# Patient Record
Sex: FEMALE | Race: WHITE | Employment: OTHER | URBAN - NONMETROPOLITAN AREA
[De-identification: names, ages, dates, MRNs, and addresses within clinical notes are randomized per-mention and may not be internally consistent; named-entity substitution may affect disease eponyms.]

---

## 2017-01-10 ENCOUNTER — TELEPHONE (OUTPATIENT)
Dept: NEUROLOGY | Age: 42
End: 2017-01-10

## 2017-01-24 ENCOUNTER — OFFICE VISIT (OUTPATIENT)
Dept: NEUROLOGY | Age: 42
End: 2017-01-24
Payer: MEDICARE

## 2017-01-24 VITALS
HEIGHT: 60 IN | WEIGHT: 180 LBS | RESPIRATION RATE: 16 BRPM | BODY MASS INDEX: 35.34 KG/M2 | DIASTOLIC BLOOD PRESSURE: 75 MMHG | SYSTOLIC BLOOD PRESSURE: 102 MMHG | HEART RATE: 80 BPM

## 2017-01-24 DIAGNOSIS — G47.33 OBSTRUCTIVE SLEEP APNEA: ICD-10-CM

## 2017-01-24 DIAGNOSIS — Z78.9 DIFFICULTY WITH BIPAP USE: ICD-10-CM

## 2017-01-24 DIAGNOSIS — G40.219 LOCALZ-RLTD SYMPTOMATIC EPILEPSY W CMPLX PART SZ, INTRACT, WO STATUS (HCC): Primary | ICD-10-CM

## 2017-01-24 PROCEDURE — G8419 CALC BMI OUT NRM PARAM NOF/U: HCPCS | Performed by: PHYSICIAN ASSISTANT

## 2017-01-24 PROCEDURE — G8427 DOCREV CUR MEDS BY ELIG CLIN: HCPCS | Performed by: PHYSICIAN ASSISTANT

## 2017-01-24 PROCEDURE — G8484 FLU IMMUNIZE NO ADMIN: HCPCS | Performed by: PHYSICIAN ASSISTANT

## 2017-01-24 PROCEDURE — 4004F PT TOBACCO SCREEN RCVD TLK: CPT | Performed by: PHYSICIAN ASSISTANT

## 2017-01-24 PROCEDURE — 99213 OFFICE O/P EST LOW 20 MIN: CPT | Performed by: PHYSICIAN ASSISTANT

## 2017-01-24 RX ORDER — GABAPENTIN 600 MG/1
300 TABLET ORAL DAILY
COMMUNITY

## 2017-02-04 ENCOUNTER — TELEPHONE (OUTPATIENT)
Dept: URGENT CARE | Facility: CLINIC | Age: 42
End: 2017-02-04

## 2017-02-04 NOTE — TELEPHONE ENCOUNTER
"Pt called to say she is having some spotting and has a h/o hysterectomy and her DR is not in today. I told pt she should call her OB/GYN on Monday to get appt and relax this weekend. Use tylenol for pain avoid NSAID. Pt to go to the ER if abd pain is severe, fevers, or bleeding increases. Pt then asks if it is possible to get a blood clot while on blood thinners, I explained it is possible if blood thinners are not in therapeutic range. Pt then states that her calf is \"killing her\". I told pt to go to the ER  "

## 2017-02-08 ENCOUNTER — HOSPITAL ENCOUNTER (INPATIENT)
Age: 42
LOS: 6 days | Discharge: HOME | DRG: 813 | End: 2017-02-14
Attending: EMERGENCY MEDICINE | Admitting: HOSPITALIST
Payer: MEDICARE

## 2017-02-08 DIAGNOSIS — D68.32 WARFARIN-INDUCED COAGULOPATHY (HCC): Primary | ICD-10-CM

## 2017-02-08 DIAGNOSIS — T45.515A WARFARIN-INDUCED COAGULOPATHY (HCC): Primary | ICD-10-CM

## 2017-02-08 LAB
ALBUMIN SERPL-MCNC: 3.8 G/DL (ref 3.5–5.2)
ALP BLD-CCNC: 119 U/L (ref 35–104)
ALT SERPL-CCNC: 21 U/L (ref 5–33)
ANION GAP SERPL CALCULATED.3IONS-SCNC: 12 MMOL/L (ref 7–19)
ANISOCYTOSIS: ABNORMAL
AST SERPL-CCNC: 16 U/L (ref 5–32)
BASOPHILS ABSOLUTE: 0 K/UL (ref 0–0.2)
BASOPHILS RELATIVE PERCENT: 0 % (ref 0–1)
BILIRUB SERPL-MCNC: <0.2 MG/DL (ref 0.2–1.2)
BUN BLDV-MCNC: 9 MG/DL (ref 6–20)
CALCIUM SERPL-MCNC: 8.9 MG/DL (ref 8.6–10)
CHLORIDE BLD-SCNC: 104 MMOL/L (ref 98–111)
CO2: 23 MMOL/L (ref 22–29)
CREAT SERPL-MCNC: 0.6 MG/DL (ref 0.5–0.9)
EOSINOPHILS ABSOLUTE: 0.55 K/UL (ref 0–0.6)
EOSINOPHILS RELATIVE PERCENT: 4 % (ref 0–5)
GFR NON-AFRICAN AMERICAN: >60
GLOBULIN: 2.7 G/DL
GLUCOSE BLD-MCNC: 99 MG/DL (ref 74–109)
HCT VFR BLD CALC: 42.6 % (ref 37–47)
HEMOGLOBIN: 13.3 G/DL (ref 12–16)
INR BLD: >18.8 (ref 0.88–1.18)
LYMPHOCYTES ABSOLUTE: 2.9 K/UL (ref 1.1–4.5)
LYMPHOCYTES RELATIVE PERCENT: 21 % (ref 20–40)
MCH RBC QN AUTO: 31.5 PG (ref 27–31)
MCHC RBC AUTO-ENTMCNC: 31.2 G/DL (ref 33–37)
MCV RBC AUTO: 100.9 FL (ref 81–99)
MONOCYTES ABSOLUTE: 0.3 K/UL (ref 0–0.9)
MONOCYTES RELATIVE PERCENT: 2 % (ref 0–10)
NEUTROPHILS ABSOLUTE: 10.1 K/UL (ref 1.5–7.5)
NEUTROPHILS RELATIVE PERCENT: 73 % (ref 50–65)
PDW BLD-RTO: 14.8 % (ref 11.5–14.5)
PLATELET # BLD: 249 K/UL (ref 130–400)
PLATELET SLIDE REVIEW: ADEQUATE
PMV BLD AUTO: 9.9 FL (ref 7.4–10.4)
POTASSIUM SERPL-SCNC: 3.7 MMOL/L (ref 3.5–5)
PROTHROMBIN TIME: >120 SEC (ref 12–14.6)
RBC # BLD: 4.22 M/UL (ref 4.2–5.4)
SODIUM BLD-SCNC: 139 MMOL/L (ref 136–145)
TEAR DROP CELLS: ABNORMAL
TOTAL PROTEIN: 6.5 G/DL (ref 6.6–8.7)
WBC # BLD: 13.8 K/UL (ref 4.8–10.8)

## 2017-02-08 PROCEDURE — 99285 EMERGENCY DEPT VISIT HI MDM: CPT

## 2017-02-08 PROCEDURE — 2580000003 HC RX 258: Performed by: EMERGENCY MEDICINE

## 2017-02-08 PROCEDURE — 85610 PROTHROMBIN TIME: CPT

## 2017-02-08 PROCEDURE — 99999 PR OFFICE/OUTPT VISIT,PROCEDURE ONLY: CPT | Performed by: CLINICAL NURSE SPECIALIST

## 2017-02-08 PROCEDURE — 36430 TRANSFUSION BLD/BLD COMPNT: CPT

## 2017-02-08 PROCEDURE — 80053 COMPREHEN METABOLIC PANEL: CPT

## 2017-02-08 PROCEDURE — 85025 COMPLETE CBC W/AUTO DIFF WBC: CPT

## 2017-02-08 PROCEDURE — 36415 COLL VENOUS BLD VENIPUNCTURE: CPT

## 2017-02-08 PROCEDURE — 6360000002 HC RX W HCPCS: Performed by: EMERGENCY MEDICINE

## 2017-02-08 PROCEDURE — 99283 EMERGENCY DEPT VISIT LOW MDM: CPT | Performed by: EMERGENCY MEDICINE

## 2017-02-08 PROCEDURE — 96365 THER/PROPH/DIAG IV INF INIT: CPT

## 2017-02-08 PROCEDURE — 1210000000 HC MED SURG R&B

## 2017-02-08 RX ORDER — FLUOXETINE HYDROCHLORIDE 40 MG/1
40 CAPSULE ORAL DAILY
COMMUNITY

## 2017-02-08 RX ORDER — ONDANSETRON 4 MG/1
4 TABLET, ORALLY DISINTEGRATING ORAL ONCE
Status: COMPLETED | OUTPATIENT
Start: 2017-02-08 | End: 2017-02-08

## 2017-02-08 RX ORDER — 0.9 % SODIUM CHLORIDE 0.9 %
250 INTRAVENOUS SOLUTION INTRAVENOUS ONCE
Status: COMPLETED | OUTPATIENT
Start: 2017-02-08 | End: 2017-02-09

## 2017-02-08 RX ADMIN — SODIUM CHLORIDE 250 ML: 9 INJECTION, SOLUTION INTRAVENOUS at 22:39

## 2017-02-08 RX ADMIN — PHYTONADIONE 10 MG: 10 INJECTION, EMULSION INTRAMUSCULAR; INTRAVENOUS; SUBCUTANEOUS at 23:17

## 2017-02-08 RX ADMIN — ONDANSETRON 4 MG: 4 TABLET, ORALLY DISINTEGRATING ORAL at 22:00

## 2017-02-08 ASSESSMENT — PAIN SCALES - GENERAL: PAINLEVEL_OUTOF10: 9

## 2017-02-09 PROBLEM — G40.909 SEIZURE DISORDER (HCC): Chronic | Status: ACTIVE | Noted: 2017-02-09

## 2017-02-09 PROBLEM — D68.59 HYPERCOAGULOPATHY (HCC): Status: ACTIVE | Noted: 2017-02-09

## 2017-02-09 LAB
ABO/RH: NORMAL
ANION GAP SERPL CALCULATED.3IONS-SCNC: 14 MMOL/L (ref 7–19)
ANTIBODY SCREEN: NORMAL
BLOOD BANK DISPENSE STATUS: NORMAL
BLOOD BANK PRODUCT CODE: NORMAL
BPU ID: NORMAL
BUN BLDV-MCNC: 8 MG/DL (ref 6–20)
CALCIUM SERPL-MCNC: 9.9 MG/DL (ref 8.6–10)
CHLORIDE BLD-SCNC: 103 MMOL/L (ref 98–111)
CO2: 28 MMOL/L (ref 22–29)
CREAT SERPL-MCNC: 0.7 MG/DL (ref 0.5–0.9)
DESCRIPTION BLOOD BANK: NORMAL
GFR NON-AFRICAN AMERICAN: >60
GLUCOSE BLD-MCNC: 83 MG/DL (ref 74–109)
HCT VFR BLD CALC: 43.1 % (ref 37–47)
HEMOGLOBIN: 13.3 G/DL (ref 12–16)
INR BLD: 1.33 (ref 0.88–1.18)
MAGNESIUM: 2.3 MG/DL (ref 1.6–2.6)
MCH RBC QN AUTO: 31.6 PG (ref 27–31)
MCHC RBC AUTO-ENTMCNC: 30.9 G/DL (ref 33–37)
MCV RBC AUTO: 102.4 FL (ref 81–99)
PDW BLD-RTO: 15 % (ref 11.5–14.5)
PHOSPHORUS: 3.4 MG/DL (ref 2.5–4.5)
PLATELET # BLD: 222 K/UL (ref 130–400)
PMV BLD AUTO: 9.9 FL (ref 7.4–10.4)
POTASSIUM SERPL-SCNC: 3.7 MMOL/L (ref 3.5–5)
PROTHROMBIN TIME: 16.4 SEC (ref 12–14.6)
RBC # BLD: 4.21 M/UL (ref 4.2–5.4)
SODIUM BLD-SCNC: 145 MMOL/L (ref 136–145)
TSH SERPL DL<=0.05 MIU/L-ACNC: 1.87 UIU/ML (ref 0.27–4.2)
WBC # BLD: 9.8 K/UL (ref 4.8–10.8)

## 2017-02-09 PROCEDURE — 80048 BASIC METABOLIC PNL TOTAL CA: CPT

## 2017-02-09 PROCEDURE — 83735 ASSAY OF MAGNESIUM: CPT

## 2017-02-09 PROCEDURE — 85027 COMPLETE CBC AUTOMATED: CPT

## 2017-02-09 PROCEDURE — 1210000000 HC MED SURG R&B

## 2017-02-09 PROCEDURE — 36415 COLL VENOUS BLD VENIPUNCTURE: CPT

## 2017-02-09 PROCEDURE — 99999 PR OFFICE/OUTPT VISIT,PROCEDURE ONLY: CPT | Performed by: HOSPITALIST

## 2017-02-09 PROCEDURE — 85610 PROTHROMBIN TIME: CPT

## 2017-02-09 PROCEDURE — 94640 AIRWAY INHALATION TREATMENT: CPT

## 2017-02-09 PROCEDURE — 6370000000 HC RX 637 (ALT 250 FOR IP): Performed by: HOSPITALIST

## 2017-02-09 PROCEDURE — 86850 RBC ANTIBODY SCREEN: CPT

## 2017-02-09 PROCEDURE — 6360000002 HC RX W HCPCS: Performed by: CLINICAL NURSE SPECIALIST

## 2017-02-09 PROCEDURE — 99223 1ST HOSP IP/OBS HIGH 75: CPT | Performed by: HOSPITALIST

## 2017-02-09 PROCEDURE — 86901 BLOOD TYPING SEROLOGIC RH(D): CPT

## 2017-02-09 PROCEDURE — 6370000000 HC RX 637 (ALT 250 FOR IP): Performed by: CLINICAL NURSE SPECIALIST

## 2017-02-09 PROCEDURE — 36430 TRANSFUSION BLD/BLD COMPNT: CPT

## 2017-02-09 PROCEDURE — 6360000002 HC RX W HCPCS: Performed by: HOSPITALIST

## 2017-02-09 PROCEDURE — 84443 ASSAY THYROID STIM HORMONE: CPT

## 2017-02-09 PROCEDURE — P9059 PLASMA, FRZ BETWEEN 8-24HOUR: HCPCS

## 2017-02-09 PROCEDURE — 84100 ASSAY OF PHOSPHORUS: CPT

## 2017-02-09 PROCEDURE — 86900 BLOOD TYPING SEROLOGIC ABO: CPT

## 2017-02-09 RX ORDER — WARFARIN SODIUM 2.5 MG/1
2.5 TABLET ORAL DAILY
Status: DISCONTINUED | OUTPATIENT
Start: 2017-02-09 | End: 2017-02-09

## 2017-02-09 RX ORDER — LITHIUM CARBONATE 300 MG/1
300 CAPSULE ORAL
Status: DISCONTINUED | OUTPATIENT
Start: 2017-02-09 | End: 2017-02-14 | Stop reason: HOSPADM

## 2017-02-09 RX ORDER — GABAPENTIN 600 MG/1
600 TABLET ORAL 2 TIMES DAILY
Status: DISCONTINUED | OUTPATIENT
Start: 2017-02-09 | End: 2017-02-14 | Stop reason: HOSPADM

## 2017-02-09 RX ORDER — PROMETHAZINE HYDROCHLORIDE 25 MG/1
25 TABLET ORAL 2 TIMES DAILY PRN
Status: DISCONTINUED | OUTPATIENT
Start: 2017-02-09 | End: 2017-02-14 | Stop reason: HOSPADM

## 2017-02-09 RX ORDER — HYDROCODONE BITARTRATE AND ACETAMINOPHEN 7.5; 325 MG/1; MG/1
1 TABLET ORAL EVERY 6 HOURS PRN
Status: DISCONTINUED | OUTPATIENT
Start: 2017-02-09 | End: 2017-02-13

## 2017-02-09 RX ORDER — ONDANSETRON 2 MG/ML
4 INJECTION INTRAMUSCULAR; INTRAVENOUS EVERY 4 HOURS PRN
Status: DISCONTINUED | OUTPATIENT
Start: 2017-02-09 | End: 2017-02-14 | Stop reason: HOSPADM

## 2017-02-09 RX ORDER — TOPIRAMATE 100 MG/1
100 TABLET, FILM COATED ORAL DAILY
Status: DISCONTINUED | OUTPATIENT
Start: 2017-02-09 | End: 2017-02-14 | Stop reason: HOSPADM

## 2017-02-09 RX ORDER — LEVOTHYROXINE SODIUM 0.1 MG/1
100 TABLET ORAL DAILY
Status: DISCONTINUED | OUTPATIENT
Start: 2017-02-09 | End: 2017-02-14 | Stop reason: HOSPADM

## 2017-02-09 RX ORDER — ACETAMINOPHEN 325 MG/1
650 TABLET ORAL EVERY 4 HOURS PRN
Status: DISCONTINUED | OUTPATIENT
Start: 2017-02-09 | End: 2017-02-14 | Stop reason: HOSPADM

## 2017-02-09 RX ORDER — ZOLPIDEM TARTRATE 5 MG/1
10 TABLET ORAL NIGHTLY PRN
Status: DISCONTINUED | OUTPATIENT
Start: 2017-02-09 | End: 2017-02-13

## 2017-02-09 RX ORDER — PANTOPRAZOLE SODIUM 40 MG/1
40 TABLET, DELAYED RELEASE ORAL
Status: DISCONTINUED | OUTPATIENT
Start: 2017-02-09 | End: 2017-02-12

## 2017-02-09 RX ORDER — FLUOXETINE HYDROCHLORIDE 20 MG/1
40 CAPSULE ORAL DAILY
Status: DISCONTINUED | OUTPATIENT
Start: 2017-02-09 | End: 2017-02-14 | Stop reason: HOSPADM

## 2017-02-09 RX ORDER — PANTOPRAZOLE SODIUM 40 MG/10ML
40 INJECTION, POWDER, LYOPHILIZED, FOR SOLUTION INTRAVENOUS DAILY
Status: DISCONTINUED | OUTPATIENT
Start: 2017-02-09 | End: 2017-02-09 | Stop reason: SDUPTHER

## 2017-02-09 RX ORDER — FORMOTEROL FUMARATE 20 UG/2ML
20 SOLUTION RESPIRATORY (INHALATION) 2 TIMES DAILY
Status: DISCONTINUED | OUTPATIENT
Start: 2017-02-09 | End: 2017-02-14 | Stop reason: HOSPADM

## 2017-02-09 RX ORDER — ACARBOSE 25 MG/1
25 TABLET ORAL
Status: DISCONTINUED | OUTPATIENT
Start: 2017-02-09 | End: 2017-02-14 | Stop reason: HOSPADM

## 2017-02-09 RX ORDER — WARFARIN SODIUM 5 MG/1
5 TABLET ORAL DAILY
Status: DISCONTINUED | OUTPATIENT
Start: 2017-02-09 | End: 2017-02-11

## 2017-02-09 RX ORDER — DIAZEPAM 10 MG/1
10 TABLET ORAL 3 TIMES DAILY PRN
Status: DISCONTINUED | OUTPATIENT
Start: 2017-02-09 | End: 2017-02-13

## 2017-02-09 RX ADMIN — ACARBOSE 25 MG: 25 TABLET ORAL at 16:02

## 2017-02-09 RX ADMIN — WARFARIN SODIUM 5 MG: 5 TABLET ORAL at 17:51

## 2017-02-09 RX ADMIN — PANTOPRAZOLE SODIUM 40 MG: 40 TABLET, DELAYED RELEASE ORAL at 07:22

## 2017-02-09 RX ADMIN — LITHIUM CARBONATE 300 MG: 300 CAPSULE, GELATIN COATED ORAL at 12:30

## 2017-02-09 RX ADMIN — LITHIUM CARBONATE 300 MG: 300 CAPSULE, GELATIN COATED ORAL at 16:02

## 2017-02-09 RX ADMIN — HYDROCODONE BITARTRATE AND ACETAMINOPHEN 1 TABLET: 7.5; 325 TABLET ORAL at 07:22

## 2017-02-09 RX ADMIN — LITHIUM CARBONATE 300 MG: 300 CAPSULE, GELATIN COATED ORAL at 07:22

## 2017-02-09 RX ADMIN — LEVOTHYROXINE SODIUM 100 MCG: 100 TABLET ORAL at 07:22

## 2017-02-09 RX ADMIN — DIAZEPAM 10 MG: 10 TABLET ORAL at 10:00

## 2017-02-09 RX ADMIN — GABAPENTIN 600 MG: 600 TABLET, FILM COATED ORAL at 21:12

## 2017-02-09 RX ADMIN — PROMETHAZINE HYDROCHLORIDE 25 MG: 25 TABLET ORAL at 09:56

## 2017-02-09 RX ADMIN — TOPIRAMATE 100 MG: 100 TABLET, FILM COATED ORAL at 09:55

## 2017-02-09 RX ADMIN — ZOLPIDEM TARTRATE 10 MG: 5 TABLET, FILM COATED ORAL at 21:13

## 2017-02-09 RX ADMIN — FORMOTEROL FUMARATE DIHYDRATE 20 MCG: 20 SOLUTION RESPIRATORY (INHALATION) at 19:17

## 2017-02-09 RX ADMIN — FLUOXETINE 40 MG: 20 CAPSULE ORAL at 09:55

## 2017-02-09 RX ADMIN — ENOXAPARIN SODIUM 80 MG: 80 INJECTION SUBCUTANEOUS at 21:12

## 2017-02-09 RX ADMIN — ACARBOSE 25 MG: 25 TABLET ORAL at 12:30

## 2017-02-09 RX ADMIN — GABAPENTIN 600 MG: 600 TABLET, FILM COATED ORAL at 09:55

## 2017-02-09 ASSESSMENT — ENCOUNTER SYMPTOMS
RESPIRATORY NEGATIVE: 1
EYES NEGATIVE: 1
BLOOD IN STOOL: 1

## 2017-02-09 ASSESSMENT — PAIN SCALES - GENERAL: PAINLEVEL_OUTOF10: 6

## 2017-02-10 LAB
ANION GAP SERPL CALCULATED.3IONS-SCNC: 13 MMOL/L (ref 7–19)
BUN BLDV-MCNC: 11 MG/DL (ref 6–20)
CALCIUM SERPL-MCNC: 9.4 MG/DL (ref 8.6–10)
CHLORIDE BLD-SCNC: 105 MMOL/L (ref 98–111)
CO2: 26 MMOL/L (ref 22–29)
CREAT SERPL-MCNC: 0.8 MG/DL (ref 0.5–0.9)
GFR NON-AFRICAN AMERICAN: >60
GLUCOSE BLD-MCNC: 68 MG/DL (ref 74–109)
HCT VFR BLD CALC: 41.9 % (ref 37–47)
HEMOGLOBIN: 12.7 G/DL (ref 12–16)
INR BLD: 1.19 (ref 0.88–1.18)
LITHIUM LEVEL: 0.7 MMOL/L (ref 0.6–1.2)
MAGNESIUM: 2.2 MG/DL (ref 1.6–2.6)
MCH RBC QN AUTO: 31.1 PG (ref 27–31)
MCHC RBC AUTO-ENTMCNC: 30.3 G/DL (ref 33–37)
MCV RBC AUTO: 102.7 FL (ref 81–99)
PDW BLD-RTO: 14.7 % (ref 11.5–14.5)
PLATELET # BLD: 226 K/UL (ref 130–400)
PMV BLD AUTO: 9.9 FL (ref 7.4–10.4)
POTASSIUM SERPL-SCNC: 4 MMOL/L (ref 3.5–5)
PROTHROMBIN TIME: 15 SEC (ref 12–14.6)
RBC # BLD: 4.08 M/UL (ref 4.2–5.4)
SODIUM BLD-SCNC: 144 MMOL/L (ref 136–145)
WBC # BLD: 9.2 K/UL (ref 4.8–10.8)

## 2017-02-10 PROCEDURE — 6370000000 HC RX 637 (ALT 250 FOR IP): Performed by: CLINICAL NURSE SPECIALIST

## 2017-02-10 PROCEDURE — 6360000002 HC RX W HCPCS: Performed by: CLINICAL NURSE SPECIALIST

## 2017-02-10 PROCEDURE — 85027 COMPLETE CBC AUTOMATED: CPT

## 2017-02-10 PROCEDURE — G8978 MOBILITY CURRENT STATUS: HCPCS

## 2017-02-10 PROCEDURE — 83735 ASSAY OF MAGNESIUM: CPT

## 2017-02-10 PROCEDURE — G8979 MOBILITY GOAL STATUS: HCPCS

## 2017-02-10 PROCEDURE — 97162 PT EVAL MOD COMPLEX 30 MIN: CPT

## 2017-02-10 PROCEDURE — 94640 AIRWAY INHALATION TREATMENT: CPT

## 2017-02-10 PROCEDURE — 2700000000 HC OXYGEN THERAPY PER DAY

## 2017-02-10 PROCEDURE — 85610 PROTHROMBIN TIME: CPT

## 2017-02-10 PROCEDURE — 99232 SBSQ HOSP IP/OBS MODERATE 35: CPT | Performed by: INTERNAL MEDICINE

## 2017-02-10 PROCEDURE — 6360000002 HC RX W HCPCS: Performed by: PHYSICIAN ASSISTANT

## 2017-02-10 PROCEDURE — 80048 BASIC METABOLIC PNL TOTAL CA: CPT

## 2017-02-10 PROCEDURE — 6360000002 HC RX W HCPCS: Performed by: HOSPITALIST

## 2017-02-10 PROCEDURE — 2500000003 HC RX 250 WO HCPCS: Performed by: PHYSICIAN ASSISTANT

## 2017-02-10 PROCEDURE — 1210000000 HC MED SURG R&B

## 2017-02-10 PROCEDURE — 80178 ASSAY OF LITHIUM: CPT

## 2017-02-10 PROCEDURE — 2580000003 HC RX 258: Performed by: PHYSICIAN ASSISTANT

## 2017-02-10 PROCEDURE — 36415 COLL VENOUS BLD VENIPUNCTURE: CPT

## 2017-02-10 RX ADMIN — FLUOXETINE 40 MG: 20 CAPSULE ORAL at 08:46

## 2017-02-10 RX ADMIN — LITHIUM CARBONATE 300 MG: 300 CAPSULE, GELATIN COATED ORAL at 08:46

## 2017-02-10 RX ADMIN — ENOXAPARIN SODIUM 80 MG: 80 INJECTION SUBCUTANEOUS at 20:52

## 2017-02-10 RX ADMIN — HYDROCODONE BITARTRATE AND ACETAMINOPHEN 1 TABLET: 7.5; 325 TABLET ORAL at 05:25

## 2017-02-10 RX ADMIN — HYDROCODONE BITARTRATE AND ACETAMINOPHEN 1 TABLET: 7.5; 325 TABLET ORAL at 15:38

## 2017-02-10 RX ADMIN — ENOXAPARIN SODIUM 80 MG: 80 INJECTION SUBCUTANEOUS at 08:46

## 2017-02-10 RX ADMIN — GABAPENTIN 600 MG: 600 TABLET, FILM COATED ORAL at 20:52

## 2017-02-10 RX ADMIN — MAGNESIUM SULFATE HEPTAHYDRATE 1 G: 1 INJECTION, SOLUTION INTRAVENOUS at 17:02

## 2017-02-10 RX ADMIN — PANTOPRAZOLE SODIUM 40 MG: 40 TABLET, DELAYED RELEASE ORAL at 05:24

## 2017-02-10 RX ADMIN — ZOLPIDEM TARTRATE 10 MG: 5 TABLET, FILM COATED ORAL at 22:58

## 2017-02-10 RX ADMIN — LEVOTHYROXINE SODIUM 100 MCG: 100 TABLET ORAL at 05:25

## 2017-02-10 RX ADMIN — HYDROCODONE BITARTRATE AND ACETAMINOPHEN 1 TABLET: 7.5; 325 TABLET ORAL at 22:55

## 2017-02-10 RX ADMIN — ACARBOSE 25 MG: 25 TABLET ORAL at 13:13

## 2017-02-10 RX ADMIN — HYDROCODONE BITARTRATE AND ACETAMINOPHEN 1 TABLET: 7.5; 325 TABLET ORAL at 08:45

## 2017-02-10 RX ADMIN — GABAPENTIN 600 MG: 600 TABLET, FILM COATED ORAL at 08:45

## 2017-02-10 RX ADMIN — ACARBOSE 25 MG: 25 TABLET ORAL at 08:45

## 2017-02-10 RX ADMIN — VALPROATE SODIUM 250 MG: 100 INJECTION, SOLUTION INTRAVENOUS at 15:37

## 2017-02-10 RX ADMIN — TOPIRAMATE 100 MG: 100 TABLET, FILM COATED ORAL at 08:45

## 2017-02-10 RX ADMIN — LITHIUM CARBONATE 300 MG: 300 CAPSULE, GELATIN COATED ORAL at 13:13

## 2017-02-10 RX ADMIN — ONDANSETRON 4 MG: 2 INJECTION INTRAMUSCULAR; INTRAVENOUS at 08:46

## 2017-02-10 RX ADMIN — FORMOTEROL FUMARATE DIHYDRATE 20 MCG: 20 SOLUTION RESPIRATORY (INHALATION) at 18:26

## 2017-02-10 RX ADMIN — FORMOTEROL FUMARATE DIHYDRATE 20 MCG: 20 SOLUTION RESPIRATORY (INHALATION) at 06:31

## 2017-02-10 ASSESSMENT — PAIN SCALES - GENERAL
PAINLEVEL_OUTOF10: 8
PAINLEVEL_OUTOF10: 9
PAINLEVEL_OUTOF10: 0
PAINLEVEL_OUTOF10: 0
PAINLEVEL_OUTOF10: 10
PAINLEVEL_OUTOF10: 8

## 2017-02-11 LAB
ANION GAP SERPL CALCULATED.3IONS-SCNC: 15 MMOL/L (ref 7–19)
BUN BLDV-MCNC: 9 MG/DL (ref 6–20)
CALCIUM SERPL-MCNC: 9.1 MG/DL (ref 8.6–10)
CHLORIDE BLD-SCNC: 104 MMOL/L (ref 98–111)
CO2: 24 MMOL/L (ref 22–29)
CREAT SERPL-MCNC: 0.8 MG/DL (ref 0.5–0.9)
GFR NON-AFRICAN AMERICAN: >60
GLUCOSE BLD-MCNC: 85 MG/DL (ref 74–109)
HCT VFR BLD CALC: 39.6 % (ref 37–47)
HEMOGLOBIN: 12.1 G/DL (ref 12–16)
INR BLD: 1.12 (ref 0.88–1.18)
MCH RBC QN AUTO: 31.6 PG (ref 27–31)
MCHC RBC AUTO-ENTMCNC: 30.6 G/DL (ref 33–37)
MCV RBC AUTO: 103.4 FL (ref 81–99)
PDW BLD-RTO: 14.7 % (ref 11.5–14.5)
PLATELET # BLD: 238 K/UL (ref 130–400)
PMV BLD AUTO: 9.8 FL (ref 7.4–10.4)
POTASSIUM SERPL-SCNC: 4.2 MMOL/L (ref 3.5–5)
PROTHROMBIN TIME: 14.4 SEC (ref 12–14.6)
RBC # BLD: 3.83 M/UL (ref 4.2–5.4)
SODIUM BLD-SCNC: 143 MMOL/L (ref 136–145)
WBC # BLD: 9.1 K/UL (ref 4.8–10.8)

## 2017-02-11 PROCEDURE — 85610 PROTHROMBIN TIME: CPT

## 2017-02-11 PROCEDURE — 94640 AIRWAY INHALATION TREATMENT: CPT

## 2017-02-11 PROCEDURE — 97110 THERAPEUTIC EXERCISES: CPT

## 2017-02-11 PROCEDURE — 97116 GAIT TRAINING THERAPY: CPT

## 2017-02-11 PROCEDURE — 36415 COLL VENOUS BLD VENIPUNCTURE: CPT

## 2017-02-11 PROCEDURE — 1210000000 HC MED SURG R&B

## 2017-02-11 PROCEDURE — 6370000000 HC RX 637 (ALT 250 FOR IP): Performed by: CLINICAL NURSE SPECIALIST

## 2017-02-11 PROCEDURE — 99232 SBSQ HOSP IP/OBS MODERATE 35: CPT | Performed by: INTERNAL MEDICINE

## 2017-02-11 PROCEDURE — 85027 COMPLETE CBC AUTOMATED: CPT

## 2017-02-11 PROCEDURE — 6360000002 HC RX W HCPCS: Performed by: HOSPITALIST

## 2017-02-11 PROCEDURE — 80048 BASIC METABOLIC PNL TOTAL CA: CPT

## 2017-02-11 PROCEDURE — 6360000002 HC RX W HCPCS: Performed by: CLINICAL NURSE SPECIALIST

## 2017-02-11 PROCEDURE — 6370000000 HC RX 637 (ALT 250 FOR IP): Performed by: PHYSICIAN ASSISTANT

## 2017-02-11 PROCEDURE — 2700000000 HC OXYGEN THERAPY PER DAY

## 2017-02-11 RX ORDER — ALBUTEROL SULFATE 90 UG/1
2 AEROSOL, METERED RESPIRATORY (INHALATION) EVERY 6 HOURS PRN
Status: DISCONTINUED | OUTPATIENT
Start: 2017-02-11 | End: 2017-02-14 | Stop reason: HOSPADM

## 2017-02-11 RX ORDER — WARFARIN SODIUM 5 MG/1
10 TABLET ORAL DAILY
Status: DISCONTINUED | OUTPATIENT
Start: 2017-02-11 | End: 2017-02-14 | Stop reason: HOSPADM

## 2017-02-11 RX ADMIN — LEVOTHYROXINE SODIUM 100 MCG: 100 TABLET ORAL at 05:28

## 2017-02-11 RX ADMIN — FORMOTEROL FUMARATE DIHYDRATE 20 MCG: 20 SOLUTION RESPIRATORY (INHALATION) at 18:59

## 2017-02-11 RX ADMIN — LITHIUM CARBONATE 300 MG: 300 CAPSULE, GELATIN COATED ORAL at 18:47

## 2017-02-11 RX ADMIN — HYDROCODONE BITARTRATE AND ACETAMINOPHEN 1 TABLET: 7.5; 325 TABLET ORAL at 20:30

## 2017-02-11 RX ADMIN — ENOXAPARIN SODIUM 80 MG: 80 INJECTION SUBCUTANEOUS at 08:32

## 2017-02-11 RX ADMIN — WARFARIN SODIUM 10 MG: 5 TABLET ORAL at 18:47

## 2017-02-11 RX ADMIN — TOPIRAMATE 100 MG: 100 TABLET, FILM COATED ORAL at 08:32

## 2017-02-11 RX ADMIN — ZOLPIDEM TARTRATE 10 MG: 5 TABLET, FILM COATED ORAL at 20:30

## 2017-02-11 RX ADMIN — ENOXAPARIN SODIUM 80 MG: 80 INJECTION SUBCUTANEOUS at 20:19

## 2017-02-11 RX ADMIN — FORMOTEROL FUMARATE DIHYDRATE 20 MCG: 20 SOLUTION RESPIRATORY (INHALATION) at 07:04

## 2017-02-11 RX ADMIN — GABAPENTIN 600 MG: 600 TABLET, FILM COATED ORAL at 20:19

## 2017-02-11 RX ADMIN — GABAPENTIN 600 MG: 600 TABLET, FILM COATED ORAL at 08:32

## 2017-02-11 RX ADMIN — LITHIUM CARBONATE 300 MG: 300 CAPSULE, GELATIN COATED ORAL at 12:49

## 2017-02-11 RX ADMIN — LITHIUM CARBONATE 300 MG: 300 CAPSULE, GELATIN COATED ORAL at 08:32

## 2017-02-11 RX ADMIN — PANTOPRAZOLE SODIUM 40 MG: 40 TABLET, DELAYED RELEASE ORAL at 05:28

## 2017-02-11 RX ADMIN — FLUOXETINE 40 MG: 20 CAPSULE ORAL at 08:32

## 2017-02-11 RX ADMIN — HYDROCODONE BITARTRATE AND ACETAMINOPHEN 1 TABLET: 7.5; 325 TABLET ORAL at 13:53

## 2017-02-11 RX ADMIN — ACARBOSE 25 MG: 25 TABLET ORAL at 08:32

## 2017-02-11 RX ADMIN — DIAZEPAM 10 MG: 10 TABLET ORAL at 20:30

## 2017-02-11 RX ADMIN — ACARBOSE 25 MG: 25 TABLET ORAL at 12:49

## 2017-02-11 ASSESSMENT — PAIN SCALES - GENERAL
PAINLEVEL_OUTOF10: 10
PAINLEVEL_OUTOF10: 8
PAINLEVEL_OUTOF10: 0
PAINLEVEL_OUTOF10: 10
PAINLEVEL_OUTOF10: 0

## 2017-02-12 LAB
ANION GAP SERPL CALCULATED.3IONS-SCNC: 9 MMOL/L (ref 7–19)
BILIRUBIN URINE: NEGATIVE
BLOOD, URINE: ABNORMAL
BUN BLDV-MCNC: 11 MG/DL (ref 6–20)
CALCIUM SERPL-MCNC: 9.8 MG/DL (ref 8.6–10)
CHLORIDE BLD-SCNC: 102 MMOL/L (ref 98–111)
CLARITY: ABNORMAL
CO2: 30 MMOL/L (ref 22–29)
COLOR: YELLOW
CREAT SERPL-MCNC: 1 MG/DL (ref 0.5–0.9)
GFR NON-AFRICAN AMERICAN: >60
GLUCOSE BLD-MCNC: 97 MG/DL (ref 74–109)
GLUCOSE URINE: NEGATIVE MG/DL
HCT VFR BLD CALC: 41.2 % (ref 37–47)
HEMOGLOBIN: 12.6 G/DL (ref 12–16)
INR BLD: 1.1 (ref 0.88–1.18)
KETONES, URINE: NEGATIVE MG/DL
LEUKOCYTE ESTERASE, URINE: NEGATIVE
LITHIUM LEVEL: 0.9 MMOL/L (ref 0.6–1.2)
MCH RBC QN AUTO: 31.7 PG (ref 27–31)
MCHC RBC AUTO-ENTMCNC: 30.6 G/DL (ref 33–37)
MCV RBC AUTO: 103.5 FL (ref 81–99)
NITRITE, URINE: NEGATIVE
PDW BLD-RTO: 14.6 % (ref 11.5–14.5)
PH UA: 7
PLATELET # BLD: 236 K/UL (ref 130–400)
PMV BLD AUTO: 9.6 FL (ref 7.4–10.4)
POTASSIUM SERPL-SCNC: 4.4 MMOL/L (ref 3.5–5)
PROTEIN UA: NEGATIVE MG/DL
PROTHROMBIN TIME: 14.2 SEC (ref 12–14.6)
RBC # BLD: 3.98 M/UL (ref 4.2–5.4)
RBC UA: ABNORMAL /HPF (ref 0–2)
SODIUM BLD-SCNC: 141 MMOL/L (ref 136–145)
SPECIFIC GRAVITY UA: 1.01
TROPONIN: <0.01 NG/ML (ref 0–0.03)
TROPONIN: <0.01 NG/ML (ref 0–0.03)
UROBILINOGEN, URINE: 1 E.U./DL
WBC # BLD: 11.1 K/UL (ref 4.8–10.8)

## 2017-02-12 PROCEDURE — 6370000000 HC RX 637 (ALT 250 FOR IP): Performed by: PHYSICIAN ASSISTANT

## 2017-02-12 PROCEDURE — 80048 BASIC METABOLIC PNL TOTAL CA: CPT

## 2017-02-12 PROCEDURE — 2700000000 HC OXYGEN THERAPY PER DAY

## 2017-02-12 PROCEDURE — 94640 AIRWAY INHALATION TREATMENT: CPT

## 2017-02-12 PROCEDURE — 80178 ASSAY OF LITHIUM: CPT

## 2017-02-12 PROCEDURE — 93005 ELECTROCARDIOGRAM TRACING: CPT

## 2017-02-12 PROCEDURE — 6360000002 HC RX W HCPCS: Performed by: HOSPITALIST

## 2017-02-12 PROCEDURE — 36415 COLL VENOUS BLD VENIPUNCTURE: CPT

## 2017-02-12 PROCEDURE — 6370000000 HC RX 637 (ALT 250 FOR IP): Performed by: CLINICAL NURSE SPECIALIST

## 2017-02-12 PROCEDURE — 81001 URINALYSIS AUTO W/SCOPE: CPT

## 2017-02-12 PROCEDURE — 1210000000 HC MED SURG R&B

## 2017-02-12 PROCEDURE — 6360000002 HC RX W HCPCS: Performed by: CLINICAL NURSE SPECIALIST

## 2017-02-12 PROCEDURE — 85027 COMPLETE CBC AUTOMATED: CPT

## 2017-02-12 PROCEDURE — 6370000000 HC RX 637 (ALT 250 FOR IP): Performed by: HOSPITALIST

## 2017-02-12 PROCEDURE — 99233 SBSQ HOSP IP/OBS HIGH 50: CPT | Performed by: HOSPITALIST

## 2017-02-12 PROCEDURE — 84484 ASSAY OF TROPONIN QUANT: CPT

## 2017-02-12 PROCEDURE — 85610 PROTHROMBIN TIME: CPT

## 2017-02-12 RX ORDER — SUCRALFATE ORAL 1 G/10ML
1 SUSPENSION ORAL
Status: DISCONTINUED | OUTPATIENT
Start: 2017-02-12 | End: 2017-02-14 | Stop reason: HOSPADM

## 2017-02-12 RX ORDER — PANTOPRAZOLE SODIUM 40 MG/1
40 TABLET, DELAYED RELEASE ORAL
Status: DISCONTINUED | OUTPATIENT
Start: 2017-02-12 | End: 2017-02-14 | Stop reason: HOSPADM

## 2017-02-12 RX ORDER — POLYETHYLENE GLYCOL 3350 17 G/17G
17 POWDER, FOR SOLUTION ORAL DAILY
Status: DISCONTINUED | OUTPATIENT
Start: 2017-02-12 | End: 2017-02-14 | Stop reason: HOSPADM

## 2017-02-12 RX ADMIN — SUCRALFATE 1 G: 1 SUSPENSION ORAL at 17:07

## 2017-02-12 RX ADMIN — DIAZEPAM 10 MG: 10 TABLET ORAL at 10:55

## 2017-02-12 RX ADMIN — LEVOTHYROXINE SODIUM 100 MCG: 100 TABLET ORAL at 05:21

## 2017-02-12 RX ADMIN — FLUOXETINE 40 MG: 20 CAPSULE ORAL at 08:51

## 2017-02-12 RX ADMIN — LITHIUM CARBONATE 300 MG: 300 CAPSULE, GELATIN COATED ORAL at 17:06

## 2017-02-12 RX ADMIN — WARFARIN SODIUM 10 MG: 5 TABLET ORAL at 18:36

## 2017-02-12 RX ADMIN — LITHIUM CARBONATE 300 MG: 300 CAPSULE, GELATIN COATED ORAL at 07:36

## 2017-02-12 RX ADMIN — HYDROCODONE BITARTRATE AND ACETAMINOPHEN 1 TABLET: 7.5; 325 TABLET ORAL at 17:06

## 2017-02-12 RX ADMIN — GABAPENTIN 600 MG: 600 TABLET, FILM COATED ORAL at 20:27

## 2017-02-12 RX ADMIN — TOPIRAMATE 100 MG: 100 TABLET, FILM COATED ORAL at 08:51

## 2017-02-12 RX ADMIN — PANTOPRAZOLE SODIUM 40 MG: 40 TABLET, DELAYED RELEASE ORAL at 05:21

## 2017-02-12 RX ADMIN — SUCRALFATE 1 G: 1 SUSPENSION ORAL at 12:25

## 2017-02-12 RX ADMIN — FORMOTEROL FUMARATE DIHYDRATE 20 MCG: 20 SOLUTION RESPIRATORY (INHALATION) at 06:17

## 2017-02-12 RX ADMIN — SUCRALFATE 1 G: 1 SUSPENSION ORAL at 20:27

## 2017-02-12 RX ADMIN — ENOXAPARIN SODIUM 90 MG: 100 INJECTION SUBCUTANEOUS at 08:52

## 2017-02-12 RX ADMIN — POLYETHYLENE GLYCOL 3350 17 G: 17 POWDER, FOR SOLUTION ORAL at 09:07

## 2017-02-12 RX ADMIN — ACARBOSE 25 MG: 25 TABLET ORAL at 07:36

## 2017-02-12 RX ADMIN — HYDROCODONE BITARTRATE AND ACETAMINOPHEN 1 TABLET: 7.5; 325 TABLET ORAL at 10:55

## 2017-02-12 RX ADMIN — FORMOTEROL FUMARATE DIHYDRATE 20 MCG: 20 SOLUTION RESPIRATORY (INHALATION) at 19:09

## 2017-02-12 RX ADMIN — ENOXAPARIN SODIUM 90 MG: 100 INJECTION SUBCUTANEOUS at 20:27

## 2017-02-12 RX ADMIN — GABAPENTIN 600 MG: 600 TABLET, FILM COATED ORAL at 08:52

## 2017-02-12 RX ADMIN — ACARBOSE 25 MG: 25 TABLET ORAL at 17:06

## 2017-02-12 RX ADMIN — PANTOPRAZOLE SODIUM 40 MG: 40 TABLET, DELAYED RELEASE ORAL at 17:07

## 2017-02-12 RX ADMIN — HYDROCODONE BITARTRATE AND ACETAMINOPHEN 1 TABLET: 7.5; 325 TABLET ORAL at 05:22

## 2017-02-12 RX ADMIN — ACARBOSE 25 MG: 25 TABLET ORAL at 12:25

## 2017-02-12 RX ADMIN — LITHIUM CARBONATE 300 MG: 300 CAPSULE, GELATIN COATED ORAL at 12:25

## 2017-02-12 ASSESSMENT — PAIN SCALES - GENERAL
PAINLEVEL_OUTOF10: 10
PAINLEVEL_OUTOF10: 10
PAINLEVEL_OUTOF10: 0
PAINLEVEL_OUTOF10: 9

## 2017-02-13 LAB
AMPHETAMINE SCREEN, URINE: NEGATIVE
BARBITURATE SCREEN URINE: NEGATIVE
BENZODIAZEPINE SCREEN, URINE: POSITIVE
CANNABINOID SCREEN URINE: NEGATIVE
COCAINE METABOLITE SCREEN URINE: NEGATIVE
INR BLD: 1.33 (ref 0.88–1.18)
Lab: ABNORMAL
OCCULT BLOOD SCREENING: NORMAL
OPIATE SCREEN URINE: POSITIVE
PROTHROMBIN TIME: 16.4 SEC (ref 12–14.6)
TROPONIN: <0.01 NG/ML (ref 0–0.03)
TROPONIN: <0.01 NG/ML (ref 0–0.03)

## 2017-02-13 PROCEDURE — 6360000002 HC RX W HCPCS: Performed by: CLINICAL NURSE SPECIALIST

## 2017-02-13 PROCEDURE — 6370000000 HC RX 637 (ALT 250 FOR IP): Performed by: CLINICAL NURSE SPECIALIST

## 2017-02-13 PROCEDURE — 94640 AIRWAY INHALATION TREATMENT: CPT

## 2017-02-13 PROCEDURE — 6360000002 HC RX W HCPCS: Performed by: HOSPITALIST

## 2017-02-13 PROCEDURE — 6370000000 HC RX 637 (ALT 250 FOR IP): Performed by: HOSPITALIST

## 2017-02-13 PROCEDURE — 1210000000 HC MED SURG R&B

## 2017-02-13 PROCEDURE — 2700000000 HC OXYGEN THERAPY PER DAY

## 2017-02-13 PROCEDURE — 80307 DRUG TEST PRSMV CHEM ANLYZR: CPT

## 2017-02-13 PROCEDURE — 99233 SBSQ HOSP IP/OBS HIGH 50: CPT | Performed by: HOSPITALIST

## 2017-02-13 PROCEDURE — 6370000000 HC RX 637 (ALT 250 FOR IP): Performed by: PHYSICIAN ASSISTANT

## 2017-02-13 PROCEDURE — 85610 PROTHROMBIN TIME: CPT

## 2017-02-13 PROCEDURE — 36415 COLL VENOUS BLD VENIPUNCTURE: CPT

## 2017-02-13 PROCEDURE — 84484 ASSAY OF TROPONIN QUANT: CPT

## 2017-02-13 PROCEDURE — G0328 FECAL BLOOD SCRN IMMUNOASSAY: HCPCS

## 2017-02-13 RX ORDER — DIAZEPAM 5 MG/1
5 TABLET ORAL 3 TIMES DAILY PRN
Status: DISCONTINUED | OUTPATIENT
Start: 2017-02-13 | End: 2017-02-14 | Stop reason: HOSPADM

## 2017-02-13 RX ORDER — ZOLPIDEM TARTRATE 5 MG/1
10 TABLET ORAL NIGHTLY PRN
Status: DISCONTINUED | OUTPATIENT
Start: 2017-02-13 | End: 2017-02-14 | Stop reason: HOSPADM

## 2017-02-13 RX ADMIN — ACARBOSE 25 MG: 25 TABLET ORAL at 07:21

## 2017-02-13 RX ADMIN — WARFARIN SODIUM 10 MG: 5 TABLET ORAL at 17:13

## 2017-02-13 RX ADMIN — PROMETHAZINE HYDROCHLORIDE 25 MG: 25 TABLET ORAL at 17:44

## 2017-02-13 RX ADMIN — DIAZEPAM 10 MG: 10 TABLET ORAL at 11:24

## 2017-02-13 RX ADMIN — ACARBOSE 25 MG: 25 TABLET ORAL at 11:24

## 2017-02-13 RX ADMIN — ENOXAPARIN SODIUM 90 MG: 100 INJECTION SUBCUTANEOUS at 08:38

## 2017-02-13 RX ADMIN — SUCRALFATE 1 G: 1 SUSPENSION ORAL at 11:24

## 2017-02-13 RX ADMIN — HYDROCODONE BITARTRATE AND ACETAMINOPHEN 1 TABLET: 7.5; 325 TABLET ORAL at 07:21

## 2017-02-13 RX ADMIN — ENOXAPARIN SODIUM 90 MG: 100 INJECTION SUBCUTANEOUS at 21:52

## 2017-02-13 RX ADMIN — FLUOXETINE 40 MG: 20 CAPSULE ORAL at 08:38

## 2017-02-13 RX ADMIN — TOPIRAMATE 100 MG: 100 TABLET, FILM COATED ORAL at 09:15

## 2017-02-13 RX ADMIN — FORMOTEROL FUMARATE DIHYDRATE 20 MCG: 20 SOLUTION RESPIRATORY (INHALATION) at 21:21

## 2017-02-13 RX ADMIN — SUCRALFATE 1 G: 1 SUSPENSION ORAL at 21:51

## 2017-02-13 RX ADMIN — LEVOTHYROXINE SODIUM 100 MCG: 100 TABLET ORAL at 06:09

## 2017-02-13 RX ADMIN — HYDROCODONE BITARTRATE AND ACETAMINOPHEN 1 TABLET: 7.5; 325 TABLET ORAL at 00:15

## 2017-02-13 RX ADMIN — PANTOPRAZOLE SODIUM 40 MG: 40 TABLET, DELAYED RELEASE ORAL at 06:09

## 2017-02-13 RX ADMIN — PANTOPRAZOLE SODIUM 40 MG: 40 TABLET, DELAYED RELEASE ORAL at 15:47

## 2017-02-13 RX ADMIN — DIAZEPAM 10 MG: 10 TABLET ORAL at 04:03

## 2017-02-13 RX ADMIN — ZOLPIDEM TARTRATE 10 MG: 5 TABLET, FILM COATED ORAL at 21:53

## 2017-02-13 RX ADMIN — DIAZEPAM 5 MG: 5 TABLET ORAL at 21:52

## 2017-02-13 RX ADMIN — LITHIUM CARBONATE 300 MG: 300 CAPSULE, GELATIN COATED ORAL at 07:21

## 2017-02-13 RX ADMIN — SUCRALFATE 1 G: 1 SUSPENSION ORAL at 17:12

## 2017-02-13 RX ADMIN — GABAPENTIN 600 MG: 600 TABLET, FILM COATED ORAL at 21:52

## 2017-02-13 RX ADMIN — LITHIUM CARBONATE 300 MG: 300 CAPSULE, GELATIN COATED ORAL at 17:13

## 2017-02-13 RX ADMIN — FORMOTEROL FUMARATE DIHYDRATE 20 MCG: 20 SOLUTION RESPIRATORY (INHALATION) at 06:41

## 2017-02-13 RX ADMIN — GABAPENTIN 600 MG: 600 TABLET, FILM COATED ORAL at 08:39

## 2017-02-13 RX ADMIN — ACARBOSE 25 MG: 25 TABLET ORAL at 17:13

## 2017-02-13 RX ADMIN — LITHIUM CARBONATE 300 MG: 300 CAPSULE, GELATIN COATED ORAL at 11:24

## 2017-02-13 RX ADMIN — SUCRALFATE 1 G: 1 SUSPENSION ORAL at 06:09

## 2017-02-13 RX ADMIN — HYDROCODONE BITARTRATE AND ACETAMINOPHEN 1 TABLET: 7.5; 325 TABLET ORAL at 15:46

## 2017-02-13 ASSESSMENT — PAIN SCALES - GENERAL
PAINLEVEL_OUTOF10: 4
PAINLEVEL_OUTOF10: 8
PAINLEVEL_OUTOF10: 8
PAINLEVEL_OUTOF10: 10

## 2017-02-14 VITALS
TEMPERATURE: 98 F | SYSTOLIC BLOOD PRESSURE: 96 MMHG | HEART RATE: 85 BPM | OXYGEN SATURATION: 100 % | DIASTOLIC BLOOD PRESSURE: 66 MMHG | BODY MASS INDEX: 37.76 KG/M2 | RESPIRATION RATE: 18 BRPM | WEIGHT: 192.3 LBS | HEIGHT: 60 IN

## 2017-02-14 LAB
EKG P AXIS: 41 DEGREES
EKG P-R INTERVAL: 120 MS
EKG Q-T INTERVAL: 402 MS
EKG QRS DURATION: 80 MS
EKG QTC CALCULATION (BAZETT): 431 MS
EKG T AXIS: 37 DEGREES
GLUCOSE BLD-MCNC: 117 MG/DL (ref 70–99)
INR BLD: 1.8 (ref 0.88–1.18)
PERFORMED ON: ABNORMAL
PROTHROMBIN TIME: 20.5 SEC (ref 12–14.6)

## 2017-02-14 PROCEDURE — 85610 PROTHROMBIN TIME: CPT

## 2017-02-14 PROCEDURE — 2700000000 HC OXYGEN THERAPY PER DAY

## 2017-02-14 PROCEDURE — 6370000000 HC RX 637 (ALT 250 FOR IP): Performed by: HOSPITALIST

## 2017-02-14 PROCEDURE — 6360000002 HC RX W HCPCS: Performed by: CLINICAL NURSE SPECIALIST

## 2017-02-14 PROCEDURE — 82948 REAGENT STRIP/BLOOD GLUCOSE: CPT

## 2017-02-14 PROCEDURE — 36415 COLL VENOUS BLD VENIPUNCTURE: CPT

## 2017-02-14 PROCEDURE — 94640 AIRWAY INHALATION TREATMENT: CPT

## 2017-02-14 PROCEDURE — 6360000002 HC RX W HCPCS: Performed by: HOSPITALIST

## 2017-02-14 PROCEDURE — 6370000000 HC RX 637 (ALT 250 FOR IP): Performed by: CLINICAL NURSE SPECIALIST

## 2017-02-14 PROCEDURE — 99238 HOSP IP/OBS DSCHRG MGMT 30/<: CPT | Performed by: INTERNAL MEDICINE

## 2017-02-14 RX ORDER — DIAZEPAM 10 MG/1
5 TABLET ORAL 3 TIMES DAILY PRN
Qty: 1 TABLET | Refills: 0
Start: 2017-02-14

## 2017-02-14 RX ADMIN — ENOXAPARIN SODIUM 90 MG: 100 INJECTION SUBCUTANEOUS at 08:19

## 2017-02-14 RX ADMIN — FLUOXETINE 40 MG: 20 CAPSULE ORAL at 08:20

## 2017-02-14 RX ADMIN — TOPIRAMATE 100 MG: 100 TABLET, FILM COATED ORAL at 08:20

## 2017-02-14 RX ADMIN — SUCRALFATE 1 G: 1 SUSPENSION ORAL at 12:32

## 2017-02-14 RX ADMIN — LITHIUM CARBONATE 300 MG: 300 CAPSULE, GELATIN COATED ORAL at 12:32

## 2017-02-14 RX ADMIN — DIAZEPAM 5 MG: 5 TABLET ORAL at 06:39

## 2017-02-14 RX ADMIN — PANTOPRAZOLE SODIUM 40 MG: 40 TABLET, DELAYED RELEASE ORAL at 06:36

## 2017-02-14 RX ADMIN — LEVOTHYROXINE SODIUM 100 MCG: 100 TABLET ORAL at 06:36

## 2017-02-14 RX ADMIN — GABAPENTIN 600 MG: 600 TABLET, FILM COATED ORAL at 08:20

## 2017-02-14 RX ADMIN — ACARBOSE 25 MG: 25 TABLET ORAL at 08:20

## 2017-02-14 RX ADMIN — FORMOTEROL FUMARATE DIHYDRATE 20 MCG: 20 SOLUTION RESPIRATORY (INHALATION) at 07:23

## 2017-02-14 RX ADMIN — ACARBOSE 25 MG: 25 TABLET ORAL at 12:32

## 2017-02-14 RX ADMIN — LITHIUM CARBONATE 300 MG: 300 CAPSULE, GELATIN COATED ORAL at 08:20

## 2017-02-14 RX ADMIN — SUCRALFATE 1 G: 1 SUSPENSION ORAL at 06:36

## 2017-02-16 ENCOUNTER — OFFICE VISIT (OUTPATIENT)
Dept: OBSTETRICS AND GYNECOLOGY | Facility: CLINIC | Age: 42
End: 2017-02-16

## 2017-02-16 VITALS
HEIGHT: 60 IN | SYSTOLIC BLOOD PRESSURE: 106 MMHG | BODY MASS INDEX: 35.73 KG/M2 | WEIGHT: 182 LBS | DIASTOLIC BLOOD PRESSURE: 56 MMHG

## 2017-02-16 DIAGNOSIS — N93.9 VAGINAL BLEEDING: Primary | ICD-10-CM

## 2017-02-16 DIAGNOSIS — Z90.710 HX OF HYSTERECTOMY: ICD-10-CM

## 2017-02-16 PROBLEM — F32.A DEPRESSION: Status: ACTIVE | Noted: 2017-02-16

## 2017-02-16 PROBLEM — D64.9 ANEMIA: Status: ACTIVE | Noted: 2017-02-16

## 2017-02-16 PROBLEM — K21.9 GASTROESOPHAGEAL REFLUX DISEASE: Status: ACTIVE | Noted: 2017-02-16

## 2017-02-16 PROBLEM — G40.909 SEIZURE DISORDER (HCC): Status: ACTIVE | Noted: 2017-02-09

## 2017-02-16 PROBLEM — F41.9 ANXIETY: Status: ACTIVE | Noted: 2017-02-16

## 2017-02-16 PROBLEM — G89.29 CHRONIC PAIN: Status: ACTIVE | Noted: 2017-02-16

## 2017-02-16 PROBLEM — D68.59 HYPERCOAGULABLE STATE (HCC): Status: ACTIVE | Noted: 2017-02-09

## 2017-02-16 PROBLEM — D68.9 COAGULATION DISORDER (HCC): Status: ACTIVE | Noted: 2017-02-16

## 2017-02-16 PROBLEM — Z99.89 BIPHASIC POSITIVE AIRWAY PRESSURE DEPENDENCE: Status: ACTIVE | Noted: 2017-02-16

## 2017-02-16 PROBLEM — J44.9 CHRONIC OBSTRUCTIVE PULMONARY DISEASE (HCC): Status: ACTIVE | Noted: 2017-02-16

## 2017-02-16 PROBLEM — G47.33 OBSTRUCTIVE SLEEP APNEA SYNDROME: Status: ACTIVE | Noted: 2017-02-16

## 2017-02-16 PROCEDURE — 99213 OFFICE O/P EST LOW 20 MIN: CPT | Performed by: NURSE PRACTITIONER

## 2017-02-16 RX ORDER — PROMETHAZINE HYDROCHLORIDE 25 MG/1
TABLET ORAL 2 TIMES DAILY
COMMUNITY
Start: 2016-09-02 | End: 2017-08-02

## 2017-02-16 RX ORDER — CLOTRIMAZOLE AND BETAMETHASONE DIPROPIONATE 10; .64 MG/G; MG/G
CREAM TOPICAL 2 TIMES DAILY
Qty: 15 G | Refills: 0 | Status: SHIPPED | OUTPATIENT
Start: 2017-02-16 | End: 2017-08-02

## 2017-02-16 RX ORDER — LEVOTHYROXINE SODIUM 0.1 MG/1
TABLET ORAL
COMMUNITY
Start: 2016-09-24 | End: 2017-08-02

## 2017-02-16 RX ORDER — TOPIRAMATE 100 MG/1
TABLET, FILM COATED ORAL
COMMUNITY
Start: 2016-10-31 | End: 2017-08-02

## 2017-02-16 RX ORDER — GABAPENTIN 600 MG/1
TABLET ORAL
COMMUNITY
End: 2017-08-02

## 2017-02-16 RX ORDER — ACARBOSE 25 MG/1
TABLET ORAL
COMMUNITY
Start: 2016-09-02 | End: 2017-08-02

## 2017-02-16 RX ORDER — ZOLPIDEM TARTRATE 10 MG/1
TABLET ORAL DAILY
COMMUNITY
End: 2017-02-16

## 2017-02-16 RX ORDER — LITHIUM CARBONATE 300 MG/1
CAPSULE ORAL
COMMUNITY
End: 2017-08-02

## 2017-02-16 RX ORDER — FLUOXETINE HYDROCHLORIDE 40 MG/1
CAPSULE ORAL
Refills: 2 | COMMUNITY
Start: 2017-01-26 | End: 2017-08-02

## 2017-02-16 RX ORDER — OMEPRAZOLE 20 MG/1
CAPSULE, DELAYED RELEASE ORAL
COMMUNITY
Start: 2016-09-02 | End: 2017-08-02

## 2017-02-16 RX ORDER — PREDNISONE 10 MG/1
TABLET ORAL
Refills: 0 | COMMUNITY
Start: 2017-01-26 | End: 2017-08-02

## 2017-02-16 RX ORDER — HYDROCODONE BITARTRATE AND ACETAMINOPHEN 7.5; 325 MG/1; MG/1
TABLET ORAL 3 TIMES DAILY
COMMUNITY
End: 2017-08-02

## 2017-02-16 RX ORDER — WARFARIN SODIUM 7.5 MG/1
TABLET ORAL
COMMUNITY
End: 2017-08-02

## 2017-02-16 RX ORDER — DIAZEPAM 10 MG/1
TABLET ORAL 3 TIMES DAILY
COMMUNITY
Start: 2017-02-14 | End: 2017-08-02

## 2017-02-16 RX ORDER — FLUOXETINE HYDROCHLORIDE 40 MG/1
CAPSULE ORAL
COMMUNITY
End: 2017-08-02

## 2017-02-16 NOTE — PROGRESS NOTES
Subjective   Vianca Spencer is a 41 y.o. female.     HPI Comments: Vaginal bleeding.    Pt reports spots of blood on underwear a few times since December.         The following portions of the patient's history were reviewed and updated as appropriate: allergies, current medications, past family history, past medical history, past social history, past surgical history and problem list.    Review of Systems   Constitutional: Negative for activity change, appetite change, fatigue and fever.        Pt reports recent hospitalization r/t bruising and coumadin       Respiratory: Negative for apnea and shortness of breath.    Cardiovascular: Negative for chest pain and palpitations.   Gastrointestinal: Negative for abdominal distention, abdominal pain, constipation and diarrhea.   Endocrine: Negative for cold intolerance and heat intolerance.   Genitourinary: Negative for difficulty urinating, dysuria, flank pain, frequency, hematuria, menstrual problem, pelvic pain, urgency, vaginal discharge and vaginal pain. Vaginal bleeding: blood on underwear.   Neurological: Negative for headaches.   Psychiatric/Behavioral: Negative for agitation and sleep disturbance.       Objective   Physical Exam   Constitutional: She is oriented to person, place, and time. She appears well-developed.   HENT:   Head: Normocephalic.   Neck: No tracheal deviation present.   Cardiovascular: Normal rate.    Pulmonary/Chest: Breath sounds normal. She has no wheezes.   Abdominal: Soft. There is no tenderness.   Genitourinary: Rectum normal. There is no rash or tenderness on the right labia. There is no rash, tenderness or lesion on the left labia. Left adnexum displays no mass, no tenderness and no fullness. No erythema, tenderness or bleeding in the vagina. No foreign body in the vagina. No signs of injury around the vagina. No vaginal discharge found.   Genitourinary Comments: Minor abrasion on inner labia majora. No tenderness noted.     Lymphadenopathy:        Right: No inguinal adenopathy present.        Left: No inguinal adenopathy present.   Neurological: She is alert and oriented to person, place, and time. She has normal strength.   Skin: Skin is warm and dry. No rash noted. No cyanosis.   Psychiatric: She has a normal mood and affect. Her speech is normal and behavior is normal.       Assessment/Plan    No vaginal tenderness, irritation or bleeding noted with exam.   Will send in Lotrisone to assist with healing of minor abrasion on labia majora.   Pt declined urine testing her as she would prefer to follow up with urology group, pt reports she has been a pt there for years.     Vianca was seen today for vaginal bleeding.    Diagnoses and all orders for this visit:    Vaginal bleeding    Hx of hysterectomy    Other orders  -     clotrimazole-betamethasone (LOTRISONE) 1-0.05 % cream; Apply  topically 2 (Two) Times a Day.

## 2017-03-03 RX ORDER — TOPIRAMATE 100 MG/1
TABLET, FILM COATED ORAL
Qty: 30 TABLET | Refills: 3 | Status: SHIPPED | OUTPATIENT
Start: 2017-03-03

## 2017-03-08 ENCOUNTER — HOSPITAL ENCOUNTER (OUTPATIENT)
Dept: ULTRASOUND IMAGING | Age: 42
Discharge: HOME | End: 2017-03-08
Payer: MEDICARE

## 2017-03-08 DIAGNOSIS — N63.0 LUMP OR MASS IN BREAST: ICD-10-CM

## 2017-03-08 PROCEDURE — 76642 ULTRASOUND BREAST LIMITED: CPT

## 2017-03-21 ENCOUNTER — HOSPITAL ENCOUNTER (OUTPATIENT)
Dept: MRI IMAGING | Age: 42
Discharge: HOME | End: 2017-03-21
Payer: MEDICARE

## 2017-03-21 DIAGNOSIS — N63.0 LUMP OR MASS IN BREAST: ICD-10-CM

## 2017-03-21 PROCEDURE — 6360000004 HC RX CONTRAST MEDICATION: Performed by: NURSE PRACTITIONER

## 2017-03-21 PROCEDURE — A9579 GAD-BASE MR CONTRAST NOS,1ML: HCPCS | Performed by: NURSE PRACTITIONER

## 2017-03-21 PROCEDURE — C8908 MRI W/O FOL W/CONT, BREAST,: HCPCS

## 2017-03-21 RX ADMIN — GADOPENTETATE DIMEGLUMINE 20 ML: 469.01 INJECTION INTRAVENOUS at 15:22

## 2017-04-24 ENCOUNTER — TELEPHONE (OUTPATIENT)
Dept: NEUROLOGY | Age: 42
End: 2017-04-24

## 2017-04-27 ENCOUNTER — HOSPITAL ENCOUNTER (OUTPATIENT)
Dept: GENERAL RADIOLOGY | Facility: HOSPITAL | Age: 42
Discharge: HOME | End: 2017-04-27
Attending: PAIN MEDICINE | Admitting: PAIN MEDICINE

## 2017-04-27 ENCOUNTER — TRANSCRIBE ORDERS (OUTPATIENT)
Dept: ADMINISTRATIVE | Facility: HOSPITAL | Age: 42
End: 2017-04-27

## 2017-04-27 DIAGNOSIS — M25.562 PAIN IN BOTH KNEES, UNSPECIFIED CHRONICITY: ICD-10-CM

## 2017-04-27 DIAGNOSIS — M25.559 ARTHRALGIA OF HIP, UNSPECIFIED LATERALITY: Primary | ICD-10-CM

## 2017-04-27 DIAGNOSIS — M25.561 PAIN IN BOTH KNEES, UNSPECIFIED CHRONICITY: ICD-10-CM

## 2017-04-27 DIAGNOSIS — M25.552 PAIN OF BOTH HIP JOINTS: ICD-10-CM

## 2017-04-27 DIAGNOSIS — M25.551 PAIN OF BOTH HIP JOINTS: ICD-10-CM

## 2017-04-27 PROCEDURE — 73564 X-RAY EXAM KNEE 4 OR MORE: CPT

## 2017-04-27 PROCEDURE — 73522 X-RAY EXAM HIPS BI 3-4 VIEWS: CPT

## 2017-07-31 ENCOUNTER — TELEPHONE (OUTPATIENT)
Dept: OBSTETRICS AND GYNECOLOGY | Facility: CLINIC | Age: 42
End: 2017-07-31

## 2017-07-31 NOTE — TELEPHONE ENCOUNTER
Pt is s/p hysterectomy and c/o bleeding and discharge with foul odor again. Pt informed, per last office visit with Traci, she needs to be seen if she had bleeding again. Pt sent to scheduling.

## 2017-08-02 ENCOUNTER — OFFICE VISIT (OUTPATIENT)
Dept: OBSTETRICS AND GYNECOLOGY | Facility: CLINIC | Age: 42
End: 2017-08-02

## 2017-08-02 VITALS
WEIGHT: 191 LBS | HEIGHT: 60 IN | BODY MASS INDEX: 37.5 KG/M2 | DIASTOLIC BLOOD PRESSURE: 64 MMHG | SYSTOLIC BLOOD PRESSURE: 102 MMHG

## 2017-08-02 DIAGNOSIS — N76.0 ACUTE VAGINITIS: Primary | ICD-10-CM

## 2017-08-02 PROBLEM — D68.59 HYPERCOAGULOPATHY (HCC): Status: ACTIVE | Noted: 2017-02-09

## 2017-08-02 PROBLEM — G47.33 OBSTRUCTIVE SLEEP APNEA: Status: ACTIVE | Noted: 2017-08-02

## 2017-08-02 PROBLEM — D68.32 WARFARIN-INDUCED COAGULOPATHY (HCC): Status: ACTIVE | Noted: 2017-08-02

## 2017-08-02 PROBLEM — K21.9 GERD (GASTROESOPHAGEAL REFLUX DISEASE): Status: ACTIVE | Noted: 2017-08-02

## 2017-08-02 PROBLEM — G40.219 LOCALZ-RLTD SYMPTOMATIC EPILEPSY W CMPLX PART SZ, INTRACT, WO STATUS (HCC): Status: ACTIVE | Noted: 2017-08-02

## 2017-08-02 PROBLEM — F31.9 BIPOLAR DISORDER (HCC): Status: ACTIVE | Noted: 2017-08-02

## 2017-08-02 PROBLEM — J44.9 COPD (CHRONIC OBSTRUCTIVE PULMONARY DISEASE) (HCC): Status: ACTIVE | Noted: 2017-08-02

## 2017-08-02 PROBLEM — Z99.89 BIPAP (BIPHASIC POSITIVE AIRWAY PRESSURE) DEPENDENCE: Status: ACTIVE | Noted: 2017-08-02

## 2017-08-02 PROBLEM — T45.515A WARFARIN-INDUCED COAGULOPATHY (HCC): Status: ACTIVE | Noted: 2017-08-02

## 2017-08-02 PROBLEM — E66.01 MORBID OBESITY DUE TO EXCESS CALORIES (HCC): Status: ACTIVE | Noted: 2017-08-02

## 2017-08-02 PROCEDURE — 99213 OFFICE O/P EST LOW 20 MIN: CPT | Performed by: NURSE PRACTITIONER

## 2017-08-02 PROCEDURE — 87798 DETECT AGENT NOS DNA AMP: CPT | Performed by: NURSE PRACTITIONER

## 2017-08-02 PROCEDURE — 87512 GARDNER VAG DNA QUANT: CPT | Performed by: NURSE PRACTITIONER

## 2017-08-02 PROCEDURE — 87481 CANDIDA DNA AMP PROBE: CPT | Performed by: NURSE PRACTITIONER

## 2017-08-02 PROCEDURE — 87491 CHLMYD TRACH DNA AMP PROBE: CPT | Performed by: NURSE PRACTITIONER

## 2017-08-02 PROCEDURE — 87591 N.GONORRHOEAE DNA AMP PROB: CPT | Performed by: NURSE PRACTITIONER

## 2017-08-02 PROCEDURE — 87661 TRICHOMONAS VAGINALIS AMPLIF: CPT | Performed by: NURSE PRACTITIONER

## 2017-08-02 RX ORDER — OMEPRAZOLE 20 MG/1
20 CAPSULE, DELAYED RELEASE ORAL DAILY
COMMUNITY
Start: 2016-09-02 | End: 2018-01-29 | Stop reason: HOSPADM

## 2017-08-02 RX ORDER — TOPIRAMATE 100 MG/1
TABLET, FILM COATED ORAL
COMMUNITY
Start: 2017-03-03 | End: 2017-11-14

## 2017-08-02 RX ORDER — LEVOTHYROXINE SODIUM 112 UG/1
112 TABLET ORAL DAILY
COMMUNITY
Start: 2016-09-24

## 2017-08-02 RX ORDER — DOXEPIN HYDROCHLORIDE 10 MG/1
CAPSULE ORAL
COMMUNITY
Start: 2017-07-12 | End: 2017-11-14

## 2017-08-02 RX ORDER — ZOLPIDEM TARTRATE 10 MG/1
10 TABLET ORAL NIGHTLY
COMMUNITY

## 2017-08-02 RX ORDER — PROMETHAZINE HYDROCHLORIDE 25 MG/1
25 TABLET ORAL 2 TIMES DAILY PRN
COMMUNITY
Start: 2016-09-02

## 2017-08-02 RX ORDER — FLUOXETINE HYDROCHLORIDE 40 MG/1
40 CAPSULE ORAL DAILY
Status: ON HOLD | COMMUNITY
End: 2018-02-02 | Stop reason: DRUGHIGH

## 2017-08-02 RX ORDER — DIAZEPAM 5 MG/1
5 TABLET ORAL 3 TIMES DAILY
COMMUNITY
Start: 2017-02-14

## 2017-08-02 RX ORDER — HYDROCODONE BITARTRATE AND ACETAMINOPHEN 7.5; 325 MG/1; MG/1
1 TABLET ORAL 3 TIMES DAILY
Status: ON HOLD | COMMUNITY
End: 2018-02-02 | Stop reason: DRUGHIGH

## 2017-08-02 RX ORDER — GABAPENTIN 600 MG/1
TABLET ORAL
COMMUNITY
End: 2017-11-14

## 2017-08-02 RX ORDER — LITHIUM CARBONATE 300 MG/1
300 CAPSULE ORAL NIGHTLY
COMMUNITY

## 2017-08-02 RX ORDER — WARFARIN SODIUM 7.5 MG/1
TABLET ORAL
COMMUNITY
End: 2017-11-14

## 2017-08-02 NOTE — PROGRESS NOTES
"Pierre Spencer is a 41 y.o. female.     HPI Comments: Discharge, bleeding and odor.       The following portions of the patient's history were reviewed and updated as appropriate: allergies, current medications, past family history, past medical history, past social history, past surgical history and problem list.    /64 (BP Location: Left arm, Patient Position: Sitting)  Ht 60\" (152.4 cm)  Wt 191 lb (86.6 kg)  BMI 37.3 kg/m2    Review of Systems   Constitutional: Negative for activity change, appetite change, fatigue and fever.   Respiratory: Negative for apnea and shortness of breath.    Cardiovascular: Negative for chest pain and palpitations.   Gastrointestinal: Negative for abdominal distention, abdominal pain, constipation, diarrhea, nausea and vomiting.   Endocrine: Negative for cold intolerance and heat intolerance.   Genitourinary: Positive for vaginal bleeding (spotting after intercourse) and vaginal discharge (vaginal discharge, odor and itching). Negative for difficulty urinating, dysuria, flank pain, frequency, genital sores, hematuria, menstrual problem, pelvic pain and vaginal pain.   Neurological: Negative for headaches.   Psychiatric/Behavioral: Negative for agitation and sleep disturbance.       Objective   Physical Exam   Constitutional: She is oriented to person, place, and time. She appears well-developed.   HENT:   Head: Normocephalic.   Neck: No tracheal deviation present.   Cardiovascular: Normal rate.    Pulmonary/Chest: Breath sounds normal. She has no wheezes.   Abdominal: Soft. There is no tenderness.   Genitourinary: Rectum normal. There is no rash, tenderness or lesion on the right labia. There is no rash, tenderness or lesion on the left labia. Right adnexum displays no mass, no tenderness and no fullness. Left adnexum displays no mass, no tenderness and no fullness. No erythema or tenderness in the vagina. Vaginal discharge (thin yellow, odor) found. "   Lymphadenopathy:        Right: No inguinal adenopathy present.        Left: No inguinal adenopathy present.   Neurological: She is alert and oriented to person, place, and time. She has normal strength.   Skin: Skin is warm and dry. No rash noted. No cyanosis.   Psychiatric: She has a normal mood and affect. Her speech is normal and behavior is normal.       Assessment/Plan    Wet mount inconclusive.   Specimen collected for BV panel, gonorrhea and chlamydia.      Pt is a coumadin pt. Will need to contact your Dr. Valencia about flagyl if needed for treatment.     Will treat based on results.   Pt to use lotrimin for itching at this time.   RV based on results.     Vianca was seen today for vaginal bleeding.    Diagnoses and all orders for this visit:    Acute vaginitis  -     Gynecologic Fluid, Supplemental Testing

## 2017-08-07 LAB
GEN CATEG CVX/VAG CYTO-IMP: NORMAL
LAB AP CASE REPORT: NORMAL
Lab: NORMAL
STAT OF ADQ CVX/VAG CYTO-IMP: NORMAL

## 2017-08-08 ENCOUNTER — TELEPHONE (OUTPATIENT)
Dept: OBSTETRICS AND GYNECOLOGY | Facility: CLINIC | Age: 42
End: 2017-08-08

## 2017-08-08 NOTE — TELEPHONE ENCOUNTER
Voicemail box is not set up.     Purpose of call-inform pt of test results and planned plan of care.     I have contacted Dr. Valencia's office to inform of needed treatment and ask for coumadin monitoring.     Will send prescriptions when Dr. Valencia's office calls back with verification to start treatment with Flagyl and Doxycycline.

## 2017-08-09 ENCOUNTER — TELEPHONE (OUTPATIENT)
Dept: OBSTETRICS AND GYNECOLOGY | Facility: CLINIC | Age: 42
End: 2017-08-09

## 2017-08-09 RX ORDER — METRONIDAZOLE 500 MG/1
500 TABLET ORAL 2 TIMES DAILY
Qty: 14 TABLET | Refills: 0 | Status: SHIPPED | OUTPATIENT
Start: 2017-08-09 | End: 2017-08-09 | Stop reason: SDUPTHER

## 2017-08-09 RX ORDER — DOXYCYCLINE 100 MG/1
100 CAPSULE ORAL 2 TIMES DAILY
Qty: 14 CAPSULE | Refills: 0 | Status: SHIPPED | OUTPATIENT
Start: 2017-08-09 | End: 2017-11-14

## 2017-08-09 RX ORDER — METRONIDAZOLE 500 MG/1
2000 TABLET ORAL ONCE
Qty: 4 TABLET | Refills: 0 | Status: SHIPPED | OUTPATIENT
Start: 2017-08-09 | End: 2017-08-09

## 2017-08-10 ENCOUNTER — TELEPHONE (OUTPATIENT)
Dept: OBSTETRICS AND GYNECOLOGY | Facility: CLINIC | Age: 42
End: 2017-08-10

## 2017-08-10 NOTE — TELEPHONE ENCOUNTER
Sravanthi from Dr. Storey office called to advise BEAR Rogel that it was OK to treat patient with Flagyl and Doxy

## 2017-09-08 ENCOUNTER — OFFICE VISIT (OUTPATIENT)
Dept: OBSTETRICS AND GYNECOLOGY | Facility: CLINIC | Age: 42
End: 2017-09-08

## 2017-09-08 VITALS
BODY MASS INDEX: 37.89 KG/M2 | HEIGHT: 60 IN | DIASTOLIC BLOOD PRESSURE: 82 MMHG | WEIGHT: 193 LBS | SYSTOLIC BLOOD PRESSURE: 124 MMHG

## 2017-09-08 DIAGNOSIS — Z20.2 TRICHOMONAS CONTACT, TREATED: Primary | ICD-10-CM

## 2017-09-08 PROCEDURE — 87798 DETECT AGENT NOS DNA AMP: CPT | Performed by: OBSTETRICS & GYNECOLOGY

## 2017-09-08 PROCEDURE — 87481 CANDIDA DNA AMP PROBE: CPT | Performed by: OBSTETRICS & GYNECOLOGY

## 2017-09-08 PROCEDURE — 99213 OFFICE O/P EST LOW 20 MIN: CPT | Performed by: OBSTETRICS & GYNECOLOGY

## 2017-09-08 PROCEDURE — 87512 GARDNER VAG DNA QUANT: CPT | Performed by: OBSTETRICS & GYNECOLOGY

## 2017-09-08 PROCEDURE — 87661 TRICHOMONAS VAGINALIS AMPLIF: CPT | Performed by: OBSTETRICS & GYNECOLOGY

## 2017-09-08 NOTE — PROGRESS NOTES
Subjective   Vianca Spencer is a 41 y.o. female presents for a test of cure for Trich.    History of Present Illness  Her for SHELLY for Trich.  Patient denies any problems.  The following portions of the patient's history were reviewed and updated as appropriate: allergies, current medications, past family history, past medical history, past social history, past surgical history and problem list.    Review of Systems   Constitutional: Negative for fatigue and unexpected weight change.   HENT: Negative.    Eyes: Negative.    Respiratory: Negative for cough, chest tightness and shortness of breath.    Cardiovascular: Negative for chest pain, palpitations and leg swelling.   Gastrointestinal: Negative for abdominal distention, abdominal pain, anal bleeding, blood in stool, constipation, diarrhea, nausea and vomiting.   Endocrine: Negative for polydipsia and polyuria.   Genitourinary: Positive for vaginal discharge. Negative for difficulty urinating, dyspareunia, dysuria, frequency, genital sores, hematuria, menstrual problem, pelvic pain, urgency, vaginal bleeding and vaginal pain.   Musculoskeletal: Positive for gait problem.   Skin: Negative for color change and rash.   Allergic/Immunologic: Negative.    Neurological: Negative for dizziness, seizures, syncope, light-headedness and headaches.   Hematological: Negative for adenopathy. Does not bruise/bleed easily.   Psychiatric/Behavioral: Negative for agitation, confusion, dysphoric mood, sleep disturbance and suicidal ideas. The patient is not nervous/anxious.        Objective   Physical Exam   Constitutional: She is oriented to person, place, and time. She appears well-developed and well-nourished.   HENT:   Head: Normocephalic.   Eyes: Pupils are equal, round, and reactive to light.   Cardiovascular: Normal rate and regular rhythm.    Pulmonary/Chest: Effort normal and breath sounds normal.   Abdominal: Soft.   Genitourinary: Vagina normal. Rectal exam shows anal  tone normal. Pelvic exam was performed with patient supine. No labial fusion. There is no rash, tenderness, lesion or injury on the right labia. There is no rash, tenderness, lesion or injury on the left labia. Uterus is not enlarged and not tender. Cervix exhibits no motion tenderness, no discharge and no friability. Right adnexum displays no mass, no tenderness and no fullness. Left adnexum displays no mass, no tenderness and no fullness. No erythema, tenderness or bleeding in the vagina. No signs of injury around the vagina. No vaginal discharge found.   Genitourinary Comments: BUS glands appear nl, perineum intact, urethral orifice appears nl, vagina has good support. Uterus is absent.   Musculoskeletal: Normal range of motion.   Neurological: She is alert and oriented to person, place, and time.   Skin: Skin is warm and dry.   Psychiatric: She has a normal mood and affect. Her behavior is normal.   Nursing note and vitals reviewed.      Assessment/Plan     Recent infection with Trichomonas  Here for test of cure.  BV panel done

## 2017-09-15 LAB
GEN CATEG CVX/VAG CYTO-IMP: NORMAL
LAB AP CASE REPORT: NORMAL
Lab: NORMAL
PATH INTERP SPEC-IMP: NORMAL
STAT OF ADQ CVX/VAG CYTO-IMP: NORMAL

## 2017-11-12 ENCOUNTER — APPOINTMENT (OUTPATIENT)
Dept: CT IMAGING | Age: 42
End: 2017-11-12
Payer: MEDICARE

## 2017-11-12 ENCOUNTER — HOSPITAL ENCOUNTER (EMERGENCY)
Age: 42
Discharge: HOME | End: 2017-11-12
Payer: MEDICARE

## 2017-11-12 ENCOUNTER — APPOINTMENT (OUTPATIENT)
Dept: GENERAL RADIOLOGY | Age: 42
End: 2017-11-12
Payer: MEDICARE

## 2017-11-12 VITALS
SYSTOLIC BLOOD PRESSURE: 125 MMHG | TEMPERATURE: 98.7 F | RESPIRATION RATE: 16 BRPM | OXYGEN SATURATION: 98 % | HEIGHT: 59 IN | DIASTOLIC BLOOD PRESSURE: 78 MMHG | WEIGHT: 180 LBS | BODY MASS INDEX: 36.29 KG/M2 | HEART RATE: 70 BPM

## 2017-11-12 DIAGNOSIS — R05.9 COUGH: ICD-10-CM

## 2017-11-12 DIAGNOSIS — T45.511A COUMADIN TOXICITY, ACCIDENTAL OR UNINTENTIONAL, INITIAL ENCOUNTER: ICD-10-CM

## 2017-11-12 DIAGNOSIS — S80.02XA CONTUSION OF LEFT KNEE, INITIAL ENCOUNTER: Primary | ICD-10-CM

## 2017-11-12 DIAGNOSIS — S09.90XA CLOSED HEAD INJURY, INITIAL ENCOUNTER: ICD-10-CM

## 2017-11-12 LAB
ALBUMIN SERPL-MCNC: 3.7 G/DL (ref 3.5–5.2)
ALP BLD-CCNC: 144 U/L (ref 35–104)
ALT SERPL-CCNC: 11 U/L (ref 5–33)
AMPHETAMINE SCREEN, URINE: NEGATIVE
ANION GAP SERPL CALCULATED.3IONS-SCNC: 12 MMOL/L (ref 7–19)
AST SERPL-CCNC: 16 U/L (ref 5–32)
BACTERIA: NEGATIVE /HPF
BARBITURATE SCREEN URINE: NEGATIVE
BENZODIAZEPINE SCREEN, URINE: POSITIVE
BILIRUB SERPL-MCNC: <0.2 MG/DL (ref 0.2–1.2)
BILIRUBIN URINE: NEGATIVE
BLOOD, URINE: ABNORMAL
BUN BLDV-MCNC: 8 MG/DL (ref 6–20)
CALCIUM SERPL-MCNC: 9.2 MG/DL (ref 8.6–10)
CANNABINOID SCREEN URINE: NEGATIVE
CHLORIDE BLD-SCNC: 100 MMOL/L (ref 98–111)
CLARITY: CLEAR
CO2: 27 MMOL/L (ref 22–29)
COCAINE METABOLITE SCREEN URINE: NEGATIVE
COLOR: YELLOW
CREAT SERPL-MCNC: 0.8 MG/DL (ref 0.5–0.9)
EPITHELIAL CELLS, UA: 2 /HPF (ref 0–5)
ETHANOL: <10 MG/DL (ref 0–0.08)
GFR NON-AFRICAN AMERICAN: >60
GLUCOSE BLD-MCNC: 80 MG/DL
GLUCOSE BLD-MCNC: 80 MG/DL (ref 70–99)
GLUCOSE BLD-MCNC: 81 MG/DL (ref 74–109)
GLUCOSE URINE: NEGATIVE MG/DL
HCT VFR BLD CALC: 41.1 % (ref 37–47)
HEMOGLOBIN: 13.3 G/DL (ref 12–16)
HYALINE CASTS: 7 /HPF (ref 0–8)
INR BLD: 6.45 (ref 0.88–1.18)
KETONES, URINE: NEGATIVE MG/DL
LEUKOCYTE ESTERASE, URINE: NEGATIVE
LITHIUM LEVEL: 0.5 MMOL/L (ref 0.6–1.2)
Lab: ABNORMAL
MCH RBC QN AUTO: 32.4 PG (ref 27–31)
MCHC RBC AUTO-ENTMCNC: 32.4 G/DL (ref 33–37)
MCV RBC AUTO: 100 FL (ref 81–99)
NITRITE, URINE: NEGATIVE
OPIATE SCREEN URINE: NEGATIVE
PDW BLD-RTO: 13.2 % (ref 11.5–14.5)
PERFORMED ON: NORMAL
PH UA: 6.5
PLATELET # BLD: 346 K/UL (ref 130–400)
PMV BLD AUTO: 9.5 FL (ref 9.4–12.3)
POTASSIUM SERPL-SCNC: 4.1 MMOL/L (ref 3.5–5)
PROTEIN UA: NEGATIVE MG/DL
PROTHROMBIN TIME: 58.2 SEC (ref 12–14.6)
RAPID INFLUENZA  B AGN: NEGATIVE
RAPID INFLUENZA A AGN: NEGATIVE
RBC # BLD: 4.11 M/UL (ref 4.2–5.4)
RBC UA: 14 /HPF (ref 0–4)
SODIUM BLD-SCNC: 139 MMOL/L (ref 136–145)
SPECIFIC GRAVITY UA: 1.01
TOTAL PROTEIN: 7.1 G/DL (ref 6.6–8.7)
UROBILINOGEN, URINE: 1 E.U./DL
WBC # BLD: 15.9 K/UL (ref 4.8–10.8)
WBC UA: 3 /HPF (ref 0–5)

## 2017-11-12 PROCEDURE — 70486 CT MAXILLOFACIAL W/O DYE: CPT

## 2017-11-12 PROCEDURE — 99284 EMERGENCY DEPT VISIT MOD MDM: CPT

## 2017-11-12 PROCEDURE — 81001 URINALYSIS AUTO W/SCOPE: CPT

## 2017-11-12 PROCEDURE — 99283 EMERGENCY DEPT VISIT LOW MDM: CPT | Performed by: NURSE PRACTITIONER

## 2017-11-12 PROCEDURE — 73560 X-RAY EXAM OF KNEE 1 OR 2: CPT

## 2017-11-12 PROCEDURE — 87804 INFLUENZA ASSAY W/OPTIC: CPT

## 2017-11-12 PROCEDURE — G0480 DRUG TEST DEF 1-7 CLASSES: HCPCS

## 2017-11-12 PROCEDURE — 71020 XR CHEST STANDARD TWO VW: CPT

## 2017-11-12 PROCEDURE — 80178 ASSAY OF LITHIUM: CPT

## 2017-11-12 PROCEDURE — 93005 ELECTROCARDIOGRAM TRACING: CPT

## 2017-11-12 PROCEDURE — 85027 COMPLETE CBC AUTOMATED: CPT

## 2017-11-12 PROCEDURE — 2580000003 HC RX 258: Performed by: NURSE PRACTITIONER

## 2017-11-12 PROCEDURE — 80053 COMPREHEN METABOLIC PANEL: CPT

## 2017-11-12 PROCEDURE — 70450 CT HEAD/BRAIN W/O DYE: CPT

## 2017-11-12 PROCEDURE — 80201 ASSAY OF TOPIRAMATE: CPT

## 2017-11-12 PROCEDURE — 6370000000 HC RX 637 (ALT 250 FOR IP): Performed by: NURSE PRACTITIONER

## 2017-11-12 PROCEDURE — 82948 REAGENT STRIP/BLOOD GLUCOSE: CPT

## 2017-11-12 PROCEDURE — 80307 DRUG TEST PRSMV CHEM ANLYZR: CPT

## 2017-11-12 PROCEDURE — 36415 COLL VENOUS BLD VENIPUNCTURE: CPT

## 2017-11-12 PROCEDURE — 85610 PROTHROMBIN TIME: CPT

## 2017-11-12 RX ORDER — PHYTONADIONE 5 MG/1
5 TABLET ORAL ONCE
Status: COMPLETED | OUTPATIENT
Start: 2017-11-12 | End: 2017-11-12

## 2017-11-12 RX ORDER — 0.9 % SODIUM CHLORIDE 0.9 %
500 INTRAVENOUS SOLUTION INTRAVENOUS ONCE
Status: COMPLETED | OUTPATIENT
Start: 2017-11-12 | End: 2017-11-12

## 2017-11-12 RX ORDER — HYDROCODONE BITARTRATE AND ACETAMINOPHEN 10; 325 MG/1; MG/1
1 TABLET ORAL ONCE
Status: COMPLETED | OUTPATIENT
Start: 2017-11-12 | End: 2017-11-12

## 2017-11-12 RX ADMIN — PHYTONADIONE 5 MG: 5 TABLET ORAL at 02:02

## 2017-11-12 RX ADMIN — HYDROCODONE BITARTRATE AND ACETAMINOPHEN 1 TABLET: 10; 325 TABLET ORAL at 02:50

## 2017-11-12 RX ADMIN — SODIUM CHLORIDE 500 ML: 9 INJECTION, SOLUTION INTRAVENOUS at 02:02

## 2017-11-12 ASSESSMENT — ENCOUNTER SYMPTOMS
COUGH: 1
VOMITING: 0

## 2017-11-12 ASSESSMENT — PAIN SCALES - GENERAL
PAINLEVEL_OUTOF10: 5
PAINLEVEL_OUTOF10: 8

## 2017-11-12 ASSESSMENT — PAIN DESCRIPTION - LOCATION: LOCATION: FACE

## 2017-11-12 NOTE — ED NOTES
Patient placed on Oxygen @ 2 L/M NC    Patient states she uses oxygen at home when she is awake       Natali Santana RN  11/12/17 [id number]

## 2017-11-12 NOTE — ED PROVIDER NOTES
[facility] EMERGENCY DEPT  eMERGENCY dEPARTMENT eNCOUnter      Pt Name: Michael Keyes  MRN: [id number]  [place] 1975  Date of evaluation: 11/12/2017  Provider: Digna Phillips, [address]       Chief Complaint   Patient presents with    Fall     Patient was getting up from the comode, fell while pulling her pants up, and hit the bath tub, denies LOC ; Recent frequent falls, about 1 week ago.  Facial Injury     Left side of face     Cough     Patient has had a productive cough for about 1 week      Knee Pain     Left knee bruising           HISTORY OF PRESENT ILLNESS   (Location/Symptom, Timing/Onset, Context/Setting, Quality, Duration, Modifying Factors, Severity)  Note limiting factors. Michael Keyes is a 35 y.o. female who presents to the emergency department with frequent falls in the last week. SHe takes coumadin and has many bruises. Tonight she fell while getting up from the commode. she hit the side of her face on the bathtub. No loc. ALso has left knee pain and many bruises to her knee. Pt has had a productive cough for a week. History of seizures, obesity, opoid use and bipolar     The history is provided by the patient. Fall   The accident occurred 1 to 2 hours ago. The fall occurred from a stool. She fell from a height of 1 to 2 ft. She landed on concrete. There was no blood loss. The point of impact was the head and left knee. She was not ambulatory at the scene. There was no entrapment after the fall. There was no drug use involved in the accident. There was no alcohol use involved in the accident. Associated symptoms include headaches. Pertinent negatives include no fever, no vomiting and no loss of consciousness. Nursing Notes were reviewed. REVIEW OF SYSTEMS    (2-9 systems for level 4, 10 or more for level 5)     Review of Systems   Constitutional: Negative for fever. HENT: Positive for congestion. Respiratory: Positive for cough.     Gastrointestinal: CURRENT MEDICATIONS       Previous Medications    ARFORMOTEROL TARTRATE (BROVANA) 15 MCG/2ML NEBU    Take 1 ampule by nebulization 2 times daily    DIAZEPAM (VALIUM) 10 MG TABLET    Take 0.5 tablets by mouth 3 times daily as needed for Anxiety    FLUOXETINE (PROZAC) 40 MG CAPSULE    Take 40 mg by mouth daily    GABAPENTIN (NEURONTIN) 600 MG TABLET    Take 300 mg by mouth daily     HYDROCODONE-ACETAMINOPHEN (NORCO) 7.5-325 MG PER TABLET    Take 1 tablet by mouth 3 times daily as needed for Pain     LEVOTHYROXINE (SYNTHROID) 100 MCG TABLET    Take 100 mcg by mouth daily     LITHIUM 300 MG CAPSULE    Take 300 mg by mouth 3 times daily (with meals). OMEPRAZOLE (PRILOSEC) 20 MG DELAYED RELEASE CAPSULE    Take 20 mg by mouth Daily     OXYGEN    Inhale into the lungs nightly     PROMETHAZINE (PHENERGAN) 25 MG TABLET    Take 25 mg by mouth 2 times daily as needed     TOPIRAMATE (TOPAMAX) 100 MG TABLET    TAKE 1 TABLET BY MOUTH DAILY    WARFARIN (COUMADIN) 7.5 MG TABLET    Take 7.5 mg by mouth daily As direced    ZOLPIDEM (AMBIEN) 10 MG TABLET    Take  by mouth nightly as needed for Sleep. ALLERGIES     Morphine; Asa [aspirin];  Seroquel [quetiapine]; and Codeine    FAMILY HISTORY       Family History   Problem Relation Age of Onset    Lung Cancer Mother     Cancer Father      bone    Diabetes Paternal Grandmother     Heart Disease Paternal Grandmother     Colon Cancer Neg Hx     Colon Polyps Neg Hx           SOCIAL HISTORY       Social History     Social History    Marital status:      Spouse name: N/A    Number of children: 3    Years of education: N/A     Social History Main Topics    Smoking status: Current Every Day Smoker     Packs/day: 0.25     Years: 35.00     Types: Cigarettes    Smokeless tobacco: Never Used      Comment: disable    Alcohol use No    Drug use: No    Sexual activity: Not on file     Other Topics Concern    Not on file     Social History Narrative    No BEDSIDE ULTRASOUND:   Performed by ED Physician - none    LABS:  Labs Reviewed   CBC - Abnormal; Notable for the following:        Result Value    WBC 15.9 (*)     RBC 4.11 (*)     .0 (*)     MCH 32.4 (*)     MCHC 32.4 (*)     All other components within normal limits   COMPREHENSIVE METABOLIC PANEL - Abnormal; Notable for the following:     Alkaline Phosphatase 144 (*)     All other components within normal limits   PROTIME-INR - Abnormal; Notable for the following:     Protime 58.2 (*)     INR 6.45 (*)     All other components within normal limits    Narrative:     CALL  John  FATOU tel. ,  Coag results called to and read back by Terra Wise RN in ED, 11/12/2017 01:21, by  Watson Sauer - Abnormal; Notable for the following:     Blood, Urine MODERATE (*)     All other components within normal limits   URINE DRUG SCREEN - Abnormal; Notable for the following:     Benzodiazepine Screen, Urine Positive (*)     All other components within normal limits   LITHIUM LEVEL - Abnormal; Notable for the following:     Lithium Lvl 0.50 (*)     All other components within normal limits   MICROSCOPIC URINALYSIS - Abnormal; Notable for the following:     RBC, UA 14 (*)     All other components within normal limits   POCT GLUCOSE - Normal   RAPID INFLUENZA A/B ANTIGENS   ETHANOL   TOPIRAMATE LEVEL   POCT GLUCOSE       All other labs were within normal range or not returned as of this dictation. EMERGENCY DEPARTMENT COURSE and DIFFERENTIAL DIAGNOSIS/MDM:   Vitals:    Vitals:    11/12/17 0022 11/12/17 0223   BP: 93/76 108/72   Pulse: 88 70   Resp: 18 16   Temp: 98 °F (36.7 °C)    TempSrc: Oral    SpO2: 93% 98%   Weight: 180 lb (81.6 kg)    Height: 4' 11\" (1.499 m)            MDM  Pt was given vit k  Pt has been instructed to hold her coumadin x 2 days and to talk to Her doc on Monday about when to restart it. Pt agreeable with this.         CONSULTS:  None    PROCEDURES:  Unless otherwise noted below, none

## 2017-11-13 LAB
EKG P AXIS: 62 DEGREES
EKG P-R INTERVAL: 118 MS
EKG Q-T INTERVAL: 414 MS
EKG QRS DURATION: 76 MS
EKG QTC CALCULATION (BAZETT): 429 MS
EKG T AXIS: 57 DEGREES

## 2017-11-14 ENCOUNTER — HOSPITAL ENCOUNTER (OUTPATIENT)
Facility: HOSPITAL | Age: 42
Setting detail: OBSERVATION
Discharge: HOME | End: 2017-11-15
Attending: INTERNAL MEDICINE | Admitting: INTERNAL MEDICINE

## 2017-11-14 ENCOUNTER — APPOINTMENT (OUTPATIENT)
Dept: CT IMAGING | Facility: HOSPITAL | Age: 42
End: 2017-11-14

## 2017-11-14 ENCOUNTER — APPOINTMENT (OUTPATIENT)
Dept: GENERAL RADIOLOGY | Facility: HOSPITAL | Age: 42
End: 2017-11-14

## 2017-11-14 DIAGNOSIS — R29.6 MULTIPLE FALLS: Primary | ICD-10-CM

## 2017-11-14 DIAGNOSIS — Z74.09 IMPAIRED FUNCTIONAL MOBILITY, BALANCE, GAIT, AND ENDURANCE: ICD-10-CM

## 2017-11-14 DIAGNOSIS — S00.03XA CONTUSION OF SCALP, INITIAL ENCOUNTER: ICD-10-CM

## 2017-11-14 DIAGNOSIS — N39.0 ACUTE UTI: ICD-10-CM

## 2017-11-14 LAB
ALBUMIN SERPL-MCNC: 4 G/DL (ref 3.5–5)
ALBUMIN/GLOB SERPL: 1.3 G/DL (ref 1.1–2.5)
ALP SERPL-CCNC: 128 U/L (ref 24–120)
ALT SERPL W P-5'-P-CCNC: 26 U/L (ref 0–54)
AMPHET+METHAMPHET UR QL: NEGATIVE
ANION GAP SERPL CALCULATED.3IONS-SCNC: 8 MMOL/L (ref 4–13)
APTT PPP: 38.8 SECONDS (ref 24.1–34.8)
ARTERIAL PATENCY WRIST A: POSITIVE
AST SERPL-CCNC: 32 U/L (ref 7–45)
ATMOSPHERIC PRESS: 759 MMHG
BACTERIA UR QL AUTO: ABNORMAL /HPF
BARBITURATES UR QL SCN: NEGATIVE
BASE EXCESS BLDA CALC-SCNC: 0.5 MMOL/L (ref 0–2)
BASOPHILS # BLD AUTO: 0.02 10*3/MM3 (ref 0–0.2)
BASOPHILS NFR BLD AUTO: 0.2 % (ref 0–2)
BDY SITE: ABNORMAL
BENZODIAZ UR QL SCN: POSITIVE
BILIRUB SERPL-MCNC: 0.3 MG/DL (ref 0.1–1)
BILIRUB UR QL STRIP: NEGATIVE
BODY TEMPERATURE: 37 C
BUN BLD-MCNC: 10 MG/DL (ref 5–21)
BUN/CREAT SERPL: 8.8 (ref 7–25)
CALCIUM SPEC-SCNC: 9.4 MG/DL (ref 8.4–10.4)
CANNABINOIDS SERPL QL: NEGATIVE
CHLORIDE SERPL-SCNC: 106 MMOL/L (ref 98–110)
CLARITY UR: ABNORMAL
CO2 SERPL-SCNC: 31 MMOL/L (ref 24–31)
COCAINE UR QL: NEGATIVE
COLOR UR: YELLOW
CREAT BLD-MCNC: 1.13 MG/DL (ref 0.5–1.4)
DEPRECATED RDW RBC AUTO: 49.8 FL (ref 40–54)
EOSINOPHIL # BLD AUTO: 0.25 10*3/MM3 (ref 0–0.7)
EOSINOPHIL NFR BLD AUTO: 2.2 % (ref 0–4)
ERYTHROCYTE [DISTWIDTH] IN BLOOD BY AUTOMATED COUNT: 13.9 % (ref 12–15)
FOLATE SERPL-MCNC: 4.69 NG/ML
GFR SERPL CREATININE-BSD FRML MDRD: 53 ML/MIN/1.73
GLOBULIN UR ELPH-MCNC: 3.2 GM/DL
GLUCOSE BLD-MCNC: 96 MG/DL (ref 70–100)
GLUCOSE UR STRIP-MCNC: NEGATIVE MG/DL
HCO3 BLDA-SCNC: 25.6 MMOL/L (ref 20–26)
HCT VFR BLD AUTO: 39.8 % (ref 37–47)
HGB BLD-MCNC: 12.5 G/DL (ref 12–16)
HGB UR QL STRIP.AUTO: ABNORMAL
HOLD SPECIMEN: NORMAL
HOLD SPECIMEN: NORMAL
IMM GRANULOCYTES # BLD: 0.1 10*3/MM3 (ref 0–0.03)
IMM GRANULOCYTES NFR BLD: 0.9 % (ref 0–5)
INR PPP: 1.54 (ref 0.91–1.09)
KETONES UR QL STRIP: NEGATIVE
LEUKOCYTE ESTERASE UR QL STRIP.AUTO: ABNORMAL
LITHIUM SERPL-SCNC: 0.2 MMOL/L (ref 0.6–1.2)
LYMPHOCYTES # BLD AUTO: 1.65 10*3/MM3 (ref 0.72–4.86)
LYMPHOCYTES NFR BLD AUTO: 14.7 % (ref 15–45)
Lab: ABNORMAL
MAGNESIUM SERPL-MCNC: 2.1 MG/DL (ref 1.4–2.2)
MCH RBC QN AUTO: 31.4 PG (ref 28–32)
MCHC RBC AUTO-ENTMCNC: 31.4 G/DL (ref 33–36)
MCV RBC AUTO: 100 FL (ref 82–98)
METHADONE UR QL SCN: NEGATIVE
MODALITY: ABNORMAL
MONOCYTES # BLD AUTO: 0.35 10*3/MM3 (ref 0.19–1.3)
MONOCYTES NFR BLD AUTO: 3.1 % (ref 4–12)
NEUTROPHILS # BLD AUTO: 8.82 10*3/MM3 (ref 1.87–8.4)
NEUTROPHILS NFR BLD AUTO: 78.9 % (ref 39–78)
NITRITE UR QL STRIP: NEGATIVE
OPIATES UR QL: NEGATIVE
PCO2 BLDA: 42.1 MM HG (ref 35–45)
PCP UR QL SCN: NEGATIVE
PH BLDA: 7.39 PH UNITS (ref 7.35–7.45)
PH UR STRIP.AUTO: 6 [PH] (ref 5–8)
PLATELET # BLD AUTO: 302 10*3/MM3 (ref 130–400)
PMV BLD AUTO: 9.7 FL (ref 6–12)
PO2 BLDA: 80.6 MM HG (ref 83–108)
POTASSIUM BLD-SCNC: 4.4 MMOL/L (ref 3.5–5.3)
PROT SERPL-MCNC: 7.2 G/DL (ref 6.3–8.7)
PROT UR QL STRIP: ABNORMAL
PROTHROMBIN TIME: 19 SECONDS (ref 11.9–14.6)
RBC # BLD AUTO: 3.98 10*6/MM3 (ref 4.2–5.4)
RBC # UR: ABNORMAL /HPF
REF LAB TEST METHOD: ABNORMAL
SAO2 % BLDCOA: 97.1 % (ref 94–99)
SODIUM BLD-SCNC: 145 MMOL/L (ref 135–145)
SP GR UR STRIP: 1.02 (ref 1–1.03)
SQUAMOUS #/AREA URNS HPF: ABNORMAL /HPF
TOPIRAMATE LEVEL: <1.5 UG/ML (ref 5–20)
TROPONIN I SERPL-MCNC: <0.012 NG/ML (ref 0–0.03)
UROBILINOGEN UR QL STRIP: ABNORMAL
VENTILATOR MODE: ABNORMAL
VIT B12 BLD-MCNC: 255 PG/ML (ref 239–931)
WBC NRBC COR # BLD: 11.19 10*3/MM3 (ref 4.8–10.8)
WBC UR QL AUTO: ABNORMAL /HPF
WHOLE BLOOD HOLD SPECIMEN: NORMAL
WHOLE BLOOD HOLD SPECIMEN: NORMAL

## 2017-11-14 PROCEDURE — 97161 PT EVAL LOW COMPLEX 20 MIN: CPT

## 2017-11-14 PROCEDURE — G0378 HOSPITAL OBSERVATION PER HR: HCPCS

## 2017-11-14 PROCEDURE — 85610 PROTHROMBIN TIME: CPT | Performed by: NURSE PRACTITIONER

## 2017-11-14 PROCEDURE — 93005 ELECTROCARDIOGRAM TRACING: CPT | Performed by: NURSE PRACTITIONER

## 2017-11-14 PROCEDURE — 99284 EMERGENCY DEPT VISIT MOD MDM: CPT

## 2017-11-14 PROCEDURE — 80053 COMPREHEN METABOLIC PANEL: CPT | Performed by: NURSE PRACTITIONER

## 2017-11-14 PROCEDURE — 87186 SC STD MICRODIL/AGAR DIL: CPT | Performed by: NURSE PRACTITIONER

## 2017-11-14 PROCEDURE — 63710000001 FLINTSTONES COMPLETE 60 MG CHEWABLE TABLET: Performed by: INTERNAL MEDICINE

## 2017-11-14 PROCEDURE — 85025 COMPLETE CBC W/AUTO DIFF WBC: CPT | Performed by: NURSE PRACTITIONER

## 2017-11-14 PROCEDURE — 96372 THER/PROPH/DIAG INJ SC/IM: CPT

## 2017-11-14 PROCEDURE — 80307 DRUG TEST PRSMV CHEM ANLYZR: CPT | Performed by: NURSE PRACTITIONER

## 2017-11-14 PROCEDURE — 81001 URINALYSIS AUTO W/SCOPE: CPT | Performed by: NURSE PRACTITIONER

## 2017-11-14 PROCEDURE — 82746 ASSAY OF FOLIC ACID SERUM: CPT | Performed by: INTERNAL MEDICINE

## 2017-11-14 PROCEDURE — 25010000002 CEFTRIAXONE: Performed by: NURSE PRACTITIONER

## 2017-11-14 PROCEDURE — 93010 ELECTROCARDIOGRAM REPORT: CPT | Performed by: INTERNAL MEDICINE

## 2017-11-14 PROCEDURE — 84484 ASSAY OF TROPONIN QUANT: CPT | Performed by: NURSE PRACTITIONER

## 2017-11-14 PROCEDURE — 83735 ASSAY OF MAGNESIUM: CPT | Performed by: NURSE PRACTITIONER

## 2017-11-14 PROCEDURE — 80178 ASSAY OF LITHIUM: CPT | Performed by: NURSE PRACTITIONER

## 2017-11-14 PROCEDURE — 25010000002 HYDROMORPHONE PER 4 MG: Performed by: INTERNAL MEDICINE

## 2017-11-14 PROCEDURE — 96375 TX/PRO/DX INJ NEW DRUG ADDON: CPT

## 2017-11-14 PROCEDURE — 70450 CT HEAD/BRAIN W/O DYE: CPT

## 2017-11-14 PROCEDURE — 82607 VITAMIN B-12: CPT | Performed by: INTERNAL MEDICINE

## 2017-11-14 PROCEDURE — 96365 THER/PROPH/DIAG IV INF INIT: CPT

## 2017-11-14 PROCEDURE — 85730 THROMBOPLASTIN TIME PARTIAL: CPT | Performed by: NURSE PRACTITIONER

## 2017-11-14 PROCEDURE — G8979 MOBILITY GOAL STATUS: HCPCS

## 2017-11-14 PROCEDURE — 36600 WITHDRAWAL OF ARTERIAL BLOOD: CPT

## 2017-11-14 PROCEDURE — G8978 MOBILITY CURRENT STATUS: HCPCS

## 2017-11-14 PROCEDURE — 25010000002 CYANOCOBALAMIN PER 1000 MCG: Performed by: INTERNAL MEDICINE

## 2017-11-14 PROCEDURE — 70486 CT MAXILLOFACIAL W/O DYE: CPT

## 2017-11-14 PROCEDURE — 87086 URINE CULTURE/COLONY COUNT: CPT | Performed by: NURSE PRACTITIONER

## 2017-11-14 PROCEDURE — 82803 BLOOD GASES ANY COMBINATION: CPT

## 2017-11-14 PROCEDURE — 87077 CULTURE AEROBIC IDENTIFY: CPT | Performed by: NURSE PRACTITIONER

## 2017-11-14 PROCEDURE — 96376 TX/PRO/DX INJ SAME DRUG ADON: CPT

## 2017-11-14 PROCEDURE — 71010 HC CHEST PA OR AP: CPT

## 2017-11-14 RX ORDER — DIAZEPAM 10 MG/1
10 TABLET ORAL 3 TIMES DAILY
Status: DISCONTINUED | OUTPATIENT
Start: 2017-11-14 | End: 2017-11-15 | Stop reason: HOSPADM

## 2017-11-14 RX ORDER — WARFARIN SODIUM 5 MG/1
5 TABLET ORAL 3 TIMES WEEKLY
Status: ON HOLD | COMMUNITY
End: 2018-01-29

## 2017-11-14 RX ORDER — GABAPENTIN 300 MG/1
300 CAPSULE ORAL 2 TIMES DAILY
Status: DISCONTINUED | OUTPATIENT
Start: 2017-11-14 | End: 2017-11-15 | Stop reason: HOSPADM

## 2017-11-14 RX ORDER — SODIUM CHLORIDE 9 MG/ML
100 INJECTION, SOLUTION INTRAVENOUS CONTINUOUS
Status: DISCONTINUED | OUTPATIENT
Start: 2017-11-14 | End: 2017-11-15

## 2017-11-14 RX ORDER — WARFARIN SODIUM 7.5 MG/1
7.5 TABLET ORAL
Status: DISCONTINUED | OUTPATIENT
Start: 2017-11-14 | End: 2017-11-15 | Stop reason: HOSPADM

## 2017-11-14 RX ORDER — SODIUM CHLORIDE 0.9 % (FLUSH) 0.9 %
1-10 SYRINGE (ML) INJECTION AS NEEDED
Status: DISCONTINUED | OUTPATIENT
Start: 2017-11-14 | End: 2017-11-15 | Stop reason: HOSPADM

## 2017-11-14 RX ORDER — LITHIUM CARBONATE 300 MG/1
300 CAPSULE ORAL NIGHTLY
Status: DISCONTINUED | OUTPATIENT
Start: 2017-11-14 | End: 2017-11-15 | Stop reason: HOSPADM

## 2017-11-14 RX ORDER — GABAPENTIN 300 MG/1
600 CAPSULE ORAL NIGHTLY
Status: DISCONTINUED | OUTPATIENT
Start: 2017-11-14 | End: 2017-11-15 | Stop reason: HOSPADM

## 2017-11-14 RX ORDER — GABAPENTIN 300 MG/1
600 CAPSULE ORAL NIGHTLY
COMMUNITY

## 2017-11-14 RX ORDER — SODIUM CHLORIDE 0.9 % (FLUSH) 0.9 %
10 SYRINGE (ML) INJECTION AS NEEDED
Status: DISCONTINUED | OUTPATIENT
Start: 2017-11-14 | End: 2017-11-15 | Stop reason: HOSPADM

## 2017-11-14 RX ORDER — GUAIFENESIN 600 MG/1
1200 TABLET, EXTENDED RELEASE ORAL EVERY 12 HOURS SCHEDULED
Status: DISCONTINUED | OUTPATIENT
Start: 2017-11-14 | End: 2017-11-15 | Stop reason: HOSPADM

## 2017-11-14 RX ORDER — LEVOTHYROXINE SODIUM 0.1 MG/1
100 TABLET ORAL
Status: DISCONTINUED | OUTPATIENT
Start: 2017-11-14 | End: 2017-11-15 | Stop reason: HOSPADM

## 2017-11-14 RX ORDER — WARFARIN SODIUM 5 MG/1
5 TABLET ORAL 3 TIMES WEEKLY
Status: DISCONTINUED | OUTPATIENT
Start: 2017-11-15 | End: 2017-11-15 | Stop reason: HOSPADM

## 2017-11-14 RX ORDER — CYANOCOBALAMIN 1000 UG/ML
1000 INJECTION, SOLUTION INTRAMUSCULAR; SUBCUTANEOUS DAILY
Status: DISCONTINUED | OUTPATIENT
Start: 2017-11-14 | End: 2017-11-15 | Stop reason: HOSPADM

## 2017-11-14 RX ORDER — ACETAMINOPHEN 325 MG/1
650 TABLET ORAL EVERY 4 HOURS PRN
Status: DISCONTINUED | OUTPATIENT
Start: 2017-11-14 | End: 2017-11-15 | Stop reason: HOSPADM

## 2017-11-14 RX ORDER — PANTOPRAZOLE SODIUM 40 MG/1
40 TABLET, DELAYED RELEASE ORAL
Status: DISCONTINUED | OUTPATIENT
Start: 2017-11-15 | End: 2017-11-15 | Stop reason: HOSPADM

## 2017-11-14 RX ORDER — FLUOXETINE HYDROCHLORIDE 20 MG/1
40 CAPSULE ORAL DAILY
Status: DISCONTINUED | OUTPATIENT
Start: 2017-11-14 | End: 2017-11-15 | Stop reason: HOSPADM

## 2017-11-14 RX ORDER — NALOXONE HCL 0.4 MG/ML
0.4 VIAL (ML) INJECTION
Status: DISCONTINUED | OUTPATIENT
Start: 2017-11-14 | End: 2017-11-15 | Stop reason: HOSPADM

## 2017-11-14 RX ORDER — NICOTINE 21 MG/24HR
1 PATCH, TRANSDERMAL 24 HOURS TRANSDERMAL EVERY 24 HOURS
Status: DISCONTINUED | OUTPATIENT
Start: 2017-11-14 | End: 2017-11-15 | Stop reason: HOSPADM

## 2017-11-14 RX ORDER — GABAPENTIN 300 MG/1
300 CAPSULE ORAL 2 TIMES DAILY
COMMUNITY

## 2017-11-14 RX ORDER — HYDROCODONE BITARTRATE AND ACETAMINOPHEN 7.5; 325 MG/1; MG/1
1 TABLET ORAL 3 TIMES DAILY
Status: DISCONTINUED | OUTPATIENT
Start: 2017-11-14 | End: 2017-11-15 | Stop reason: HOSPADM

## 2017-11-14 RX ORDER — ALBUTEROL SULFATE 2.5 MG/3ML
2.5 SOLUTION RESPIRATORY (INHALATION) EVERY 6 HOURS PRN
Status: DISCONTINUED | OUTPATIENT
Start: 2017-11-14 | End: 2017-11-15 | Stop reason: HOSPADM

## 2017-11-14 RX ORDER — ZOLPIDEM TARTRATE 5 MG/1
10 TABLET ORAL NIGHTLY
Status: DISCONTINUED | OUTPATIENT
Start: 2017-11-14 | End: 2017-11-15 | Stop reason: HOSPADM

## 2017-11-14 RX ORDER — ONDANSETRON 2 MG/ML
4 INJECTION INTRAMUSCULAR; INTRAVENOUS EVERY 6 HOURS PRN
Status: DISCONTINUED | OUTPATIENT
Start: 2017-11-14 | End: 2017-11-15 | Stop reason: HOSPADM

## 2017-11-14 RX ORDER — WARFARIN SODIUM 5 MG/1
7.5 TABLET ORAL
COMMUNITY
End: 2018-01-29 | Stop reason: HOSPADM

## 2017-11-14 RX ADMIN — CEFTRIAXONE 1 G: 1 INJECTION, POWDER, FOR SOLUTION INTRAMUSCULAR; INTRAVENOUS at 10:56

## 2017-11-14 RX ADMIN — DIAZEPAM 10 MG: 10 TABLET ORAL at 16:04

## 2017-11-14 RX ADMIN — LEVOTHYROXINE SODIUM 100 MCG: 100 TABLET ORAL at 14:49

## 2017-11-14 RX ADMIN — SODIUM CHLORIDE 100 ML/HR: 9 INJECTION, SOLUTION INTRAVENOUS at 18:20

## 2017-11-14 RX ADMIN — GUAIFENESIN 1200 MG: 600 TABLET, EXTENDED RELEASE ORAL at 20:14

## 2017-11-14 RX ADMIN — HYDROCODONE BITARTRATE AND ACETAMINOPHEN 1 TABLET: 7.5; 325 TABLET ORAL at 20:15

## 2017-11-14 RX ADMIN — ZOLPIDEM TARTRATE 10 MG: 5 TABLET, COATED ORAL at 20:15

## 2017-11-14 RX ADMIN — HYDROMORPHONE HYDROCHLORIDE 1 MG: 1 INJECTION, SOLUTION INTRAMUSCULAR; INTRAVENOUS; SUBCUTANEOUS at 18:26

## 2017-11-14 RX ADMIN — LITHIUM CARBONATE 300 MG: 300 CAPSULE, GELATIN COATED ORAL at 20:14

## 2017-11-14 RX ADMIN — CYANOCOBALAMIN 1000 MCG: 1000 INJECTION, SOLUTION INTRAMUSCULAR; SUBCUTANEOUS at 16:39

## 2017-11-14 RX ADMIN — GABAPENTIN 600 MG: 300 CAPSULE ORAL at 20:14

## 2017-11-14 RX ADMIN — GUAIFENESIN 1200 MG: 600 TABLET, EXTENDED RELEASE ORAL at 14:49

## 2017-11-14 RX ADMIN — FLUOXETINE HYDROCHLORIDE 40 MG: 20 CAPSULE ORAL at 14:49

## 2017-11-14 RX ADMIN — GABAPENTIN 300 MG: 300 CAPSULE ORAL at 18:26

## 2017-11-14 RX ADMIN — Medication 1 TABLET: at 18:26

## 2017-11-14 RX ADMIN — DIAZEPAM 10 MG: 10 TABLET ORAL at 20:15

## 2017-11-14 RX ADMIN — WARFARIN SODIUM 7.5 MG: 7.5 TABLET ORAL at 18:26

## 2017-11-14 RX ADMIN — HYDROMORPHONE HYDROCHLORIDE 1 MG: 1 INJECTION, SOLUTION INTRAMUSCULAR; INTRAVENOUS; SUBCUTANEOUS at 13:24

## 2017-11-14 RX ADMIN — HYDROCODONE BITARTRATE AND ACETAMINOPHEN 1 TABLET: 7.5; 325 TABLET ORAL at 16:04

## 2017-11-14 NOTE — ED NOTES
"PT CO BRUISING, PAIN ALL OVER, AND FREQUENT FALLS. PT STATES SHE WENT TO [facility] LAST Friday FOR A FALL WHERE SHE HIT HER HEAD. PT STATES THEY DID XRAYS AND CT SCANS AND TOLD HER THEY WERE NEGATIVE. PT STATES THEY TOLD HER INR WAS A \"6\". SHE SAYS SHE TAKES COUMADIN BUT DOESN'T THINK IT'S HELPING HER BECAUSE ALL IT DOES IS CAUSE BRUISING. I ASKED IF SHE HAS HER INR LEVELS CHECKED AS ORDERED AND PT SAID SHE HASN'T IN OVER A MOUTH. ON ASSESSMENT, SEVERAL BRUISES AT VARIOUS STAGES OF HEALING ARE SEEN FROM HEAD TO TOE. PT SAY \"I FALL ALL THE TIME.\" PT USES A WHEELED WALKER WITH AMBULATION. A&O X3. CONTINENT OF B & B.     Kinga Valencia RN  11/14/17 1031    "

## 2017-11-14 NOTE — ED PROVIDER NOTES
"Subjective   HPI Comments: Patient is a 41-year-old white female with frequent falling for the past several weeks. She states that she fell 5 days ago and hit the left side of her face on the commode. She states that she does not remember if she had loc. She states that she was seen at [facility] after the fall and was checked out. She states that she has continued to have falling since and that she still has headache. She denies chest pain or sob     Patient is a 41 y.o. female presenting with fall.   History provided by:  Patient   used: No    Fall       Review of Systems   Constitutional: Negative.    HENT: Negative.    Eyes: Negative.    Respiratory: Negative.    Cardiovascular: Negative.    Gastrointestinal: Negative.    Endocrine: Negative.    Genitourinary: Negative.    Musculoskeletal: Negative.    Skin: Negative.    Allergic/Immunologic: Negative.    Neurological:        Pt presents with frequent falls for the past several weeks. She states that she fell 5 days ago and injured her head and left side of her face. She states that she was seen at [facility] at that time and was checked out. She states that since then, she has continued to have headache and has continued to have mult falls. She denies chest pain before falling. She states that she is not sure whether she has loc or not when she falls. Pt takes coumadin for dvt prevention. She states that she has been on that for several years  She states that her 7yo son has been \"catching me\"    Hematological: Negative.    Psychiatric/Behavioral: Negative.    All other systems reviewed and are negative.      Past Medical History:   Diagnosis Date   • Acute retention of urine    • Anxiety and depression    • Bipolar 1 disorder    • COPD (chronic obstructive pulmonary disease)    • GERD (gastroesophageal reflux disease)    • Hypoglycemia    • Insomnia    • PTSD (post-traumatic stress disorder)        Allergies   Allergen Reactions   • Morphine " Anaphylaxis   • Aspirin Other (See Comments)     Child allergy, unsure what reaction  Child allergy, unsure what reaction   • Morphine And Related    • Nsaids    • Quetiapine Other (See Comments)     Muscle spasms  Muscle spasms   • Quetiapine Fumarate    • Salicylates    • Codeine Rash       Past Surgical History:   Procedure Laterality Date   • ABDOMINOPLASTY     • APPENDECTOMY     • CHOLECYSTECTOMY     • GASTRIC BYPASS     • HYSTERECTOMY         Family History   Problem Relation Age of Onset   • Cancer Mother    • Cancer Father    • HIV Brother        Social History     Social History   • Marital status:      Spouse name: N/A   • Number of children: N/A   • Years of education: N/A     Social History Main Topics   • Smoking status: Current Every Day Smoker     Packs/day: 0.25     Types: Cigarettes   • Smokeless tobacco: Never Used   • Alcohol use No   • Drug use: No   • Sexual activity: No     Other Topics Concern   • None     Social History Narrative       Prior to Admission medications    Medication Sig Start Date End Date Taking? Authorizing Provider   diazePAM (VALIUM) 10 MG tablet Take  by mouth. 2/14/17   Historical Provider, MD   doxepin (SINEquan) 10 MG capsule  7/12/17   Historical Provider, MD   doxycycline (MONODOX) 100 MG capsule Take 1 capsule by mouth 2 (Two) Times a Day. 8/9/17   PARIS Howard   FLUoxetine (PROzac) 40 MG capsule Take  by mouth.    Historical Provider, MD   gabapentin (NEURONTIN) 600 MG tablet Take  by mouth.    Historical Provider, MD   HYDROcodone-acetaminophen (NORCO) 7.5-325 MG per tablet Take  by mouth.    Historical Provider, MD   levothyroxine (SYNTHROID, LEVOTHROID) 100 MCG tablet Take  by mouth. 9/24/16   Historical Provider, MD   lithium carbonate 300 MG capsule Take  by mouth.    Historical Provider, MD   O2 (OXYGEN) Inhale into the lungs nightly     Historical Provider, MD   O2 (OXYGEN) Inhale.    Historical Provider, MD   omeprazole (priLOSEC) 20 MG  "capsule Take  by mouth. 9/2/16   Historical Provider, MD   PROAIR  (90 BASE) MCG/ACT inhaler  6/3/17   Historical Provider, MD   promethazine (PHENERGAN) 25 MG tablet Take  by mouth. 9/2/16   Historical Provider, MD   topiramate (TOPAMAX) 100 MG tablet TAKE 1 TABLET BY MOUTH DAILY 3/3/17   Historical Provider, MD   warfarin (COUMADIN) 7.5 MG tablet Take  by mouth.    Historical Provider, MD   zolpidem (AMBIEN) 10 MG tablet Take  by mouth.    Historical Provider, MD       /70 (BP Location: Right arm, Patient Position: Lying)  Pulse 76  Temp 97.8 °F (36.6 °C) (Oral)   Resp 20  Ht 59\" (149.9 cm)  Wt 200 lb (90.7 kg)  SpO2 100%  BMI 40.4 kg/m2    Objective   Physical Exam   Constitutional: She is oriented to person, place, and time. She appears well-developed and well-nourished.   HENT:   Head: Normocephalic and atraumatic.   Moderate sized hematoma noted to left side of face involving the left periorpital area. Moderate amt of swelling noted to left side of scalp as well.   Eyes: Conjunctivae and EOM are normal. Pupils are equal, round, and reactive to light.   Neck: Normal range of motion. Neck supple. No tracheal deviation present. No thyromegaly present.   Cardiovascular: Normal rate, regular rhythm, normal heart sounds and intact distal pulses.    Pulmonary/Chest: Effort normal and breath sounds normal. No respiratory distress. She has no wheezes. She has no rales. She exhibits no tenderness.   Abdominal: Soft. Bowel sounds are normal.   Musculoskeletal: Normal range of motion.   Neurological: She is alert and oriented to person, place, and time. She has normal reflexes. No cranial nerve deficit.   Skin: Skin is warm and dry.   Mult bruises noted to bilat les and ues . Pedal pulses and periph palp. bernadette well    Psychiatric: She has a normal mood and affect. Her behavior is normal. Judgment and thought content normal.   Nursing note and vitals reviewed.      Procedures         Lab Results (last " 24 hours)     Procedure Component Value Units Date/Time    Blood Gas, Arterial [[id number]]  (Abnormal) Collected:  11/14/17 0941    Specimen:  Arterial Blood Updated:  11/14/17 0950     Site Right Radial     Luis's Test Positive     pH, Arterial 7.392 pH units      pCO2, Arterial 42.1 mm Hg      pO2, Arterial 80.6 (L) mm Hg      HCO3, Arterial 25.6 mmol/L      Base Excess, Arterial 0.5 mmol/L      O2 Saturation, Arterial 97.1 %      Temperature 37.0 C      Barometric Pressure for Blood Gas 759 mmHg      Modality Room Air     Ventilator Mode NA     Collected by [id number]    Urinalysis With / Culture If Indicated - Urine, Clean Catch [[id number]]  (Abnormal) Collected:  11/14/17 0942    Specimen:  Urine from Urine, Clean Catch Updated:  11/14/17 1023     Color, UA Yellow     Appearance, UA Turbid (A)     pH, UA 6.0     Specific Gravity, UA 1.018     Glucose, UA Negative     Ketones, UA Negative     Bilirubin, UA Negative     Blood, UA Moderate (2+) (A)     Protein, UA Trace (A)     Leuk Esterase, UA Small (1+) (A)     Nitrite, UA Negative     Urobilinogen, UA 1.0 E.U./dL    Urine Drug Screen - Urine, Clean Catch [[id number]]  (Abnormal) Collected:  11/14/17 0942    Specimen:  Urine from Urine, Clean Catch Updated:  11/14/17 1036     Amphetamine Screen, Urine Negative     Barbiturates Screen, Urine Negative     Benzodiazepine Screen, Urine Positive (A)     Cocaine Screen, Urine Negative     Methadone Screen, Urine Negative     Opiate Screen Negative     Phencyclidine (PCP), Urine Negative     THC, Screen, Urine Negative    Narrative:       Negative Thresholds For Drugs Screened in Urine:    Amphetamines          500 ng/ml  Barbiturates          200 ng/ml  Benzodiazepines       200 ng/ml  Cocaine               150 ng/ml  Methadone             150 ng/ml  Opiates               300 ng/ml  Phencyclidine         25 ng/ml  THC                      50 ng/ml    The normal value for all drugs tested is negative. This report includes  final unconfirmed screening results.  A positive result by this assay can be, at your request, sent to the Reference Lab for confirmation by gas chromatography. Unconfirmed results must not be used for non-medical purposes, such as employment or legal testing. Clinical consideration should be applied to any drug of abuse test result, particularly when unconfirmed results are used.    Urine Culture - Urine, Urine, Clean Catch [[id number]] Collected:  11/14/17 0942    Specimen:  Urine from Urine, Clean Catch Updated:  11/14/17 1000    Urinalysis, Microscopic Only - Urine, Clean Catch [[id number]]  (Abnormal) Collected:  11/14/17 0942    Specimen:  Urine from Urine, Clean Catch Updated:  11/14/17 1023     RBC, UA 6-12 (A) /HPF      WBC, UA 21-30 (A) /HPF      Bacteria, UA 4+ (A) /HPF      Squamous Epithelial Cells, UA 31-50 (A) /HPF      Methodology Manual Light Microscopy    CBC & Differential [[id number]] Collected:  11/14/17 0952    Specimen:  Blood Updated:  11/14/17 1002    Narrative:       The following orders were created for panel order CBC & Differential.  Procedure                               Abnormality         Status                     ---------                               -----------         ------                     CBC Auto Differential[[id number]]        Abnormal            Final result                 Please view results for these tests on the individual orders.    Comprehensive Metabolic Panel [[id number]]  (Abnormal) Collected:  11/14/17 0952    Specimen:  Blood Updated:  11/14/17 1013     Glucose 96 mg/dL      BUN 10 mg/dL      Creatinine 1.13 mg/dL      Sodium 145 mmol/L      Potassium 4.4 mmol/L      Chloride 106 mmol/L      CO2 31.0 mmol/L      Calcium 9.4 mg/dL      Total Protein 7.2 g/dL      Albumin 4.00 g/dL      ALT (SGPT) 26 U/L      AST (SGOT) 32 U/L      Alkaline Phosphatase 128 (H) U/L      Total Bilirubin 0.3 mg/dL      eGFR Non African Amer 53 (L) mL/min/1.73      Globulin 3.2 gm/dL       A/G Ratio 1.3 g/dL      BUN/Creatinine Ratio 8.8     Anion Gap 8.0 mmol/L     Protime-INR [[id number]]  (Abnormal) Collected:  11/14/17 0952    Specimen:  Blood Updated:  11/14/17 1009     Protime 19.0 (H) Seconds      INR 1.54 (H)    aPTT [[id number]]  (Abnormal) Collected:  11/14/17 0952    Specimen:  Blood Updated:  11/14/17 1009     PTT 38.8 (H) seconds     Troponin [[id number]]  (Normal) Collected:  11/14/17 0952    Specimen:  Blood Updated:  11/14/17 1025     Troponin I <0.012 ng/mL     Lithium Level [[id number]]  (Abnormal) Collected:  11/14/17 0952    Specimen:  Blood Updated:  11/14/17 1040     Lithium 0.2 (L) mmol/L     CBC Auto Differential [[id number]]  (Abnormal) Collected:  11/14/17 0952    Specimen:  Blood Updated:  11/14/17 1002     WBC 11.19 (H) 10*3/mm3      RBC 3.98 (L) 10*6/mm3      Hemoglobin 12.5 g/dL      Hematocrit 39.8 %      .0 (H) fL      MCH 31.4 pg      MCHC 31.4 (L) g/dL      RDW 13.9 %      RDW-SD 49.8 fl      MPV 9.7 fL      Platelets 302 10*3/mm3      Neutrophil % 78.9 (H) %      Lymphocyte % 14.7 (L) %      Monocyte % 3.1 (L) %      Eosinophil % 2.2 %      Basophil % 0.2 %      Immature Grans % 0.9 %      Neutrophils, Absolute 8.82 (H) 10*3/mm3      Lymphocytes, Absolute 1.65 10*3/mm3      Monocytes, Absolute 0.35 10*3/mm3      Eosinophils, Absolute 0.25 10*3/mm3      Basophils, Absolute 0.02 10*3/mm3      Immature Grans, Absolute 0.10 (H) 10*3/mm3     Magnesium [[id number]]  (Normal) Collected:  11/14/17 0952    Specimen:  Blood Updated:  11/14/17 1059     Magnesium 2.1 mg/dL     Vitamin B12 [[id number]] Collected:  11/14/17 0952    Specimen:  Blood Updated:  11/14/17 1353    Folate [[id number]] Collected:  11/14/17 0952    Specimen:  Blood Updated:  11/14/17 1353          XR Chest 1 View   Final Result   1. Limited exam due to poor chest expansion.   2. The lungs appear grossly clear.           This report was finalized on 11/14/2017 10:34 by Dr Geovanny Hagen, .      CT Head  Without Contrast   ED Interpretation   1. No acute intracranial findings.       This report was finalized on 11/14/2017 10:16 by Dr. Arnel Reaves MD.      Final Result   1. No acute intracranial findings.       This report was finalized on 11/14/2017 10:16 by Dr. Arnel Reaves MD.      CT Facial Bones Without Contrast   ED Interpretation   1. No evidence of acute facial fracture. Extensive soft tissue swelling   of the left face.   This report was finalized on 11/14/2017 10:28 by Dr. Arnel Reaves MD.      Final Result   1. No evidence of acute facial fracture. Extensive soft tissue swelling   of the left face.   This report was finalized on 11/14/2017 10:28 by Dr. Arnel Reaves MD.          ED Course  ED Course   Comment By Time   Reviewed pt and pt care plan with dr yang- also in agreement with pt care plan. Call placed to hospitalist for further Eliza Patel, APRN 11/14 1045   Spoke to dr john- advised to admit to dr esdras Patel, APRN 11/14 1102          MDM  Number of Diagnoses or Management Options  Acute UTI: minor  Contusion of scalp, initial encounter: minor  Multiple falls: new and requires workup     Amount and/or Complexity of Data Reviewed  Clinical lab tests: ordered and reviewed  Tests in the radiology section of CPT®: ordered and reviewed  Independent visualization of images, tracings, or specimens: yes    Patient Progress  Patient progress: stable      Final diagnoses:   Multiple falls   Acute UTI   Contusion of scalp, initial encounter          Eliza Patel, APRN  11/14/17 9771

## 2017-11-14 NOTE — PLAN OF CARE
Problem: Patient Care Overview (Adult)  Goal: Plan of Care Review  Outcome: Ongoing (interventions implemented as appropriate)    11/14/17 1500   Coping/Psychosocial Response Interventions   Plan Of Care Reviewed With patient   Outcome Evaluation   Outcome Summary/Follow up Plan PT evaluation completed. Pt is oriented x4 but repeats herself and appears confused at times. Pt completes all bed mobility independently. Pt completes sit to stand to sit with rollator CGA. Pt experiences LOB with stand to sit and required mod A to correct. Pt amb 100 ft CGA with rollator. Pt would benefit from continued skilled therapy to increase activity tolerance and balance and increase independence in transfers and gait. Anticipated d/c home with assist, HH.         Problem: Inpatient Physical Therapy  Goal: Transfer Training Goal 1 LTG- PT  Outcome: Ongoing (interventions implemented as appropriate)    11/14/17 1500   Transfer Training PT LTG   Transfer Training PT LTG, Date Established 11/14/17   Transfer Training PT LTG, Time to Achieve by discharge   Transfer Training PT LTG, Activity Type bed to chair /chair to bed;sit to stand/stand to sit   Transfer Training PT LTG, [place] Level conditional independence   Transfer Training PT LTG, Assist Device walker, rolling   Transfer Training PT LTG, Additional Goal no LOB       Goal: Gait Training Goal LTG- PT  Outcome: Ongoing (interventions implemented as appropriate)    11/14/17 1500   Gait Training PT LTG   Gait Training Goal PT LTG, Date Established 11/14/17   Gait Training Goal PT LTG, Time to Achieve by discharge   Gait Training Goal PT LTG, [place] Level conditional independence   Gait Training Goal PT LTG, Assist Device walker, rolling   Gait Training Goal PT LTG, Distance to Achieve 200 ft

## 2017-11-14 NOTE — THERAPY EVALUATION
Acute Care - Physical Therapy Initial Evaluation  [facility]     Patient Name: Vianca Spencer  : 1975  MRN: [id number]  Today's Date: 2017   Onset of Illness/Injury or Date of Surgery Date: 17  Date of Referral to PT: 17  Referring Physician: Dr Kimbrough      Admit Date: 2017     Visit Dx:    ICD-10-CM ICD-9-CM   1. Multiple falls R29.6 V15.88   2. Acute UTI N39.0 599.0   3. Contusion of scalp, initial encounter S00.03XA 920   4. Impaired functional mobility, balance, gait, and endurance Z74.09 V49.89     Patient Active Problem List   Diagnosis   • Fe deficiency anemia   • Anemia   • Anxiety   • Biphasic positive airway pressure dependence   • Chronic pain   • Chronic obstructive pulmonary disease   • Depression   • Gastroesophageal reflux disease   • Bariatric surgery status   • Hypercoagulable state   • Opioid dependence   • Obstructive sleep apnea syndrome   • Seizure disorder   • Coagulation disorder   • BiPAP (biphasic positive airway pressure) dependence   • Bipolar disorder   • COPD (chronic obstructive pulmonary disease)   • Essential tremor   • GERD (gastroesophageal reflux disease)   • History of Stefanie-en-Y gastric bypass   • Hypercoagulopathy   • Incisional hernia   • 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, intract, 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   • Morbid obesity due to excess calories   • Narcotic dependence   • Obstructive sleep apnea   • Warfarin-induced coagulopathy   • Multiple falls     Past Medical History:   Diagnosis Date   • Acute retention of urine    • Anxiety and depression    • Bipolar 1 disorder    • COPD (chronic obstructive pulmonary disease)    • GERD (gastroesophageal reflux disease)    • Hypoglycemia    • Insomnia    • PTSD (post-traumatic stress disorder)      Past Surgical History:   Procedure Laterality Date   • ABDOMINOPLASTY     • APPENDECTOMY     • CHOLECYSTECTOMY     • GASTRIC BYPASS     • HYSTERECTOMY            PT ASSESSMENT (last 72 hours)      PT  Evaluation       11/14/17 1418 11/14/17 1230    Rehab Evaluation    Document Type evaluation   see MAR  -PB (r) AA (t) PB (c)     Subjective Information agree to therapy;fatigue;nausea/vomiting;dyspnea;numbness   numbness R foot  -PB (r) AA (t) PB (c)     Patient Effort, Rehab Treatment good  -PB (r) AA (t) PB (c)     Symptoms Noted During/After Treatment fatigue  -PB (r) AA (t) PB (c)     Symptoms Noted Comment under direct supervision of PB, PT throughout evaluation  -PB (r) AA (t) PB (c)     General Information    Patient Profile Review yes  -PB (r) AA (t) PB (c)     Onset of Illness/Injury or Date of Surgery Date 11/14/17  -PB (r) AA (t) PB (c)     Referring Physician Dr Kimbrough  -PB (r) AA (t) PB (c)     General Observations Pt in bed, awake and alert, O2 via NC, L eye bruised  -PB (r) AA (t) PB (c)     Pertinent History Of Current Problem Pt has had multiple falls over several weeks. Fell 5 days ago, went to [email or web address], all testing negative. Pt has continued to have recurrent falls since then  -PB (r) AA (t) PB (c)     Precautions/Limitations fall precautions;oxygen therapy device and L/min  -PB (r) AA (t) PB (c)     Prior Level of Function independent:;ADL's;feeding;grooming;dressing;all household mobility;min assist:;community mobility  -PB (r) AA (t) PB (c)     Equipment Currently Used at Home rollator;oxygen  -PB (r) AA (t) PB (c)     Plans/Goals Discussed With patient;agreed upon  -PB (r) AA (t) PB (c)     Risks Reviewed patient:;LOB;nausea/vomiting;dizziness;increased discomfort;change in vital signs;increased drainage;lines disloged  -PB (r) AA (t) PB (c)     Benefits Reviewed patient:;improve function;increase independence;increase strength;increase balance;decrease pain;decrease risk of DVT;improve skin integrity;increase knowledge  -PB (r) AA (t) PB (c)     Barriers to Rehab physical barrier  -PB (r) AA (t) PB (c)     Living Environment    Lives With child(laura), dependent  -PB (r) AA (t) PB (c)  child(laura), dependent  -LR    Living Arrangements apartment  -PB (r) AA (t) PB (c) apartment  -LR    Home Accessibility tub/shower is not walk in;bed and bath on same level  -PB (r) AA (t) PB (c) no concerns  -LR    Stair Railings at Home  none  -LR    Type of Financial/Environmental Concern  none  -LR    Transportation Available  car  -LR    Living Environment Comment other children can check if needed  -PB (r) AA (t) PB (c)     Clinical Impression    Date of Referral to PT 11/14/17  -PB (r) AA (t) PB (c)     PT Diagnosis impaired mobility, endurance  -PB (r) AA (t) PB (c)     Patient/Family Goals Statement return home  -PB (r) AA (t) PB (c)     Criteria for Skilled Therapeutic Interventions Met yes;treatment indicated  -PB (r) AA (t) PB (c)     Impairments Found (describe specific impairments) aerobic capacity/endurance;gait, locomotion, and balance  -PB (r) AA (t) PB (c)     Rehab Potential fair, will monitor progress closely  -PB (r) AA (t) PB (c)     Predicted Duration of Therapy Intervention (days/wks) until d/c  -PB (r) AA (t) PB (c)     Pain Assessment    Pain Assessment No/denies pain  -PB (r) AA (t) PB (c)     Cognitive Assessment/Intervention    Current Cognitive/Communication Assessment functional  -PB (r) AA (t) PB (c)     Orientation Status oriented x 4  -PB (r) AA (t) PB (c)     Follows Commands/Answers Questions 100% of the time;able to follow multi-step instructions  -PB (r) AA (t) PB (c)     Personal Safety decreased awareness, need for assist;decreased awareness, need for safety  -PB (r) AA (t) PB (c)     Personal Safety Interventions fall prevention program maintained;gait belt;nonskid shoes/slippers when out of bed;supervised activity  -PB (r) AA (t) PB (c)     ROM (Range of Motion)    General ROM no range of motion deficits identified  -PB (r) AA (t) PB (c)     MMT (Manual Muscle Testing)    General MMT Assessment no strength deficits identified  -PB (r) AA (t) PB (c)     Bed Mobility,  Assessment/Treatment    Bed Mob, Supine to Sit, [place] independent  -PB (r) AA (t) PB (c)     Bed Mob, Sit to Supine, [place] independent  -PB (r) AA (t) PB (c)     Bed Mobility, Impairments impaired balance  -PB (r) AA (t) PB (c)     Transfer Assessment/Treatment    Transfers, Sit-Stand [place] contact guard assist;upper extremity support  -PB (r) AA (t) PB (c)     Transfers, Stand-Sit [place] contact guard assist;upper extremity support  -PB (r) AA (t) PB (c)     Transfers, Sit-Stand-Sit, Assist Device rolling walker  -PB (r) AA (t) PB (c)     Transfer, Safety Issues loses balance backward   during stand to sit  -PB (r) AA (t) PB (c)     Transfer, Impairments impaired balance  -PB (r) AA (t) PB (c)     Transfer, Comment Pt requires modA to correct LOB stand to sit  -PB (r) AA (t) PB (c)     Gait Assessment/Treatment    Gait, [place] Level contact guard assist;upper extremity support  -PB (r) AA (t) PB (c)     Gait, Assistive Device rollator  -PB (r) AA (t) PB (c)     Gait, Distance (Feet) 100  -PB (r) AA (t) PB (c)     Gait, Safety Issues balance decreased during turns;step length decreased;supplemental O2;loses balance backward  -PB (r) AA (t) PB (c)     Gait, Impairments impaired balance  -PB (r) AA (t) PB (c)     Motor Skills/Interventions    Additional Documentation Balance Skills Training (Group)  -PB (r) AA (t) PB (c)     Balance Skills Training    Sitting-Level of Assistance Independent  -PB (r) AA (t) PB (c)     Sitting-Balance Support No upper extremity supported;Feet unsupported  -PB (r) AA (t) PB (c)     Standing-Level of Assistance Close supervision  -PB (r) AA (t) PB (c)     Static Standing Balance Support assistive device  -PB (r) AA (t) PB (c)     Gait Balance-Level of Assistance Contact guard  -PB (r) AA (t) PB (c)     Gait Balance Support assistive device  -PB (r) AA (t) PB (c)     Positioning and Restraints    Pre-Treatment Position in bed  -PB (r) AA (t) PB (c)     Post  Treatment Position bed  -PB (r) AA (t) PB (c)     In Bed fowlers;call light within reach;encouraged to call for assist;with nsg  -PB (r) AA (t) PB (c)       11/14/17 1226       General Information    Equipment Currently Used at Home rollator  -LR       User Key  (r) = Recorded By, (t) = Taken By, (c) = Cosigned By    Initials Name Provider Type    LR Margaret Crocker, RN Registered Nurse    MATTHEW Quezada, PT DPT Physical Therapist    AA Olga Smith, PT Student PT Student          Physical Therapy Education     Title: PT OT SLP Therapies (Done)     Topic: Physical Therapy (Done)     Point: Mobility training (Done)    Learning Progress Summary    Learner Readiness Method Response Comment Documented by Status   Patient Acceptance E NR,VU Role of PT, benefits of activity, fall prevention  11/14/17 1500 Done               Point: Body mechanics (Done)    Learning Progress Summary    Learner Readiness Method Response Comment Documented by Status   Patient Acceptance E NR,VU Role of PT, benefits of activity, fall prevention  11/14/17 1500 Done               Point: Precautions (Done)    Learning Progress Summary    Learner Readiness Method Response Comment Documented by Status   Patient Acceptance E NR,VU Role of PT, benefits of activity, fall prevention  11/14/17 1500 Done                      User Key     Initials Effective Dates Name Provider Type Discipline     10/11/17 -  Olga Smith, PT Student PT Student PT                PT Recommendation and Plan  Anticipated Equipment Needs At Discharge: shower chair  Anticipated Discharge Disposition: home with assist, home with home health  Demonstrates Need for Referral to Another Service: occupational therapy (decreased activity tolerance, difficulty with ADL's)  Planned Therapy Interventions: balance training, gait training, patient/family education, transfer training  PT Frequency: 2 times/day, daily, per priority policy  Plan of Care Review  Plan Of Care  Reviewed With: patient  Outcome Summary/Follow up Plan: PT evaluation completed. Pt is oriented x4 but repeats herself and appears confused at times. Pt completes all bed mobility independently. Pt completes sit to stand to sit with rollator CGA. Pt experiences LOB with stand to sit and required mod A to correct. Pt amb 100 ft CGA with rollator. Pt would benefit from continued skilled therapy to increase activity tolerance and balance and increase independence in transfers and gait. Anticipated d/c home with assist, HH.          IP PT Goals       11/14/17 1500          Transfer Training PT LTG    Transfer Training PT LTG, Date Established 11/14/17  -PB (r) AA (t) PB (c)      Transfer Training PT LTG, Time to Achieve by discharge  -PB (r) AA (t) PB (c)      Transfer Training PT LTG, Activity Type bed to chair /chair to bed;sit to stand/stand to sit  -PB (r) AA (t) PB (c)      Transfer Training PT LTG, [place] Level conditional independence  -PB (r) AA (t) PB (c)      Transfer Training PT LTG, Assist Device walker, rolling  -PB (r) AA (t) PB (c)      Transfer Training PT LTG, Additional Goal no LOB  -PB (r) AA (t) PB (c)      Gait Training PT LTG    Gait Training Goal PT LTG, Date Established 11/14/17  -PB (r) AA (t) PB (c)      Gait Training Goal PT LTG, Time to Achieve by discharge  -PB (r) AA (t) PB (c)      Gait Training Goal PT LTG, [place] Level conditional independence  -PB (r) AA (t) PB (c)      Gait Training Goal PT LTG, Assist Device walker, rolling  -PB (r) AA (t) PB (c)      Gait Training Goal PT LTG, Distance to Achieve 200 ft  -PB (r) AA (t) PB (c)        User Key  (r) = Recorded By, (t) = Taken By, (c) = Cosigned By    Initials Name Provider Type    MATTHEW Quezada, PT DPT Physical Therapist    JORGE LUIS Smith, PT Student PT Student                Outcome Measures       11/14/17 1400          How much help from another person do you currently need...    Turning from your back to your  side while in flat bed without using bedrails? 4  -PB (r) AA (t) PB (c)      Moving from lying on back to sitting on the side of a flat bed without bedrails? 4  -PB (r) AA (t) PB (c)      Moving to and from a bed to a chair (including a wheelchair)? 3  -PB (r) AA (t) PB (c)      Standing up from a chair using your arms (e.g., wheelchair, bedside chair)? 3  -PB (r) AA (t) PB (c)      Climbing 3-5 steps with a railing? 3  -PB (r) AA (t) PB (c)      To walk in hospital room? 3  -PB (r) AA (t) PB (c)      AM-PAC 6 Clicks Score 20  -PB (r) AA (t)      Functional Assessment    Outcome Measure Options AM-PAC 6 Clicks Basic Mobility (PT)  -PB (r) AA (t) PB (c)        User Key  (r) = Recorded By, (t) = Taken By, (c) = Cosigned By    Initials Name Provider Type    MATTHEW Quezada, PT DPT Physical Therapist    JORGE LUIS Smith, PT Student PT Student           Time Calculation:         PT Charges       11/14/17 1505          Time Calculation    Start Time 1418  -PB (r) AA (t) PB (c)      Stop Time 1451  -PB (r) AA (t) PB (c)      Time Calculation (min) 33 min  -PB (r) AA (t)      PT Received On 11/14/17  -PB (r) AA (t) PB (c)      PT Goal Re-Cert Due Date 11/24/17  -PB (r) AA (t) PB (c)        User Key  (r) = Recorded By, (t) = Taken By, (c) = Cosigned By    Initials Name Provider Type    MATTHEW Quezada, PT DPT Physical Therapist    JORGE LUIS Smith, PT Student PT Student          Therapy Charges for Today     Code Description Service Date Service Provider Modifiers Qty    [id number]  PT EVAL LOW COMPLEXITY 2 11/14/2017 Olga Smith, PT Student GP 1          PT G-Codes  Outcome Measure Options: AM-PAC 6 Clicks Basic Mobility (PT)  Score: 20  Functional Limitation: Mobility: Walking and moving around  Mobility: Walking and Moving Around Current Status (): At least 20 percent but less than 40 percent impaired, limited or restricted  Mobility: Walking and Moving Around Goal Status (): At least 1  percent but less than 20 percent impaired, limited or restricted      Olga SHANNON Smith, PT Student  11/14/2017

## 2017-11-14 NOTE — H&P
"    [facility] Medicine Services  HISTORY AND PHYSICAL    Date of Admission: 11/14/2017  Primary Care Physician: Neel Valencia Jr., MD    Subjective     Chief Complaint: Frequent falls     Fall   Associated symptoms include headaches. Pertinent negatives include no fever.   Headache    Associated symptoms include coughing, eye pain and weakness. Pertinent negatives include no dizziness, fever or seizures. Eye redness: Left eye, related to recent fall.      Ms. Spencer is a 41-year-old  female who follows Dr. Neel Valencia at [facility] for her primary care.  She also sees Dr. Wagoner for chronic pain management.  She has a past medical history significant for seizure disorder, depression, anxiety, Bipolar disorder, multiple suicide attempts, bilateral lower extremity DVT's on chronic Coumadin therapy, peripheral neuropathy, gastric bypass, emphysema, and chronic pain. The patient also has a history of obstructive sleep apnea. She tells me that she was intolerant to CPAP therapy, so she is supposed to wear oxygen at night. She tells me that because she frequently falls asleep during the day, she keeps her oxygen on during the day as well.     She presented to the [facility] emergency department on 11/14/2017 with complaints of frequent falls at home. She was actually seen at the [facility] emergency department on Sunday, 11/12/2017 for similar complaints. She states she has chronic issues with her left knee, following a left anterior cruciate ligament repair many years ago, and periodically this knee \"gives out\" from under her causing her to fall.  During this particular incident, the patient was standing up from the commode and fell while pulling her pants up causing her to hit the bathtub on the left side of her face. The X-ray of her left knee showed soft tissue swelling likely related to direct trauma to the knee, with no evidence of " "acute fracture.  The CT of her head showed a scalp hematoma overlying the left zygoma and left temporal scalp no acute intracranial process or associated bony injury.  The CT of her facial bones showed no evidence of acute facial fracture. At that time, she was told that her INR was supra therapeutic at 6.45. She was instructed to hold her Coumadin on Sunday night and resume on Monday. The patient states she fell asleep and forgot to take her Coumadin last night.     The patient states that around 4 AM today, her left knee gave out on her once again, causing her to fall. She decided to seek treatment at the emergency department because she states \"I shouldn't have to live like this. I can't keep falling all the time.\" She denies any fever or chills. She does admit to a non-productive cough that has been present for at least 2 weeks. She admits to nausea and vomiting, but states this has been a chronic issue for at least 9 years and is presently unchanged.     The patient has stated that she has recently (approximately within the last month) been treated for trichomonas. She does admit that she is still experiencing some vaginal itching, but denies any discharge at this time. She denies dysuria but does admit to urgency.     The patient will be admitted to the hospitalist service for further evaluation and management.     Review of Systems   Constitutional: Positive for fatigue. Negative for chills and fever.   Eyes: Positive for pain. Eye redness: Left eye, related to recent fall.   Respiratory: Positive for cough and wheezing. Negative for shortness of breath.    Cardiovascular: Negative for chest pain, palpitations and leg swelling.   Gastrointestinal: Negative.    Endocrine: Negative.    Genitourinary: Positive for urgency. Negative for difficulty urinating, dysuria, frequency, vaginal discharge and vaginal pain.   Musculoskeletal: Positive for gait problem.   Skin: Negative.    Allergic/Immunologic: Negative.  "   Neurological: Positive for weakness and headaches. Negative for dizziness, seizures and syncope.   Hematological: Bruises/bleeds easily.   Psychiatric/Behavioral: Positive for dysphoric mood.      Otherwise complete ROS reviewed and negative except as mentioned in the HPI.    Past Medical History:   Past Medical History:   Diagnosis Date   • Acute retention of urine    • Anxiety and depression    • Bipolar 1 disorder    • COPD (chronic obstructive pulmonary disease)    • GERD (gastroesophageal reflux disease)    • Hypoglycemia    • Insomnia    • PTSD (post-traumatic stress disorder)      Past Surgical History:  Past Surgical History:   Procedure Laterality Date   • ABDOMINOPLASTY     • APPENDECTOMY     • CHOLECYSTECTOMY     • GASTRIC BYPASS     • HYSTERECTOMY       Social History: The patient reports that she has been smoking since she was 6 years old. She smokes approximately 1/2 pack per day. She reports that she does not drink alcohol or use illicit drugs. She lives in a first-floor apartment with her 16-year-old son. She has two other living children who help take care of her.     Family History: The patient reports her mother  from Lung cancer and her father  from bone cancer. She states mental illness is present on both sides of her family. She also reports that one of her grandmothers had Diabetes. She has a brother with HIV.     Allergies:  Allergies   Allergen Reactions   • Morphine Anaphylaxis   • Aspirin Other (See Comments)     Child allergy, unsure what reaction  Child allergy, unsure what reaction   • Morphine And Related    • Nsaids    • Quetiapine Other (See Comments)     Muscle spasms  Muscle spasms   • Quetiapine Fumarate    • Salicylates    • Synthroid [Levothyroxine Sodium]    • Codeine Rash     Medications:  Prior to Admission medications    Medication Sig Start Date End Date Taking? Authorizing Provider   diazePAM (VALIUM) 10 MG tablet Take 10 mg by mouth 3 (Three) Times a Day.  2/14/17  Yes Historical Provider, MD   FLUoxetine (PROzac) 40 MG capsule Take 40 mg by mouth Daily.   Yes Historical Provider, MD   gabapentin (NEURONTIN) 300 MG capsule Take 300 mg by mouth 2 (Two) Times a Day.   Yes Historical Provider, MD   gabapentin (NEURONTIN) 300 MG capsule Take 600 mg by mouth Every Night.   Yes Historical Provider, MD   HYDROcodone-acetaminophen (NORCO) 7.5-325 MG per tablet Take 1 tablet by mouth 3 (Three) Times a Day.   Yes Historical Provider, MD   levothyroxine (SYNTHROID, LEVOTHROID) 100 MCG tablet Take 100 mcg by mouth Daily. 9/24/16  Yes Historical Provider, MD   lithium carbonate 300 MG capsule Take 300 mg by mouth Every Night.   Yes Historical Provider, MD   O2 (OXYGEN) Inhale 2 L/min Continuous.   Yes Historical Provider, MD   omeprazole (priLOSEC) 20 MG capsule Take 20 mg by mouth Daily. 9/2/16  Yes Historical Provider, MD   PROAIR  (90 BASE) MCG/ACT inhaler Inhale 2 puffs Every 4 (Four) Hours As Needed for Wheezing or Shortness of Air. 6/3/17  Yes Historical Provider, MD   promethazine (PHENERGAN) 25 MG tablet Take 25 mg by mouth Every 8 (Eight) Hours As Needed for Nausea or Vomiting. 9/2/16  Yes Historical Provider, MD   warfarin (COUMADIN) 5 MG tablet Take 7.5 mg by mouth 4 (Four) Times a Week. Sunday, Tuesday, Thursday, and Saturday.   Yes Historical Provider, MD   warfarin (COUMADIN) 5 MG tablet Take 5 mg by mouth 3 (Three) Times a Week. Monday, Wednesday, and Friday.   Yes Historical Provider, MD   zolpidem (AMBIEN) 10 MG tablet Take 10 mg by mouth Every Night.   Yes Historical Provider, MD   O2 (OXYGEN) Inhale 2 L/min Continuous.  11/14/17 Yes Historical Provider, MD   doxepin (SINEquan) 10 MG capsule  7/12/17 11/14/17  Historical Provider, MD   doxycycline (MONODOX) 100 MG capsule Take 1 capsule by mouth 2 (Two) Times a Day. 8/9/17 11/14/17  PARIS Howard   gabapentin (NEURONTIN) 600 MG tablet Take  by mouth.  11/14/17  Historical Provider, MD   O2  "(OXYGEN) Inhale into the lungs nightly   11/14/17  Historical Provider, MD   O2 (OXYGEN) Inhale.  11/14/17  Historical Provider, MD   topiramate (TOPAMAX) 100 MG tablet TAKE 1 TABLET BY MOUTH DAILY 3/3/17 11/14/17  Historical Provider, MD   warfarin (COUMADIN) 7.5 MG tablet Take  by mouth.  11/14/17  Historical Provider, MD     Objective     Vital Signs: /70 (BP Location: Right arm, Patient Position: Lying)  Pulse 76  Temp 97.8 °F (36.6 °C) (Oral)   Resp 20  Ht 59\" (149.9 cm)  Wt 200 lb (90.7 kg)  SpO2 100%  BMI 40.4 kg/m2  Physical Exam   Constitutional: She is oriented to person, place, and time. She appears well-developed and well-nourished. No distress.   HENT:   Head:       Eyes: Pupils are equal, round, and reactive to light.   Neck: Normal range of motion. Neck supple.   Cardiovascular: Normal rate and regular rhythm.  Exam reveals no gallop and no friction rub.    No murmur heard.  Pulmonary/Chest: Effort normal. No respiratory distress. She has wheezes (Bilaterally throughout). She has no rales.   Abdominal: Soft. Bowel sounds are normal. She exhibits no distension. There is no tenderness.   Musculoskeletal: She exhibits no edema.        Left knee: She exhibits swelling.        Legs:  Generalized weakness   Neurological: She is alert and oriented to person, place, and time.   Skin: Skin is warm and dry.   Bruises in various stages of healing noted on bilateral arms, legs, and feet.    Psychiatric: Her behavior is normal. Judgment and thought content normal.   The patient is depressed   Vitals reviewed.    Results Reviewed:  Lab Results (last 24 hours)     Procedure Component Value Units Date/Time    Blood Gas, Arterial [[id number]]  (Abnormal) Collected:  11/14/17 0941    Specimen:  Arterial Blood Updated:  11/14/17 0950     Site Right Radial     Luis's Test Positive     pH, Arterial 7.392 pH units      pCO2, Arterial 42.1 mm Hg      pO2, Arterial 80.6 (L) mm Hg      HCO3, Arterial 25.6 mmol/L  "     Base Excess, Arterial 0.5 mmol/L      O2 Saturation, Arterial 97.1 %      Temperature 37.0 C      Barometric Pressure for Blood Gas 759 mmHg      Modality Room Air     Ventilator Mode NA     Collected by [id number]    Urine Culture - Urine, Urine, Clean Catch [[id number]] Collected:  11/14/17 0942    Specimen:  Urine from Urine, Clean Catch Updated:  11/14/17 1000    CBC & Differential [[id number]] Collected:  11/14/17 0952    Specimen:  Blood Updated:  11/14/17 1002    Narrative:       The following orders were created for panel order CBC & Differential.  Procedure                               Abnormality         Status                     ---------                               -----------         ------                     CBC Auto Differential[[id number]]        Abnormal            Final result                 Please view results for these tests on the individual orders.    CBC Auto Differential [[id number]]  (Abnormal) Collected:  11/14/17 0952    Specimen:  Blood Updated:  11/14/17 1002     WBC 11.19 (H) 10*3/mm3      RBC 3.98 (L) 10*6/mm3      Hemoglobin 12.5 g/dL      Hematocrit 39.8 %      .0 (H) fL      MCH 31.4 pg      MCHC 31.4 (L) g/dL      RDW 13.9 %      RDW-SD 49.8 fl      MPV 9.7 fL      Platelets 302 10*3/mm3      Neutrophil % 78.9 (H) %      Lymphocyte % 14.7 (L) %      Monocyte % 3.1 (L) %      Eosinophil % 2.2 %      Basophil % 0.2 %      Immature Grans % 0.9 %      Neutrophils, Absolute 8.82 (H) 10*3/mm3      Lymphocytes, Absolute 1.65 10*3/mm3      Monocytes, Absolute 0.35 10*3/mm3      Eosinophils, Absolute 0.25 10*3/mm3      Basophils, Absolute 0.02 10*3/mm3      Immature Grans, Absolute 0.10 (H) 10*3/mm3     Protime-INR [[id number]]  (Abnormal) Collected:  11/14/17 0952    Specimen:  Blood Updated:  11/14/17 1009     Protime 19.0 (H) Seconds      INR 1.54 (H)    aPTT [[id number]]  (Abnormal) Collected:  11/14/17 0952    Specimen:  Blood Updated:  11/14/17 1009     PTT 38.8 (H) seconds      Comprehensive Metabolic Panel [[id number]]  (Abnormal) Collected:  11/14/17 0952    Specimen:  Blood Updated:  11/14/17 1013     Glucose 96 mg/dL      BUN 10 mg/dL      Creatinine 1.13 mg/dL      Sodium 145 mmol/L      Potassium 4.4 mmol/L      Chloride 106 mmol/L      CO2 31.0 mmol/L      Calcium 9.4 mg/dL      Total Protein 7.2 g/dL      Albumin 4.00 g/dL      ALT (SGPT) 26 U/L      AST (SGOT) 32 U/L      Alkaline Phosphatase 128 (H) U/L      Total Bilirubin 0.3 mg/dL      eGFR Non African Amer 53 (L) mL/min/1.73      Globulin 3.2 gm/dL      A/G Ratio 1.3 g/dL      BUN/Creatinine Ratio 8.8     Anion Gap 8.0 mmol/L     Urinalysis With / Culture If Indicated - Urine, Clean Catch [[id number]]  (Abnormal) Collected:  11/14/17 0942    Specimen:  Urine from Urine, Clean Catch Updated:  11/14/17 1023     Color, UA Yellow     Appearance, UA Turbid (A)     pH, UA 6.0     Specific Gravity, UA 1.018     Glucose, UA Negative     Ketones, UA Negative     Bilirubin, UA Negative     Blood, UA Moderate (2+) (A)     Protein, UA Trace (A)     Leuk Esterase, UA Small (1+) (A)     Nitrite, UA Negative     Urobilinogen, UA 1.0 E.U./dL    Urinalysis, Microscopic Only - Urine, Clean Catch [[id number]]  (Abnormal) Collected:  11/14/17 0942    Specimen:  Urine from Urine, Clean Catch Updated:  11/14/17 1023     RBC, UA 6-12 (A) /HPF      WBC, UA 21-30 (A) /HPF      Bacteria, UA 4+ (A) /HPF      Squamous Epithelial Cells, UA 31-50 (A) /HPF      Methodology Manual Light Microscopy    Troponin [[id number]]  (Normal) Collected:  11/14/17 0952    Specimen:  Blood Updated:  11/14/17 1025     Troponin I <0.012 ng/mL     Urine Drug Screen - Urine, Clean Catch [[id number]]  (Abnormal) Collected:  11/14/17 0942    Specimen:  Urine from Urine, Clean Catch Updated:  11/14/17 1036     Amphetamine Screen, Urine Negative     Barbiturates Screen, Urine Negative     Benzodiazepine Screen, Urine Positive (A)     Cocaine Screen, Urine Negative     Methadone  Screen, Urine Negative     Opiate Screen Negative     Phencyclidine (PCP), Urine Negative     THC, Screen, Urine Negative    Narrative:       Negative Thresholds For Drugs Screened in Urine:    Amphetamines          500 ng/ml  Barbiturates          200 ng/ml  Benzodiazepines       200 ng/ml  Cocaine               150 ng/ml  Methadone             150 ng/ml  Opiates               300 ng/ml  Phencyclidine         25 ng/ml  THC                      50 ng/ml    The normal value for all drugs tested is negative. This report includes final unconfirmed screening results.  A positive result by this assay can be, at your request, sent to the Reference Lab for confirmation by gas chromatography. Unconfirmed results must not be used for non-medical purposes, such as employment or legal testing. Clinical consideration should be applied to any drug of abuse test result, particularly when unconfirmed results are used.    Lithium Level [[id number]]  (Abnormal) Collected:  11/14/17 0952    Specimen:  Blood Updated:  11/14/17 1040     Lithium 0.2 (L) mmol/L     Magnesium [[id number]]  (Normal) Collected:  11/14/17 0952    Specimen:  Blood Updated:  11/14/17 1059     Magnesium 2.1 mg/dL     [place] Draw [[id number]] Collected:  11/14/17 0952    Specimen:  Blood Updated:  11/14/17 1101    Narrative:       The following orders were created for panel order [place] Draw.  Procedure                               Abnormality         Status                     ---------                               -----------         ------                     Light Blue Top[[id number]]                                   Final result               Green Top (Gel)[[id number]]                                  Final result               Lavender Top[[id number]]                                     Final result               Red Top[[id number]]                                          Final result                 Please view results for these tests on the individual  orders.    Light Blue Top [[id number]] Collected:  11/14/17 0952    Specimen:  Blood Updated:  11/14/17 1101     Extra Tube hold for add-on      Auto resulted       Green Top (Gel) [[id number]] Collected:  11/14/17 0952    Specimen:  Blood Updated:  11/14/17 1101     Extra Tube Hold for add-ons.      Auto resulted.       Lavender Top [[id number]] Collected:  11/14/17 0952    Specimen:  Blood Updated:  11/14/17 1101     Extra Tube hold for add-on      Auto resulted       Red Top [[id number]] Collected:  11/14/17 0952    Specimen:  Blood Updated:  11/14/17 1101     Extra Tube Hold for add-ons.      Auto resulted.           Imaging Results (last 24 hours)     Procedure Component Value Units Date/Time    CT Facial Bones Without Contrast [[id number]] Collected:  11/14/17 1022     Updated:  11/14/17 1034    Narrative:       EXAMINATION: CT FACIAL BONES WO CONTRAST- 11/14/2017 10:22 AM CST     HISTORY: Recent fall, left side bruising and swelling     COMPARISON: None     DOSE: 729 mGy-cm     TECHNIQUE: Sequential imaging was performed through the facial bones  without the use of IV contrast utilizing bony algorithm.  Sagittal and  coronal reformations were made from the original source data and  reviewed. Automated exposure control was also utilized to decrease  patient radiation dose.     FINDINGS:   There is no evidence of acute facial fracture. There is minimal  maxillary sinus mucosal thickening. The paranasal sinuses and mastoid  air cells otherwise appear clear. There is soft tissue swelling of the  left side of the head and face with mild skin thickening, compatible  with history of bruising and swelling. The visualized globes and orbits  appear grossly normal. Prominent but not pathologically enlarged lymph  nodes are noted, measuring up to 1.4 cm in long axis.          Impression:       1. No evidence of acute facial fracture. Extensive soft tissue swelling  of the left face.  This report was finalized on 11/14/2017  10:28 by Dr. Arnel Reaves MD.    CT Head Without Contrast [[id number]] Collected:  11/14/17 1014     Updated:  11/14/17 1034    Narrative:       EXAMINATION: CT HEAD WO CONTRAST- 11/14/2017 10:14 AM CST     HISTORY: Recent fall, bruising of the left eye with swelling     COMPARISON: CT head without contrast 06/12/2016     DOSE: 651 mGy-cm     TECHNIQUE: Sequential imaging was performed from the vertex through the  base of the skull without the use of IV contrast.  Sagittal and coronal  reformations were made from the original source data and reviewed.  Automated exposure control was also utilized to decrease patient  radiation dose.     FINDINGS:   There is no evidence of acute intracranial hemorrhage, mass, or mass  effect.  The corticomedullary differentiation is normal without evidence  of acute infarct. The ventricles appear normal in configuration. The  basilar cisterns are patent. The posterior fossa is grossly normal in  appearance.     No depressed skull fracture is identified. The paranasal  sinuses and mastoid air cells appear clear.               Impression:       1. No acute intracranial findings.     This report was finalized on 11/14/2017 10:16 by Dr. Arnel Reaves MD.    XR Chest 1 View [[id number]] Collected:  11/14/17 1033     Updated:  11/14/17 1038    Narrative:       XR CHEST 1 VW- 11/14/2017 10:37 AM EST     HISTORY: frequent falls       COMPARISON: None.     FINDINGS:      Limited exam given the poor inspiration. Heart appears grossly normal in  size and the pulmonary vasculature are unremarkable. No gross lung  infiltrates.       Impression:       1. Limited exam due to poor chest expansion.  2. The lungs appear grossly clear.        This report was finalized on 11/14/2017 10:34 by Dr Geovanny Hagen, .        I have personally reviewed and interpreted the radiology studies and ECG obtained at time of admission.     Assessment / Plan   Assessment:  1. Multiple falls  2. Urinary tract  infection  3. History of bilateral lower extremity DVT's- on chronic Coumadin therapy. INR sub-therapeutic at this time.  4. History of obstructive sleep apnea, intolerant to CPAP therapy. Wears oxygen at 2L per nasal cannula at home.   5. History of peripheral neuropathy  6. History of bipolar depression, on chronic lithium therapy  7. Mild leukocytosis  8. Left knee contusion  9. Tobacco Abuse     Plan:   1. Admit to medical floor under hospitalist service  2. Check orthostatic blood pressures  3. Rocephin 1 gm Daily for urinary tract infection  4. Resume home medications, including Coumadin  5. Normal Saline at 125 ml/hr  6. As needed Dilaudid and home dose of Norco for pain control  7. BMP/CBC in AM  8. Will check B12 and folate levels given history of gastric bypass and peripheral neuropathy not related to diabetes  9. Daily PT/INR  10. Physical therapy consult    Code Status: Full Code     I discussed the patients findings and my recommendations with the patient and Dr. Kimbrough.    Estimated length of stay 1-2 days    PARIS Culver   11/14/17   1:13 PM    I personally evaluated and examined the patient in conjunction with PARIS Mosquera and agree with the assessment, treatment plan, and disposition of the patient as recorded by her. My history, exam, and further recommendations are:     The patient was seen by me around 1300.    Patient admitted in the setting of recurrent falls on Coumadin with bruising and injury.  She also has urinary tract infection.    Continue Rocephin.  She has no history of difficult to treat urinary tract infection in the past.    I discussed the circumstances of her recurrent falls with her in detail.  She is deconditioned from her weight and underlying lung disease from continued smoking.  She also has a history of peripheral neuropathy, but is not diabetic.  She is not sure why she has peripheral neuropathy.  She tells me that the gabapentin does not help.  She does  have a remote gastric bypass surgery and I explained to her that she might be B12 deficient which could lead to problems with peripheral neuropathy and posterior column degeneration.  I checked B12 and folate levels.  She indeed has a low B12, but normal folate.  Replace B12 intramuscularly for now.    Check orthostatic vital signs.  Physical therapy to evaluate.    Continue on Coumadin and allow to become therapeutic again.  She has a history of recurrent DVT.  She is followed subtherapeutic because she was given vitamin K for an INR greater than 6 at [facility] last week.    She wants to be discharged home as quickly as possible as her 16-year-old son will have to be by himself while she is here.    Tha Kimbrough,   11/14/17  3:42 PM

## 2017-11-15 VITALS
DIASTOLIC BLOOD PRESSURE: 71 MMHG | HEART RATE: 84 BPM | SYSTOLIC BLOOD PRESSURE: 112 MMHG | HEIGHT: 59 IN | RESPIRATION RATE: 20 BRPM | WEIGHT: 200 LBS | BODY MASS INDEX: 40.32 KG/M2 | TEMPERATURE: 98.2 F | OXYGEN SATURATION: 98 %

## 2017-11-15 LAB
ANION GAP SERPL CALCULATED.3IONS-SCNC: 11 MMOL/L (ref 4–13)
BUN BLD-MCNC: 13 MG/DL (ref 5–21)
BUN/CREAT SERPL: 14 (ref 7–25)
CALCIUM SPEC-SCNC: 9.5 MG/DL (ref 8.4–10.4)
CHLORIDE SERPL-SCNC: 105 MMOL/L (ref 98–110)
CO2 SERPL-SCNC: 27 MMOL/L (ref 24–31)
CREAT BLD-MCNC: 0.93 MG/DL (ref 0.5–1.4)
DEPRECATED RDW RBC AUTO: 52.7 FL (ref 40–54)
ERYTHROCYTE [DISTWIDTH] IN BLOOD BY AUTOMATED COUNT: 14.2 % (ref 12–15)
GFR SERPL CREATININE-BSD FRML MDRD: 66 ML/MIN/1.73
GLUCOSE BLD-MCNC: 97 MG/DL (ref 70–100)
HCT VFR BLD AUTO: 40.3 % (ref 37–47)
HGB BLD-MCNC: 12.2 G/DL (ref 12–16)
INR PPP: 1.28 (ref 0.91–1.09)
MCH RBC QN AUTO: 30.7 PG (ref 28–32)
MCHC RBC AUTO-ENTMCNC: 30.3 G/DL (ref 33–36)
MCV RBC AUTO: 101.5 FL (ref 82–98)
PLATELET # BLD AUTO: 311 10*3/MM3 (ref 130–400)
PMV BLD AUTO: 9.8 FL (ref 6–12)
POTASSIUM BLD-SCNC: 4.3 MMOL/L (ref 3.5–5.3)
PROTHROMBIN TIME: 16.4 SECONDS (ref 11.9–14.6)
RBC # BLD AUTO: 3.97 10*6/MM3 (ref 4.2–5.4)
SODIUM BLD-SCNC: 143 MMOL/L (ref 135–145)
WBC NRBC COR # BLD: 13.07 10*3/MM3 (ref 4.8–10.8)

## 2017-11-15 PROCEDURE — 96376 TX/PRO/DX INJ SAME DRUG ADON: CPT

## 2017-11-15 PROCEDURE — G0378 HOSPITAL OBSERVATION PER HR: HCPCS

## 2017-11-15 PROCEDURE — 97116 GAIT TRAINING THERAPY: CPT

## 2017-11-15 PROCEDURE — 25010000002 ONDANSETRON PER 1 MG

## 2017-11-15 PROCEDURE — 96372 THER/PROPH/DIAG INJ SC/IM: CPT

## 2017-11-15 PROCEDURE — 97530 THERAPEUTIC ACTIVITIES: CPT

## 2017-11-15 PROCEDURE — 63710000001 FLINTSTONES COMPLETE 60 MG CHEWABLE TABLET: Performed by: INTERNAL MEDICINE

## 2017-11-15 PROCEDURE — 96375 TX/PRO/DX INJ NEW DRUG ADDON: CPT

## 2017-11-15 PROCEDURE — 80048 BASIC METABOLIC PNL TOTAL CA: CPT | Performed by: INTERNAL MEDICINE

## 2017-11-15 PROCEDURE — 25010000002 CEFTRIAXONE PER 250 MG: Performed by: INTERNAL MEDICINE

## 2017-11-15 PROCEDURE — 96361 HYDRATE IV INFUSION ADD-ON: CPT

## 2017-11-15 PROCEDURE — 25010000002 CYANOCOBALAMIN PER 1000 MCG: Performed by: INTERNAL MEDICINE

## 2017-11-15 PROCEDURE — 85610 PROTHROMBIN TIME: CPT | Performed by: INTERNAL MEDICINE

## 2017-11-15 PROCEDURE — 96366 THER/PROPH/DIAG IV INF ADDON: CPT

## 2017-11-15 PROCEDURE — 25010000002 HYDROMORPHONE PER 4 MG: Performed by: INTERNAL MEDICINE

## 2017-11-15 PROCEDURE — 85027 COMPLETE CBC AUTOMATED: CPT | Performed by: INTERNAL MEDICINE

## 2017-11-15 PROCEDURE — 25010000002 ONDANSETRON PER 1 MG: Performed by: INTERNAL MEDICINE

## 2017-11-15 PROCEDURE — 25010000002 HYDROMORPHONE PER 4 MG

## 2017-11-15 RX ORDER — GUAIFENESIN 600 MG/1
1200 TABLET, EXTENDED RELEASE ORAL EVERY 12 HOURS SCHEDULED
Status: ON HOLD
Start: 2017-11-15 | End: 2018-01-29

## 2017-11-15 RX ORDER — ONDANSETRON 2 MG/ML
INJECTION INTRAMUSCULAR; INTRAVENOUS
Status: COMPLETED
Start: 2017-11-15 | End: 2017-11-15

## 2017-11-15 RX ORDER — MULTIVITAMIN WITH IRON
50 TABLET ORAL DAILY
Qty: 30 TABLET | Refills: 0 | Status: SHIPPED | OUTPATIENT
Start: 2017-11-15 | End: 2017-11-15 | Stop reason: HOSPADM

## 2017-11-15 RX ORDER — PANTOPRAZOLE SODIUM 40 MG/1
TABLET, DELAYED RELEASE ORAL
Status: COMPLETED
Start: 2017-11-15 | End: 2017-11-15

## 2017-11-15 RX ORDER — CEFDINIR 300 MG/1
300 CAPSULE ORAL 2 TIMES DAILY
Qty: 10 CAPSULE | Refills: 0 | Status: SHIPPED | OUTPATIENT
Start: 2017-11-15 | End: 2017-11-20

## 2017-11-15 RX ADMIN — FLUOXETINE HYDROCHLORIDE 40 MG: 20 CAPSULE ORAL at 08:37

## 2017-11-15 RX ADMIN — CEFTRIAXONE SODIUM 1 G: 1 INJECTION, POWDER, FOR SOLUTION INTRAMUSCULAR; INTRAVENOUS at 12:39

## 2017-11-15 RX ADMIN — ONDANSETRON HYDROCHLORIDE: 2 SOLUTION INTRAMUSCULAR; INTRAVENOUS at 01:10

## 2017-11-15 RX ADMIN — HYDROMORPHONE HYDROCHLORIDE 1 MG: 1 INJECTION, SOLUTION INTRAMUSCULAR; INTRAVENOUS; SUBCUTANEOUS at 01:10

## 2017-11-15 RX ADMIN — ONDANSETRON HYDROCHLORIDE 4 MG: 2 SOLUTION INTRAMUSCULAR; INTRAVENOUS at 14:22

## 2017-11-15 RX ADMIN — Medication 1 TABLET: at 08:37

## 2017-11-15 RX ADMIN — PANTOPRAZOLE SODIUM 40 MG: 40 TABLET, DELAYED RELEASE ORAL at 06:51

## 2017-11-15 RX ADMIN — GUAIFENESIN 1200 MG: 600 TABLET, EXTENDED RELEASE ORAL at 08:37

## 2017-11-15 RX ADMIN — LEVOTHYROXINE SODIUM 100 MCG: 100 TABLET ORAL at 06:50

## 2017-11-15 RX ADMIN — HYDROCODONE BITARTRATE AND ACETAMINOPHEN 1 TABLET: 7.5; 325 TABLET ORAL at 08:37

## 2017-11-15 RX ADMIN — DIAZEPAM 10 MG: 10 TABLET ORAL at 08:37

## 2017-11-15 RX ADMIN — ONDANSETRON HYDROCHLORIDE 4 MG: 2 SOLUTION INTRAMUSCULAR; INTRAVENOUS at 01:10

## 2017-11-15 RX ADMIN — GABAPENTIN 300 MG: 300 CAPSULE ORAL at 08:37

## 2017-11-15 RX ADMIN — CYANOCOBALAMIN 1000 MCG: 1000 INJECTION, SOLUTION INTRAMUSCULAR; SUBCUTANEOUS at 08:37

## 2017-11-15 RX ADMIN — SODIUM CHLORIDE 100 ML/HR: 9 INJECTION, SOLUTION INTRAVENOUS at 08:37

## 2017-11-15 NOTE — NURSING NOTE
"PATIENT WENT DOWN TO SMOKE THIS AM AGAINST STAFF ADVICE. SON WAS GIVEN SCHOOL EXCUSE DUE TO PATIENT ASKING HIM TO STAY AND HELP HER, WHICH ENTAILS TRANSPORTING HER OUTSIDE TO SMOKE. NIGHT SHIFT ADVISED THAT SHE WAS GETTING IV NARCOTICS AND THEY WOULD PREFER THAT SHE NOT LEAVE THE FLOOR, THAT PATIENT WAS DROWSY THROUGHOUT THE SHIFT. THIS MORNING SHE REMAINS DROWSY, AND VERY UNSTEADY ON HER FEET, UNABLE TO KEEP EYES OPEN. PROVIDER AWARE. AT BEGINNING OF SHIFT, PT AND SON LEFT THE FLOOR IN A WHEELCHAIR WITH IV POLE, RETURNED BEFORE EVER LEAVING FLOOR DUE TO THE FACT THAT WHEN HEADING TOWARD THE ELEVATOR, THEY KNOCKED THE IV POLE OVER.  A NURSE FROM ANOTHER UNIT BROUGHT THEM BACK AND TOLD STAFF OF WHAT HAD HAPPENED. WHILE IV WAS UNHOOKED TO REPLACE FLUIDS AND TUBING, PT AND SON AGAIN LEFT UNIT IN WHEELCHAIR TO FIND A PLACE TO SMOKE.  THEY HAVE BEEN TOLD NUMBEROUS TIMES THAT THIS IS A NON-SMOKING CAMPUS AND THAT WE ADVISE AGAINST HER LEAVING THE FLOOR WHILE TAKING NARCOTICS. PT STATES, \"I WILL BE RIGHT BACK.\" AND SHE LEAVES ANYWAY. PATIENT IS CONSTANTLY ASKING FOR BREAKTHROUGH PAIN MEDS FOR A HEADACHE, EVEN THOUGH SHE CANNOT FINISH A SENTENCE WITHOUT DRIFTING TO SLEEP. PATIENT AND SON WERE ADVISED UNTIL SHE BECAME MORE ALERT, IT WAS IN HER BEST INTEREST NOT TO RECEIVE FURTHER SEDATING MEDICATIONS, ESPECIALLY IF SHE IS GOING TO BE LEAVING THE FLOOR. SHE WAS COUNCELED ON RESPIRATORY DEPRESSION WITH NARCOTICS AND SAFETY MEASURES/CONSIDERATIONS. PT AND SON VOICED UNDERSTANDING, AND SON NODDING EMPHATICALLY IN AGREEMENT. PT RESTING QUIETLY.  "

## 2017-11-15 NOTE — DISCHARGE PLACEMENT REQUEST
"Ashia [facility]ct[facility] [phone]    Samantha Solomon (41 y.o. Female)     Date of Birth Social Security Number Address Home Phone MRN    1975  [address]  [place] [id number] [phone] [id number]    Hinduism Marital Status          None        Admission Date Admission Type Admitting Provider Attending Provider Department, Room/Bed    11/14/17 Emergency Tha Kimbrough, Tha Vila,  [facility] 3A, 355/2    Discharge Date Discharge Disposition Discharge Destination         Home or Self Care             Attending Provider: Tha Kimbrough DO     Allergies:  Morphine, Aspirin, Morphine And Related, Nsaids, Quetiapine, Quetiapine Fumarate, Salicylates, Codeine    Isolation:  None   Infection:  None   Code Status:  FULL    Ht:  59\" (149.9 cm)   Wt:  200 lb (90.7 kg)    Admission Cmt:  None   Principal Problem:  None                Active Insurance as of 11/14/2017     Primary Coverage     Payor Plan Insurance Group Employer/Plan Group    MEDICARE MEDICARE A & B      Payor Plan Address Payor Plan Phone Number Effective From Effective To    [address] [phone] 4/1/2013     [place] [address]       Subscriber Name Subscriber Birth Date Member ID       SAMANTHA SOLOMON 1975 [id number]           Secondary Coverage     Payor Plan Insurance Group Employer/Plan Group    [place]      Payor Plan Address Payor Plan Phone Number Effective From Effective To    [address] [address] [phone] 9/19/2016     [place] [address]       Subscriber Name Subscriber Birth Date Member ID       SAMANTHA SOLOMON 1975 [id number]                 Emergency Contacts      (Rel.) Home Phone Work Phone Mobile Phone    Delmy Del Rio (Daughter) [phone] -- --         [address] [address]  Phone:  [phone]  Fax:   Date: Nov 15, 2017      Referral to Home Health     Patient:  Samantha Solomon MRN:  [id number] "   [address]  [facility] [address] :  1975  SSN:    Phone: [phone] Sex:  F      INSURANCE PAYOR PLAN GROUP # SUBSCRIBER ID   Primary:  Secondary: [place]ICARE [place] [id number]  [id number]  [id number]      Referring Provider Information:  CHARI MATHEWS Phone: [phone] Fax:       Referral Information:   # Visits:  1 Referral Type: Home Health [42]   Urgency:  Routine Referral Reason: Specialty Services Required   Start Date: Nov 15, 2017 End Date:  To be determined by Insurer   Diagnosis: Multiple falls (R29.6 [ICD-10-CM] V15.88 [ICD-9-CM])      Refer to Dept:   Refer to Provider:   Refer to Facility:       Face to Face Visit Date: 11/15/2017  Follow-up Provider for Plan of Care? I treated the patient in an acute care facility and will not continue treatment after discharge.  Follow-up Provider: PHIL ARSHAD JR. [[id number]]  Reason/Clinical Findings: Frequent falls/weakness/gait instability  Describe mobility limitations that make leaving home difficult: Frequent falls/weakness/gait instability  Nursing/Therapeutic Services Requested: Physical Therapy  PT orders: Therapeutic exercise  PT orders: Gait Training  PT orders: Strengthening  Weight Bearing Status: Full Weight Bearing  Frequency: 1 Week 1     This document serves as a request of services and does not constitute Insurance authorization or approval of services.  To determine eligibility, please contact the members Insurance carrier to verify and review coverage.     If you have medical questions regarding this request for services. Please contact [address] at [phone] between the hours of 8:00am - 5:00pm (Mon-Fri).             Authorizing Provider:PARIS Culver  Authorizing Provider's NPI: [id number]  Order Entered By: PARIS Culver 11/15/2017  2:46 PM     Electronically signed by: PARIS Culver 11/15/2017  2:46 PM                History & Physical      Tha MONGE  "DO Kaylan at 11/14/2017  1:13 PM              [facility] Medicine Services  HISTORY AND PHYSICAL    Date of Admission: 11/14/2017  Primary Care Physician: Neel Valencia Jr., MD    Subjective     Chief Complaint: Frequent falls     Fall   Associated symptoms include headaches. Pertinent negatives include no fever.   Headache    Associated symptoms include coughing, eye pain and weakness. Pertinent negatives include no dizziness, fever or seizures. Eye redness: Left eye, related to recent fall.      Ms. Spencer is a 41-year-old  female who follows Dr. Neel Valencia at [facility] for her primary care.  She also sees Dr. Wagoner for chronic pain management.  She has a past medical history significant for seizure disorder, depression, anxiety, Bipolar disorder, multiple suicide attempts, bilateral lower extremity DVT's on chronic Coumadin therapy, peripheral neuropathy, gastric bypass, emphysema, and chronic pain. The patient also has a history of obstructive sleep apnea. She tells me that she was intolerant to CPAP therapy, so she is supposed to wear oxygen at night. She tells me that because she frequently falls asleep during the day, she keeps her oxygen on during the day as well.     She presented to the [facility] emergency department on 11/14/2017 with complaints of frequent falls at home. She was actually seen at the [facility] emergency department on Sunday, 11/12/2017 for similar complaints. She states she has chronic issues with her left knee, following a left anterior cruciate ligament repair many years ago, and periodically this knee \"gives out\" from under her causing her to fall.  During this particular incident, the patient was standing up from the commode and fell while pulling her pants up causing her to hit the bathtub on the left side of her face. The X-ray of her left knee showed soft tissue swelling likely related to direct " "trauma to the knee, with no evidence of acute fracture.  The CT of her head showed a scalp hematoma overlying the left zygoma and left temporal scalp no acute intracranial process or associated bony injury.  The CT of her facial bones showed no evidence of acute facial fracture. At that time, she was told that her INR was supra therapeutic at 6.45. She was instructed to hold her Coumadin on Sunday night and resume on Monday. The patient states she fell asleep and forgot to take her Coumadin last night.     The patient states that around 4 AM today, her left knee gave out on her once again, causing her to fall. She decided to seek treatment at the emergency department because she states \"I shouldn't have to live like this. I can't keep falling all the time.\" She denies any fever or chills. She does admit to a non-productive cough that has been present for at least 2 weeks. She admits to nausea and vomiting, but states this has been a chronic issue for at least 9 years and is presently unchanged.     The patient has stated that she has recently (approximately within the last month) been treated for trichomonas. She does admit that she is still experiencing some vaginal itching, but denies any discharge at this time. She denies dysuria but does admit to urgency.     The patient will be admitted to the hospitalist service for further evaluation and management.     Review of Systems   Constitutional: Positive for fatigue. Negative for chills and fever.   Eyes: Positive for pain. Eye redness: Left eye, related to recent fall.   Respiratory: Positive for cough and wheezing. Negative for shortness of breath.    Cardiovascular: Negative for chest pain, palpitations and leg swelling.   Gastrointestinal: Negative.    Endocrine: Negative.    Genitourinary: Positive for urgency. Negative for difficulty urinating, dysuria, frequency, vaginal discharge and vaginal pain.   Musculoskeletal: Positive for gait problem.   Skin: " Negative.    Allergic/Immunologic: Negative.    Neurological: Positive for weakness and headaches. Negative for dizziness, seizures and syncope.   Hematological: Bruises/bleeds easily.   Psychiatric/Behavioral: Positive for dysphoric mood.      Otherwise complete ROS reviewed and negative except as mentioned in the HPI.    Past Medical History:   Past Medical History:   Diagnosis Date   • Acute retention of urine    • Anxiety and depression    • Bipolar 1 disorder    • COPD (chronic obstructive pulmonary disease)    • GERD (gastroesophageal reflux disease)    • Hypoglycemia    • Insomnia    • PTSD (post-traumatic stress disorder)      Past Surgical History:  Past Surgical History:   Procedure Laterality Date   • ABDOMINOPLASTY     • APPENDECTOMY     • CHOLECYSTECTOMY     • GASTRIC BYPASS     • HYSTERECTOMY       Social History: The patient reports that she has been smoking since she was 6 years old. She smokes approximately 1/2 pack per day. She reports that she does not drink alcohol or use illicit drugs. She lives in a first-floor apartment with her 16-year-old son. She has two other living children who help take care of her.     Family History: The patient reports her mother  from Lung cancer and her father  from bone cancer. She states mental illness is present on both sides of her family. She also reports that one of her grandmothers had Diabetes. She has a brother with HIV.     Allergies:  Allergies   Allergen Reactions   • Morphine Anaphylaxis   • Aspirin Other (See Comments)     Child allergy, unsure what reaction  Child allergy, unsure what reaction   • Morphine And Related    • Nsaids    • Quetiapine Other (See Comments)     Muscle spasms  Muscle spasms   • Quetiapine Fumarate    • Salicylates    • Synthroid [Levothyroxine Sodium]    • Codeine Rash     Medications:  Prior to Admission medications    Medication Sig Start Date End Date Taking? Authorizing Provider   diazePAM (VALIUM) 10 MG tablet  Take 10 mg by mouth 3 (Three) Times a Day. 2/14/17  Yes Historical Provider, MD   FLUoxetine (PROzac) 40 MG capsule Take 40 mg by mouth Daily.   Yes Historical Provider, MD   gabapentin (NEURONTIN) 300 MG capsule Take 300 mg by mouth 2 (Two) Times a Day.   Yes Historical Provider, MD   gabapentin (NEURONTIN) 300 MG capsule Take 600 mg by mouth Every Night.   Yes Historical Provider, MD   HYDROcodone-acetaminophen (NORCO) 7.5-325 MG per tablet Take 1 tablet by mouth 3 (Three) Times a Day.   Yes Historical Provider, MD   levothyroxine (SYNTHROID, LEVOTHROID) 100 MCG tablet Take 100 mcg by mouth Daily. 9/24/16  Yes Historical Provider, MD   lithium carbonate 300 MG capsule Take 300 mg by mouth Every Night.   Yes Historical Provider, MD   O2 (OXYGEN) Inhale 2 L/min Continuous.   Yes Historical Provider, MD   omeprazole (priLOSEC) 20 MG capsule Take 20 mg by mouth Daily. 9/2/16  Yes Historical Provider, MD   PROAIR  (90 BASE) MCG/ACT inhaler Inhale 2 puffs Every 4 (Four) Hours As Needed for Wheezing or Shortness of Air. 6/3/17  Yes Historical Provider, MD   promethazine (PHENERGAN) 25 MG tablet Take 25 mg by mouth Every 8 (Eight) Hours As Needed for Nausea or Vomiting. 9/2/16  Yes Historical Provider, MD   warfarin (COUMADIN) 5 MG tablet Take 7.5 mg by mouth 4 (Four) Times a Week. Sunday, Tuesday, Thursday, and Saturday.   Yes Historical Provider, MD   warfarin (COUMADIN) 5 MG tablet Take 5 mg by mouth 3 (Three) Times a Week. Monday, Wednesday, and Friday.   Yes Historical Provider, MD   zolpidem (AMBIEN) 10 MG tablet Take 10 mg by mouth Every Night.   Yes Historical Provider, MD   O2 (OXYGEN) Inhale 2 L/min Continuous.  11/14/17 Yes Historical Provider, MD   doxepin (SINEquan) 10 MG capsule  7/12/17 11/14/17  Historical Provider, MD   doxycycline (MONODOX) 100 MG capsule Take 1 capsule by mouth 2 (Two) Times a Day. 8/9/17 11/14/17  PARIS Howard   gabapentin (NEURONTIN) 600 MG tablet Take  by mouth.   "11/14/17  Historical Provider, MD   O2 (OXYGEN) Inhale into the lungs nightly   11/14/17  Historical Provider, MD   O2 (OXYGEN) Inhale.  11/14/17  Historical Provider, MD   topiramate (TOPAMAX) 100 MG tablet TAKE 1 TABLET BY MOUTH DAILY 3/3/17 11/14/17  Historical Provider, MD   warfarin (COUMADIN) 7.5 MG tablet Take  by mouth.  11/14/17  Historical Provider, MD     Objective     Vital Signs: /70 (BP Location: Right arm, Patient Position: Lying)  Pulse 76  Temp 97.8 °F (36.6 °C) (Oral)   Resp 20  Ht 59\" (149.9 cm)  Wt 200 lb (90.7 kg)  SpO2 100%  BMI 40.4 kg/m2  Physical Exam   Constitutional: She is oriented to person, place, and time. She appears well-developed and well-nourished. No distress.   HENT:   Head:       Eyes: Pupils are equal, round, and reactive to light.   Neck: Normal range of motion. Neck supple.   Cardiovascular: Normal rate and regular rhythm.  Exam reveals no gallop and no friction rub.    No murmur heard.  Pulmonary/Chest: Effort normal. No respiratory distress. She has wheezes (Bilaterally throughout). She has no rales.   Abdominal: Soft. Bowel sounds are normal. She exhibits no distension. There is no tenderness.   Musculoskeletal: She exhibits no edema.        Left knee: She exhibits swelling.        Legs:  Generalized weakness   Neurological: She is alert and oriented to person, place, and time.   Skin: Skin is warm and dry.   Bruises in various stages of healing noted on bilateral arms, legs, and feet.    Psychiatric: Her behavior is normal. Judgment and thought content normal.   The patient is depressed   Vitals reviewed.    Results Reviewed:  Lab Results (last 24 hours)     Procedure Component Value Units Date/Time    Blood Gas, Arterial [[id number]]  (Abnormal) Collected:  11/14/17 0941    Specimen:  Arterial Blood Updated:  11/14/17 0950     Site Right Radial     Luis's Test Positive     pH, Arterial 7.392 pH units      pCO2, Arterial 42.1 mm Hg      pO2, Arterial 80.6 (L) " mm Hg      HCO3, Arterial 25.6 mmol/L      Base Excess, Arterial 0.5 mmol/L      O2 Saturation, Arterial 97.1 %      Temperature 37.0 C      Barometric Pressure for Blood Gas 759 mmHg      Modality Room Air     Ventilator Mode NA     Collected by [id number]    Urine Culture - Urine, Urine, Clean Catch [[id number]] Collected:  11/14/17 0942    Specimen:  Urine from Urine, Clean Catch Updated:  11/14/17 1000    CBC & Differential [[id number]] Collected:  11/14/17 0952    Specimen:  Blood Updated:  11/14/17 1002    Narrative:       The following orders were created for panel order CBC & Differential.  Procedure                               Abnormality         Status                     ---------                               -----------         ------                     CBC Auto Differential[[id number]]        Abnormal            Final result                 Please view results for these tests on the individual orders.    CBC Auto Differential [[id number]]  (Abnormal) Collected:  11/14/17 0952    Specimen:  Blood Updated:  11/14/17 1002     WBC 11.19 (H) 10*3/mm3      RBC 3.98 (L) 10*6/mm3      Hemoglobin 12.5 g/dL      Hematocrit 39.8 %      .0 (H) fL      MCH 31.4 pg      MCHC 31.4 (L) g/dL      RDW 13.9 %      RDW-SD 49.8 fl      MPV 9.7 fL      Platelets 302 10*3/mm3      Neutrophil % 78.9 (H) %      Lymphocyte % 14.7 (L) %      Monocyte % 3.1 (L) %      Eosinophil % 2.2 %      Basophil % 0.2 %      Immature Grans % 0.9 %      Neutrophils, Absolute 8.82 (H) 10*3/mm3      Lymphocytes, Absolute 1.65 10*3/mm3      Monocytes, Absolute 0.35 10*3/mm3      Eosinophils, Absolute 0.25 10*3/mm3      Basophils, Absolute 0.02 10*3/mm3      Immature Grans, Absolute 0.10 (H) 10*3/mm3     Protime-INR [[id number]]  (Abnormal) Collected:  11/14/17 0952    Specimen:  Blood Updated:  11/14/17 1009     Protime 19.0 (H) Seconds      INR 1.54 (H)    aPTT [[id number]]  (Abnormal) Collected:  11/14/17 0952    Specimen:  Blood Updated:   11/14/17 1009     PTT 38.8 (H) seconds     Comprehensive Metabolic Panel [[id number]]  (Abnormal) Collected:  11/14/17 0952    Specimen:  Blood Updated:  11/14/17 1013     Glucose 96 mg/dL      BUN 10 mg/dL      Creatinine 1.13 mg/dL      Sodium 145 mmol/L      Potassium 4.4 mmol/L      Chloride 106 mmol/L      CO2 31.0 mmol/L      Calcium 9.4 mg/dL      Total Protein 7.2 g/dL      Albumin 4.00 g/dL      ALT (SGPT) 26 U/L      AST (SGOT) 32 U/L      Alkaline Phosphatase 128 (H) U/L      Total Bilirubin 0.3 mg/dL      eGFR Non African Amer 53 (L) mL/min/1.73      Globulin 3.2 gm/dL      A/G Ratio 1.3 g/dL      BUN/Creatinine Ratio 8.8     Anion Gap 8.0 mmol/L     Urinalysis With / Culture If Indicated - Urine, Clean Catch [[id number]]  (Abnormal) Collected:  11/14/17 0942    Specimen:  Urine from Urine, Clean Catch Updated:  11/14/17 1023     Color, UA Yellow     Appearance, UA Turbid (A)     pH, UA 6.0     Specific Gravity, UA 1.018     Glucose, UA Negative     Ketones, UA Negative     Bilirubin, UA Negative     Blood, UA Moderate (2+) (A)     Protein, UA Trace (A)     Leuk Esterase, UA Small (1+) (A)     Nitrite, UA Negative     Urobilinogen, UA 1.0 E.U./dL    Urinalysis, Microscopic Only - Urine, Clean Catch [[id number]]  (Abnormal) Collected:  11/14/17 0942    Specimen:  Urine from Urine, Clean Catch Updated:  11/14/17 1023     RBC, UA 6-12 (A) /HPF      WBC, UA 21-30 (A) /HPF      Bacteria, UA 4+ (A) /HPF      Squamous Epithelial Cells, UA 31-50 (A) /HPF      Methodology Manual Light Microscopy    Troponin [[id number]]  (Normal) Collected:  11/14/17 0952    Specimen:  Blood Updated:  11/14/17 1025     Troponin I <0.012 ng/mL     Urine Drug Screen - Urine, Clean Catch [[id number]]  (Abnormal) Collected:  11/14/17 0942    Specimen:  Urine from Urine, Clean Catch Updated:  11/14/17 1036     Amphetamine Screen, Urine Negative     Barbiturates Screen, Urine Negative     Benzodiazepine Screen, Urine Positive (A)      Cocaine Screen, Urine Negative     Methadone Screen, Urine Negative     Opiate Screen Negative     Phencyclidine (PCP), Urine Negative     THC, Screen, Urine Negative    Narrative:       Negative Thresholds For Drugs Screened in Urine:    Amphetamines          500 ng/ml  Barbiturates          200 ng/ml  Benzodiazepines       200 ng/ml  Cocaine               150 ng/ml  Methadone             150 ng/ml  Opiates               300 ng/ml  Phencyclidine         25 ng/ml  THC                      50 ng/ml    The normal value for all drugs tested is negative. This report includes final unconfirmed screening results.  A positive result by this assay can be, at your request, sent to the Reference Lab for confirmation by gas chromatography. Unconfirmed results must not be used for non-medical purposes, such as employment or legal testing. Clinical consideration should be applied to any drug of abuse test result, particularly when unconfirmed results are used.    Lithium Level [[id number]]  (Abnormal) Collected:  11/14/17 0952    Specimen:  Blood Updated:  11/14/17 1040     Lithium 0.2 (L) mmol/L     Magnesium [[id number]]  (Normal) Collected:  11/14/17 0952    Specimen:  Blood Updated:  11/14/17 1059     Magnesium 2.1 mg/dL     [place] Draw [[id number]] Collected:  11/14/17 0952    Specimen:  Blood Updated:  11/14/17 1101    Narrative:       The following orders were created for panel order [place] Draw.  Procedure                               Abnormality         Status                     ---------                               -----------         ------                     Light Blue Top[[id number]]                                   Final result               Green Top (Gel)[[id number]]                                  Final result               Lavender Top[[id number]]                                     Final result               Red Top[[id number]]                                          Final result                 Please view  results for these tests on the individual orders.    Light Blue Top [[id number]] Collected:  11/14/17 0952    Specimen:  Blood Updated:  11/14/17 1101     Extra Tube hold for add-on      Auto resulted       Green Top (Gel) [[id number]] Collected:  11/14/17 0952    Specimen:  Blood Updated:  11/14/17 1101     Extra Tube Hold for add-ons.      Auto resulted.       Lavender Top [[id number]] Collected:  11/14/17 0952    Specimen:  Blood Updated:  11/14/17 1101     Extra Tube hold for add-on      Auto resulted       Red Top [[id number]] Collected:  11/14/17 0952    Specimen:  Blood Updated:  11/14/17 1101     Extra Tube Hold for add-ons.      Auto resulted.           Imaging Results (last 24 hours)     Procedure Component Value Units Date/Time    CT Facial Bones Without Contrast [[id number]] Collected:  11/14/17 1022     Updated:  11/14/17 1034    Narrative:       EXAMINATION: CT FACIAL BONES WO CONTRAST- 11/14/2017 10:22 AM CST     HISTORY: Recent fall, left side bruising and swelling     COMPARISON: None     DOSE: 729 mGy-cm     TECHNIQUE: Sequential imaging was performed through the facial bones  without the use of IV contrast utilizing bony algorithm.  Sagittal and  coronal reformations were made from the original source data and  reviewed. Automated exposure control was also utilized to decrease  patient radiation dose.     FINDINGS:   There is no evidence of acute facial fracture. There is minimal  maxillary sinus mucosal thickening. The paranasal sinuses and mastoid  air cells otherwise appear clear. There is soft tissue swelling of the  left side of the head and face with mild skin thickening, compatible  with history of bruising and swelling. The visualized globes and orbits  appear grossly normal. Prominent but not pathologically enlarged lymph  nodes are noted, measuring up to 1.4 cm in long axis.          Impression:       1. No evidence of acute facial fracture. Extensive soft tissue swelling  of the left  face.  This report was finalized on 11/14/2017 10:28 by Dr. Arnel Reaves MD.    CT Head Without Contrast [[id number]] Collected:  11/14/17 1014     Updated:  11/14/17 1034    Narrative:       EXAMINATION: CT HEAD WO CONTRAST- 11/14/2017 10:14 AM CST     HISTORY: Recent fall, bruising of the left eye with swelling     COMPARISON: CT head without contrast 06/12/2016     DOSE: 651 mGy-cm     TECHNIQUE: Sequential imaging was performed from the vertex through the  base of the skull without the use of IV contrast.  Sagittal and coronal  reformations were made from the original source data and reviewed.  Automated exposure control was also utilized to decrease patient  radiation dose.     FINDINGS:   There is no evidence of acute intracranial hemorrhage, mass, or mass  effect.  The corticomedullary differentiation is normal without evidence  of acute infarct. The ventricles appear normal in configuration. The  basilar cisterns are patent. The posterior fossa is grossly normal in  appearance.     No depressed skull fracture is identified. The paranasal  sinuses and mastoid air cells appear clear.               Impression:       1. No acute intracranial findings.     This report was finalized on 11/14/2017 10:16 by Dr. Arnel Reaves MD.    XR Chest 1 View [[id number]] Collected:  11/14/17 1033     Updated:  11/14/17 1038    Narrative:       XR CHEST 1 VW- 11/14/2017 10:37 AM EST     HISTORY: frequent falls       COMPARISON: None.     FINDINGS:      Limited exam given the poor inspiration. Heart appears grossly normal in  size and the pulmonary vasculature are unremarkable. No gross lung  infiltrates.       Impression:       1. Limited exam due to poor chest expansion.  2. The lungs appear grossly clear.        This report was finalized on 11/14/2017 10:34 by Dr Geovanny Hagen, .        I have personally reviewed and interpreted the radiology studies and ECG obtained at time of admission.     Assessment / Plan    Assessment:  1. Multiple falls  2. Urinary tract infection  3. History of bilateral lower extremity DVT's- on chronic Coumadin therapy. INR sub-therapeutic at this time.  4. History of obstructive sleep apnea, intolerant to CPAP therapy. Wears oxygen at 2L per nasal cannula at home.   5. History of peripheral neuropathy  6. History of bipolar depression, on chronic lithium therapy  7. Mild leukocytosis  8. Left knee contusion  9. Tobacco Abuse     Plan:   1. Admit to medical floor under hospitalist service  2. Check orthostatic blood pressures  3. Rocephin 1 gm Daily for urinary tract infection  4. Resume home medications, including Coumadin  5. Normal Saline at 125 ml/hr  6. As needed Dilaudid and home dose of Norco for pain control  7. BMP/CBC in AM  8. Will check B12 and folate levels given history of gastric bypass and peripheral neuropathy not related to diabetes  9. Daily PT/INR  10. Physical therapy consult    Code Status: Full Code     I discussed the patients findings and my recommendations with the patient and Dr. Kimbrough.    Estimated length of stay 1-2 days    PARIS Culver   11/14/17   1:13 PM    I personally evaluated and examined the patient in conjunction with PARIS Mosquera and agree with the assessment, treatment plan, and disposition of the patient as recorded by her. My history, exam, and further recommendations are:     The patient was seen by me around 1300.    Patient admitted in the setting of recurrent falls on Coumadin with bruising and injury.  She also has urinary tract infection.    Continue Rocephin.  She has no history of difficult to treat urinary tract infection in the past.    I discussed the circumstances of her recurrent falls with her in detail.  She is deconditioned from her weight and underlying lung disease from continued smoking.  She also has a history of peripheral neuropathy, but is not diabetic.  She is not sure why she has peripheral neuropathy.  She tells  me that the gabapentin does not help.  She does have a remote gastric bypass surgery and I explained to her that she might be B12 deficient which could lead to problems with peripheral neuropathy and posterior column degeneration.  I checked B12 and folate levels.  She indeed has a low B12, but normal folate.  Replace B12 intramuscularly for now.    Check orthostatic vital signs.  Physical therapy to evaluate.    Continue on Coumadin and allow to become therapeutic again.  She has a history of recurrent DVT.  She is followed subtherapeutic because she was given vitamin K for an INR greater than 6 at [facility] last week.    She wants to be discharged home as quickly as possible as her 16-year-old son will have to be by himself while she is here.    Tha Kimbrough DO  17  3:42 PM             Electronically signed by Tha Kimbrough DO at 2017  3:47 PM           Physical Therapy Notes (last 72 hours) (Notes from 2017  3:03 PM through 11/15/2017  3:03 PM)      Lucy Sheth PTA at 11/15/2017  9:45 AM  Version 1 of 1         Problem: Patient Care Overview (Adult)  Goal: Plan of Care Review  Outcome: Ongoing (interventions implemented as appropriate)    11/15/17 0942   Coping/Psychosocial Response Interventions   Plan Of Care Reviewed With patient   Outcome Evaluation   Outcome Summary/Follow up Plan Pt very sleepy/lethargic, diffiicult to understand, slurred speech. Pt trans to EOB min assist, pt amb 20 feet to bathroom with rollator min assist. then 20 feet back to bed. Pt not safe to amb any distance this am.    Patient Care Overview   Progress progress toward functional goals is gradual              Electronically signed by Lucy Sheth PTA at 11/15/2017  9:45 AM      Lucy Sheth PTA at 11/15/2017  9:46 AM  Version 1 of 1         Acute Care - Physical Therapy Treatment Note   [facility]     Patient Name: Vianca Spencer  : 1975  MRN: [id number]  Today's Date:  11/15/2017  Onset of Illness/Injury or Date of Surgery Date: 11/14/17  Date of Referral to : 11/14/17  Referring Physician: Dr Kimbrough    Admit Date: 11/14/2017    Visit Dx:    ICD-10-CM ICD-9-CM   1. Multiple falls R29.6 V15.88   2. Acute UTI N39.0 599.0   3. Contusion of scalp, initial encounter S00.03XA 920   4. Impaired functional mobility, balance, gait, and endurance Z74.09 V49.89     Patient Active Problem List   Diagnosis   • Fe deficiency anemia   • Anemia   • Anxiety   • Biphasic positive airway pressure dependence   • Chronic pain   • Chronic obstructive pulmonary disease   • Depression   • Gastroesophageal reflux disease   • Bariatric surgery status   • Hypercoagulable state   • Opioid dependence   • Obstructive sleep apnea syndrome   • Seizure disorder   • Coagulation disorder   • BiPAP (biphasic positive airway pressure) dependence   • Bipolar disorder   • COPD (chronic obstructive pulmonary disease)   • Essential tremor   • GERD (gastroesophageal reflux disease)   • History of Stefanie-en-Y gastric bypass   • Hypercoagulopathy   • Incisional hernia   • 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, intract, 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   • Morbid obesity due to excess calories   • Narcotic dependence   • Obstructive sleep apnea   • Warfarin-induced coagulopathy   • Multiple falls               Adult Rehabilitation Note       11/15/17 0900          Rehab Assessment/Intervention    Discipline physical therapy assistant  -      Document Type therapy note (daily note)  -      Subjective Information agree to therapy;complains of;pain   difficult to understand, slurred speech  -      Precautions/Limitations fall precautions  -      Recorded by [] Lucy Sheth, PTA      Pain Assessment    Pain Assessment 0-10  -      Pain Score 8  -      Pain Location Generalized  -      Pain Frequency Constant/continuous  -      Pain Intervention(s) Medication (See MAR);Repositioned;Ambulation/increased activity  -       Response to Interventions tolerated  -AH      Recorded by [] Lucy Sheth PTA      Bed Mobility, Assessment/Treatment    Bed Mob, Supine to Sit, [place] minimum assist (75% patient effort);verbal cues required  -AH      Bed Mob, Sit to Supine, [place] contact guard assist;verbal cues required  -AH      Bed Mobility, Comment pt very sleepy/lethargic  -AH      Recorded by [] Lucy Sheth PTA      Transfer Assessment/Treatment    Transfers, Sit-Stand [place] contact guard assist;minimum assist (75% patient effort);verbal cues required  -AH      Transfers, Stand-Sit [place] contact guard assist;verbal cues required  -AH      Transfers, Sit-Stand-Sit, Assist Device rolling walker  -AH      Toilet Transfer, [place] contact guard assist;minimum assist (75% patient effort);verbal cues required  -AH      Toilet Transfer, Assistive Device rolling walker  -AH      Transfer, Safety Issues loses balance backward  -AH      Transfer, Impairments impaired balance;coordination impaired;pain  -AH      Transfer, Comment pt very sleepy/lethargic  -AH      Recorded by [] Lucy Sheth PTA      Gait Assessment/Treatment    Gait, [place] Level contact guard assist;minimum assist (75% patient effort);verbal cues required  -      Gait, Assistive Device rollator  -AH      Gait, Distance (Feet) 20   20 x 2  -AH      Gait, Safety Issues loses balance backward  -AH      Gait, Impairments impaired balance;coordination impaired;pain  -AH      Gait, Comment pt very sleepy/lethargic, not safe to amb more distance  -AH      Recorded by [] Lucy Sheth PTA      Positioning and Restraints    Pre-Treatment Position in bed  -AH      Post Treatment Position bed  -AH      In Bed fowlers;call light within reach;encouraged to call for assist;exit alarm on;with family/caregiver  -      Recorded by [] Lucy Sheth PTA        User Key  (r) = Recorded By, (t) = Taken By, (c) = Cosigned By    Initials  Name Effective Dates     Lucy Sheth, PTA 08/02/16 -                 IP PT Goals       11/14/17 1500          Transfer Training PT LTG    Transfer Training PT LTG, Date Established 11/14/17  -PB (r) AA (t) PB (c)      Transfer Training PT LTG, Time to Achieve by discharge  -PB (r) AA (t) PB (c)      Transfer Training PT LTG, Activity Type bed to chair /chair to bed;sit to stand/stand to sit  -PB (r) AA (t) PB (c)      Transfer Training PT LTG, [place] Level conditional independence  -PB (r) AA (t) PB (c)      Transfer Training PT LTG, Assist Device walker, rolling  -PB (r) AA (t) PB (c)      Transfer Training PT LTG, Additional Goal no LOB  -PB (r) AA (t) PB (c)      Gait Training PT LTG    Gait Training Goal PT LTG, Date Established 11/14/17  -PB (r) AA (t) PB (c)      Gait Training Goal PT LTG, Time to Achieve by discharge  -PB (r) AA (t) PB (c)      Gait Training Goal PT LTG, [place] Level conditional independence  -PB (r) AA (t) PB (c)      Gait Training Goal PT LTG, Assist Device walker, rolling  -PB (r) AA (t) PB (c)      Gait Training Goal PT LTG, Distance to Achieve 200 ft  -PB (r) AA (t) PB (c)        User Key  (r) = Recorded By, (t) = Taken By, (c) = Cosigned By    Initials Name Provider Type    PB Ugo Quezada, PT DPT Physical Therapist     Olga Smith, PT Student PT Student          Physical Therapy Education     Title: PT OT SLP Therapies (Active)     Topic: Physical Therapy (Active)     Point: Mobility training (Active)    Learning Progress Summary    Learner Readiness Method Response Comment Documented by Status   Patient Acceptance E NR benefits of activity  11/15/17 0941 Active    Acceptance E NR,VU Role of PT, benefits of activity, fall prevention  11/14/17 1500 Done               Point: Body mechanics (Done)    Learning Progress Summary    Learner Readiness Method Response Comment Documented by Status   Patient Acceptance E NR,VU Role of PT, benefits of activity, fall  prevention  11/14/17 1500 Done               Point: Precautions (Done)    Learning Progress Summary    Learner Readiness Method Response Comment Documented by Status   Patient Acceptance E NR,VU Role of PT, benefits of activity, fall prevention  11/14/17 1500 Done                      User Key     Initials Effective Dates Name Provider Type Discipline     08/02/16 -  Lucy Sheth, PTA Physical Therapy Assistant PT     10/11/17 -  Olga Smith, PT Student PT Student PT                    PT Recommendation and Plan  Anticipated Equipment Needs At Discharge: shower chair  Anticipated Discharge Disposition: home with assist, home with home health  Demonstrates Need for Referral to Another Service: occupational therapy (decreased activity tolerance, difficulty with ADL's)  Planned Therapy Interventions: balance training, gait training, patient/family education, transfer training  PT Frequency: 2 times/day, daily, per priority policy  Plan of Care Review  Plan Of Care Reviewed With: patient  Progress: progress toward functional goals is gradual  Outcome Summary/Follow up Plan: Pt very sleepy/lethargic, diffiicult to understand, slurred speech. Pt trans to EOB min assist, pt amb 20 feet to bathroom with rollator min assist. then 20 feet back to bed. Pt not safe to amb any distance this am.           Outcome Measures       11/15/17 0900 11/14/17 1400       How much help from another person do you currently need...    Turning from your back to your side while in flat bed without using bedrails? 3  - 4  -PB (r) AA (t) PB (c)     Moving from lying on back to sitting on the side of a flat bed without bedrails? 3  - 4  -PB (r) AA (t) PB (c)     Moving to and from a bed to a chair (including a wheelchair)? 3  - 3  -PB (r) AA (t) PB (c)     Standing up from a chair using your arms (e.g., wheelchair, bedside chair)? 3  - 3  -PB (r) AA (t) PB (c)     Climbing 3-5 steps with a railing? 2  - 3  -PB (r) AA (t) PB  (c)     To walk in hospital room? 2  -AH 3  -PB (r) AA (t) PB (c)     AM-PAC 6 Clicks Score 16  -AH 20  -PB (r) AA (t)     Functional Assessment    Outcome Measure Options AM-PAC 6 Clicks Basic Mobility (PT)  - AM-PAC 6 Clicks Basic Mobility (PT)  -PB (r) AA (t) PB (c)       User Key  (r) = Recorded By, (t) = Taken By, (c) = Cosigned By    Initials Name Provider Type     Lucy Sheth PTA Physical Therapy Assistant    PB Ugo Quezada, PT DPT Physical Therapist    JORGE LUIS Smith, PT Student PT Student           Time Calculation:         PT Charges       11/15/17 0945          Time Calculation    Start Time 0900  -      Stop Time 0923  -      Time Calculation (min) 23 min  -      PT Received On 11/15/17  -      PT Goal Re-Cert Due Date 11/24/17  -      Time Calculation- PT    Total Timed Code Minutes- PT 23 minute(s)  -        User Key  (r) = Recorded By, (t) = Taken By, (c) = Cosigned By    Initials Name Provider Type     Lucy Sheth PTA Physical Therapy Assistant          Therapy Charges for Today     Code Description Service Date Service Provider Modifiers Qty    18239106475 HC GAIT TRAINING EA 15 MIN 11/15/2017 Lucy Sheth PTA GP 1    94649968563 HC PT THERAPEUTIC ACT EA 15 MIN 11/15/2017 Lucy Sheth PTA GP 1          PT G-Codes  Outcome Measure Options: AM-PAC 6 Clicks Basic Mobility (PT)  Score: 20  Functional Limitation: Mobility: Walking and moving around  Mobility: Walking and Moving Around Current Status (): At least 20 percent but less than 40 percent impaired, limited or restricted  Mobility: Walking and Moving Around Goal Status (): At least 1 percent but less than 20 percent impaired, limited or restricted    Lucy Sheth PTA  11/15/2017            Electronically signed by Lucy Sheth PTA at 11/15/2017  9:46 AM        Discharge Summary     No notes of this type exist for this encounter.

## 2017-11-15 NOTE — PROGRESS NOTES
Discharge Planning Assessment  [facility]     Patient Name: Vianca Spencer  MRN: [id number]  Today's Date: 11/15/2017    Admit Date: 11/14/2017          Discharge Needs Assessment       11/15/17 1507    Living Environment    Lives With child(laura), dependent   16 year old son    Living Arrangements apartment    Type of Financial/Environmental Concern none    Transportation Available car;family or friend will provide    Living Environment    Provides Primary Care For child(laura)    Quality Of Family Relationships helpful    Able to Return to Prior Living Arrangements yes    Discharge Needs Assessment    Concerns To Be Addressed adjustment to diagnosis/illness concerns    Readmission Within The Last 30 Days no previous admission in last 30 days    Anticipated Changes Related to Illness none    Equipment Currently Used at Home rollator;oxygen    Discharge Facility/Level Of Care Needs home with home health    Discharge Disposition home healthcare service    Discharge Planning Comments Pt lives with her 16 year old son. He is in the room with her. Pt is being d/c'ed home and has orders for home health. Informed her of the agencies and she requests [facility]. Referral sent to them. She had questions about equipment and wants a shower chair. Did let her know insurance will not pay for one so she is going to have to get one on her own. Pt will d/c today.            Discharge Plan     None        Discharge Placement     Facility/Agency Request Status Selected? Address Phone Number Fax Number    [facility] Accepted    Yes [address] [place] [address] [phone]        Expected Discharge Date and Time     Expected Discharge Date Expected Discharge Time    Nov 15, 2017               Demographic Summary     None            Functional Status     None            Psychosocial     None            Abuse/Neglect     None            Legal     None            Substance Abuse     None             Patient Forms     None          LAQUITA Hopper

## 2017-11-15 NOTE — THERAPY TREATMENT NOTE
Acute Care - Physical Therapy Treatment Note  [facility]     Patient Name: Vianca Spencer  : 1975  MRN: [id number]  Today's Date: 11/15/2017  Onset of Illness/Injury or Date of Surgery Date: 17  Date of Referral to PT: 17  Referring Physician: Dr Kimbrough    Admit Date: 2017    Visit Dx:    ICD-10-CM ICD-9-CM   1. Multiple falls R29.6 V15.88   2. Acute UTI N39.0 599.0   3. Contusion of scalp, initial encounter S00.03XA 920   4. Impaired functional mobility, balance, gait, and endurance Z74.09 V49.89     Patient Active Problem List   Diagnosis   • Fe deficiency anemia   • Anemia   • Anxiety   • Biphasic positive airway pressure dependence   • Chronic pain   • Chronic obstructive pulmonary disease   • Depression   • Gastroesophageal reflux disease   • Bariatric surgery status   • Hypercoagulable state   • Opioid dependence   • Obstructive sleep apnea syndrome   • Seizure disorder   • Coagulation disorder   • BiPAP (biphasic positive airway pressure) dependence   • Bipolar disorder   • COPD (chronic obstructive pulmonary disease)   • Essential tremor   • GERD (gastroesophageal reflux disease)   • History of Stefanie-en-Y gastric bypass   • Hypercoagulopathy   • Incisional hernia   • 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, intract, 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   • Morbid obesity due to excess calories   • Narcotic dependence   • Obstructive sleep apnea   • Warfarin-induced coagulopathy   • Multiple falls               Adult Rehabilitation Note       11/15/17 0900          Rehab Assessment/Intervention    Discipline physical therapy assistant  -      Document Type therapy note (daily note)  -      Subjective Information agree to therapy;complains of;pain   difficult to understand, slurred speech  -      Precautions/Limitations fall precautions  -      Recorded by [] Lucy Sheth PTA      Pain Assessment    Pain Assessment 0-10  -      Pain Score 8  -      Pain Location Generalized  -       Pain Frequency Constant/continuous  -      Pain Intervention(s) Medication (See MAR);Repositioned;Ambulation/increased activity  -      Response to Interventions tolerated  -      Recorded by [] Lucy Sheth PTA      Bed Mobility, Assessment/Treatment    Bed Mob, Supine to Sit, [place] minimum assist (75% patient effort);verbal cues required  -      Bed Mob, Sit to Supine, Bath contact guard assist;verbal cues required  -      Bed Mobility, Comment pt very sleepy/lethargic  -AH      Recorded by [] Lucy Sheth PTA      Transfer Assessment/Treatment    Transfers, Sit-Stand Bath contact guard assist;minimum assist (75% patient effort);verbal cues required  -      Transfers, Stand-Sit [place] contact guard assist;verbal cues required  -      Transfers, Sit-Stand-Sit, Assist Device rolling walker  -      Toilet Transfer, Bath contact guard assist;minimum assist (75% patient effort);verbal cues required  -      Toilet Transfer, Assistive Device rolling walker  -AH      Transfer, Safety Issues loses balance backward  -AH      Transfer, Impairments impaired balance;coordination impaired;pain  -AH      Transfer, Comment pt very sleepy/lethargic  -      Recorded by [] Lucy Sheth PTA      Gait Assessment/Treatment    Gait, Bath Level contact guard assist;minimum assist (75% patient effort);verbal cues required  -      Gait, Assistive Device rollator  -      Gait, Distance (Feet) 20   20 x 2  -AH      Gait, Safety Issues loses balance backward  -AH      Gait, Impairments impaired balance;coordination impaired;pain  -      Gait, Comment pt very sleepy/lethargic, not safe to amb more distance  -      Recorded by [] Lucy Sheth PTA      Positioning and Restraints    Pre-Treatment Position in bed  -AH      Post Treatment Position bed  -AH      In Bed fowlers;call light within reach;encouraged to call for assist;exit alarm on;with  family/caregiver  -      Recorded by [] Lucy Sheth, PTA        User Key  (r) = Recorded By, (t) = Taken By, (c) = Cosigned By    Initials Name Effective Dates     Lucy TRENT Sheth, PTA 08/02/16 -                 IP PT Goals       11/14/17 1500          Transfer Training PT LTG    Transfer Training PT LTG, Date Established 11/14/17  -PB (r) AA (t) PB (c)      Transfer Training PT LTG, Time to Achieve by discharge  -PB (r) AA (t) PB (c)      Transfer Training PT LTG, Activity Type bed to chair /chair to bed;sit to stand/stand to sit  -PB (r) AA (t) PB (c)      Transfer Training PT LTG, [place] Level conditional independence  -PB (r) AA (t) PB (c)      Transfer Training PT LTG, Assist Device walker, rolling  -PB (r) AA (t) PB (c)      Transfer Training PT LTG, Additional Goal no LOB  -PB (r) AA (t) PB (c)      Gait Training PT LTG    Gait Training Goal PT LTG, Date Established 11/14/17  -PB (r) AA (t) PB (c)      Gait Training Goal PT LTG, Time to Achieve by discharge  -PB (r) AA (t) PB (c)      Gait Training Goal PT LTG, [place] Level conditional independence  -PB (r) AA (t) PB (c)      Gait Training Goal PT LTG, Assist Device walker, rolling  -PB (r) AA (t) PB (c)      Gait Training Goal PT LTG, Distance to Achieve 200 ft  -PB (r) AA (t) PB (c)        User Key  (r) = Recorded By, (t) = Taken By, (c) = Cosigned By    Initials Name Provider Type    PB Ugo Quezada, PT DPT Physical Therapist    JORGE LUIS Smith, PT Student PT Student          Physical Therapy Education     Title: PT OT SLP Therapies (Active)     Topic: Physical Therapy (Active)     Point: Mobility training (Active)    Learning Progress Summary    Learner Readiness Method Response Comment Documented by Status   Patient Acceptance E NR benefits of activity  11/15/17 0941 Active    Acceptance E NR,VU Role of PT, benefits of activity, fall prevention  11/14/17 1500 Done               Point: Body mechanics (Done)    Learning  Progress Summary    Learner Readiness Method Response Comment Documented by Status   Patient Acceptance E NR,VU Role of PT, benefits of activity, fall prevention  11/14/17 1500 Done               Point: Precautions (Done)    Learning Progress Summary    Learner Readiness Method Response Comment Documented by Status   Patient Acceptance E NR,VU Role of PT, benefits of activity, fall prevention  11/14/17 1500 Done                      User Key     Initials Effective Dates Name Provider Type Discipline     08/02/16 -  Lucy Sheth, PTA Physical Therapy Assistant PT     10/11/17 -  Olga Smith, PT Student PT Student PT                    PT Recommendation and Plan  Anticipated Equipment Needs At Discharge: shower chair  Anticipated Discharge Disposition: home with assist, home with home health  Demonstrates Need for Referral to Another Service: occupational therapy (decreased activity tolerance, difficulty with ADL's)  Planned Therapy Interventions: balance training, gait training, patient/family education, transfer training  PT Frequency: 2 times/day, daily, per priority policy  Plan of Care Review  Plan Of Care Reviewed With: patient  Progress: progress toward functional goals is gradual  Outcome Summary/Follow up Plan: Pt very sleepy/lethargic, diffiicult to understand, slurred speech. Pt trans to EOB min assist, pt amb 20 feet to bathroom with rollator min assist. then 20 feet back to bed. Pt not safe to amb any distance this am.           Outcome Measures       11/15/17 0900 11/14/17 1400       How much help from another person do you currently need...    Turning from your back to your side while in flat bed without using bedrails? 3  - 4  -PB (r) AA (t) PB (c)     Moving from lying on back to sitting on the side of a flat bed without bedrails? 3  - 4  -PB (r) AA (t) PB (c)     Moving to and from a bed to a chair (including a wheelchair)? 3  - 3  -PB (r) AA (t) PB (c)     Standing up from a  chair using your arms (e.g., wheelchair, bedside chair)? 3  - 3  -PB (r) AA (t) PB (c)     Climbing 3-5 steps with a railing? 2  - 3  -PB (r) AA (t) PB (c)     To walk in hospital room? 2  - 3  -PB (r) AA (t) PB (c)     AM-PAC 6 Clicks Score 16  -AH 20  -PB (r) AA (t)     Functional Assessment    Outcome Measure Options AM-PAC 6 Clicks Basic Mobility (PT)  - AM-PAC 6 Clicks Basic Mobility (PT)  -PB (r) AA (t) PB (c)       User Key  (r) = Recorded By, (t) = Taken By, (c) = Cosigned By    Initials Name Provider Type     Lucy Sheth PTA Physical Therapy Assistant    PB Ugo Quezada, PT DPT Physical Therapist    JORGE LUIS Smith, PT Student PT Student           Time Calculation:         PT Charges       11/15/17 0945          Time Calculation    Start Time 0900  -      Stop Time 0923  -      Time Calculation (min) 23 min  -      PT Received On 11/15/17  -      PT Goal Re-Cert Due Date 11/24/17  -      Time Calculation- PT    Total Timed Code Minutes- PT 23 minute(s)  -        User Key  (r) = Recorded By, (t) = Taken By, (c) = Cosigned By    Initials Name Provider Type     Lucy Sheth PTA Physical Therapy Assistant          Therapy Charges for Today     Code Description Service Date Service Provider Modifiers Qty    54866350205 HC GAIT TRAINING EA 15 MIN 11/15/2017 Lucy Sheth PTA GP 1    53695476524 HC PT THERAPEUTIC ACT EA 15 MIN 11/15/2017 Lucy Sheth PTA GP 1          PT G-Codes  Outcome Measure Options: AM-PAC 6 Clicks Basic Mobility (PT)  Score: 20  Functional Limitation: Mobility: Walking and moving around  Mobility: Walking and Moving Around Current Status (): At least 20 percent but less than 40 percent impaired, limited or restricted  Mobility: Walking and Moving Around Goal Status (): At least 1 percent but less than 20 percent impaired, limited or restricted    Lucy Sheth PTA  11/15/2017

## 2017-11-15 NOTE — PLAN OF CARE
Problem: Patient Care Overview (Adult)  Goal: Plan of Care Review  Outcome: Ongoing (interventions implemented as appropriate)    11/15/17 0942   Coping/Psychosocial Response Interventions   Plan Of Care Reviewed With patient   Outcome Evaluation   Outcome Summary/Follow up Plan Pt very sleepy/lethargic, diffiicult to understand, slurred speech. Pt trans to EOB min assist, pt amb 20 feet to bathroom with rollator min assist. then 20 feet back to bed. Pt not safe to amb any distance this am.    Patient Care Overview   Progress progress toward functional goals is gradual

## 2017-11-15 NOTE — DISCHARGE SUMMARY
[facility] Medicine Services  DISCHARGE SUMMARY       Date of Admission: 11/14/2017  Date of Discharge:  11/15/2017  Primary Care Physician: Neel Valencia Jr., MD    Presenting Problem/History of Present Illness:  Recurrent falls    Final Discharge Diagnoses:  1. Multiple falls  2. Urinary tract infection  3. History of bilateral lower extremity DVT's- on chronic Coumadin therapy. INR sub-therapeutic at this time.  4. History of obstructive sleep apnea, intolerant to CPAP therapy. Wears oxygen at 2L per nasal cannula at home.   5. History of peripheral neuropathy  6. History of bipolar depression, on chronic lithium therapy  7. Mild leukocytosis  8. Left knee contusion  9. Tobacco abuse  10. B12 deficiency     Consults: None    Procedures Performed: None    Pertinent Test Results:   Lab Results (last 48 hours)     Procedure Component Value Units Date/Time    Blood Gas, Arterial [[id number]]  (Abnormal) Collected:  11/14/17 0941    Specimen:  Arterial Blood Updated:  11/14/17 0950     Site Right Radial     Luis's Test Positive     pH, Arterial 7.392 pH units      pCO2, Arterial 42.1 mm Hg      pO2, Arterial 80.6 (L) mm Hg      HCO3, Arterial 25.6 mmol/L      Base Excess, Arterial 0.5 mmol/L      O2 Saturation, Arterial 97.1 %      Temperature 37.0 C      Barometric Pressure for Blood Gas 759 mmHg      Modality Room Air     Ventilator Mode NA     Collected by [id number]    CBC Auto Differential [[id number]]  (Abnormal) Collected:  11/14/17 0952    Specimen:  Blood Updated:  11/14/17 1002     WBC 11.19 (H) 10*3/mm3      RBC 3.98 (L) 10*6/mm3      Hemoglobin 12.5 g/dL      Hematocrit 39.8 %      .0 (H) fL      MCH 31.4 pg      MCHC 31.4 (L) g/dL      RDW 13.9 %      RDW-SD 49.8 fl      MPV 9.7 fL      Platelets 302 10*3/mm3      Neutrophil % 78.9 (H) %      Lymphocyte % 14.7 (L) %      Monocyte % 3.1 (L) %      Eosinophil % 2.2 %      Basophil % 0.2 %      Immature Grans % 0.9  %      Neutrophils, Absolute 8.82 (H) 10*3/mm3      Lymphocytes, Absolute 1.65 10*3/mm3      Monocytes, Absolute 0.35 10*3/mm3      Eosinophils, Absolute 0.25 10*3/mm3      Basophils, Absolute 0.02 10*3/mm3      Immature Grans, Absolute 0.10 (H) 10*3/mm3     Protime-INR [[id number]]  (Abnormal) Collected:  11/14/17 0952    Specimen:  Blood Updated:  11/14/17 1009     Protime 19.0 (H) Seconds      INR 1.54 (H)    aPTT [[id number]]  (Abnormal) Collected:  11/14/17 0952    Specimen:  Blood Updated:  11/14/17 1009     PTT 38.8 (H) seconds     Comprehensive Metabolic Panel [[id number]]  (Abnormal) Collected:  11/14/17 0952    Specimen:  Blood Updated:  11/14/17 1013     Glucose 96 mg/dL      BUN 10 mg/dL      Creatinine 1.13 mg/dL      Sodium 145 mmol/L      Potassium 4.4 mmol/L      Chloride 106 mmol/L      CO2 31.0 mmol/L      Calcium 9.4 mg/dL      Total Protein 7.2 g/dL      Albumin 4.00 g/dL      ALT (SGPT) 26 U/L      AST (SGOT) 32 U/L      Alkaline Phosphatase 128 (H) U/L      Total Bilirubin 0.3 mg/dL      eGFR Non African Amer 53 (L) mL/min/1.73      Globulin 3.2 gm/dL      A/G Ratio 1.3 g/dL      BUN/Creatinine Ratio 8.8     Anion Gap 8.0 mmol/L     Urinalysis With / Culture If Indicated - Urine, Clean Catch [[id number]]  (Abnormal) Collected:  11/14/17 0942    Specimen:  Urine from Urine, Clean Catch Updated:  11/14/17 1023     Color, UA Yellow     Appearance, UA Turbid (A)     pH, UA 6.0     Specific Gravity, UA 1.018     Glucose, UA Negative     Ketones, UA Negative     Bilirubin, UA Negative     Blood, UA Moderate (2+) (A)     Protein, UA Trace (A)     Leuk Esterase, UA Small (1+) (A)     Nitrite, UA Negative     Urobilinogen, UA 1.0 E.U./dL    Urinalysis, Microscopic Only - Urine, Clean Catch [[id number]]  (Abnormal) Collected:  11/14/17 0942    Specimen:  Urine from Urine, Clean Catch Updated:  11/14/17 1023     RBC, UA 6-12 (A) /HPF      WBC, UA 21-30 (A) /HPF      Bacteria, UA 4+ (A) /HPF      Squamous  Epithelial Cells, UA 31-50 (A) /HPF      Methodology Manual Light Microscopy    Troponin [[id number]]  (Normal) Collected:  11/14/17 0952    Specimen:  Blood Updated:  11/14/17 1025     Troponin I <0.012 ng/mL     Urine Drug Screen - Urine, Clean Catch [[id number]]  (Abnormal) Collected:  11/14/17 0942    Specimen:  Urine from Urine, Clean Catch Updated:  11/14/17 1036     Amphetamine Screen, Urine Negative     Barbiturates Screen, Urine Negative     Benzodiazepine Screen, Urine Positive (A)     Cocaine Screen, Urine Negative     Methadone Screen, Urine Negative     Opiate Screen Negative     Phencyclidine (PCP), Urine Negative     THC, Screen, Urine Negative    Lithium Level [[id number]]  (Abnormal) Collected:  11/14/17 0952    Specimen:  Blood Updated:  11/14/17 1040     Lithium 0.2 (L) mmol/L     Magnesium [[id number]]  (Normal) Collected:  11/14/17 0952    Specimen:  Blood Updated:  11/14/17 1059     Magnesium 2.1 mg/dL     Vitamin B12 [[id number]]  (Normal) Collected:  11/14/17 0952    Specimen:  Blood Updated:  11/14/17 1526     Vitamin B-12 255 pg/mL     Folate [[id number]] Collected:  11/14/17 0952    Specimen:  Blood Updated:  11/14/17 1526     Folate 4.69 ng/mL     Urine Culture - Urine, Urine, Clean Catch [[id number]]  (Normal) Collected:  11/14/17 0942    Specimen:  Urine from Urine, Clean Catch Updated:  11/15/17 0623     Urine Culture Growth present, too young to evaluate    CBC (No Diff) [[id number]]  (Abnormal) Collected:  11/15/17 0612    Specimen:  Blood Updated:  11/15/17 0648     WBC 13.07 (H) 10*3/mm3      RBC 3.97 (L) 10*6/mm3      Hemoglobin 12.2 g/dL      Hematocrit 40.3 %      .5 (H) fL      MCH 30.7 pg      MCHC 30.3 (L) g/dL      RDW 14.2 %      RDW-SD 52.7 fl      MPV 9.8 fL      Platelets 311 10*3/mm3     Basic Metabolic Panel [[id number]]  (Normal) Collected:  11/15/17 0612    Specimen:  Blood Updated:  11/15/17 0657     Glucose 97 mg/dL      BUN 13 mg/dL      Creatinine 0.93 mg/dL       Sodium 143 mmol/L      Potassium 4.3 mmol/L      Chloride 105 mmol/L      CO2 27.0 mmol/L      Calcium 9.5 mg/dL      eGFR Non African Amer 66 mL/min/1.73      BUN/Creatinine Ratio 14.0     Anion Gap 11.0 mmol/L     Narrative:       GFR Normal >60  Chronic Kidney Disease <60  Kidney Failure <15    Protime-INR [[id number]]  (Abnormal) Collected:  11/15/17 0612    Specimen:  Blood Updated:  11/15/17 0706     Protime 16.4 (H) Seconds      INR 1.28 (H)        Imaging Results (last 72 hours)     Procedure Component Value Units Date/Time    CT Facial Bones Without Contrast [[id number]] Collected:  11/14/17 1022     Updated:  11/14/17 1034    Narrative:       EXAMINATION: CT FACIAL BONES WO CONTRAST- 11/14/2017 10:22 AM CST     HISTORY: Recent fall, left side bruising and swelling     COMPARISON: None     DOSE: 729 mGy-cm     TECHNIQUE: Sequential imaging was performed through the facial bones  without the use of IV contrast utilizing bony algorithm.  Sagittal and  coronal reformations were made from the original source data and  reviewed. Automated exposure control was also utilized to decrease  patient radiation dose.     FINDINGS:   There is no evidence of acute facial fracture. There is minimal  maxillary sinus mucosal thickening. The paranasal sinuses and mastoid  air cells otherwise appear clear. There is soft tissue swelling of the  left side of the head and face with mild skin thickening, compatible  with history of bruising and swelling. The visualized globes and orbits  appear grossly normal. Prominent but not pathologically enlarged lymph  nodes are noted, measuring up to 1.4 cm in long axis.          Impression:       1. No evidence of acute facial fracture. Extensive soft tissue swelling  of the left face.  This report was finalized on 11/14/2017 10:28 by Dr. Arnel Reaves MD.    CT Head Without Contrast [[id number]] Collected:  11/14/17 1014     Updated:  11/14/17 1034    Narrative:       EXAMINATION: CT HEAD  WO CONTRAST- 11/14/2017 10:14 AM CST     HISTORY: Recent fall, bruising of the left eye with swelling     COMPARISON: CT head without contrast 06/12/2016     DOSE: 651 mGy-cm     TECHNIQUE: Sequential imaging was performed from the vertex through the  base of the skull without the use of IV contrast.  Sagittal and coronal  reformations were made from the original source data and reviewed.  Automated exposure control was also utilized to decrease patient  radiation dose.     FINDINGS:   There is no evidence of acute intracranial hemorrhage, mass, or mass  effect.  The corticomedullary differentiation is normal without evidence  of acute infarct. The ventricles appear normal in configuration. The  basilar cisterns are patent. The posterior fossa is grossly normal in  appearance.     No depressed skull fracture is identified. The paranasal  sinuses and mastoid air cells appear clear.               Impression:       1. No acute intracranial findings.     This report was finalized on 11/14/2017 10:16 by Dr. Arnel Reaves MD.    XR Chest 1 View [[id number]] Collected:  11/14/17 1033     Updated:  11/14/17 1038    Narrative:       XR CHEST 1 VW- 11/14/2017 10:37 AM EST     HISTORY: frequent falls       COMPARISON: None.     FINDINGS:      Limited exam given the poor inspiration. Heart appears grossly normal in  size and the pulmonary vasculature are unremarkable. No gross lung  infiltrates.       Impression:       1. Limited exam due to poor chest expansion.  2. The lungs appear grossly clear.        This report was finalized on 11/14/2017 10:34 by Dr Geovanny Hagen, .        Chief Complaint on Day of Discharge: Headache    History of Present Illness on Day of Discharge: The patient states she is feeling better today, despite a slight headache. Per her nurse, Cynthia's report, she has been going off the floor multiple times today to smoke despite being advised not to.  She has been very somnolent today.  Her son, Yung, is at  "bedside and tells me that this is her baseline after taking her morning medications.      Hospital Course:  Ms. Spencer is a 41-year-old  female who follows Dr. Neel Valencia at [facility] for her primary care.  She also sees Dr. Wagoner for chronic pain management.  She has a past medical history significant for seizure disorder, depression, anxiety, Bipolar disorder, multiple suicide attempts, bilateral lower extremity DVT's on chronic Coumadin therapy, peripheral neuropathy, gastric bypass, emphysema, and chronic pain. The patient also has a history of obstructive sleep apnea. She tells me that she was intolerant to CPAP therapy, so she is supposed to wear oxygen at night. She tells me that because she frequently falls asleep during the day, she keeps her oxygen on during the day as well.      She presented to the [facility] emergency department on 11/14/2017 with complaints of frequent falls at home. She was actually seen at the [facility] emergency department on Sunday, 11/12/2017 for similar complaints. She states she has chronic issues with her left knee, following a left anterior cruciate ligament repair many years ago, and periodically this knee \"gives out\" from under her causing her to fall.  During this particular incident, the patient was standing up from the commode and fell while pulling her pants up causing her to hit the bathtub on the left side of her face. The X-ray of her left knee showed soft tissue swelling likely related to direct trauma to the knee, with no evidence of acute fracture.  The CT of her head showed a scalp hematoma overlying the left zygoma and left temporal scalp no acute intracranial process or associated bony injury.  The CT of her facial bones showed no evidence of acute facial fracture. At that time, she was told that her INR was supra therapeutic at 6.45. She was given a dose of Vitamin K and instructed to hold her Coumadin on Sunday " "night and resume on Monday. The patient states she fell asleep and forgot to take her Coumadin on Monday.      The patient states that around 4 AM on the day of admission, her left knee gave out on her once again, causing her to fall. She decided to seek treatment at the emergency department because she states \"I shouldn't have to live like this. I can't keep falling all the time.\" She denies any fever or chills. She does admit to a non-productive cough that has been present for at least 2 weeks.      Repeat CT of the head in our emergency department showed no acute intracranial findings.  In addition, repeat CT of the facial bones and our emergency department showed no evidence of acute facial fracture, with extensive soft tissue swelling of the left face consistent with her previous injuries.  Lab work in our emergency department revealed a subtherapeutic INR of 1.54.  She also had a slightly elevated white blood cell count of 11.19. A urinalysis showed small leuk esterase with 21-30 white blood cells and 4+ bacteria.  Otherwise, her lab work on admission was unremarkable.  The patient was admitted to the hospitalist service for further evaluation and management of her frequent falls and urinary tract infection.     Given her history of gastric bypass surgery and peripheral neuropathy not related to diabetes, a vitamin B12 level was checked, which was found to be 255. She was given a dose of IM cyanocobalamin, and will be sent home on oral therapy and instructions to monitor this with her primary care physician. She was also given a dose of IV rocephin for her uncomplicated urinary tract infection.  She will be sent home on 5 days of oral Omnicef therapy to complete treatment.      Physical therapy was consulted for frequent falls and generalized weakness.  The patient's level of somnolence did not allow for a full treatment with physical therapy as she was only able to ambulate 20 feet to the bathroom. The " "patient's son, Yung, who was at bedside this morning with the patient states that her level of somnolence today is normal for the patient after taking her morning medications.  The patient does take multiple medications with sedating effects.  This may be a contributing factor to her frequent falls, and her medications may need to be adjusted as an outpatient by her primary care provider.    Today, the patient's INR is still subtherapeutic at 1.28.  She will be sent home on her regular Coumadin regimen, with instructions to follow up with her primary care provider within one week's time and to have a PT/INR drawn at that time.     Per her nurse, Cynthia's report, the patient has been leaving the floor multiple times to go outside to smoke, despite being advised not to do so after having IV narcotics.  At one point early this morning, the patient was so lethargic while trying to leave the floor to smoke she even knocked her IV pole over.  She had to be brought back to her room by another nurse.  As it appears the patient is back to her baseline, she has been deemed stable and is agreeable for discharge at this time.  A referral will be made to home health for physical therapy for strengthening and gait training.     Condition on Discharge:  Stable    Physical Exam on Discharge:  /76 (BP Location: Left arm, Patient Position: Standing)  Pulse 97  Temp 98.2 °F (36.8 °C) (Oral)   Resp 16  Ht 59\" (149.9 cm)  Wt 200 lb (90.7 kg)  SpO2 92%  BMI 40.4 kg/m2  Physical Exam  Physical Exam   Constitutional: She is oriented to person, place, and time. She appears well-developed and well-nourished. No distress. She is more somnolent today.  HENT:   Head:       Eyes: Pupils are equal, round, and reactive to light.   Neck: Normal range of motion. Neck supple.   Cardiovascular: Normal rate and regular rhythm.  Exam reveals no gallop and no friction rub.    No murmur heard.  Pulmonary/Chest: Effort normal. No respiratory " distress. She has no wheezes or rales. She has no rales. Lungs clear to auscultation bilaterally.   Abdominal: Soft. Bowel sounds are normal. She exhibits no distension. There is no tenderness.   Musculoskeletal: She exhibits no edema.        Left knee: She exhibits swelling.        Legs:  Generalized weakness   Neurological: She is alert and oriented to person, place, and time.   Skin: Skin is warm and dry.   Bruises in various stages of healing noted on bilateral arms, legs, and feet.    Psychiatric: Her behavior is normal. Judgment and thought content normal.   The patient is depressed   Vitals reviewed.    Discharge Disposition:  Home or Self Care    Discharge Medications:   Vianca Spencer   Home Medication Instructions KIERRA:[id number]    Printed on:11/15/17 1377   Medication Information                      cefdinir (OMNICEF) 300 MG capsule  Take 1 capsule by mouth 2 (Two) Times a Day for 5 days.             cyanocobalamin (V-R VITAMIN B-12) 500 MCG tablet  Take 1 tablet by mouth Daily.             diazePAM (VALIUM) 10 MG tablet  Take 10 mg by mouth 3 (Three) Times a Day.             FLUoxetine (PROzac) 40 MG capsule  Take 40 mg by mouth Daily.             gabapentin (NEURONTIN) 300 MG capsule  Take 300 mg by mouth 2 (Two) Times a Day.             gabapentin (NEURONTIN) 300 MG capsule  Take 600 mg by mouth Every Night.             guaiFENesin (MUCINEX) 600 MG 12 hr tablet  Take 2 tablets by mouth Every 12 (Twelve) Hours.             HYDROcodone-acetaminophen (NORCO) 7.5-325 MG per tablet  Take 1 tablet by mouth 3 (Three) Times a Day.             levothyroxine (SYNTHROID, LEVOTHROID) 100 MCG tablet  Take 100 mcg by mouth Daily.             lithium carbonate 300 MG capsule  Take 300 mg by mouth Every Night.             O2 (OXYGEN)  Inhale 2 L/min Continuous.             omeprazole (priLOSEC) 20 MG capsule  Take 20 mg by mouth Daily.             PROAIR  (90 BASE) MCG/ACT inhaler  Inhale 2 puffs Every 4  (Four) Hours As Needed for Wheezing or Shortness of Air.             promethazine (PHENERGAN) 25 MG tablet  Take 25 mg by mouth Every 8 (Eight) Hours As Needed for Nausea or Vomiting.             warfarin (COUMADIN) 5 MG tablet  Take 7.5 mg by mouth 4 (Four) Times a Week. Sunday, Tuesday, Thursday, and Saturday.             warfarin (COUMADIN) 5 MG tablet  Take 5 mg by mouth 3 (Three) Times a Week. Monday, Wednesday, and Friday.             zolpidem (AMBIEN) 10 MG tablet  Take 10 mg by mouth Every Night.               Discharge Diet:   Diet Instructions     Diet: Regular       Discharge Diet:  Regular               Activity at Discharge:   Activity Instructions     Activity as Tolerated                   Discharge Care Plan/Instructions:   1.  Follow-up with Dr. Neel Valencia at [facility] in 1 week. Will need a PT/INR drawn prior to appointment.  2.  Tobacco cessation.  3.  Home health referral for physical therapy.     Test Results Pending at Discharge: None    PARIS Culver  11/15/17  3:26 PM    Time: 35 minutes    I personally evaluated and examined the patient in conjunction with PARIS Mosquera and agree with the assessment, treatment plan, and disposition of the patient as recorded by her. My history, exam, and further recommendations are:     I saw the patient with her son present.  Also discussed the case directly with her nurse, Cynthia Hill, and the , Ashia Moctezuma.     She needs to take her Coumadin as directed and follow-up with Dr. Valencia on her INR.  I do not believe that she needs to be bridged at this point in time.    She has tolerated Rocephin without any problem.  Her urine culture shows growth present, but too young to evaluate.  We will discharge her on Omnicef and follow up with a phone call to change antibiotics if needed.    Her orthostatic vital signs were negative.  Physical therapy evaluated her and recommends home health for strengthening and gait training.    I  told the patient my concerns that she likely is having falls being overmedicated.  She takes Norco, gabapentin, and Valium chronically.  She also takes Ambien at night.  She really needs to consider being reduced or even removed from these medications by her primary care provider.  Her son seems to agree.  He tells me that she basically lays in bed all day and sleeps.  He is 16 and it seems that he basically has to take care of himself.    She tells me that she was nauseous earlier today, but is currently eating [employer].  She is agreeable to going home.  We have set up home health.  She apparently has all of the durable medical equipment that she needs.    Tha Kimbrough,   11/15/17  3:45 PM

## 2017-11-15 NOTE — PLAN OF CARE
Problem: Patient Care Overview (Adult)  Goal: Plan of Care Review  Outcome: Ongoing (interventions implemented as appropriate)    11/15/17 0807   Coping/Psychosocial Response Interventions   Plan Of Care Reviewed With patient   Outcome Evaluation   Outcome Summary/Follow up Plan Pt. c/o pain through the night, PRN Dilaudid given, pt. drowsy but arousable, VSS, left eye swollen and bruised, son at bedside this AM.    Patient Care Overview   Progress no change         Problem: Fall Risk (Adult)  Goal: Absence of Falls  Outcome: Ongoing (interventions implemented as appropriate)    Problem: Pain, Acute (Adult)  Goal: Acceptable Pain Control/Comfort Level  Outcome: Ongoing (interventions implemented as appropriate)    Problem: Infection, Risk/Actual (Adult)  Goal: Infection Prevention/Resolution  Outcome: Ongoing (interventions implemented as appropriate)

## 2017-11-15 NOTE — PLAN OF CARE
Problem: Patient Care Overview (Adult)  Goal: Plan of Care Review  Outcome: Ongoing (interventions implemented as appropriate)    11/14/17 1950   Coping/Psychosocial Response Interventions   Plan Of Care Reviewed With patient   Outcome Evaluation   Outcome Summary/Follow up Plan PO and IV pain meds PRN with good relief. Up x1 with walker. IV abx. Safety maintained   Patient Care Overview   Progress no change         Problem: Fall Risk (Adult)  Goal: Identify Related Risk Factors and Signs and Symptoms  Outcome: Outcome(s) achieved Date Met:  11/14/17  Goal: Absence of Falls  Outcome: Ongoing (interventions implemented as appropriate)    Problem: Pain, Acute (Adult)  Goal: Identify Related Risk Factors and Signs and Symptoms  Outcome: Outcome(s) achieved Date Met:  11/14/17  Goal: Acceptable Pain Control/Comfort Level  Outcome: Ongoing (interventions implemented as appropriate)    Problem: Infection, Risk/Actual (Adult)  Goal: Identify Related Risk Factors and Signs and Symptoms  Outcome: Outcome(s) achieved Date Met:  11/14/17  Goal: Infection Prevention/Resolution  Outcome: Ongoing (interventions implemented as appropriate)

## 2017-11-15 NOTE — CONSULTS
Advance Care Planning/Advance Directive. Patient/surrogate/family given opportunity to decline. We reviewed purpose and goals for advance care planning. Vianca reports she has completed an Advance Directive and has on file at [employer]. A copy has not been provided. We will attempt to retrieve document per pt request and scan to EMR.     Time spent on preparation, facilitation, and documentation was 15 minutes.

## 2017-11-16 NOTE — PLAN OF CARE
Problem: Inpatient Physical Therapy  Goal: Transfer Training Goal 1 LTG- PT  Outcome: Unable to achieve outcome(s) by discharge Date Met:  11/16/17 11/14/17 1500 11/16/17 0818   Transfer Training PT LTG   Transfer Training PT LTG, Date Established 11/14/17 --    Transfer Training PT LTG, Time to Achieve by discharge --    Transfer Training PT LTG, Activity Type bed to chair /chair to bed;sit to stand/stand to sit --    Transfer Training PT LTG, [place] Level conditional independence --    Transfer Training PT LTG, Assist Device walker, rolling --    Transfer Training PT LTG, Additional Goal no LOB --    Transfer Training PT LTG, Date Goal Reviewed --  11/16/17   Transfer Training PT LTG, Outcome --  goal not met   Transfer Training PT LTG, Reason Goal Not Met --  discharged from facility       Goal: Gait Training Goal LTG- PT  Outcome: Unable to achieve outcome(s) by discharge Date Met:  11/16/17 11/14/17 1500 11/16/17 0818   Gait Training PT LTG   Gait Training Goal PT LTG, Date Established 11/14/17 --    Gait Training Goal PT LTG, Time to Achieve by discharge --    Gait Training Goal PT LTG, [place] Level conditional independence --    Gait Training Goal PT LTG, Assist Device walker, rolling --    Gait Training Goal PT LTG, Distance to Achieve 200 ft --    Gait Training Goal PT LTG, Date Goal Reviewed --  11/16/17   Gait Training Goal PT LTG, Outcome --  goal not met   Gait Training Goal PT LTG, Reason Goal Not Met --  discharged from facility

## 2017-11-16 NOTE — PROGRESS NOTES
Patient requests a wheelchair from [facility]. MD has ordered wheelchair. BRADEN faxed referral to [facility] at [phone].

## 2017-11-16 NOTE — THERAPY DISCHARGE NOTE
Acute Care - Physical Therapy Discharge Summary  [facility]       Patient Name: Vianca Spencer  : 1975  MRN: [id number]    Today's Date: 2017  Onset of Illness/Injury or Date of Surgery Date: 17    Date of Referral to PT: 17  Referring Physician: Dr Kimbrough      Admit Date: 2017      PT Recommendation and Plan    Visit Dx:    ICD-10-CM ICD-9-CM   1. Multiple falls R29.6 V15.88   2. Acute UTI N39.0 599.0   3. Contusion of scalp, initial encounter S00.03XA 920   4. Impaired functional mobility, balance, gait, and endurance Z74.09 V49.89             Outcome Measures       11/15/17 0900 17 1400       How much help from another person do you currently need...    Turning from your back to your side while in flat bed without using bedrails? 3  - 4  -PB (r) AA (t) PB (c)     Moving from lying on back to sitting on the side of a flat bed without bedrails? 3  - 4  -PB (r) AA (t) PB (c)     Moving to and from a bed to a chair (including a wheelchair)? 3  - 3  -PB (r) AA (t) PB (c)     Standing up from a chair using your arms (e.g., wheelchair, bedside chair)? 3  - 3  -PB (r) AA (t) PB (c)     Climbing 3-5 steps with a railing? 2  - 3  -PB (r) AA (t) PB (c)     To walk in hospital room? 2  - 3  -PB (r) AA (t) PB (c)     AM-PAC 6 Clicks Score 16  -AH 20  -PB (r) AA (t)     Functional Assessment    Outcome Measure Options AM-PAC 6 Clicks Basic Mobility (PT)  -AH AM-PAC 6 Clicks Basic Mobility (PT)  -PB (r) AA (t) PB (c)       User Key  (r) = Recorded By, (t) = Taken By, (c) = Cosigned By    Initials Name Provider Type    GERBER Sheth, PTA Physical Therapy Assistant    MATTHEW Quezada, PT DPT Physical Therapist    JORGE LUIS Smith, PT Student PT Student                      IP PT Goals       17 0818 17 1500       Transfer Training PT LTG    Transfer Training PT LTG, Date Established  17  -PB (r) AA (t) PB (c)     Transfer Training PT LTG, Time to  Achieve  by discharge  -PB (r) AA (t) PB (c)     Transfer Training PT LTG, Activity Type  bed to chair /chair to bed;sit to stand/stand to sit  -PB (r) AA (t) PB (c)     Transfer Training PT LTG, [place] Level  conditional independence  -PB (r) AA (t) PB (c)     Transfer Training PT LTG, Assist Device  walker, rolling  -PB (r) AA (t) PB (c)     Transfer Training PT LTG, Additional Goal  no LOB  -PB (r) AA (t) PB (c)     Transfer Training PT  LTG, Date Goal Reviewed 11/16/17  -      Transfer Training PT LTG, Outcome goal not met  -      Transfer Training PT LTG, Reason Goal Not Met discharged from facility  -      Gait Training PT LTG    Gait Training Goal PT LTG, Date Established  11/14/17  -PB (r) AA (t) PB (c)     Gait Training Goal PT LTG, Time to Achieve  by discharge  -PB (r) AA (t) PB (c)     Gait Training Goal PT LTG, [place] Level  conditional independence  -PB (r) AA (t) PB (c)     Gait Training Goal PT LTG, Assist Device  walker, rolling  -PB (r) AA (t) PB (c)     Gait Training Goal PT LTG, Distance to Achieve  200 ft  -PB (r) AA (t) PB (c)     Gait Training Goal PT LTG, Date Goal Reviewed 11/16/17  -      Gait Training Goal PT LTG, Outcome goal not met  -      Gait Training Goal PT LTG, Reason Goal Not Met discharged from [facility]  -        User Key  (r) = Recorded By, (t) = Taken By, (c) = Cosigned By    Initials Name Provider Type     Lucy Sheth PTA Physical Therapy Assistant    PB Ugo Quezada, PT DPT Physical Therapist    AA Olga Smith, PT Student PT Student          Therapy Charges for Today     Code Description Service Date Service Provider Modifiers Qty    84934914311 HC GAIT TRAINING EA 15 MIN 11/15/2017 Lucy Sheth, PTA GP 1    43488314647 HC PT THERAPEUTIC ACT EA 15 MIN 11/15/2017 Lucy Sheth PTA GP 1    10591048268 HC GAIT TRAINING EA 15 MIN 11/15/2017 Lucy Sheth, SY GP 1          PT Discharge Summary  Reason for Discharge: Discharge from  facility  Outcomes Achieved: Refer to plan of care for updates on goals achieved  Discharge Destination: Home      Lucy Sheth, PTA   11/16/2017

## 2017-11-17 LAB
BACTERIA SPEC AEROBE CULT: ABNORMAL
BACTERIA SPEC AEROBE CULT: ABNORMAL

## 2018-01-28 PROCEDURE — 99285 EMERGENCY DEPT VISIT HI MDM: CPT

## 2018-01-29 ENCOUNTER — HOSPITAL ENCOUNTER (INPATIENT)
Facility: HOSPITAL | Age: 43
LOS: 1 days | Discharge: HOME | End: 2018-01-29
Attending: EMERGENCY MEDICINE | Admitting: INTERNAL MEDICINE

## 2018-01-29 ENCOUNTER — ANESTHESIA EVENT (OUTPATIENT)
Dept: GASTROENTEROLOGY | Facility: HOSPITAL | Age: 43
End: 2018-01-29

## 2018-01-29 ENCOUNTER — ANESTHESIA (OUTPATIENT)
Dept: GASTROENTEROLOGY | Facility: HOSPITAL | Age: 43
End: 2018-01-29

## 2018-01-29 ENCOUNTER — APPOINTMENT (OUTPATIENT)
Dept: GENERAL RADIOLOGY | Facility: HOSPITAL | Age: 43
End: 2018-01-29

## 2018-01-29 VITALS
OXYGEN SATURATION: 99 % | TEMPERATURE: 98 F | RESPIRATION RATE: 18 BRPM | HEART RATE: 65 BPM | SYSTOLIC BLOOD PRESSURE: 121 MMHG | HEIGHT: 59 IN | WEIGHT: 180 LBS | DIASTOLIC BLOOD PRESSURE: 63 MMHG | BODY MASS INDEX: 36.29 KG/M2

## 2018-01-29 DIAGNOSIS — K92.2 UPPER GI BLEED: Primary | ICD-10-CM

## 2018-01-29 LAB
ABO GROUP BLD: NORMAL
ALBUMIN SERPL-MCNC: 4 G/DL (ref 3.5–5)
ALBUMIN/GLOB SERPL: 1.2 G/DL (ref 1.1–2.5)
ALP SERPL-CCNC: 132 U/L (ref 24–120)
ALT SERPL W P-5'-P-CCNC: 27 U/L (ref 0–54)
ANION GAP SERPL CALCULATED.3IONS-SCNC: 12 MMOL/L (ref 4–13)
APTT PPP: 193 SECONDS (ref 24.1–34.8)
AST SERPL-CCNC: 29 U/L (ref 7–45)
B-HCG UR QL: NEGATIVE
BASOPHILS # BLD AUTO: 0.05 10*3/MM3 (ref 0–0.2)
BASOPHILS NFR BLD AUTO: 0.3 % (ref 0–2)
BILIRUB SERPL-MCNC: <0.1 MG/DL (ref 0.1–1)
BLD GP AB SCN SERPL QL: NEGATIVE
BUN BLD-MCNC: 9 MG/DL (ref 5–21)
BUN/CREAT SERPL: 9.3 (ref 7–25)
CALCIUM SPEC-SCNC: 9.6 MG/DL (ref 8.4–10.4)
CHLORIDE SERPL-SCNC: 108 MMOL/L (ref 98–110)
CO2 SERPL-SCNC: 24 MMOL/L (ref 24–31)
CREAT BLD-MCNC: 0.97 MG/DL (ref 0.5–1.4)
DEPRECATED RDW RBC AUTO: 43 FL (ref 40–54)
EOSINOPHIL # BLD AUTO: 0.19 10*3/MM3 (ref 0–0.7)
EOSINOPHIL NFR BLD AUTO: 1 % (ref 0–4)
ERYTHROCYTE [DISTWIDTH] IN BLOOD BY AUTOMATED COUNT: 12.9 % (ref 12–15)
FERRITIN SERPL-MCNC: 23.5 NG/ML (ref 6.24–137)
FOLATE SERPL-MCNC: 2.26 NG/ML
GFR SERPL CREATININE-BSD FRML MDRD: 62 ML/MIN/1.73
GLOBULIN UR ELPH-MCNC: 3.3 GM/DL
GLUCOSE BLD-MCNC: 107 MG/DL (ref 70–100)
HCT VFR BLD AUTO: 37.8 % (ref 37–47)
HCT VFR BLD AUTO: 39.8 % (ref 37–47)
HGB BLD-MCNC: 12.2 G/DL (ref 12–16)
HGB BLD-MCNC: 13.8 G/DL (ref 12–16)
HOLD SPECIMEN: NORMAL
HOLD SPECIMEN: NORMAL
IMM GRANULOCYTES # BLD: 0.11 10*3/MM3 (ref 0–0.03)
IMM GRANULOCYTES NFR BLD: 0.6 % (ref 0–5)
INR PPP: 14.79 (ref 0.91–1.09)
INR PPP: 2.37 (ref 0.91–1.09)
IRON 24H UR-MRATE: 80 MCG/DL (ref 42–180)
IRON SATN MFR SERPL: 23 % (ref 20–45)
LITHIUM SERPL-SCNC: 0.4 MMOL/L (ref 0.6–1.2)
LYMPHOCYTES # BLD AUTO: 1.81 10*3/MM3 (ref 0.72–4.86)
LYMPHOCYTES NFR BLD AUTO: 9.2 % (ref 15–45)
MCH RBC QN AUTO: 31.5 PG (ref 28–32)
MCHC RBC AUTO-ENTMCNC: 34.7 G/DL (ref 33–36)
MCV RBC AUTO: 90.9 FL (ref 82–98)
MONOCYTES # BLD AUTO: 0.7 10*3/MM3 (ref 0.19–1.3)
MONOCYTES NFR BLD AUTO: 3.6 % (ref 4–12)
NEUTROPHILS # BLD AUTO: 16.83 10*3/MM3 (ref 1.87–8.4)
NEUTROPHILS NFR BLD AUTO: 85.3 % (ref 39–78)
NRBC BLD MANUAL-RTO: 0 /100 WBC (ref 0–0)
PLATELET # BLD AUTO: 278 10*3/MM3 (ref 130–400)
PMV BLD AUTO: 9.9 FL (ref 6–12)
POTASSIUM BLD-SCNC: 3.3 MMOL/L (ref 3.5–5.3)
PROT SERPL-MCNC: 7.3 G/DL (ref 6.3–8.7)
PROTHROMBIN TIME: 114.5 SECONDS (ref 11.9–14.6)
PROTHROMBIN TIME: 26.8 SECONDS (ref 11.9–14.6)
RBC # BLD AUTO: 4.38 10*6/MM3 (ref 4.2–5.4)
RH BLD: NEGATIVE
SODIUM BLD-SCNC: 144 MMOL/L (ref 135–145)
TIBC SERPL-MCNC: 351 MCG/DL (ref 225–420)
VIT B12 BLD-MCNC: 281 PG/ML (ref 239–931)
WBC NRBC COR # BLD: 19.69 10*3/MM3 (ref 4.8–10.8)
WHOLE BLOOD HOLD SPECIMEN: NORMAL
WHOLE BLOOD HOLD SPECIMEN: NORMAL

## 2018-01-29 PROCEDURE — 25010000002 VITAMIN K1 PER 1 MG: Performed by: EMERGENCY MEDICINE

## 2018-01-29 PROCEDURE — 80178 ASSAY OF LITHIUM: CPT | Performed by: INTERNAL MEDICINE

## 2018-01-29 PROCEDURE — 82728 ASSAY OF FERRITIN: CPT | Performed by: INTERNAL MEDICINE

## 2018-01-29 PROCEDURE — 99222 1ST HOSP IP/OBS MODERATE 55: CPT | Performed by: INTERNAL MEDICINE

## 2018-01-29 PROCEDURE — 85610 PROTHROMBIN TIME: CPT | Performed by: EMERGENCY MEDICINE

## 2018-01-29 PROCEDURE — 86900 BLOOD TYPING SEROLOGIC ABO: CPT | Performed by: EMERGENCY MEDICINE

## 2018-01-29 PROCEDURE — 83540 ASSAY OF IRON: CPT | Performed by: INTERNAL MEDICINE

## 2018-01-29 PROCEDURE — 86927 PLASMA FRESH FROZEN: CPT

## 2018-01-29 PROCEDURE — 85025 COMPLETE CBC W/AUTO DIFF WBC: CPT | Performed by: EMERGENCY MEDICINE

## 2018-01-29 PROCEDURE — 83550 IRON BINDING TEST: CPT | Performed by: INTERNAL MEDICINE

## 2018-01-29 PROCEDURE — 86901 BLOOD TYPING SEROLOGIC RH(D): CPT | Performed by: EMERGENCY MEDICINE

## 2018-01-29 PROCEDURE — 71045 X-RAY EXAM CHEST 1 VIEW: CPT

## 2018-01-29 PROCEDURE — 86850 RBC ANTIBODY SCREEN: CPT | Performed by: EMERGENCY MEDICINE

## 2018-01-29 PROCEDURE — 30233K1 TRANSFUSION OF NONAUTOLOGOUS FROZEN PLASMA INTO PERIPHERAL VEIN, PERCUTANEOUS APPROACH: ICD-10-PCS | Performed by: INTERNAL MEDICINE

## 2018-01-29 PROCEDURE — 85018 HEMOGLOBIN: CPT | Performed by: INTERNAL MEDICINE

## 2018-01-29 PROCEDURE — 85610 PROTHROMBIN TIME: CPT | Performed by: INTERNAL MEDICINE

## 2018-01-29 PROCEDURE — 82746 ASSAY OF FOLIC ACID SERUM: CPT | Performed by: INTERNAL MEDICINE

## 2018-01-29 PROCEDURE — 93005 ELECTROCARDIOGRAM TRACING: CPT | Performed by: EMERGENCY MEDICINE

## 2018-01-29 PROCEDURE — 93010 ELECTROCARDIOGRAM REPORT: CPT | Performed by: INTERNAL MEDICINE

## 2018-01-29 PROCEDURE — P9017 PLASMA 1 DONOR FRZ W/IN 8 HR: HCPCS

## 2018-01-29 PROCEDURE — 25810000003 SODIUM CHLORIDE 0.9 % WITH KCL 20 MEQ 20-0.9 MEQ/L-% SOLUTION: Performed by: INTERNAL MEDICINE

## 2018-01-29 PROCEDURE — 85730 THROMBOPLASTIN TIME PARTIAL: CPT | Performed by: EMERGENCY MEDICINE

## 2018-01-29 PROCEDURE — 82607 VITAMIN B-12: CPT | Performed by: INTERNAL MEDICINE

## 2018-01-29 PROCEDURE — 25010000002 PROPOFOL 10 MG/ML EMULSION: Performed by: NURSE ANESTHETIST, CERTIFIED REGISTERED

## 2018-01-29 PROCEDURE — 87086 URINE CULTURE/COLONY COUNT: CPT | Performed by: INTERNAL MEDICINE

## 2018-01-29 PROCEDURE — 36430 TRANSFUSION BLD/BLD COMPNT: CPT

## 2018-01-29 PROCEDURE — 43235 EGD DIAGNOSTIC BRUSH WASH: CPT | Performed by: INTERNAL MEDICINE

## 2018-01-29 PROCEDURE — 0DJ08ZZ INSPECTION OF UPPER INTESTINAL TRACT, VIA NATURAL OR ARTIFICIAL OPENING ENDOSCOPIC: ICD-10-PCS | Performed by: INTERNAL MEDICINE

## 2018-01-29 PROCEDURE — 80053 COMPREHEN METABOLIC PANEL: CPT | Performed by: EMERGENCY MEDICINE

## 2018-01-29 PROCEDURE — 85014 HEMATOCRIT: CPT | Performed by: INTERNAL MEDICINE

## 2018-01-29 PROCEDURE — 25010000002 METOCLOPRAMIDE PER 10 MG: Performed by: EMERGENCY MEDICINE

## 2018-01-29 PROCEDURE — 81025 URINE PREGNANCY TEST: CPT | Performed by: EMERGENCY MEDICINE

## 2018-01-29 PROCEDURE — 36415 COLL VENOUS BLD VENIPUNCTURE: CPT | Performed by: INTERNAL MEDICINE

## 2018-01-29 PROCEDURE — 87040 BLOOD CULTURE FOR BACTERIA: CPT | Performed by: INTERNAL MEDICINE

## 2018-01-29 RX ORDER — SODIUM CHLORIDE AND POTASSIUM CHLORIDE 150; 900 MG/100ML; MG/100ML
125 INJECTION, SOLUTION INTRAVENOUS CONTINUOUS
Status: DISCONTINUED | OUTPATIENT
Start: 2018-01-29 | End: 2018-01-29 | Stop reason: HOSPADM

## 2018-01-29 RX ORDER — NICOTINE 21 MG/24HR
1 PATCH, TRANSDERMAL 24 HOURS TRANSDERMAL EVERY 24 HOURS
Status: DISCONTINUED | OUTPATIENT
Start: 2018-01-29 | End: 2018-01-29 | Stop reason: HOSPADM

## 2018-01-29 RX ORDER — CHLORDIAZEPOXIDE HYDROCHLORIDE AND CLIDINIUM BROMIDE 5; 2.5 MG/1; MG/1
1 CAPSULE ORAL ONCE
Status: COMPLETED | OUTPATIENT
Start: 2018-01-29 | End: 2018-01-29

## 2018-01-29 RX ORDER — SODIUM CHLORIDE 9 MG/ML
500 INJECTION, SOLUTION INTRAVENOUS CONTINUOUS PRN
Status: DISCONTINUED | OUTPATIENT
Start: 2018-01-29 | End: 2018-01-29 | Stop reason: HOSPADM

## 2018-01-29 RX ORDER — DIAZEPAM 10 MG/1
10 TABLET ORAL 3 TIMES DAILY
Status: DISCONTINUED | OUTPATIENT
Start: 2018-01-29 | End: 2018-01-29 | Stop reason: HOSPADM

## 2018-01-29 RX ORDER — PANTOPRAZOLE SODIUM 40 MG/1
40 TABLET, DELAYED RELEASE ORAL DAILY
Qty: 30 TABLET | Refills: 0 | Status: SHIPPED | OUTPATIENT
Start: 2018-01-29 | End: 2018-02-10 | Stop reason: HOSPADM

## 2018-01-29 RX ORDER — PROPOFOL 10 MG/ML
VIAL (ML) INTRAVENOUS AS NEEDED
Status: DISCONTINUED | OUTPATIENT
Start: 2018-01-29 | End: 2018-01-29 | Stop reason: SURG

## 2018-01-29 RX ORDER — FAMOTIDINE 10 MG/ML
20 INJECTION, SOLUTION INTRAVENOUS ONCE
Status: COMPLETED | OUTPATIENT
Start: 2018-01-29 | End: 2018-01-29

## 2018-01-29 RX ORDER — HYDROCODONE BITARTRATE AND ACETAMINOPHEN 7.5; 325 MG/1; MG/1
1 TABLET ORAL 3 TIMES DAILY
Status: DISCONTINUED | OUTPATIENT
Start: 2018-01-29 | End: 2018-01-29 | Stop reason: HOSPADM

## 2018-01-29 RX ORDER — METOCLOPRAMIDE HYDROCHLORIDE 5 MG/ML
10 INJECTION INTRAMUSCULAR; INTRAVENOUS ONCE
Status: COMPLETED | OUTPATIENT
Start: 2018-01-29 | End: 2018-01-29

## 2018-01-29 RX ORDER — GABAPENTIN 300 MG/1
600 CAPSULE ORAL NIGHTLY
Status: DISCONTINUED | OUTPATIENT
Start: 2018-01-29 | End: 2018-01-29 | Stop reason: HOSPADM

## 2018-01-29 RX ORDER — ONDANSETRON 2 MG/ML
4 INJECTION INTRAMUSCULAR; INTRAVENOUS EVERY 6 HOURS PRN
Status: DISCONTINUED | OUTPATIENT
Start: 2018-01-29 | End: 2018-01-29 | Stop reason: HOSPADM

## 2018-01-29 RX ORDER — SUCRALFATE ORAL 1 G/10ML
1 SUSPENSION ORAL EVERY 6 HOURS SCHEDULED
Status: DISCONTINUED | OUTPATIENT
Start: 2018-01-29 | End: 2018-01-29 | Stop reason: HOSPADM

## 2018-01-29 RX ORDER — ACETAMINOPHEN 500 MG
1000 TABLET ORAL ONCE
Status: COMPLETED | OUTPATIENT
Start: 2018-01-29 | End: 2018-01-29

## 2018-01-29 RX ORDER — WARFARIN SODIUM 5 MG/1
5 TABLET ORAL
Status: ON HOLD
Start: 2018-01-29 | End: 2018-02-02 | Stop reason: DRUGHIGH

## 2018-01-29 RX ORDER — SODIUM CHLORIDE 0.9 % (FLUSH) 0.9 %
10 SYRINGE (ML) INJECTION AS NEEDED
Status: DISCONTINUED | OUTPATIENT
Start: 2018-01-29 | End: 2018-01-29 | Stop reason: HOSPADM

## 2018-01-29 RX ORDER — GUAIFENESIN 600 MG/1
1200 TABLET, EXTENDED RELEASE ORAL EVERY 12 HOURS SCHEDULED
Status: DISCONTINUED | OUTPATIENT
Start: 2018-01-29 | End: 2018-01-29 | Stop reason: HOSPADM

## 2018-01-29 RX ORDER — ALBUTEROL SULFATE 90 UG/1
2 AEROSOL, METERED RESPIRATORY (INHALATION) EVERY 4 HOURS PRN
Status: DISCONTINUED | OUTPATIENT
Start: 2018-01-29 | End: 2018-01-29 | Stop reason: CLARIF

## 2018-01-29 RX ORDER — GABAPENTIN 300 MG/1
300 CAPSULE ORAL EVERY 12 HOURS SCHEDULED
Status: DISCONTINUED | OUTPATIENT
Start: 2018-01-29 | End: 2018-01-29 | Stop reason: HOSPADM

## 2018-01-29 RX ORDER — FLUOXETINE HYDROCHLORIDE 20 MG/1
40 CAPSULE ORAL DAILY
Status: DISCONTINUED | OUTPATIENT
Start: 2018-01-29 | End: 2018-01-29 | Stop reason: HOSPADM

## 2018-01-29 RX ORDER — HYDROCODONE BITARTRATE AND ACETAMINOPHEN 5; 325 MG/1; MG/1
1 TABLET ORAL EVERY 4 HOURS PRN
Status: DISCONTINUED | OUTPATIENT
Start: 2018-01-29 | End: 2018-01-29 | Stop reason: HOSPADM

## 2018-01-29 RX ORDER — ALBUTEROL SULFATE 2.5 MG/3ML
2.5 SOLUTION RESPIRATORY (INHALATION) EVERY 4 HOURS PRN
Status: DISCONTINUED | OUTPATIENT
Start: 2018-01-29 | End: 2018-01-29 | Stop reason: HOSPADM

## 2018-01-29 RX ORDER — SODIUM CHLORIDE 0.9 % (FLUSH) 0.9 %
1-10 SYRINGE (ML) INJECTION AS NEEDED
Status: DISCONTINUED | OUTPATIENT
Start: 2018-01-29 | End: 2018-01-29 | Stop reason: HOSPADM

## 2018-01-29 RX ORDER — LITHIUM CARBONATE 300 MG/1
300 CAPSULE ORAL NIGHTLY
Status: DISCONTINUED | OUTPATIENT
Start: 2018-01-29 | End: 2018-01-29 | Stop reason: HOSPADM

## 2018-01-29 RX ORDER — LIDOCAINE HYDROCHLORIDE 20 MG/ML
INJECTION, SOLUTION INFILTRATION; PERINEURAL AS NEEDED
Status: DISCONTINUED | OUTPATIENT
Start: 2018-01-29 | End: 2018-01-29 | Stop reason: SURG

## 2018-01-29 RX ORDER — ZOLPIDEM TARTRATE 5 MG/1
10 TABLET ORAL NIGHTLY
Status: DISCONTINUED | OUTPATIENT
Start: 2018-01-29 | End: 2018-01-29 | Stop reason: HOSPADM

## 2018-01-29 RX ORDER — SUCRALFATE ORAL 1 G/10ML
1 SUSPENSION ORAL EVERY 6 HOURS SCHEDULED
Qty: 420 ML | Refills: 0 | Status: ON HOLD | OUTPATIENT
Start: 2018-01-30 | End: 2018-05-07 | Stop reason: SDDI

## 2018-01-29 RX ORDER — CHOLECALCIFEROL (VITAMIN D3) 125 MCG
500 CAPSULE ORAL DAILY
Status: DISCONTINUED | OUTPATIENT
Start: 2018-01-29 | End: 2018-01-29 | Stop reason: HOSPADM

## 2018-01-29 RX ORDER — DIPHENHYDRAMINE HCL 25 MG
25 CAPSULE ORAL ONCE
Status: DISCONTINUED | OUTPATIENT
Start: 2018-01-29 | End: 2018-01-29 | Stop reason: HOSPADM

## 2018-01-29 RX ORDER — POTASSIUM CHLORIDE 750 MG/1
40 CAPSULE, EXTENDED RELEASE ORAL ONCE
Status: DISCONTINUED | OUTPATIENT
Start: 2018-01-29 | End: 2018-01-29 | Stop reason: HOSPADM

## 2018-01-29 RX ORDER — PANTOPRAZOLE SODIUM 40 MG/1
40 TABLET, DELAYED RELEASE ORAL ONCE
Status: COMPLETED | OUTPATIENT
Start: 2018-01-29 | End: 2018-01-29

## 2018-01-29 RX ORDER — PROMETHAZINE HYDROCHLORIDE 25 MG/1
25 TABLET ORAL EVERY 8 HOURS PRN
Status: DISCONTINUED | OUTPATIENT
Start: 2018-01-29 | End: 2018-01-29 | Stop reason: HOSPADM

## 2018-01-29 RX ORDER — PANTOPRAZOLE SODIUM 40 MG/1
40 TABLET, DELAYED RELEASE ORAL ONCE
Status: DISCONTINUED | OUTPATIENT
Start: 2018-01-30 | End: 2018-01-29 | Stop reason: HOSPADM

## 2018-01-29 RX ORDER — WARFARIN SODIUM 7.5 MG/1
7.5 TABLET ORAL
Status: DISCONTINUED | OUTPATIENT
Start: 2018-01-30 | End: 2018-01-29 | Stop reason: ALTCHOICE

## 2018-01-29 RX ORDER — LEVOTHYROXINE SODIUM 0.1 MG/1
100 TABLET ORAL DAILY
Status: DISCONTINUED | OUTPATIENT
Start: 2018-01-29 | End: 2018-01-29 | Stop reason: HOSPADM

## 2018-01-29 RX ORDER — PANTOPRAZOLE SODIUM 40 MG/1
40 TABLET, DELAYED RELEASE ORAL EVERY MORNING
Status: DISCONTINUED | OUTPATIENT
Start: 2018-01-29 | End: 2018-01-29 | Stop reason: ALTCHOICE

## 2018-01-29 RX ORDER — ACETAMINOPHEN 325 MG/1
650 TABLET ORAL EVERY 4 HOURS PRN
Status: DISCONTINUED | OUTPATIENT
Start: 2018-01-29 | End: 2018-01-29 | Stop reason: HOSPADM

## 2018-01-29 RX ORDER — GABAPENTIN 300 MG/1
300 CAPSULE ORAL EVERY 12 HOURS SCHEDULED
Status: DISCONTINUED | OUTPATIENT
Start: 2018-01-29 | End: 2018-01-29 | Stop reason: SDUPTHER

## 2018-01-29 RX ORDER — SODIUM CHLORIDE 9 MG/ML
100 INJECTION, SOLUTION INTRAVENOUS CONTINUOUS
Status: DISCONTINUED | OUTPATIENT
Start: 2018-01-29 | End: 2018-01-29 | Stop reason: SDUPTHER

## 2018-01-29 RX ORDER — SODIUM CHLORIDE 0.9 % (FLUSH) 0.9 %
3 SYRINGE (ML) INJECTION AS NEEDED
Status: DISCONTINUED | OUTPATIENT
Start: 2018-01-29 | End: 2018-01-29 | Stop reason: HOSPADM

## 2018-01-29 RX ORDER — NICOTINE 21 MG/24HR
1 PATCH, TRANSDERMAL 24 HOURS TRANSDERMAL EVERY 24 HOURS
Qty: 21 PATCH | Refills: 0 | Status: ON HOLD | OUTPATIENT
Start: 2018-01-30 | End: 2018-05-07 | Stop reason: SDDI

## 2018-01-29 RX ADMIN — LEVOTHYROXINE SODIUM 100 MCG: 100 TABLET ORAL at 16:42

## 2018-01-29 RX ADMIN — PHYTONADIONE 10 MG: 10 INJECTION, EMULSION INTRAMUSCULAR; INTRAVENOUS; SUBCUTANEOUS at 03:35

## 2018-01-29 RX ADMIN — PANTOPRAZOLE SODIUM 40 MG: 40 TABLET, DELAYED RELEASE ORAL at 05:28

## 2018-01-29 RX ADMIN — LIDOCAINE HYDROCHLORIDE 100 MG: 20 INJECTION, SOLUTION INFILTRATION; PERINEURAL at 11:31

## 2018-01-29 RX ADMIN — PROPOFOL 50 MG: 10 INJECTION, EMULSION INTRAVENOUS at 11:35

## 2018-01-29 RX ADMIN — CHLORDIAZEPOXIDE HYDROCHLORIDE AND CLIDINIUM BROMIDE 1 CAPSULE: 5; 2.5 CAPSULE ORAL at 16:47

## 2018-01-29 RX ADMIN — PANTOPRAZOLE SODIUM 8 MG/HR: 40 INJECTION, POWDER, FOR SOLUTION INTRAVENOUS at 14:28

## 2018-01-29 RX ADMIN — PROPOFOL 100 MG: 10 INJECTION, EMULSION INTRAVENOUS at 11:31

## 2018-01-29 RX ADMIN — HYDROCODONE BITARTRATE AND ACETAMINOPHEN 1 TABLET: 7.5; 325 TABLET ORAL at 16:33

## 2018-01-29 RX ADMIN — FAMOTIDINE 20 MG: 10 INJECTION, SOLUTION INTRAVENOUS at 01:02

## 2018-01-29 RX ADMIN — METOCLOPRAMIDE 10 MG: 5 INJECTION, SOLUTION INTRAMUSCULAR; INTRAVENOUS at 01:02

## 2018-01-29 RX ADMIN — SODIUM CHLORIDE 500 ML: 0.9 INJECTION, SOLUTION INTRAVENOUS at 11:04

## 2018-01-29 RX ADMIN — GUAIFENESIN 1200 MG: 600 TABLET, EXTENDED RELEASE ORAL at 16:46

## 2018-01-29 RX ADMIN — POTASSIUM CHLORIDE AND SODIUM CHLORIDE 125 ML/HR: 900; 150 INJECTION, SOLUTION INTRAVENOUS at 14:26

## 2018-01-29 RX ADMIN — DIAZEPAM 10 MG: 10 TABLET ORAL at 16:33

## 2018-01-29 RX ADMIN — FLUOXETINE HYDROCHLORIDE 40 MG: 20 CAPSULE ORAL at 16:43

## 2018-01-29 RX ADMIN — SODIUM CHLORIDE 1000 ML: 9 INJECTION, SOLUTION INTRAVENOUS at 00:50

## 2018-01-29 RX ADMIN — SUCRALFATE 1 G: 1 SUSPENSION ORAL at 17:01

## 2018-01-29 RX ADMIN — GABAPENTIN 300 MG: 300 CAPSULE ORAL at 16:41

## 2018-01-29 RX ADMIN — ACETAMINOPHEN 1000 MG: 500 TABLET ORAL at 05:29

## 2018-01-29 NOTE — ANESTHESIA PREPROCEDURE EVALUATION
Anesthesia Evaluation     Patient summary reviewed and Nursing notes reviewed   NPO Solid Status: > 8 hours  NPO Liquid Status: > 8 hours     Airway   Mallampati: I  TM distance: <3 FB  Neck ROM: full  no difficulty expected  Dental - normal exam     Pulmonary - normal exam   (+) a smoker, COPD, sleep apnea,     ROS comment: Chronic right infiltrate nonted on CXR. SpO2 98% on RA  Cardiovascular - normal exam    (+) DVT resolved,       Neuro/Psych  (+) seizures, psychiatric history Bipolar, Anxiety and Depression,     GI/Hepatic/Renal/Endo    (+) obesity,  GERD,     ROS Comment: Admitted with inr 14, chronic n/v with some blood. Was given vit K overnight. INR 2.4 today. Now receiving 2 u FFP. No vomiting or signs of bleding today.   Pt has h/o PUD, gastric bypass    Musculoskeletal     Abdominal  - normal exam    Bowel sounds: normal.   Substance History      OB/GYN          Other                                            Anesthesia Plan    ASA 3     general     intravenous induction   Anesthetic plan and risks discussed with patient.

## 2018-01-29 NOTE — ANESTHESIA POSTPROCEDURE EVALUATION
Patient: Vianca Spencer    Procedure Summary     Date Anesthesia Start Anesthesia Stop Room / Location    01/29/18 1119 1142 [facility] ENDOSCOPY 5 / [facility] ENDOSCOPY       Procedure Diagnosis Surgeon Provider    ESOPHAGOGASTRODUODENOSCOPY WITH ANESTHESIA (N/A Esophagus) Upper GI bleed  (Upper GI bleed [K92.2]) MD Barron Gregorio CRNA          Anesthesia Type: general  Last vitals  BP   102/73 (01/29/18 1054)   Temp   98.3 °F (36.8 °C) (01/29/18 1054)   Pulse   75 (01/29/18 1054)   Resp   16 (01/29/18 1054)     SpO2   100 % (01/29/18 1054)     Post Anesthesia Care and Evaluation    Patient location during evaluation: PHASE II  Patient participation: complete - patient participated  Level of consciousness: awake and awake and alert  Pain score: 0  Pain management: adequate  Airway patency: patent  Anesthetic complications: No anesthetic complications  PONV Status: none  Cardiovascular status: acceptable and blood pressure returned to baseline  Respiratory status: acceptable  Hydration status: acceptable

## 2018-01-30 LAB
ABO + RH BLD: NORMAL
ABO + RH BLD: NORMAL
BH BB BLOOD EXPIRATION DATE: NORMAL
BH BB BLOOD EXPIRATION DATE: NORMAL
BH BB BLOOD TYPE BARCODE: 5100
BH BB BLOOD TYPE BARCODE: 5100
BH BB DISPENSE STATUS: NORMAL
BH BB DISPENSE STATUS: NORMAL
BH BB PRODUCT CODE: NORMAL
BH BB PRODUCT CODE: NORMAL
BH BB UNIT NUMBER: NORMAL
BH BB UNIT NUMBER: NORMAL
UNIT  ABO: NORMAL
UNIT  ABO: NORMAL
UNIT  RH: NORMAL
UNIT  RH: NORMAL

## 2018-01-31 LAB
BACTERIA SPEC AEROBE CULT: ABNORMAL
BACTERIA SPEC AEROBE CULT: ABNORMAL

## 2018-02-01 ENCOUNTER — APPOINTMENT (OUTPATIENT)
Dept: GENERAL RADIOLOGY | Facility: HOSPITAL | Age: 43
End: 2018-02-01

## 2018-02-01 ENCOUNTER — HOSPITAL ENCOUNTER (INPATIENT)
Facility: HOSPITAL | Age: 43
LOS: 9 days | Discharge: HOME | End: 2018-02-10
Attending: EMERGENCY MEDICINE | Admitting: FAMILY MEDICINE

## 2018-02-01 DIAGNOSIS — R06.00 DYSPNEA, UNSPECIFIED TYPE: ICD-10-CM

## 2018-02-01 DIAGNOSIS — Z99.89: ICD-10-CM

## 2018-02-01 DIAGNOSIS — D68.32 WARFARIN-INDUCED COAGULOPATHY (HCC): ICD-10-CM

## 2018-02-01 DIAGNOSIS — Z74.09 IMPAIRED FUNCTIONAL MOBILITY AND ACTIVITY TOLERANCE: ICD-10-CM

## 2018-02-01 DIAGNOSIS — G25.0 ESSENTIAL TREMOR: ICD-10-CM

## 2018-02-01 DIAGNOSIS — G40.219 LOCALZ-RLTD SYMPTOMATIC EPILEPSY W CMPLX PART SZ, INTRACT, WO STATUS (HCC): ICD-10-CM

## 2018-02-01 DIAGNOSIS — F41.9 ANXIETY: ICD-10-CM

## 2018-02-01 DIAGNOSIS — R41.89 COGNITIVE AND BEHAVIORAL CHANGES: ICD-10-CM

## 2018-02-01 DIAGNOSIS — G40.909 SEIZURE DISORDER (HCC): ICD-10-CM

## 2018-02-01 DIAGNOSIS — F11.20 NARCOTIC DEPENDENCE (HCC): ICD-10-CM

## 2018-02-01 DIAGNOSIS — J18.9 PNEUMONIA OF RIGHT LUNG DUE TO INFECTIOUS ORGANISM, UNSPECIFIED PART OF LUNG: Primary | ICD-10-CM

## 2018-02-01 DIAGNOSIS — J41.1 MUCOPURULENT CHRONIC BRONCHITIS (HCC): ICD-10-CM

## 2018-02-01 DIAGNOSIS — D68.9 COAGULATION DISORDER (HCC): ICD-10-CM

## 2018-02-01 DIAGNOSIS — G47.33 OBSTRUCTIVE SLEEP APNEA: ICD-10-CM

## 2018-02-01 DIAGNOSIS — Z99.89 BIPAP (BIPHASIC POSITIVE AIRWAY PRESSURE) DEPENDENCE: ICD-10-CM

## 2018-02-01 DIAGNOSIS — D68.59 HYPERCOAGULOPATHY (HCC): ICD-10-CM

## 2018-02-01 DIAGNOSIS — Z98.84 BARIATRIC SURGERY STATUS: ICD-10-CM

## 2018-02-01 DIAGNOSIS — R46.89 COGNITIVE AND BEHAVIORAL CHANGES: ICD-10-CM

## 2018-02-01 DIAGNOSIS — R13.10 DYSPHAGIA, UNSPECIFIED TYPE: ICD-10-CM

## 2018-02-01 DIAGNOSIS — Z98.84 HISTORY OF ROUX-EN-Y GASTRIC BYPASS: ICD-10-CM

## 2018-02-01 DIAGNOSIS — A41.9 SEPSIS, DUE TO UNSPECIFIED ORGANISM: ICD-10-CM

## 2018-02-01 DIAGNOSIS — K92.2 UPPER GI BLEED: ICD-10-CM

## 2018-02-01 DIAGNOSIS — J18.9 PNEUMONIA OF RIGHT MIDDLE LOBE DUE TO INFECTIOUS ORGANISM: ICD-10-CM

## 2018-02-01 DIAGNOSIS — Z74.09 IMPAIRED MOBILITY: ICD-10-CM

## 2018-02-01 DIAGNOSIS — G47.33 OBSTRUCTIVE SLEEP APNEA SYNDROME: ICD-10-CM

## 2018-02-01 DIAGNOSIS — R29.6 MULTIPLE FALLS: ICD-10-CM

## 2018-02-01 DIAGNOSIS — J18.9 PNEUMONIA: ICD-10-CM

## 2018-02-01 DIAGNOSIS — E66.01 MORBID OBESITY DUE TO EXCESS CALORIES (HCC): ICD-10-CM

## 2018-02-01 DIAGNOSIS — T45.515A WARFARIN-INDUCED COAGULOPATHY (HCC): ICD-10-CM

## 2018-02-01 DIAGNOSIS — D68.59 HYPERCOAGULABLE STATE (HCC): ICD-10-CM

## 2018-02-01 LAB
ANION GAP SERPL CALCULATED.3IONS-SCNC: 20 MMOL/L (ref 4–13)
APTT PPP: 38.6 SECONDS (ref 24.1–34.8)
ARTERIAL PATENCY WRIST A: ABNORMAL
ARTERIAL PATENCY WRIST A: POSITIVE
ARTERIAL PATENCY WRIST A: POSITIVE
ATMOSPHERIC PRESS: 755 MMHG
ATMOSPHERIC PRESS: 762 MMHG
ATMOSPHERIC PRESS: 762 MMHG
BACTERIA UR QL AUTO: ABNORMAL /HPF
BASE EXCESS BLDA CALC-SCNC: -4.3 MMOL/L (ref 0–2)
BASE EXCESS BLDA CALC-SCNC: -4.6 MMOL/L (ref 0–2)
BASE EXCESS BLDA CALC-SCNC: -6.8 MMOL/L (ref 0–2)
BASOPHILS # BLD AUTO: 0.02 10*3/MM3 (ref 0–0.2)
BASOPHILS NFR BLD AUTO: 0.1 % (ref 0–2)
BDY SITE: ABNORMAL
BILIRUB UR QL STRIP: NEGATIVE
BODY TEMPERATURE: 37 C
BUN BLD-MCNC: 13 MG/DL (ref 5–21)
BUN/CREAT SERPL: 14.1 (ref 7–25)
CALCIUM SPEC-SCNC: 9.1 MG/DL (ref 8.4–10.4)
CHLORIDE SERPL-SCNC: 103 MMOL/L (ref 98–110)
CK MB SERPL-CCNC: 0.5 NG/ML (ref 0–5)
CLARITY UR: CLEAR
CO2 SERPL-SCNC: 18 MMOL/L (ref 24–31)
COLOR UR: YELLOW
CREAT BLD-MCNC: 0.92 MG/DL (ref 0.5–1.4)
D-LACTATE SERPL-SCNC: 1.9 MMOL/L (ref 0.5–2)
D-LACTATE SERPL-SCNC: 2.1 MMOL/L (ref 0.5–2)
D-LACTATE SERPL-SCNC: 5.8 MMOL/L (ref 0.5–2)
DEPRECATED RDW RBC AUTO: 44 FL (ref 40–54)
EOSINOPHIL # BLD AUTO: 0 10*3/MM3 (ref 0–0.7)
EOSINOPHIL NFR BLD AUTO: 0 % (ref 0–4)
EPAP: 12
EPAP: 8
EPAP: 8
ERYTHROCYTE [DISTWIDTH] IN BLOOD BY AUTOMATED COUNT: 13.1 % (ref 12–15)
FLUAV AG NPH QL: NEGATIVE
FLUBV AG NPH QL IA: NEGATIVE
GFR SERPL CREATININE-BSD FRML MDRD: 66 ML/MIN/1.73
GLUCOSE BLD-MCNC: 177 MG/DL (ref 70–100)
GLUCOSE UR STRIP-MCNC: NEGATIVE MG/DL
HCO3 BLDA-SCNC: 18.1 MMOL/L (ref 20–26)
HCO3 BLDA-SCNC: 21.3 MMOL/L (ref 20–26)
HCO3 BLDA-SCNC: 21.4 MMOL/L (ref 20–26)
HCT VFR BLD AUTO: 34.5 % (ref 37–47)
HGB BLD-MCNC: 11.3 G/DL (ref 12–16)
HGB UR QL STRIP.AUTO: ABNORMAL
HOLD SPECIMEN: NORMAL
HOROWITZ INDEX BLD+IHG-RTO: 100 %
HOROWITZ INDEX BLD+IHG-RTO: 100 %
HOROWITZ INDEX BLD+IHG-RTO: 60 %
HYALINE CASTS UR QL AUTO: ABNORMAL /LPF
IMM GRANULOCYTES # BLD: 0.29 10*3/MM3 (ref 0–0.03)
IMM GRANULOCYTES NFR BLD: 1.5 % (ref 0–5)
INR PPP: 1.11 (ref 0.91–1.09)
IPAP: 18
IPAP: 18
KETONES UR QL STRIP: NEGATIVE
L PNEUMO1 AG UR QL IA: NEGATIVE
LEUKOCYTE ESTERASE UR QL STRIP.AUTO: NEGATIVE
LYMPHOCYTES # BLD AUTO: 0.75 10*3/MM3 (ref 0.72–4.86)
LYMPHOCYTES NFR BLD AUTO: 3.8 % (ref 15–45)
Lab: ABNORMAL
MCH RBC QN AUTO: 30.4 PG (ref 28–32)
MCHC RBC AUTO-ENTMCNC: 32.8 G/DL (ref 33–36)
MCV RBC AUTO: 92.7 FL (ref 82–98)
MODALITY: ABNORMAL
MONOCYTES # BLD AUTO: 0.46 10*3/MM3 (ref 0.19–1.3)
MONOCYTES NFR BLD AUTO: 2.3 % (ref 4–12)
NEUTROPHILS # BLD AUTO: 18.32 10*3/MM3 (ref 1.87–8.4)
NEUTROPHILS NFR BLD AUTO: 92.3 % (ref 39–78)
NITRITE UR QL STRIP: NEGATIVE
NOTIFIED BY: ABNORMAL
NOTIFIED WHO: ABNORMAL
NRBC BLD MANUAL-RTO: 0.1 /100 WBC (ref 0–0)
NT-PROBNP SERPL-MCNC: 4840 PG/ML (ref 0–450)
PCO2 BLDA: 33.4 MM HG (ref 35–45)
PCO2 BLDA: 40.3 MM HG (ref 35–45)
PCO2 BLDA: 42.4 MM HG (ref 35–45)
PH BLDA: 7.31 PH UNITS (ref 7.35–7.45)
PH BLDA: 7.33 PH UNITS (ref 7.35–7.45)
PH BLDA: 7.34 PH UNITS (ref 7.35–7.45)
PH UR STRIP.AUTO: <=5 [PH] (ref 5–8)
PLATELET # BLD AUTO: 252 10*3/MM3 (ref 130–400)
PMV BLD AUTO: 9.8 FL (ref 6–12)
PO2 BLDA: 54.5 MM HG (ref 83–108)
PO2 BLDA: 56.8 MM HG (ref 83–108)
PO2 BLDA: 57.2 MM HG (ref 83–108)
POTASSIUM BLD-SCNC: 3.1 MMOL/L (ref 3.5–5.3)
PROT UR QL STRIP: ABNORMAL
PROTHROMBIN TIME: 14.7 SECONDS (ref 11.9–14.6)
RBC # BLD AUTO: 3.72 10*6/MM3 (ref 4.2–5.4)
RBC # UR: ABNORMAL /HPF
REF LAB TEST METHOD: ABNORMAL
S PNEUM AG SPEC QL LA: NEGATIVE
SAO2 % BLDCOA: 87.5 % (ref 94–99)
SAO2 % BLDCOA: 88.4 % (ref 94–99)
SAO2 % BLDCOA: 90.1 % (ref 94–99)
SET MECH RESP RATE: 12
SET MECH RESP RATE: 20
SET MECH RESP RATE: 20
SODIUM BLD-SCNC: 141 MMOL/L (ref 135–145)
SP GR UR STRIP: 1.02 (ref 1–1.03)
SQUAMOUS #/AREA URNS HPF: ABNORMAL /HPF
TROPONIN I SERPL-MCNC: 0.01 NG/ML (ref 0–0.03)
UROBILINOGEN UR QL STRIP: ABNORMAL
VENTILATOR MODE: ABNORMAL
VT ON VENT VENT: 550 ML
WBC NRBC COR # BLD: 19.84 10*3/MM3 (ref 4.8–10.8)
WBC UR QL AUTO: ABNORMAL /HPF

## 2018-02-01 PROCEDURE — 83605 ASSAY OF LACTIC ACID: CPT | Performed by: FAMILY MEDICINE

## 2018-02-01 PROCEDURE — 94799 UNLISTED PULMONARY SVC/PX: CPT

## 2018-02-01 PROCEDURE — 93010 ELECTROCARDIOGRAM REPORT: CPT | Performed by: INTERNAL MEDICINE

## 2018-02-01 PROCEDURE — 84484 ASSAY OF TROPONIN QUANT: CPT | Performed by: EMERGENCY MEDICINE

## 2018-02-01 PROCEDURE — 85025 COMPLETE CBC W/AUTO DIFF WBC: CPT | Performed by: EMERGENCY MEDICINE

## 2018-02-01 PROCEDURE — 25010000002 PIPERACILLIN-TAZOBACTAM: Performed by: NURSE PRACTITIONER

## 2018-02-01 PROCEDURE — 25010000002 METHYLPREDNISOLONE PER 125 MG: Performed by: NURSE PRACTITIONER

## 2018-02-01 PROCEDURE — 94640 AIRWAY INHALATION TREATMENT: CPT

## 2018-02-01 PROCEDURE — 87040 BLOOD CULTURE FOR BACTERIA: CPT | Performed by: EMERGENCY MEDICINE

## 2018-02-01 PROCEDURE — 85610 PROTHROMBIN TIME: CPT | Performed by: EMERGENCY MEDICINE

## 2018-02-01 PROCEDURE — 82803 BLOOD GASES ANY COMBINATION: CPT

## 2018-02-01 PROCEDURE — 71045 X-RAY EXAM CHEST 1 VIEW: CPT

## 2018-02-01 PROCEDURE — 81001 URINALYSIS AUTO W/SCOPE: CPT | Performed by: EMERGENCY MEDICINE

## 2018-02-01 PROCEDURE — 25010000002 ENOXAPARIN PER 10 MG: Performed by: NURSE PRACTITIONER

## 2018-02-01 PROCEDURE — 99285 EMERGENCY DEPT VISIT HI MDM: CPT

## 2018-02-01 PROCEDURE — 87899 AGENT NOS ASSAY W/OPTIC: CPT | Performed by: NURSE PRACTITIONER

## 2018-02-01 PROCEDURE — 25010000002 VANCOMYCIN 10 G RECONSTITUTED SOLUTION: Performed by: EMERGENCY MEDICINE

## 2018-02-01 PROCEDURE — 25010000002 LEVOFLOXACIN PER 250 MG: Performed by: NURSE PRACTITIONER

## 2018-02-01 PROCEDURE — 94660 CPAP INITIATION&MGMT: CPT

## 2018-02-01 PROCEDURE — 36600 WITHDRAWAL OF ARTERIAL BLOOD: CPT

## 2018-02-01 PROCEDURE — 82553 CREATINE MB FRACTION: CPT | Performed by: EMERGENCY MEDICINE

## 2018-02-01 PROCEDURE — 25010000002 PIPERACILLIN-TAZOBACTAM

## 2018-02-01 PROCEDURE — 87804 INFLUENZA ASSAY W/OPTIC: CPT | Performed by: EMERGENCY MEDICINE

## 2018-02-01 PROCEDURE — 25010000002 NALOXONE PER 1 MG: Performed by: EMERGENCY MEDICINE

## 2018-02-01 PROCEDURE — 83880 ASSAY OF NATRIURETIC PEPTIDE: CPT | Performed by: EMERGENCY MEDICINE

## 2018-02-01 PROCEDURE — 25010000002 ONDANSETRON PER 1 MG: Performed by: EMERGENCY MEDICINE

## 2018-02-01 PROCEDURE — 80048 BASIC METABOLIC PNL TOTAL CA: CPT | Performed by: EMERGENCY MEDICINE

## 2018-02-01 PROCEDURE — 85730 THROMBOPLASTIN TIME PARTIAL: CPT | Performed by: EMERGENCY MEDICINE

## 2018-02-01 PROCEDURE — 93005 ELECTROCARDIOGRAM TRACING: CPT | Performed by: EMERGENCY MEDICINE

## 2018-02-01 PROCEDURE — 25010000002 METHYLPREDNISOLONE PER 125 MG: Performed by: EMERGENCY MEDICINE

## 2018-02-01 PROCEDURE — 83605 ASSAY OF LACTIC ACID: CPT | Performed by: NURSE PRACTITIONER

## 2018-02-01 PROCEDURE — 87086 URINE CULTURE/COLONY COUNT: CPT | Performed by: FAMILY MEDICINE

## 2018-02-01 RX ORDER — SUCRALFATE ORAL 1 G/10ML
1 SUSPENSION ORAL EVERY 6 HOURS SCHEDULED
Status: DISCONTINUED | OUTPATIENT
Start: 2018-02-01 | End: 2018-02-10 | Stop reason: HOSPADM

## 2018-02-01 RX ORDER — METHYLPREDNISOLONE SODIUM SUCCINATE 125 MG/2ML
80 INJECTION, POWDER, LYOPHILIZED, FOR SOLUTION INTRAMUSCULAR; INTRAVENOUS EVERY 8 HOURS SCHEDULED
Status: DISCONTINUED | OUTPATIENT
Start: 2018-02-01 | End: 2018-02-02

## 2018-02-01 RX ORDER — NICOTINE 21 MG/24HR
1 PATCH, TRANSDERMAL 24 HOURS TRANSDERMAL EVERY 24 HOURS
Status: DISCONTINUED | OUTPATIENT
Start: 2018-02-01 | End: 2018-02-10 | Stop reason: HOSPADM

## 2018-02-01 RX ORDER — IPRATROPIUM BROMIDE AND ALBUTEROL SULFATE 2.5; .5 MG/3ML; MG/3ML
3 SOLUTION RESPIRATORY (INHALATION)
Status: DISCONTINUED | OUTPATIENT
Start: 2018-02-01 | End: 2018-02-10 | Stop reason: HOSPADM

## 2018-02-01 RX ORDER — FLUOXETINE HYDROCHLORIDE 20 MG/1
40 CAPSULE ORAL DAILY
Status: DISCONTINUED | OUTPATIENT
Start: 2018-02-01 | End: 2018-02-02

## 2018-02-01 RX ORDER — GABAPENTIN 300 MG/1
600 CAPSULE ORAL NIGHTLY
Status: DISCONTINUED | OUTPATIENT
Start: 2018-02-01 | End: 2018-02-10 | Stop reason: HOSPADM

## 2018-02-01 RX ORDER — LEVOTHYROXINE SODIUM 0.1 MG/1
100 TABLET ORAL
Status: DISCONTINUED | OUTPATIENT
Start: 2018-02-01 | End: 2018-02-10 | Stop reason: HOSPADM

## 2018-02-01 RX ORDER — LITHIUM CARBONATE 300 MG/1
300 CAPSULE ORAL NIGHTLY
Status: DISCONTINUED | OUTPATIENT
Start: 2018-02-01 | End: 2018-02-10 | Stop reason: HOSPADM

## 2018-02-01 RX ORDER — FUROSEMIDE 10 MG/ML
20 INJECTION INTRAMUSCULAR; INTRAVENOUS ONCE
Status: COMPLETED | OUTPATIENT
Start: 2018-02-02 | End: 2018-02-01

## 2018-02-01 RX ORDER — BISACODYL 10 MG
10 SUPPOSITORY, RECTAL RECTAL DAILY PRN
Status: DISCONTINUED | OUTPATIENT
Start: 2018-02-01 | End: 2018-02-10 | Stop reason: HOSPADM

## 2018-02-01 RX ORDER — FUROSEMIDE 10 MG/ML
60 INJECTION INTRAMUSCULAR; INTRAVENOUS ONCE
Status: DISCONTINUED | OUTPATIENT
Start: 2018-02-01 | End: 2018-02-01

## 2018-02-01 RX ORDER — NALOXONE HYDROCHLORIDE 0.4 MG/ML
INJECTION, SOLUTION INTRAMUSCULAR; INTRAVENOUS; SUBCUTANEOUS
Status: DISPENSED
Start: 2018-02-01 | End: 2018-02-02

## 2018-02-01 RX ORDER — HYDROCODONE BITARTRATE AND ACETAMINOPHEN 7.5; 325 MG/1; MG/1
1 TABLET ORAL 3 TIMES DAILY
Status: DISCONTINUED | OUTPATIENT
Start: 2018-02-01 | End: 2018-02-02

## 2018-02-01 RX ORDER — SODIUM CHLORIDE 9 MG/ML
100 INJECTION, SOLUTION INTRAVENOUS CONTINUOUS
Status: DISCONTINUED | OUTPATIENT
Start: 2018-02-01 | End: 2018-02-03

## 2018-02-01 RX ORDER — LEVOFLOXACIN 5 MG/ML
750 INJECTION, SOLUTION INTRAVENOUS EVERY 24 HOURS
Status: DISCONTINUED | OUTPATIENT
Start: 2018-02-01 | End: 2018-02-02

## 2018-02-01 RX ORDER — PROMETHAZINE HYDROCHLORIDE 25 MG/1
25 TABLET ORAL EVERY 8 HOURS PRN
Status: DISCONTINUED | OUTPATIENT
Start: 2018-02-01 | End: 2018-02-10 | Stop reason: HOSPADM

## 2018-02-01 RX ORDER — PANTOPRAZOLE SODIUM 40 MG/1
40 TABLET, DELAYED RELEASE ORAL
Status: DISCONTINUED | OUTPATIENT
Start: 2018-02-01 | End: 2018-02-02

## 2018-02-01 RX ORDER — DOCUSATE SODIUM 100 MG/1
100 CAPSULE, LIQUID FILLED ORAL 2 TIMES DAILY
Status: DISCONTINUED | OUTPATIENT
Start: 2018-02-01 | End: 2018-02-10 | Stop reason: HOSPADM

## 2018-02-01 RX ORDER — LEVOFLOXACIN 5 MG/ML
750 INJECTION, SOLUTION INTRAVENOUS EVERY 24 HOURS
Status: DISCONTINUED | OUTPATIENT
Start: 2018-02-01 | End: 2018-02-01 | Stop reason: SDUPTHER

## 2018-02-01 RX ORDER — GABAPENTIN 300 MG/1
300 CAPSULE ORAL EVERY 12 HOURS SCHEDULED
Status: DISCONTINUED | OUTPATIENT
Start: 2018-02-02 | End: 2018-02-03

## 2018-02-01 RX ORDER — GABAPENTIN 300 MG/1
300 CAPSULE ORAL EVERY 12 HOURS SCHEDULED
Status: DISCONTINUED | OUTPATIENT
Start: 2018-02-01 | End: 2018-02-01 | Stop reason: SDUPTHER

## 2018-02-01 RX ORDER — SODIUM CHLORIDE 0.9 % (FLUSH) 0.9 %
1-10 SYRINGE (ML) INJECTION AS NEEDED
Status: DISCONTINUED | OUTPATIENT
Start: 2018-02-01 | End: 2018-02-10 | Stop reason: HOSPADM

## 2018-02-01 RX ORDER — ZOLPIDEM TARTRATE 5 MG/1
10 TABLET ORAL NIGHTLY
Status: DISCONTINUED | OUTPATIENT
Start: 2018-02-01 | End: 2018-02-10 | Stop reason: HOSPADM

## 2018-02-01 RX ORDER — GUAIFENESIN 600 MG/1
1200 TABLET, EXTENDED RELEASE ORAL 2 TIMES DAILY
Status: DISCONTINUED | OUTPATIENT
Start: 2018-02-01 | End: 2018-02-02 | Stop reason: SDUPTHER

## 2018-02-01 RX ORDER — FUROSEMIDE 10 MG/ML
40 INJECTION INTRAMUSCULAR; INTRAVENOUS ONCE
Status: DISCONTINUED | OUTPATIENT
Start: 2018-02-01 | End: 2018-02-01

## 2018-02-01 RX ORDER — METHYLPREDNISOLONE SODIUM SUCCINATE 125 MG/2ML
125 INJECTION, POWDER, LYOPHILIZED, FOR SOLUTION INTRAMUSCULAR; INTRAVENOUS ONCE
Status: COMPLETED | OUTPATIENT
Start: 2018-02-01 | End: 2018-02-01

## 2018-02-01 RX ORDER — NALOXONE HCL 0.4 MG/ML
0.4 VIAL (ML) INJECTION ONCE
Status: COMPLETED | OUTPATIENT
Start: 2018-02-01 | End: 2018-02-01

## 2018-02-01 RX ORDER — ONDANSETRON 2 MG/ML
4 INJECTION INTRAMUSCULAR; INTRAVENOUS ONCE
Status: COMPLETED | OUTPATIENT
Start: 2018-02-01 | End: 2018-02-01

## 2018-02-01 RX ORDER — DIAZEPAM 10 MG/1
10 TABLET ORAL 3 TIMES DAILY
Status: DISCONTINUED | OUTPATIENT
Start: 2018-02-01 | End: 2018-02-07

## 2018-02-01 RX ADMIN — DIAZEPAM 10 MG: 10 TABLET ORAL at 20:32

## 2018-02-01 RX ADMIN — METHYLPREDNISOLONE SODIUM SUCCINATE 80 MG: 125 INJECTION, POWDER, FOR SOLUTION INTRAMUSCULAR; INTRAVENOUS at 21:34

## 2018-02-01 RX ADMIN — SUCRALFATE 1 G: 1 SUSPENSION ORAL at 19:30

## 2018-02-01 RX ADMIN — ENOXAPARIN SODIUM 40 MG: 40 INJECTION SUBCUTANEOUS at 20:31

## 2018-02-01 RX ADMIN — GUAIFENESIN 1200 MG: 600 TABLET, EXTENDED RELEASE ORAL at 20:33

## 2018-02-01 RX ADMIN — GABAPENTIN 600 MG: 300 CAPSULE ORAL at 20:32

## 2018-02-01 RX ADMIN — NALOXONE HYDROCHLORIDE 0.4 MG: 0.4 INJECTION, SOLUTION INTRAMUSCULAR; INTRAVENOUS; SUBCUTANEOUS at 15:05

## 2018-02-01 RX ADMIN — SODIUM CHLORIDE 100 ML/HR: 9 INJECTION, SOLUTION INTRAVENOUS at 19:11

## 2018-02-01 RX ADMIN — TAZOBACTAM SODIUM AND PIPERACILLIN SODIUM 4.5 G: .5; 4 INJECTION, POWDER, LYOPHILIZED, FOR SOLUTION INTRAVENOUS at 21:35

## 2018-02-01 RX ADMIN — ZOLPIDEM TARTRATE 10 MG: 5 TABLET ORAL at 20:57

## 2018-02-01 RX ADMIN — LEVOTHYROXINE SODIUM 100 MCG: 100 TABLET ORAL at 19:12

## 2018-02-01 RX ADMIN — IPRATROPIUM BROMIDE AND ALBUTEROL SULFATE 3 ML: 2.5; .5 SOLUTION RESPIRATORY (INHALATION) at 18:55

## 2018-02-01 RX ADMIN — VANCOMYCIN HYDROCHLORIDE 1500 MG: 100 INJECTION, POWDER, LYOPHILIZED, FOR SOLUTION INTRAVENOUS at 15:11

## 2018-02-01 RX ADMIN — SODIUM CHLORIDE 2721 ML: 9 INJECTION, SOLUTION INTRAVENOUS at 15:05

## 2018-02-01 RX ADMIN — HYDROCODONE BITARTRATE AND ACETAMINOPHEN 1 TABLET: 7.5; 325 TABLET ORAL at 20:32

## 2018-02-01 RX ADMIN — FLUOXETINE HYDROCHLORIDE 40 MG: 20 CAPSULE ORAL at 19:22

## 2018-02-01 RX ADMIN — FUROSEMIDE 20 MG: 10 INJECTION, SOLUTION INTRAMUSCULAR; INTRAVENOUS at 23:47

## 2018-02-01 RX ADMIN — ONDANSETRON 4 MG: 2 INJECTION, SOLUTION INTRAMUSCULAR; INTRAVENOUS at 15:17

## 2018-02-01 RX ADMIN — METHYLPREDNISOLONE SODIUM SUCCINATE 125 MG: 125 INJECTION, POWDER, FOR SOLUTION INTRAMUSCULAR; INTRAVENOUS at 16:18

## 2018-02-01 RX ADMIN — PANTOPRAZOLE SODIUM 40 MG: 40 TABLET, DELAYED RELEASE ORAL at 19:31

## 2018-02-01 RX ADMIN — LITHIUM CARBONATE 300 MG: 300 CAPSULE, GELATIN COATED ORAL at 20:32

## 2018-02-01 RX ADMIN — IPRATROPIUM BROMIDE AND ALBUTEROL SULFATE 3 ML: 2.5; .5 SOLUTION RESPIRATORY (INHALATION) at 23:15

## 2018-02-01 RX ADMIN — SUCRALFATE 1 G: 1 SUSPENSION ORAL at 23:49

## 2018-02-01 RX ADMIN — LEVOFLOXACIN 750 MG: 5 INJECTION, SOLUTION INTRAVENOUS at 18:42

## 2018-02-02 ENCOUNTER — APPOINTMENT (OUTPATIENT)
Dept: GENERAL RADIOLOGY | Facility: HOSPITAL | Age: 43
End: 2018-02-02

## 2018-02-02 ENCOUNTER — APPOINTMENT (OUTPATIENT)
Dept: CARDIOLOGY | Facility: HOSPITAL | Age: 43
End: 2018-02-02
Attending: INTERNAL MEDICINE

## 2018-02-02 LAB
ANION GAP SERPL CALCULATED.3IONS-SCNC: 12 MMOL/L (ref 4–13)
ARTERIAL PATENCY WRIST A: ABNORMAL
ARTERIAL PATENCY WRIST A: POSITIVE
ARTERIAL PATENCY WRIST A: POSITIVE
ATMOSPHERIC PRESS: 763 MMHG
ATMOSPHERIC PRESS: 765 MMHG
ATMOSPHERIC PRESS: 767 MMHG
BASE EXCESS BLDA CALC-SCNC: -2.7 MMOL/L (ref 0–2)
BASE EXCESS BLDA CALC-SCNC: -3 MMOL/L (ref 0–2)
BASE EXCESS BLDA CALC-SCNC: -3.8 MMOL/L (ref 0–2)
BASOPHILS # BLD AUTO: 0.03 10*3/MM3 (ref 0–0.2)
BASOPHILS NFR BLD AUTO: 0.2 % (ref 0–2)
BDY SITE: ABNORMAL
BH CV ECHO MEAS - AO MAX PG (FULL): -0.95 MMHG
BH CV ECHO MEAS - AO MAX PG: 4.2 MMHG
BH CV ECHO MEAS - AO MEAN PG (FULL): 0 MMHG
BH CV ECHO MEAS - AO MEAN PG: 2 MMHG
BH CV ECHO MEAS - AO ROOT AREA (BSA CORRECTED): 1.5
BH CV ECHO MEAS - AO ROOT AREA: 5.7 CM^2
BH CV ECHO MEAS - AO ROOT DIAM: 2.7 CM
BH CV ECHO MEAS - AO V2 MAX: 102 CM/SEC
BH CV ECHO MEAS - AO V2 MEAN: 57.7 CM/SEC
BH CV ECHO MEAS - AO V2 VTI: 21.4 CM
BH CV ECHO MEAS - AVA(I,A): 5.4 CM^2
BH CV ECHO MEAS - AVA(I,D): 5.4 CM^2
BH CV ECHO MEAS - AVA(V,A): 4.6 CM^2
BH CV ECHO MEAS - AVA(V,D): 4.6 CM^2
BH CV ECHO MEAS - BSA(HAYCOCK): 2 M^2
BH CV ECHO MEAS - BSA: 1.8 M^2
BH CV ECHO MEAS - BZI_BMI: 40.8 KILOGRAMS/M^2
BH CV ECHO MEAS - BZI_METRIC_HEIGHT: 147.3 CM
BH CV ECHO MEAS - BZI_METRIC_WEIGHT: 88.5 KG
BH CV ECHO MEAS - CONTRAST EF 4CH: 68.4 ML/M^2
BH CV ECHO MEAS - EDV(CUBED): 71.5 ML
BH CV ECHO MEAS - EDV(MOD-SP4): 89.2 ML
BH CV ECHO MEAS - EDV(TEICH): 76.4 ML
BH CV ECHO MEAS - EF(CUBED): 77.6 %
BH CV ECHO MEAS - EF(MOD-SP4): 68.4 %
BH CV ECHO MEAS - EF(TEICH): 70.2 %
BH CV ECHO MEAS - ESV(CUBED): 16 ML
BH CV ECHO MEAS - ESV(MOD-SP4): 28.2 ML
BH CV ECHO MEAS - ESV(TEICH): 22.8 ML
BH CV ECHO MEAS - FS: 39.3 %
BH CV ECHO MEAS - IVS/LVPW: 1.1
BH CV ECHO MEAS - IVSD: 0.98 CM
BH CV ECHO MEAS - LA DIMENSION: 3.7 CM
BH CV ECHO MEAS - LA/AO: 1.4
BH CV ECHO MEAS - LAT PEAK E' VEL: 9.9 CM/SEC
BH CV ECHO MEAS - LV DIASTOLIC VOL/BSA (35-75): 49.5 ML/M^2
BH CV ECHO MEAS - LV MASS(C)D: 122.7 GRAMS
BH CV ECHO MEAS - LV MASS(C)DI: 68.1 GRAMS/M^2
BH CV ECHO MEAS - LV MAX PG: 5.1 MMHG
BH CV ECHO MEAS - LV MEAN PG: 2 MMHG
BH CV ECHO MEAS - LV SYSTOLIC VOL/BSA (12-30): 15.7 ML/M^2
BH CV ECHO MEAS - LV V1 MAX: 113 CM/SEC
BH CV ECHO MEAS - LV V1 MEAN: 68.5 CM/SEC
BH CV ECHO MEAS - LV V1 VTI: 28 CM
BH CV ECHO MEAS - LVIDD: 4.2 CM
BH CV ECHO MEAS - LVIDS: 2.5 CM
BH CV ECHO MEAS - LVLD AP4: 7.1 CM
BH CV ECHO MEAS - LVLS AP4: 6.4 CM
BH CV ECHO MEAS - LVOT AREA (M): 4.2 CM^2
BH CV ECHO MEAS - LVOT AREA: 4.2 CM^2
BH CV ECHO MEAS - LVOT DIAM: 2.3 CM
BH CV ECHO MEAS - LVPWD: 0.9 CM
BH CV ECHO MEAS - MED PEAK E' VEL: 7.51 CM/SEC
BH CV ECHO MEAS - MV A MAX VEL: 105 CM/SEC
BH CV ECHO MEAS - MV DEC TIME: 0.24 SEC
BH CV ECHO MEAS - MV E MAX VEL: 83.4 CM/SEC
BH CV ECHO MEAS - MV E/A: 0.79
BH CV ECHO MEAS - RAP SYSTOLE: 5 MMHG
BH CV ECHO MEAS - RVDD: 3.2 CM
BH CV ECHO MEAS - RVSP: 38.4 MMHG
BH CV ECHO MEAS - SI(AO): 68 ML/M^2
BH CV ECHO MEAS - SI(CUBED): 30.8 ML/M^2
BH CV ECHO MEAS - SI(LVOT): 64.6 ML/M^2
BH CV ECHO MEAS - SI(MOD-SP4): 33.9 ML/M^2
BH CV ECHO MEAS - SI(TEICH): 29.8 ML/M^2
BH CV ECHO MEAS - SV(AO): 122.5 ML
BH CV ECHO MEAS - SV(CUBED): 55.5 ML
BH CV ECHO MEAS - SV(LVOT): 116.3 ML
BH CV ECHO MEAS - SV(MOD-SP4): 61 ML
BH CV ECHO MEAS - SV(TEICH): 53.6 ML
BH CV ECHO MEAS - TR MAX VEL: 289 CM/SEC
BODY TEMPERATURE: 37 C
BUN BLD-MCNC: 9 MG/DL (ref 5–21)
BUN/CREAT SERPL: 11.4 (ref 7–25)
CALCIUM SPEC-SCNC: 8.7 MG/DL (ref 8.4–10.4)
CHLORIDE SERPL-SCNC: 108 MMOL/L (ref 98–110)
CO2 SERPL-SCNC: 25 MMOL/L (ref 24–31)
CREAT BLD-MCNC: 0.79 MG/DL (ref 0.5–1.4)
D-LACTATE SERPL-SCNC: 1.4 MMOL/L (ref 0.5–2)
DEPRECATED RDW RBC AUTO: 45.8 FL (ref 40–54)
E/E' RATIO: 11.1
EOSINOPHIL # BLD AUTO: 0 10*3/MM3 (ref 0–0.7)
EOSINOPHIL NFR BLD AUTO: 0 % (ref 0–4)
ERYTHROCYTE [DISTWIDTH] IN BLOOD BY AUTOMATED COUNT: 13.3 % (ref 12–15)
FLUAV AG NPH QL: NEGATIVE
FLUBV AG NPH QL IA: NEGATIVE
GFR SERPL CREATININE-BSD FRML MDRD: 79 ML/MIN/1.73
GLUCOSE BLD-MCNC: 176 MG/DL (ref 70–100)
HCO3 BLDA-SCNC: 22.3 MMOL/L (ref 20–26)
HCO3 BLDA-SCNC: 23.3 MMOL/L (ref 20–26)
HCO3 BLDA-SCNC: 23.7 MMOL/L (ref 20–26)
HCT VFR BLD AUTO: 34 % (ref 37–47)
HGB BLD-MCNC: 10.9 G/DL (ref 12–16)
HOROWITZ INDEX BLD+IHG-RTO: 100 %
HOROWITZ INDEX BLD+IHG-RTO: 100 %
HOROWITZ INDEX BLD+IHG-RTO: 60 %
IMM GRANULOCYTES # BLD: 0.26 10*3/MM3 (ref 0–0.03)
IMM GRANULOCYTES NFR BLD: 1.4 % (ref 0–5)
KOH PREP NAIL: NORMAL
LEFT ATRIUM VOLUME INDEX: 26.8 ML/M2
LEFT ATRIUM VOLUME: 48.2 CM3
LYMPHOCYTES # BLD AUTO: 0.54 10*3/MM3 (ref 0.72–4.86)
LYMPHOCYTES NFR BLD AUTO: 3 % (ref 15–45)
Lab: ABNORMAL
MAXIMAL PREDICTED HEART RATE: 176 BPM
MCH RBC QN AUTO: 30.3 PG (ref 28–32)
MCHC RBC AUTO-ENTMCNC: 32.1 G/DL (ref 33–36)
MCV RBC AUTO: 94.4 FL (ref 82–98)
MODALITY: ABNORMAL
MONOCYTES # BLD AUTO: 0.15 10*3/MM3 (ref 0.19–1.3)
MONOCYTES NFR BLD AUTO: 0.8 % (ref 4–12)
NEUTROPHILS # BLD AUTO: 17.06 10*3/MM3 (ref 1.87–8.4)
NEUTROPHILS NFR BLD AUTO: 94.6 % (ref 39–78)
NOTIFIED BY: ABNORMAL
NOTIFIED WHO: ABNORMAL
NRBC BLD MANUAL-RTO: 0.1 /100 WBC (ref 0–0)
PCO2 BLDA: 43.8 MM HG (ref 35–45)
PCO2 BLDA: 44.1 MM HG (ref 35–45)
PCO2 BLDA: 48.8 MM HG (ref 35–45)
PEEP RESPIRATORY: 10 CM[H2O]
PEEP RESPIRATORY: 7 CM[H2O]
PEEP RESPIRATORY: 7 CM[H2O]
PH BLDA: 7.29 PH UNITS (ref 7.35–7.45)
PH BLDA: 7.32 PH UNITS (ref 7.35–7.45)
PH BLDA: 7.33 PH UNITS (ref 7.35–7.45)
PLATELET # BLD AUTO: 223 10*3/MM3 (ref 130–400)
PMV BLD AUTO: 10.1 FL (ref 6–12)
PO2 BLDA: 54.1 MM HG (ref 83–108)
PO2 BLDA: 80.8 MM HG (ref 83–108)
PO2 BLDA: 82.6 MM HG (ref 83–108)
POTASSIUM BLD-SCNC: 3.7 MMOL/L (ref 3.5–5.3)
RBC # BLD AUTO: 3.6 10*6/MM3 (ref 4.2–5.4)
SAO2 % BLDCOA: 87 % (ref 94–99)
SAO2 % BLDCOA: 95.8 % (ref 94–99)
SAO2 % BLDCOA: 96.2 % (ref 94–99)
SET MECH RESP RATE: 18
SET MECH RESP RATE: 18
SET MECH RESP RATE: 28
SODIUM BLD-SCNC: 145 MMOL/L (ref 135–145)
STRESS TARGET HR: 150 BPM
TSH SERPL DL<=0.05 MIU/L-ACNC: 0.82 MIU/ML (ref 0.47–4.68)
VENTILATOR MODE: ABNORMAL
VENTILATOR MODE: AC
VENTILATOR MODE: AC
VT ON VENT VENT: 420 ML
VT ON VENT VENT: 500 ML
VT ON VENT VENT: 500 ML
WBC NRBC COR # BLD: 18.04 10*3/MM3 (ref 4.8–10.8)

## 2018-02-02 PROCEDURE — 85025 COMPLETE CBC W/AUTO DIFF WBC: CPT | Performed by: NURSE PRACTITIONER

## 2018-02-02 PROCEDURE — 87220 TISSUE EXAM FOR FUNGI: CPT | Performed by: INTERNAL MEDICINE

## 2018-02-02 PROCEDURE — 25010000002 FUROSEMIDE PER 20 MG: Performed by: FAMILY MEDICINE

## 2018-02-02 PROCEDURE — 94003 VENT MGMT INPAT SUBQ DAY: CPT

## 2018-02-02 PROCEDURE — 87206 SMEAR FLUORESCENT/ACID STAI: CPT | Performed by: INTERNAL MEDICINE

## 2018-02-02 PROCEDURE — 88312 SPECIAL STAINS GROUP 1: CPT | Performed by: INTERNAL MEDICINE

## 2018-02-02 PROCEDURE — 25010000002 PERFLUTREN 6.52 MG/ML SUSPENSION: Performed by: FAMILY MEDICINE

## 2018-02-02 PROCEDURE — 94799 UNLISTED PULMONARY SVC/PX: CPT

## 2018-02-02 PROCEDURE — 93306 TTE W/DOPPLER COMPLETE: CPT | Performed by: INTERNAL MEDICINE

## 2018-02-02 PROCEDURE — 88305 TISSUE EXAM BY PATHOLOGIST: CPT | Performed by: INTERNAL MEDICINE

## 2018-02-02 PROCEDURE — 84443 ASSAY THYROID STIM HORMONE: CPT | Performed by: NURSE PRACTITIONER

## 2018-02-02 PROCEDURE — 25010000002 METHYLPREDNISOLONE PER 125 MG: Performed by: NURSE PRACTITIONER

## 2018-02-02 PROCEDURE — 83605 ASSAY OF LACTIC ACID: CPT | Performed by: FAMILY MEDICINE

## 2018-02-02 PROCEDURE — 71045 X-RAY EXAM CHEST 1 VIEW: CPT

## 2018-02-02 PROCEDURE — 05H633Z INSERTION OF INFUSION DEVICE INTO LEFT SUBCLAVIAN VEIN, PERCUTANEOUS APPROACH: ICD-10-PCS | Performed by: FAMILY MEDICINE

## 2018-02-02 PROCEDURE — C1751 CATH, INF, PER/CENT/MIDLINE: HCPCS

## 2018-02-02 PROCEDURE — 87633 RESP VIRUS 12-25 TARGETS: CPT | Performed by: INTERNAL MEDICINE

## 2018-02-02 PROCEDURE — 0BH17EZ INSERTION OF ENDOTRACHEAL AIRWAY INTO TRACHEA, VIA NATURAL OR ARTIFICIAL OPENING: ICD-10-PCS | Performed by: FAMILY MEDICINE

## 2018-02-02 PROCEDURE — 82962 GLUCOSE BLOOD TEST: CPT

## 2018-02-02 PROCEDURE — 36600 WITHDRAWAL OF ARTERIAL BLOOD: CPT

## 2018-02-02 PROCEDURE — 5A09457 ASSISTANCE WITH RESPIRATORY VENTILATION, 24-96 CONSECUTIVE HOURS, CONTINUOUS POSITIVE AIRWAY PRESSURE: ICD-10-PCS | Performed by: EMERGENCY MEDICINE

## 2018-02-02 PROCEDURE — 94770: CPT

## 2018-02-02 PROCEDURE — 25010000002 PROPOFOL 1000 MG/ML EMULSION

## 2018-02-02 PROCEDURE — 87804 INFLUENZA ASSAY W/OPTIC: CPT | Performed by: INTERNAL MEDICINE

## 2018-02-02 PROCEDURE — 94760 N-INVAS EAR/PLS OXIMETRY 1: CPT

## 2018-02-02 PROCEDURE — 87070 CULTURE OTHR SPECIMN AEROBIC: CPT | Performed by: INTERNAL MEDICINE

## 2018-02-02 PROCEDURE — 87486 CHLMYD PNEUM DNA AMP PROBE: CPT | Performed by: INTERNAL MEDICINE

## 2018-02-02 PROCEDURE — 87205 SMEAR GRAM STAIN: CPT | Performed by: INTERNAL MEDICINE

## 2018-02-02 PROCEDURE — 25010000002 PIPERACILLIN-TAZOBACTAM: Performed by: NURSE PRACTITIONER

## 2018-02-02 PROCEDURE — 87102 FUNGUS ISOLATION CULTURE: CPT | Performed by: INTERNAL MEDICINE

## 2018-02-02 PROCEDURE — 93306 TTE W/DOPPLER COMPLETE: CPT

## 2018-02-02 PROCEDURE — 25010000002 SUCCINYLCHOLINE PER 20 MG

## 2018-02-02 PROCEDURE — 87798 DETECT AGENT NOS DNA AMP: CPT | Performed by: INTERNAL MEDICINE

## 2018-02-02 PROCEDURE — 25010000002 SUCCINYLCHOLINE PER 20 MG: Performed by: FAMILY MEDICINE

## 2018-02-02 PROCEDURE — 87581 M.PNEUMON DNA AMP PROBE: CPT | Performed by: INTERNAL MEDICINE

## 2018-02-02 PROCEDURE — 25010000002 PIPERACILLIN-TAZOBACTAM

## 2018-02-02 PROCEDURE — 5A1955Z RESPIRATORY VENTILATION, GREATER THAN 96 CONSECUTIVE HOURS: ICD-10-PCS | Performed by: FAMILY MEDICINE

## 2018-02-02 PROCEDURE — 94002 VENT MGMT INPAT INIT DAY: CPT

## 2018-02-02 PROCEDURE — 25010000002 METHYLPREDNISOLONE PER 40 MG: Performed by: INTERNAL MEDICINE

## 2018-02-02 PROCEDURE — 80048 BASIC METABOLIC PNL TOTAL CA: CPT | Performed by: NURSE PRACTITIONER

## 2018-02-02 PROCEDURE — 25010000002 VANCOMYCIN 10 G RECONSTITUTED SOLUTION: Performed by: NURSE PRACTITIONER

## 2018-02-02 PROCEDURE — 25010000002 LORAZEPAM PER 2 MG

## 2018-02-02 PROCEDURE — 87252 VIRUS INOCULATION TISSUE: CPT | Performed by: INTERNAL MEDICINE

## 2018-02-02 PROCEDURE — 82803 BLOOD GASES ANY COMBINATION: CPT

## 2018-02-02 PROCEDURE — 25010000002 PROPOFOL 1000 MG/ML EMULSION: Performed by: FAMILY MEDICINE

## 2018-02-02 PROCEDURE — 87116 MYCOBACTERIA CULTURE: CPT | Performed by: INTERNAL MEDICINE

## 2018-02-02 RX ORDER — LIDOCAINE HYDROCHLORIDE 10 MG/ML
1 INJECTION, SOLUTION INFILTRATION; PERINEURAL ONCE
Status: COMPLETED | OUTPATIENT
Start: 2018-02-02 | End: 2018-02-02

## 2018-02-02 RX ORDER — FLUOXETINE HYDROCHLORIDE 20 MG/1
60 CAPSULE ORAL DAILY
Status: DISCONTINUED | OUTPATIENT
Start: 2018-02-03 | End: 2018-02-10 | Stop reason: HOSPADM

## 2018-02-02 RX ORDER — WARFARIN SODIUM 5 MG/1
5 TABLET ORAL 3 TIMES WEEKLY
Status: ON HOLD | COMMUNITY
End: 2018-05-13

## 2018-02-02 RX ORDER — LORAZEPAM 2 MG/ML
INJECTION INTRAMUSCULAR
Status: COMPLETED
Start: 2018-02-02 | End: 2018-02-02

## 2018-02-02 RX ORDER — DEXTROSE MONOHYDRATE 25 G/50ML
25 INJECTION, SOLUTION INTRAVENOUS
Status: DISCONTINUED | OUTPATIENT
Start: 2018-02-02 | End: 2018-02-10 | Stop reason: HOSPADM

## 2018-02-02 RX ORDER — ETOMIDATE 2 MG/ML
INJECTION INTRAVENOUS
Status: COMPLETED | OUTPATIENT
Start: 2018-02-02 | End: 2018-02-02

## 2018-02-02 RX ORDER — FAMOTIDINE 10 MG/ML
20 INJECTION, SOLUTION INTRAVENOUS EVERY 12 HOURS SCHEDULED
Status: DISCONTINUED | OUTPATIENT
Start: 2018-02-02 | End: 2018-02-05 | Stop reason: SDUPTHER

## 2018-02-02 RX ORDER — SUCCINYLCHOLINE CHLORIDE 20 MG/ML
INJECTION INTRAMUSCULAR; INTRAVENOUS
Status: COMPLETED | OUTPATIENT
Start: 2018-02-02 | End: 2018-02-02

## 2018-02-02 RX ORDER — LIDOCAINE HYDROCHLORIDE 10 MG/ML
INJECTION, SOLUTION EPIDURAL; INFILTRATION; INTRACAUDAL; PERINEURAL AS NEEDED
Status: DISCONTINUED | OUTPATIENT
Start: 2018-02-02 | End: 2018-02-02 | Stop reason: HOSPADM

## 2018-02-02 RX ORDER — PANTOPRAZOLE SODIUM 40 MG/1
40 TABLET, DELAYED RELEASE ORAL 2 TIMES DAILY
Status: DISCONTINUED | OUTPATIENT
Start: 2018-02-02 | End: 2018-02-02

## 2018-02-02 RX ORDER — FLUOXETINE HYDROCHLORIDE 20 MG/1
60 CAPSULE ORAL DAILY
COMMUNITY

## 2018-02-02 RX ORDER — OSELTAMIVIR PHOSPHATE 6 MG/ML
75 FOR SUSPENSION ORAL EVERY 12 HOURS SCHEDULED
Status: COMPLETED | OUTPATIENT
Start: 2018-02-02 | End: 2018-02-06

## 2018-02-02 RX ORDER — OMEPRAZOLE 20 MG/1
20 CAPSULE, DELAYED RELEASE ORAL DAILY
COMMUNITY

## 2018-02-02 RX ORDER — METHYLPREDNISOLONE SODIUM SUCCINATE 40 MG/ML
40 INJECTION, POWDER, LYOPHILIZED, FOR SOLUTION INTRAMUSCULAR; INTRAVENOUS EVERY 12 HOURS
Status: DISCONTINUED | OUTPATIENT
Start: 2018-02-02 | End: 2018-02-04

## 2018-02-02 RX ORDER — LANSOPRAZOLE
30 KIT
Status: DISCONTINUED | OUTPATIENT
Start: 2018-02-02 | End: 2018-02-07

## 2018-02-02 RX ORDER — NICOTINE POLACRILEX 4 MG
15 LOZENGE BUCCAL
Status: DISCONTINUED | OUTPATIENT
Start: 2018-02-02 | End: 2018-02-10 | Stop reason: HOSPADM

## 2018-02-02 RX ORDER — HYDROCODONE BITARTRATE AND ACETAMINOPHEN 10; 325 MG/1; MG/1
1 TABLET ORAL 3 TIMES DAILY
COMMUNITY
End: 2018-05-13 | Stop reason: HOSPADM

## 2018-02-02 RX ORDER — SODIUM CHLORIDE 0.9 % (FLUSH) 0.9 %
1-10 SYRINGE (ML) INJECTION AS NEEDED
Status: DISCONTINUED | OUTPATIENT
Start: 2018-02-02 | End: 2018-02-08

## 2018-02-02 RX ORDER — CHLORHEXIDINE GLUCONATE 0.12 MG/ML
15 RINSE ORAL EVERY 12 HOURS SCHEDULED
Status: DISCONTINUED | OUTPATIENT
Start: 2018-02-02 | End: 2018-02-07

## 2018-02-02 RX ORDER — WARFARIN SODIUM 5 MG/1
7.5 TABLET ORAL
Status: ON HOLD | COMMUNITY
End: 2018-05-07

## 2018-02-02 RX ORDER — LORAZEPAM 2 MG/ML
2 INJECTION INTRAMUSCULAR ONCE
Status: COMPLETED | OUTPATIENT
Start: 2018-02-02 | End: 2018-02-02

## 2018-02-02 RX ADMIN — SUCCINYLCHOLINE CHLORIDE 120 MG: 20 INJECTION, SOLUTION INTRAMUSCULAR; INTRAVENOUS at 00:35

## 2018-02-02 RX ADMIN — IPRATROPIUM BROMIDE AND ALBUTEROL SULFATE 3 ML: 2.5; .5 SOLUTION RESPIRATORY (INHALATION) at 23:14

## 2018-02-02 RX ADMIN — DIAZEPAM 10 MG: 10 TABLET ORAL at 10:25

## 2018-02-02 RX ADMIN — SUCCINYLCHOLINE CHLORIDE 120 MG: 20 INJECTION, SOLUTION INTRAMUSCULAR; INTRAVENOUS at 00:23

## 2018-02-02 RX ADMIN — OSELTAMIVIR PHOSPHATE 75 MG: 6 POWDER, FOR SUSPENSION ORAL at 21:31

## 2018-02-02 RX ADMIN — LORAZEPAM 2 MG: 2 INJECTION INTRAMUSCULAR; INTRAVENOUS at 01:00

## 2018-02-02 RX ADMIN — LANSOPRAZOLE 30 MG: KIT at 10:28

## 2018-02-02 RX ADMIN — PROPOFOL 5 MCG/KG/MIN: 10 INJECTION, EMULSION INTRAVENOUS at 00:44

## 2018-02-02 RX ADMIN — TAZOBACTAM SODIUM AND PIPERACILLIN SODIUM 4.5 G: .5; 4 INJECTION, POWDER, LYOPHILIZED, FOR SOLUTION INTRAVENOUS at 13:38

## 2018-02-02 RX ADMIN — FAMOTIDINE 20 MG: 10 INJECTION, SOLUTION INTRAVENOUS at 11:22

## 2018-02-02 RX ADMIN — TAZOBACTAM SODIUM AND PIPERACILLIN SODIUM 4.5 G: .5; 4 INJECTION, POWDER, LYOPHILIZED, FOR SOLUTION INTRAVENOUS at 18:55

## 2018-02-02 RX ADMIN — SUCRALFATE 1 G: 1 SUSPENSION ORAL at 18:55

## 2018-02-02 RX ADMIN — GUAIFENESIN 400 MG: 100 SOLUTION ORAL at 23:02

## 2018-02-02 RX ADMIN — PROPOFOL 40 MCG/KG/MIN: 10 INJECTION, EMULSION INTRAVENOUS at 09:19

## 2018-02-02 RX ADMIN — GABAPENTIN 300 MG: 300 CAPSULE ORAL at 15:08

## 2018-02-02 RX ADMIN — IPRATROPIUM BROMIDE AND ALBUTEROL SULFATE 3 ML: 2.5; .5 SOLUTION RESPIRATORY (INHALATION) at 18:48

## 2018-02-02 RX ADMIN — HYDROCODONE BITARTRATE AND ACETAMINOPHEN 1 TABLET: 7.5; 325 TABLET ORAL at 10:25

## 2018-02-02 RX ADMIN — DIAZEPAM 10 MG: 10 TABLET ORAL at 21:32

## 2018-02-02 RX ADMIN — DIAZEPAM 10 MG: 10 TABLET ORAL at 19:00

## 2018-02-02 RX ADMIN — LITHIUM CARBONATE 300 MG: 300 CAPSULE, GELATIN COATED ORAL at 21:30

## 2018-02-02 RX ADMIN — VANCOMYCIN HYDROCHLORIDE 750 MG: 100 INJECTION, POWDER, LYOPHILIZED, FOR SOLUTION INTRAVENOUS at 06:23

## 2018-02-02 RX ADMIN — IPRATROPIUM BROMIDE AND ALBUTEROL SULFATE 3 ML: 2.5; .5 SOLUTION RESPIRATORY (INHALATION) at 14:45

## 2018-02-02 RX ADMIN — IPRATROPIUM BROMIDE AND ALBUTEROL SULFATE 3 ML: 2.5; .5 SOLUTION RESPIRATORY (INHALATION) at 03:08

## 2018-02-02 RX ADMIN — CHLORHEXIDINE GLUCONATE 15 ML: 1.2 RINSE ORAL at 10:31

## 2018-02-02 RX ADMIN — GUAIFENESIN 1200 MG: 600 TABLET, EXTENDED RELEASE ORAL at 10:26

## 2018-02-02 RX ADMIN — CHLORHEXIDINE GLUCONATE 15 ML: 1.2 RINSE ORAL at 21:31

## 2018-02-02 RX ADMIN — METHYLPREDNISOLONE SODIUM SUCCINATE 40 MG: 125 INJECTION, POWDER, FOR SOLUTION INTRAMUSCULAR; INTRAVENOUS at 18:55

## 2018-02-02 RX ADMIN — PROPOFOL 49.92 MCG/KG/MIN: 10 INJECTION, EMULSION INTRAVENOUS at 17:18

## 2018-02-02 RX ADMIN — OSELTAMIVIR PHOSPHATE 75 MG: 6 POWDER, FOR SUSPENSION ORAL at 10:29

## 2018-02-02 RX ADMIN — FLUOXETINE HYDROCHLORIDE 40 MG: 20 CAPSULE ORAL at 10:26

## 2018-02-02 RX ADMIN — IPRATROPIUM BROMIDE AND ALBUTEROL SULFATE 3 ML: 2.5; .5 SOLUTION RESPIRATORY (INHALATION) at 06:25

## 2018-02-02 RX ADMIN — METHYLPREDNISOLONE SODIUM SUCCINATE 80 MG: 125 INJECTION, POWDER, FOR SOLUTION INTRAMUSCULAR; INTRAVENOUS at 06:22

## 2018-02-02 RX ADMIN — TAZOBACTAM SODIUM AND PIPERACILLIN SODIUM 4.5 G: .5; 4 INJECTION, POWDER, LYOPHILIZED, FOR SOLUTION INTRAVENOUS at 21:31

## 2018-02-02 RX ADMIN — GABAPENTIN 600 MG: 300 CAPSULE ORAL at 21:31

## 2018-02-02 RX ADMIN — TAZOBACTAM SODIUM AND PIPERACILLIN SODIUM 4.5 G: .5; 4 INJECTION, POWDER, LYOPHILIZED, FOR SOLUTION INTRAVENOUS at 04:15

## 2018-02-02 RX ADMIN — IPRATROPIUM BROMIDE AND ALBUTEROL SULFATE 3 ML: 2.5; .5 SOLUTION RESPIRATORY (INHALATION) at 10:48

## 2018-02-02 RX ADMIN — VANCOMYCIN HYDROCHLORIDE 750 MG: 100 INJECTION, POWDER, LYOPHILIZED, FOR SOLUTION INTRAVENOUS at 18:55

## 2018-02-02 RX ADMIN — SUCRALFATE 1 G: 1 SUSPENSION ORAL at 15:08

## 2018-02-02 RX ADMIN — LIDOCAINE HYDROCHLORIDE 1 ML: 10 INJECTION, SOLUTION EPIDURAL; INFILTRATION; INTRACAUDAL; PERINEURAL at 13:00

## 2018-02-02 RX ADMIN — PROPOFOL 50 MCG/KG/MIN: 10 INJECTION, EMULSION INTRAVENOUS at 21:31

## 2018-02-02 RX ADMIN — LANSOPRAZOLE 30 MG: KIT at 19:01

## 2018-02-02 RX ADMIN — PERFLUTREN 8.48 MG: 6.52 INJECTION, SUSPENSION INTRAVENOUS at 14:36

## 2018-02-02 RX ADMIN — ETOMIDATE 24 MG: 2 INJECTION, SOLUTION INTRAVENOUS at 00:22

## 2018-02-02 RX ADMIN — LORAZEPAM 2 MG: 2 INJECTION INTRAMUSCULAR at 01:00

## 2018-02-02 RX ADMIN — PROPOFOL 50 MCG/KG/MIN: 10 INJECTION, EMULSION INTRAVENOUS at 13:43

## 2018-02-02 RX ADMIN — PROPOFOL 45 MCG/KG/MIN: 10 INJECTION, EMULSION INTRAVENOUS at 04:13

## 2018-02-02 RX ADMIN — FAMOTIDINE 20 MG: 10 INJECTION, SOLUTION INTRAVENOUS at 21:31

## 2018-02-03 ENCOUNTER — APPOINTMENT (OUTPATIENT)
Dept: ULTRASOUND IMAGING | Facility: HOSPITAL | Age: 43
End: 2018-02-03

## 2018-02-03 ENCOUNTER — APPOINTMENT (OUTPATIENT)
Dept: GENERAL RADIOLOGY | Facility: HOSPITAL | Age: 43
End: 2018-02-03

## 2018-02-03 LAB
ALBUMIN SERPL-MCNC: 3.2 G/DL (ref 3.5–5)
ALBUMIN/GLOB SERPL: 1.1 G/DL (ref 1.1–2.5)
ALP SERPL-CCNC: 123 U/L (ref 24–120)
ALT SERPL W P-5'-P-CCNC: 31 U/L (ref 0–54)
ANION GAP SERPL CALCULATED.3IONS-SCNC: 8 MMOL/L (ref 4–13)
ARTERIAL PATENCY WRIST A: POSITIVE
AST SERPL-CCNC: 35 U/L (ref 7–45)
ATMOSPHERIC PRESS: 760 MMHG
BACTERIA SPEC AEROBE CULT: NORMAL
BASE EXCESS BLDA CALC-SCNC: 0.4 MMOL/L (ref 0–2)
BASOPHILS # BLD AUTO: 0.01 10*3/MM3 (ref 0–0.2)
BASOPHILS NFR BLD AUTO: 0.1 % (ref 0–2)
BDY SITE: ABNORMAL
BILIRUB SERPL-MCNC: 0.4 MG/DL (ref 0.1–1)
BODY TEMPERATURE: 37 C
BUN BLD-MCNC: 9 MG/DL (ref 5–21)
BUN/CREAT SERPL: 12.9 (ref 7–25)
CALCIUM SPEC-SCNC: 9.6 MG/DL (ref 8.4–10.4)
CHLORIDE SERPL-SCNC: 112 MMOL/L (ref 98–110)
CO2 SERPL-SCNC: 27 MMOL/L (ref 24–31)
CREAT BLD-MCNC: 0.7 MG/DL (ref 0.5–1.4)
DEPRECATED RDW RBC AUTO: 48.3 FL (ref 40–54)
EOSINOPHIL # BLD AUTO: 0.01 10*3/MM3 (ref 0–0.7)
EOSINOPHIL NFR BLD AUTO: 0.1 % (ref 0–4)
ERYTHROCYTE [DISTWIDTH] IN BLOOD BY AUTOMATED COUNT: 13.8 % (ref 12–15)
GFR SERPL CREATININE-BSD FRML MDRD: 91 ML/MIN/1.73
GLOBULIN UR ELPH-MCNC: 3 GM/DL
GLUCOSE BLD-MCNC: 156 MG/DL (ref 70–100)
GLUCOSE BLDC GLUCOMTR-MCNC: 134 MG/DL (ref 70–130)
GLUCOSE BLDC GLUCOMTR-MCNC: 160 MG/DL (ref 70–130)
GLUCOSE BLDC GLUCOMTR-MCNC: 162 MG/DL (ref 70–130)
GLUCOSE BLDC GLUCOMTR-MCNC: 193 MG/DL (ref 70–130)
HCO3 BLDA-SCNC: 26 MMOL/L (ref 20–26)
HCT VFR BLD AUTO: 34 % (ref 37–47)
HGB BLD-MCNC: 10.6 G/DL (ref 12–16)
HOROWITZ INDEX BLD+IHG-RTO: 50 %
IMM GRANULOCYTES # BLD: 0.19 10*3/MM3 (ref 0–0.03)
IMM GRANULOCYTES NFR BLD: 1.5 % (ref 0–5)
LYMPHOCYTES # BLD AUTO: 0.48 10*3/MM3 (ref 0.72–4.86)
LYMPHOCYTES NFR BLD AUTO: 3.8 % (ref 15–45)
Lab: ABNORMAL
MCH RBC QN AUTO: 30.4 PG (ref 28–32)
MCHC RBC AUTO-ENTMCNC: 31.2 G/DL (ref 33–36)
MCV RBC AUTO: 97.4 FL (ref 82–98)
MODALITY: ABNORMAL
MONOCYTES # BLD AUTO: 0.41 10*3/MM3 (ref 0.19–1.3)
MONOCYTES NFR BLD AUTO: 3.3 % (ref 4–12)
NEUTROPHILS # BLD AUTO: 11.41 10*3/MM3 (ref 1.87–8.4)
NEUTROPHILS NFR BLD AUTO: 91.2 % (ref 39–78)
NEUTS HYPERSEG # BLD: NORMAL 10*3/UL
NRBC BLD MANUAL-RTO: 0.2 /100 WBC (ref 0–0)
PCO2 BLDA: 44.5 MM HG (ref 35–45)
PEEP RESPIRATORY: 10 CM[H2O]
PH BLDA: 7.38 PH UNITS (ref 7.35–7.45)
PLAT MORPH BLD: NORMAL
PLATELET # BLD AUTO: 182 10*3/MM3 (ref 130–400)
PMV BLD AUTO: 10.4 FL (ref 6–12)
PO2 BLDA: 112 MM HG (ref 83–108)
POTASSIUM BLD-SCNC: 3.4 MMOL/L (ref 3.5–5.3)
PROT SERPL-MCNC: 6.2 G/DL (ref 6.3–8.7)
RBC # BLD AUTO: 3.49 10*6/MM3 (ref 4.2–5.4)
RBC MORPH BLD: NORMAL
SAO2 % BLDCOA: 98.7 % (ref 94–99)
SET MECH RESP RATE: 28
SODIUM BLD-SCNC: 147 MMOL/L (ref 135–145)
VANCOMYCIN TROUGH SERPL-MCNC: 11.54 MCG/ML (ref 10–20)
VENTILATOR MODE: AC
VT ON VENT VENT: 420 ML
WBC MORPH BLD: NORMAL
WBC NRBC COR # BLD: 12.51 10*3/MM3 (ref 4.8–10.8)

## 2018-02-03 PROCEDURE — 36600 WITHDRAWAL OF ARTERIAL BLOOD: CPT

## 2018-02-03 PROCEDURE — 85025 COMPLETE CBC W/AUTO DIFF WBC: CPT | Performed by: FAMILY MEDICINE

## 2018-02-03 PROCEDURE — 94003 VENT MGMT INPAT SUBQ DAY: CPT

## 2018-02-03 PROCEDURE — 94799 UNLISTED PULMONARY SVC/PX: CPT

## 2018-02-03 PROCEDURE — 25010000002 METHYLPREDNISOLONE PER 125 MG: Performed by: INTERNAL MEDICINE

## 2018-02-03 PROCEDURE — 25010000002 METHYLPREDNISOLONE PER 40 MG: Performed by: INTERNAL MEDICINE

## 2018-02-03 PROCEDURE — 25010000002 PIPERACILLIN-TAZOBACTAM

## 2018-02-03 PROCEDURE — 25010000002 PROPOFOL 1000 MG/ML EMULSION: Performed by: FAMILY MEDICINE

## 2018-02-03 PROCEDURE — 63710000001 INSULIN LISPRO (HUMAN) PER 5 UNITS: Performed by: FAMILY MEDICINE

## 2018-02-03 PROCEDURE — 71045 X-RAY EXAM CHEST 1 VIEW: CPT

## 2018-02-03 PROCEDURE — 93970 EXTREMITY STUDY: CPT

## 2018-02-03 PROCEDURE — 93970 EXTREMITY STUDY: CPT | Performed by: SURGERY

## 2018-02-03 PROCEDURE — 80053 COMPREHEN METABOLIC PANEL: CPT | Performed by: FAMILY MEDICINE

## 2018-02-03 PROCEDURE — 82803 BLOOD GASES ANY COMBINATION: CPT

## 2018-02-03 PROCEDURE — 85007 BL SMEAR W/DIFF WBC COUNT: CPT | Performed by: FAMILY MEDICINE

## 2018-02-03 PROCEDURE — 82962 GLUCOSE BLOOD TEST: CPT

## 2018-02-03 PROCEDURE — 25010000002 POTASSIUM CHLORIDE PER 2 MEQ OF POTASSIUM: Performed by: INTERNAL MEDICINE

## 2018-02-03 PROCEDURE — 25010000002 PIPERACILLIN-TAZOBACTAM: Performed by: NURSE PRACTITIONER

## 2018-02-03 PROCEDURE — 94760 N-INVAS EAR/PLS OXIMETRY 1: CPT

## 2018-02-03 PROCEDURE — 94770: CPT

## 2018-02-03 PROCEDURE — 25010000002 VANCOMYCIN 10 G RECONSTITUTED SOLUTION: Performed by: NURSE PRACTITIONER

## 2018-02-03 PROCEDURE — 80202 ASSAY OF VANCOMYCIN: CPT | Performed by: NURSE PRACTITIONER

## 2018-02-03 PROCEDURE — 25010000002 LORAZEPAM PER 2 MG: Performed by: FAMILY MEDICINE

## 2018-02-03 PROCEDURE — 25010000002 VANCOMYCIN PER 500 MG: Performed by: INTERNAL MEDICINE

## 2018-02-03 RX ORDER — LORAZEPAM 2 MG/ML
1 INJECTION INTRAMUSCULAR ONCE
Status: COMPLETED | OUTPATIENT
Start: 2018-02-03 | End: 2018-02-03

## 2018-02-03 RX ORDER — GABAPENTIN 300 MG/1
300 CAPSULE ORAL EVERY 12 HOURS
Status: DISCONTINUED | OUTPATIENT
Start: 2018-02-03 | End: 2018-02-10 | Stop reason: HOSPADM

## 2018-02-03 RX ORDER — VANCOMYCIN HYDROCHLORIDE 1 G/200ML
1000 INJECTION, SOLUTION INTRAVENOUS EVERY 12 HOURS
Status: DISCONTINUED | OUTPATIENT
Start: 2018-02-03 | End: 2018-02-08

## 2018-02-03 RX ADMIN — IPRATROPIUM BROMIDE AND ALBUTEROL SULFATE 3 ML: 2.5; .5 SOLUTION RESPIRATORY (INHALATION) at 10:36

## 2018-02-03 RX ADMIN — SUCRALFATE 1 G: 1 SUSPENSION ORAL at 00:24

## 2018-02-03 RX ADMIN — GUAIFENESIN 400 MG: 100 SOLUTION ORAL at 16:47

## 2018-02-03 RX ADMIN — TAZOBACTAM SODIUM AND PIPERACILLIN SODIUM 4.5 G: .5; 4 INJECTION, POWDER, LYOPHILIZED, FOR SOLUTION INTRAVENOUS at 16:47

## 2018-02-03 RX ADMIN — LANSOPRAZOLE 30 MG: KIT at 17:28

## 2018-02-03 RX ADMIN — VANCOMYCIN HYDROCHLORIDE 1000 MG: 1 INJECTION, SOLUTION INTRAVENOUS at 17:28

## 2018-02-03 RX ADMIN — LEVOTHYROXINE SODIUM 100 MCG: 100 TABLET ORAL at 05:46

## 2018-02-03 RX ADMIN — OSELTAMIVIR PHOSPHATE 75 MG: 6 POWDER, FOR SUSPENSION ORAL at 09:23

## 2018-02-03 RX ADMIN — IPRATROPIUM BROMIDE AND ALBUTEROL SULFATE 3 ML: 2.5; .5 SOLUTION RESPIRATORY (INHALATION) at 03:39

## 2018-02-03 RX ADMIN — PROPOFOL 50 MCG/KG/MIN: 10 INJECTION, EMULSION INTRAVENOUS at 00:44

## 2018-02-03 RX ADMIN — LANSOPRAZOLE 30 MG: KIT at 09:23

## 2018-02-03 RX ADMIN — GUAIFENESIN 400 MG: 100 SOLUTION ORAL at 19:30

## 2018-02-03 RX ADMIN — DIAZEPAM 10 MG: 10 TABLET ORAL at 20:24

## 2018-02-03 RX ADMIN — INSULIN LISPRO 2 UNITS: 100 INJECTION, SOLUTION INTRAVENOUS; SUBCUTANEOUS at 05:49

## 2018-02-03 RX ADMIN — GUAIFENESIN 400 MG: 100 SOLUTION ORAL at 12:43

## 2018-02-03 RX ADMIN — OSELTAMIVIR PHOSPHATE 75 MG: 6 POWDER, FOR SUSPENSION ORAL at 20:25

## 2018-02-03 RX ADMIN — DIAZEPAM 10 MG: 10 TABLET ORAL at 09:24

## 2018-02-03 RX ADMIN — PROPOFOL 50 MCG/KG/MIN: 10 INJECTION, EMULSION INTRAVENOUS at 15:00

## 2018-02-03 RX ADMIN — PROPOFOL 50 MCG/KG/MIN: 10 INJECTION, EMULSION INTRAVENOUS at 11:57

## 2018-02-03 RX ADMIN — SUCRALFATE 1 G: 1 SUSPENSION ORAL at 17:23

## 2018-02-03 RX ADMIN — IPRATROPIUM BROMIDE AND ALBUTEROL SULFATE 3 ML: 2.5; .5 SOLUTION RESPIRATORY (INHALATION) at 18:30

## 2018-02-03 RX ADMIN — GABAPENTIN 600 MG: 300 CAPSULE ORAL at 20:24

## 2018-02-03 RX ADMIN — PROPOFOL 50 MCG/KG/MIN: 10 INJECTION, EMULSION INTRAVENOUS at 08:00

## 2018-02-03 RX ADMIN — METHYLPREDNISOLONE SODIUM SUCCINATE 40 MG: 125 INJECTION, POWDER, FOR SOLUTION INTRAMUSCULAR; INTRAVENOUS at 05:46

## 2018-02-03 RX ADMIN — GABAPENTIN 300 MG: 300 CAPSULE ORAL at 17:28

## 2018-02-03 RX ADMIN — POTASSIUM CHLORIDE: 2 INJECTION, SOLUTION, CONCENTRATE INTRAVENOUS at 12:43

## 2018-02-03 RX ADMIN — FAMOTIDINE 20 MG: 10 INJECTION, SOLUTION INTRAVENOUS at 20:25

## 2018-02-03 RX ADMIN — TAZOBACTAM SODIUM AND PIPERACILLIN SODIUM 4.5 G: .5; 4 INJECTION, POWDER, LYOPHILIZED, FOR SOLUTION INTRAVENOUS at 03:57

## 2018-02-03 RX ADMIN — LITHIUM CARBONATE 300 MG: 300 CAPSULE, GELATIN COATED ORAL at 20:24

## 2018-02-03 RX ADMIN — GABAPENTIN 300 MG: 300 CAPSULE ORAL at 05:47

## 2018-02-03 RX ADMIN — METHYLPREDNISOLONE SODIUM SUCCINATE 40 MG: 125 INJECTION, POWDER, FOR SOLUTION INTRAMUSCULAR; INTRAVENOUS at 17:39

## 2018-02-03 RX ADMIN — FAMOTIDINE 20 MG: 10 INJECTION, SOLUTION INTRAVENOUS at 09:23

## 2018-02-03 RX ADMIN — DIAZEPAM 10 MG: 10 TABLET ORAL at 16:47

## 2018-02-03 RX ADMIN — IPRATROPIUM BROMIDE AND ALBUTEROL SULFATE 3 ML: 2.5; .5 SOLUTION RESPIRATORY (INHALATION) at 06:36

## 2018-02-03 RX ADMIN — FLUOXETINE HYDROCHLORIDE 60 MG: 20 CAPSULE ORAL at 09:24

## 2018-02-03 RX ADMIN — LORAZEPAM 1 MG: 2 INJECTION INTRAMUSCULAR; INTRAVENOUS at 21:22

## 2018-02-03 RX ADMIN — TAZOBACTAM SODIUM AND PIPERACILLIN SODIUM 4.5 G: .5; 4 INJECTION, POWDER, LYOPHILIZED, FOR SOLUTION INTRAVENOUS at 21:48

## 2018-02-03 RX ADMIN — GUAIFENESIN 400 MG: 100 SOLUTION ORAL at 09:24

## 2018-02-03 RX ADMIN — INSULIN LISPRO 2 UNITS: 100 INJECTION, SOLUTION INTRAVENOUS; SUBCUTANEOUS at 00:24

## 2018-02-03 RX ADMIN — DOCUSATE SODIUM 100 MG: 100 CAPSULE ORAL at 09:25

## 2018-02-03 RX ADMIN — SUCRALFATE 1 G: 1 SUSPENSION ORAL at 12:44

## 2018-02-03 RX ADMIN — PROPOFOL 50 MCG/KG/MIN: 10 INJECTION, EMULSION INTRAVENOUS at 20:42

## 2018-02-03 RX ADMIN — PROPOFOL 50 MCG/KG/MIN: 10 INJECTION, EMULSION INTRAVENOUS at 04:27

## 2018-02-03 RX ADMIN — PROPOFOL 50 MCG/KG/MIN: 10 INJECTION, EMULSION INTRAVENOUS at 17:22

## 2018-02-03 RX ADMIN — CHLORHEXIDINE GLUCONATE 15 ML: 1.2 RINSE ORAL at 09:23

## 2018-02-03 RX ADMIN — IPRATROPIUM BROMIDE AND ALBUTEROL SULFATE 3 ML: 2.5; .5 SOLUTION RESPIRATORY (INHALATION) at 14:36

## 2018-02-03 RX ADMIN — GUAIFENESIN 400 MG: 100 SOLUTION ORAL at 21:46

## 2018-02-03 RX ADMIN — SUCRALFATE 1 G: 1 SUSPENSION ORAL at 05:46

## 2018-02-03 RX ADMIN — TAZOBACTAM SODIUM AND PIPERACILLIN SODIUM 4.5 G: .5; 4 INJECTION, POWDER, LYOPHILIZED, FOR SOLUTION INTRAVENOUS at 09:24

## 2018-02-03 RX ADMIN — CHLORHEXIDINE GLUCONATE 15 ML: 1.2 RINSE ORAL at 20:25

## 2018-02-03 RX ADMIN — IPRATROPIUM BROMIDE AND ALBUTEROL SULFATE 3 ML: 2.5; .5 SOLUTION RESPIRATORY (INHALATION) at 22:32

## 2018-02-03 RX ADMIN — VANCOMYCIN HYDROCHLORIDE 750 MG: 100 INJECTION, POWDER, LYOPHILIZED, FOR SOLUTION INTRAVENOUS at 06:26

## 2018-02-04 ENCOUNTER — APPOINTMENT (OUTPATIENT)
Dept: GENERAL RADIOLOGY | Facility: HOSPITAL | Age: 43
End: 2018-02-04

## 2018-02-04 LAB
ALBUMIN SERPL-MCNC: 3.2 G/DL (ref 3.5–5)
ALBUMIN/GLOB SERPL: 1.1 G/DL (ref 1.1–2.5)
ALP SERPL-CCNC: 102 U/L (ref 24–120)
ALT SERPL W P-5'-P-CCNC: 34 U/L (ref 0–54)
ANION GAP SERPL CALCULATED.3IONS-SCNC: 9 MMOL/L (ref 4–13)
ARTERIAL PATENCY WRIST A: POSITIVE
AST SERPL-CCNC: 33 U/L (ref 7–45)
ATMOSPHERIC PRESS: 748 MMHG
BACTERIA SPEC RESP CULT: ABNORMAL
BACTERIA SPEC RESP CULT: ABNORMAL
BASE EXCESS BLDA CALC-SCNC: 0 MMOL/L (ref 0–2)
BASOPHILS # BLD AUTO: 0.02 10*3/MM3 (ref 0–0.2)
BASOPHILS NFR BLD AUTO: 0.2 % (ref 0–2)
BDY SITE: ABNORMAL
BILIRUB SERPL-MCNC: 0.2 MG/DL (ref 0.1–1)
BODY TEMPERATURE: 37 C
BUN BLD-MCNC: 12 MG/DL (ref 5–21)
BUN/CREAT SERPL: 17.1 (ref 7–25)
CALCIUM SPEC-SCNC: 8.9 MG/DL (ref 8.4–10.4)
CHLORIDE SERPL-SCNC: 111 MMOL/L (ref 98–110)
CO2 SERPL-SCNC: 27 MMOL/L (ref 24–31)
CREAT BLD-MCNC: 0.7 MG/DL (ref 0.5–1.4)
DEPRECATED RDW RBC AUTO: 47.8 FL (ref 40–54)
EOSINOPHIL # BLD AUTO: 0.04 10*3/MM3 (ref 0–0.7)
EOSINOPHIL NFR BLD AUTO: 0.3 % (ref 0–4)
ERYTHROCYTE [DISTWIDTH] IN BLOOD BY AUTOMATED COUNT: 14.1 % (ref 12–15)
GFR SERPL CREATININE-BSD FRML MDRD: 91 ML/MIN/1.73
GLOBULIN UR ELPH-MCNC: 2.9 GM/DL
GLUCOSE BLD-MCNC: 141 MG/DL (ref 70–100)
GLUCOSE BLDC GLUCOMTR-MCNC: 135 MG/DL (ref 70–130)
GLUCOSE BLDC GLUCOMTR-MCNC: 151 MG/DL (ref 70–130)
GLUCOSE BLDC GLUCOMTR-MCNC: 156 MG/DL (ref 70–130)
GLUCOSE BLDC GLUCOMTR-MCNC: 171 MG/DL (ref 70–130)
GLUCOSE BLDC GLUCOMTR-MCNC: 172 MG/DL (ref 70–130)
GRAM STN SPEC: ABNORMAL
HCO3 BLDA-SCNC: 25.1 MMOL/L (ref 20–26)
HCT VFR BLD AUTO: 31.8 % (ref 37–47)
HGB BLD-MCNC: 10.3 G/DL (ref 12–16)
HOROWITZ INDEX BLD+IHG-RTO: 40 %
IMM GRANULOCYTES # BLD: 0.35 10*3/MM3 (ref 0–0.03)
IMM GRANULOCYTES NFR BLD: 3 % (ref 0–5)
LYMPHOCYTES # BLD AUTO: 0.73 10*3/MM3 (ref 0.72–4.86)
LYMPHOCYTES NFR BLD AUTO: 6.2 % (ref 15–45)
Lab: ABNORMAL
MCH RBC QN AUTO: 30.8 PG (ref 28–32)
MCHC RBC AUTO-ENTMCNC: 32.4 G/DL (ref 33–36)
MCV RBC AUTO: 95.2 FL (ref 82–98)
MODALITY: ABNORMAL
MONOCYTES # BLD AUTO: 0.31 10*3/MM3 (ref 0.19–1.3)
MONOCYTES NFR BLD AUTO: 2.6 % (ref 4–12)
NEUTROPHILS # BLD AUTO: 10.27 10*3/MM3 (ref 1.87–8.4)
NEUTROPHILS NFR BLD AUTO: 87.7 % (ref 39–78)
NRBC BLD MANUAL-RTO: 0.3 /100 WBC (ref 0–0)
PCO2 BLDA: 41.9 MM HG (ref 35–45)
PEEP RESPIRATORY: 8 CM[H2O]
PH BLDA: 7.39 PH UNITS (ref 7.35–7.45)
PLATELET # BLD AUTO: 213 10*3/MM3 (ref 130–400)
PMV BLD AUTO: 10.1 FL (ref 6–12)
PO2 BLDA: 114 MM HG (ref 83–108)
POTASSIUM BLD-SCNC: 3.8 MMOL/L (ref 3.5–5.3)
PROT SERPL-MCNC: 6.1 G/DL (ref 6.3–8.7)
RBC # BLD AUTO: 3.34 10*6/MM3 (ref 4.2–5.4)
SAO2 % BLDCOA: 98.8 % (ref 94–99)
SET MECH RESP RATE: 28
SODIUM BLD-SCNC: 147 MMOL/L (ref 135–145)
VENTILATOR MODE: AC
VT ON VENT VENT: 420 ML
WBC NRBC COR # BLD: 11.72 10*3/MM3 (ref 4.8–10.8)

## 2018-02-04 PROCEDURE — 94760 N-INVAS EAR/PLS OXIMETRY 1: CPT

## 2018-02-04 PROCEDURE — 82803 BLOOD GASES ANY COMBINATION: CPT

## 2018-02-04 PROCEDURE — 94799 UNLISTED PULMONARY SVC/PX: CPT

## 2018-02-04 PROCEDURE — 36600 WITHDRAWAL OF ARTERIAL BLOOD: CPT

## 2018-02-04 PROCEDURE — 25010000002 PROPOFOL 1000 MG/ML EMULSION: Performed by: FAMILY MEDICINE

## 2018-02-04 PROCEDURE — 82962 GLUCOSE BLOOD TEST: CPT

## 2018-02-04 PROCEDURE — 80053 COMPREHEN METABOLIC PANEL: CPT | Performed by: INTERNAL MEDICINE

## 2018-02-04 PROCEDURE — 25010000002 PIPERACILLIN-TAZOBACTAM: Performed by: NURSE PRACTITIONER

## 2018-02-04 PROCEDURE — 71045 X-RAY EXAM CHEST 1 VIEW: CPT

## 2018-02-04 PROCEDURE — 25810000003 POTASSIUM CHLORIDE PER 2 MEQ: Performed by: INTERNAL MEDICINE

## 2018-02-04 PROCEDURE — 94770: CPT

## 2018-02-04 PROCEDURE — 25010000002 FUROSEMIDE PER 20 MG: Performed by: INTERNAL MEDICINE

## 2018-02-04 PROCEDURE — 94003 VENT MGMT INPAT SUBQ DAY: CPT

## 2018-02-04 PROCEDURE — 25010000002 METHYLPREDNISOLONE PER 40 MG: Performed by: INTERNAL MEDICINE

## 2018-02-04 PROCEDURE — 25010000002 PIPERACILLIN-TAZOBACTAM

## 2018-02-04 PROCEDURE — 85025 COMPLETE CBC W/AUTO DIFF WBC: CPT | Performed by: INTERNAL MEDICINE

## 2018-02-04 PROCEDURE — 25010000002 VANCOMYCIN PER 500 MG: Performed by: INTERNAL MEDICINE

## 2018-02-04 PROCEDURE — 63710000001 INSULIN LISPRO (HUMAN) PER 5 UNITS: Performed by: FAMILY MEDICINE

## 2018-02-04 RX ORDER — SODIUM CHLORIDE AND POTASSIUM CHLORIDE 150; 450 MG/100ML; MG/100ML
30 INJECTION, SOLUTION INTRAVENOUS CONTINUOUS
Status: DISCONTINUED | OUTPATIENT
Start: 2018-02-04 | End: 2018-02-08

## 2018-02-04 RX ORDER — METHYLPREDNISOLONE SODIUM SUCCINATE 40 MG/ML
20 INJECTION, POWDER, LYOPHILIZED, FOR SOLUTION INTRAMUSCULAR; INTRAVENOUS EVERY 12 HOURS
Status: DISCONTINUED | OUTPATIENT
Start: 2018-02-04 | End: 2018-02-07

## 2018-02-04 RX ORDER — FUROSEMIDE 10 MG/ML
40 INJECTION INTRAMUSCULAR; INTRAVENOUS DAILY
Status: DISCONTINUED | OUTPATIENT
Start: 2018-02-04 | End: 2018-02-10 | Stop reason: HOSPADM

## 2018-02-04 RX ADMIN — GABAPENTIN 300 MG: 300 CAPSULE ORAL at 05:42

## 2018-02-04 RX ADMIN — PROPOFOL 50 MCG/KG/MIN: 10 INJECTION, EMULSION INTRAVENOUS at 10:20

## 2018-02-04 RX ADMIN — DIAZEPAM 10 MG: 10 TABLET ORAL at 20:13

## 2018-02-04 RX ADMIN — FLUOXETINE HYDROCHLORIDE 60 MG: 20 CAPSULE ORAL at 09:08

## 2018-02-04 RX ADMIN — IPRATROPIUM BROMIDE AND ALBUTEROL SULFATE 3 ML: 2.5; .5 SOLUTION RESPIRATORY (INHALATION) at 22:31

## 2018-02-04 RX ADMIN — TAZOBACTAM SODIUM AND PIPERACILLIN SODIUM 4.5 G: .5; 4 INJECTION, POWDER, LYOPHILIZED, FOR SOLUTION INTRAVENOUS at 17:22

## 2018-02-04 RX ADMIN — TAZOBACTAM SODIUM AND PIPERACILLIN SODIUM 4.5 G: .5; 4 INJECTION, POWDER, LYOPHILIZED, FOR SOLUTION INTRAVENOUS at 21:11

## 2018-02-04 RX ADMIN — VANCOMYCIN HYDROCHLORIDE 1000 MG: 1 INJECTION, SOLUTION INTRAVENOUS at 17:23

## 2018-02-04 RX ADMIN — GABAPENTIN 600 MG: 300 CAPSULE ORAL at 20:13

## 2018-02-04 RX ADMIN — SUCRALFATE 1 G: 1 SUSPENSION ORAL at 12:10

## 2018-02-04 RX ADMIN — OSELTAMIVIR PHOSPHATE 75 MG: 6 POWDER, FOR SUSPENSION ORAL at 20:12

## 2018-02-04 RX ADMIN — GUAIFENESIN 400 MG: 100 SOLUTION ORAL at 21:11

## 2018-02-04 RX ADMIN — PROPOFOL 50 MCG/KG/MIN: 10 INJECTION, EMULSION INTRAVENOUS at 06:37

## 2018-02-04 RX ADMIN — SUCRALFATE 1 G: 1 SUSPENSION ORAL at 05:42

## 2018-02-04 RX ADMIN — SUCRALFATE 1 G: 1 SUSPENSION ORAL at 00:14

## 2018-02-04 RX ADMIN — PROPOFOL 50 MCG/KG/MIN: 10 INJECTION, EMULSION INTRAVENOUS at 03:43

## 2018-02-04 RX ADMIN — VANCOMYCIN HYDROCHLORIDE 1000 MG: 1 INJECTION, SOLUTION INTRAVENOUS at 05:43

## 2018-02-04 RX ADMIN — INSULIN LISPRO 2 UNITS: 100 INJECTION, SOLUTION INTRAVENOUS; SUBCUTANEOUS at 05:42

## 2018-02-04 RX ADMIN — IPRATROPIUM BROMIDE AND ALBUTEROL SULFATE 3 ML: 2.5; .5 SOLUTION RESPIRATORY (INHALATION) at 10:33

## 2018-02-04 RX ADMIN — INSULIN LISPRO 2 UNITS: 100 INJECTION, SOLUTION INTRAVENOUS; SUBCUTANEOUS at 00:14

## 2018-02-04 RX ADMIN — PROPOFOL 50 MCG/KG/MIN: 10 INJECTION, EMULSION INTRAVENOUS at 17:29

## 2018-02-04 RX ADMIN — SUCRALFATE 1 G: 1 SUSPENSION ORAL at 17:22

## 2018-02-04 RX ADMIN — TAZOBACTAM SODIUM AND PIPERACILLIN SODIUM 4.5 G: .5; 4 INJECTION, POWDER, LYOPHILIZED, FOR SOLUTION INTRAVENOUS at 09:08

## 2018-02-04 RX ADMIN — METHYLPREDNISOLONE SODIUM SUCCINATE 20 MG: 40 INJECTION, POWDER, FOR SOLUTION INTRAMUSCULAR; INTRAVENOUS at 17:19

## 2018-02-04 RX ADMIN — IPRATROPIUM BROMIDE AND ALBUTEROL SULFATE 3 ML: 2.5; .5 SOLUTION RESPIRATORY (INHALATION) at 14:30

## 2018-02-04 RX ADMIN — OSELTAMIVIR PHOSPHATE 75 MG: 6 POWDER, FOR SUSPENSION ORAL at 09:09

## 2018-02-04 RX ADMIN — GUAIFENESIN 400 MG: 100 SOLUTION ORAL at 12:10

## 2018-02-04 RX ADMIN — DIAZEPAM 10 MG: 10 TABLET ORAL at 17:19

## 2018-02-04 RX ADMIN — GUAIFENESIN 400 MG: 100 SOLUTION ORAL at 09:08

## 2018-02-04 RX ADMIN — IPRATROPIUM BROMIDE AND ALBUTEROL SULFATE 3 ML: 2.5; .5 SOLUTION RESPIRATORY (INHALATION) at 02:31

## 2018-02-04 RX ADMIN — GUAIFENESIN 400 MG: 100 SOLUTION ORAL at 17:19

## 2018-02-04 RX ADMIN — GABAPENTIN 300 MG: 300 CAPSULE ORAL at 17:21

## 2018-02-04 RX ADMIN — FAMOTIDINE 20 MG: 10 INJECTION, SOLUTION INTRAVENOUS at 20:12

## 2018-02-04 RX ADMIN — LANSOPRAZOLE 30 MG: KIT at 09:08

## 2018-02-04 RX ADMIN — SUCRALFATE 1 G: 1 SUSPENSION ORAL at 23:39

## 2018-02-04 RX ADMIN — FUROSEMIDE 40 MG: 10 INJECTION, SOLUTION INTRAMUSCULAR; INTRAVENOUS at 11:08

## 2018-02-04 RX ADMIN — PROPOFOL 50 MCG/KG/MIN: 10 INJECTION, EMULSION INTRAVENOUS at 14:31

## 2018-02-04 RX ADMIN — CHLORHEXIDINE GLUCONATE 15 ML: 1.2 RINSE ORAL at 20:13

## 2018-02-04 RX ADMIN — LANSOPRAZOLE 30 MG: KIT at 17:22

## 2018-02-04 RX ADMIN — IPRATROPIUM BROMIDE AND ALBUTEROL SULFATE 3 ML: 2.5; .5 SOLUTION RESPIRATORY (INHALATION) at 18:45

## 2018-02-04 RX ADMIN — FAMOTIDINE 20 MG: 10 INJECTION, SOLUTION INTRAVENOUS at 09:08

## 2018-02-04 RX ADMIN — SODIUM CHLORIDE AND POTASSIUM CHLORIDE 100 ML/HR: 4.5; 1.49 INJECTION, SOLUTION INTRAVENOUS at 02:07

## 2018-02-04 RX ADMIN — TAZOBACTAM SODIUM AND PIPERACILLIN SODIUM 4.5 G: .5; 4 INJECTION, POWDER, LYOPHILIZED, FOR SOLUTION INTRAVENOUS at 03:44

## 2018-02-04 RX ADMIN — HYDROCODONE BITARTRATE AND ACETAMINOPHEN 15 ML: 7.5; 325 SOLUTION ORAL at 12:20

## 2018-02-04 RX ADMIN — LITHIUM CARBONATE 300 MG: 300 CAPSULE, GELATIN COATED ORAL at 20:13

## 2018-02-04 RX ADMIN — METHYLPREDNISOLONE SODIUM SUCCINATE 40 MG: 125 INJECTION, POWDER, FOR SOLUTION INTRAMUSCULAR; INTRAVENOUS at 05:42

## 2018-02-04 RX ADMIN — IPRATROPIUM BROMIDE AND ALBUTEROL SULFATE 3 ML: 2.5; .5 SOLUTION RESPIRATORY (INHALATION) at 06:30

## 2018-02-04 RX ADMIN — GUAIFENESIN 400 MG: 100 SOLUTION ORAL at 06:37

## 2018-02-04 RX ADMIN — PROPOFOL 50 MCG/KG/MIN: 10 INJECTION, EMULSION INTRAVENOUS at 00:12

## 2018-02-04 RX ADMIN — CHLORHEXIDINE GLUCONATE 15 ML: 1.2 RINSE ORAL at 09:08

## 2018-02-04 RX ADMIN — PROPOFOL 50 MCG/KG/MIN: 10 INJECTION, EMULSION INTRAVENOUS at 21:11

## 2018-02-04 RX ADMIN — INSULIN LISPRO 2 UNITS: 100 INJECTION, SOLUTION INTRAVENOUS; SUBCUTANEOUS at 12:22

## 2018-02-04 RX ADMIN — DIAZEPAM 10 MG: 10 TABLET ORAL at 09:08

## 2018-02-04 RX ADMIN — LEVOTHYROXINE SODIUM 100 MCG: 100 TABLET ORAL at 05:42

## 2018-02-05 ENCOUNTER — APPOINTMENT (OUTPATIENT)
Dept: GENERAL RADIOLOGY | Facility: HOSPITAL | Age: 43
End: 2018-02-05

## 2018-02-05 LAB
ALBUMIN SERPL-MCNC: 3.4 G/DL (ref 3.5–5)
ALBUMIN/GLOB SERPL: 1.2 G/DL (ref 1.1–2.5)
ALP SERPL-CCNC: 101 U/L (ref 24–120)
ALT SERPL W P-5'-P-CCNC: 34 U/L (ref 0–54)
ANION GAP SERPL CALCULATED.3IONS-SCNC: 10 MMOL/L (ref 4–13)
ARTERIAL PATENCY WRIST A: POSITIVE
AST SERPL-CCNC: 28 U/L (ref 7–45)
ATMOSPHERIC PRESS: 759 MMHG
B PERT DNA SPEC QL NAA+PROBE: NOT DETECTED
BASE EXCESS BLDA CALC-SCNC: 8.1 MMOL/L (ref 0–2)
BASOPHILS # BLD AUTO: 0.03 10*3/MM3 (ref 0–0.2)
BASOPHILS NFR BLD AUTO: 0.3 % (ref 0–2)
BDY SITE: ABNORMAL
BILIRUB SERPL-MCNC: 0.2 MG/DL (ref 0.1–1)
BODY TEMPERATURE: 37 C
BUN BLD-MCNC: 14 MG/DL (ref 5–21)
BUN/CREAT SERPL: 17.3 (ref 7–25)
C PNEUM DNA NPH QL NAA+NON-PROBE: NOT DETECTED
CALCIUM SPEC-SCNC: 8.9 MG/DL (ref 8.4–10.4)
CHLORIDE SERPL-SCNC: 103 MMOL/L (ref 98–110)
CO2 SERPL-SCNC: 34 MMOL/L (ref 24–31)
CREAT BLD-MCNC: 0.81 MG/DL (ref 0.5–1.4)
DEPRECATED RDW RBC AUTO: 47.6 FL (ref 40–54)
EOSINOPHIL # BLD AUTO: 0.07 10*3/MM3 (ref 0–0.7)
EOSINOPHIL NFR BLD AUTO: 0.7 % (ref 0–4)
ERYTHROCYTE [DISTWIDTH] IN BLOOD BY AUTOMATED COUNT: 14 % (ref 12–15)
FLUAV H1 2009 PAND RNA NPH QL NAA+PROBE: NOT DETECTED
FLUAV H1 HA GENE NPH QL NAA+PROBE: NOT DETECTED
FLUAV H3 RNA NPH QL NAA+PROBE: NOT DETECTED
FLUAV SUBTYP SPEC NAA+PROBE: NOT DETECTED
FLUBV RNA ISLT QL NAA+PROBE: NOT DETECTED
GFR SERPL CREATININE-BSD FRML MDRD: 77 ML/MIN/1.73
GLOBULIN UR ELPH-MCNC: 2.9 GM/DL
GLUCOSE BLD-MCNC: 125 MG/DL (ref 70–100)
GLUCOSE BLDC GLUCOMTR-MCNC: 117 MG/DL (ref 70–130)
GLUCOSE BLDC GLUCOMTR-MCNC: 130 MG/DL (ref 70–130)
GLUCOSE BLDC GLUCOMTR-MCNC: 149 MG/DL (ref 70–130)
GLUCOSE BLDC GLUCOMTR-MCNC: 156 MG/DL (ref 70–130)
HADV DNA SPEC NAA+PROBE: NOT DETECTED
HCO3 BLDA-SCNC: 32.3 MMOL/L (ref 20–26)
HCOV 229E RNA SPEC QL NAA+PROBE: NOT DETECTED
HCOV HKU1 RNA SPEC QL NAA+PROBE: NOT DETECTED
HCOV NL63 RNA SPEC QL NAA+PROBE: NOT DETECTED
HCOV OC43 RNA SPEC QL NAA+PROBE: NOT DETECTED
HCT VFR BLD AUTO: 34.4 % (ref 37–47)
HGB BLD-MCNC: 10.9 G/DL (ref 12–16)
HMPV RNA NPH QL NAA+NON-PROBE: NOT DETECTED
HOROWITZ INDEX BLD+IHG-RTO: 30 %
HPIV1 RNA SPEC QL NAA+PROBE: NOT DETECTED
HPIV2 RNA SPEC QL NAA+PROBE: NOT DETECTED
HPIV3 RNA NPH QL NAA+PROBE: NOT DETECTED
HPIV4 P GENE NPH QL NAA+PROBE: NOT DETECTED
IMM GRANULOCYTES # BLD: 0.5 10*3/MM3 (ref 0–0.03)
IMM GRANULOCYTES NFR BLD: 4.9 % (ref 0–5)
LYMPHOCYTES # BLD AUTO: 1.22 10*3/MM3 (ref 0.72–4.86)
LYMPHOCYTES NFR BLD AUTO: 12 % (ref 15–45)
Lab: ABNORMAL
M PNEUMO IGG SER IA-ACNC: NOT DETECTED
MCH RBC QN AUTO: 30 PG (ref 28–32)
MCHC RBC AUTO-ENTMCNC: 31.7 G/DL (ref 33–36)
MCV RBC AUTO: 94.8 FL (ref 82–98)
MODALITY: ABNORMAL
MONOCYTES # BLD AUTO: 0.44 10*3/MM3 (ref 0.19–1.3)
MONOCYTES NFR BLD AUTO: 4.3 % (ref 4–12)
NEUTROPHILS # BLD AUTO: 7.87 10*3/MM3 (ref 1.87–8.4)
NEUTROPHILS NFR BLD AUTO: 77.8 % (ref 39–78)
NRBC BLD MANUAL-RTO: 0.5 /100 WBC (ref 0–0)
NT-PROBNP SERPL-MCNC: 2350 PG/ML (ref 0–450)
PCO2 BLDA: 42.4 MM HG (ref 35–45)
PEEP RESPIRATORY: 8 CM[H2O]
PH BLDA: 7.49 PH UNITS (ref 7.35–7.45)
PLATELET # BLD AUTO: 252 10*3/MM3 (ref 130–400)
PMV BLD AUTO: 10 FL (ref 6–12)
PO2 BLDA: 87 MM HG (ref 83–108)
POTASSIUM BLD-SCNC: 3.5 MMOL/L (ref 3.5–5.3)
PROT SERPL-MCNC: 6.3 G/DL (ref 6.3–8.7)
RBC # BLD AUTO: 3.63 10*6/MM3 (ref 4.2–5.4)
RHINOVIRUS RNA SPEC NAA+PROBE: NOT DETECTED
RSV RNA NPH QL NAA+NON-PROBE: NOT DETECTED
SAO2 % BLDCOA: 97.7 % (ref 94–99)
SET MECH RESP RATE: 28
SODIUM BLD-SCNC: 147 MMOL/L (ref 135–145)
VANCOMYCIN TROUGH SERPL-MCNC: 15.27 MCG/ML (ref 10–20)
VENTILATOR MODE: AC
VT ON VENT VENT: 420 ML
WBC NRBC COR # BLD: 10.13 10*3/MM3 (ref 4.8–10.8)

## 2018-02-05 PROCEDURE — 83880 ASSAY OF NATRIURETIC PEPTIDE: CPT | Performed by: NURSE PRACTITIONER

## 2018-02-05 PROCEDURE — 25010000002 PROPOFOL 1000 MG/ML EMULSION: Performed by: FAMILY MEDICINE

## 2018-02-05 PROCEDURE — 94799 UNLISTED PULMONARY SVC/PX: CPT

## 2018-02-05 PROCEDURE — 25010000002 FUROSEMIDE PER 20 MG: Performed by: INTERNAL MEDICINE

## 2018-02-05 PROCEDURE — 94003 VENT MGMT INPAT SUBQ DAY: CPT

## 2018-02-05 PROCEDURE — 82803 BLOOD GASES ANY COMBINATION: CPT

## 2018-02-05 PROCEDURE — 25810000003 POTASSIUM CHLORIDE PER 2 MEQ: Performed by: INTERNAL MEDICINE

## 2018-02-05 PROCEDURE — 85025 COMPLETE CBC W/AUTO DIFF WBC: CPT | Performed by: INTERNAL MEDICINE

## 2018-02-05 PROCEDURE — 82962 GLUCOSE BLOOD TEST: CPT

## 2018-02-05 PROCEDURE — 80053 COMPREHEN METABOLIC PANEL: CPT | Performed by: INTERNAL MEDICINE

## 2018-02-05 PROCEDURE — 94770: CPT

## 2018-02-05 PROCEDURE — 71045 X-RAY EXAM CHEST 1 VIEW: CPT

## 2018-02-05 PROCEDURE — 25010000002 METHYLPREDNISOLONE PER 40 MG: Performed by: INTERNAL MEDICINE

## 2018-02-05 PROCEDURE — 25010000002 PIPERACILLIN-TAZOBACTAM: Performed by: NURSE PRACTITIONER

## 2018-02-05 PROCEDURE — 63710000001 INSULIN LISPRO (HUMAN) PER 5 UNITS: Performed by: FAMILY MEDICINE

## 2018-02-05 PROCEDURE — 36600 WITHDRAWAL OF ARTERIAL BLOOD: CPT

## 2018-02-05 PROCEDURE — 25010000002 VANCOMYCIN PER 500 MG: Performed by: INTERNAL MEDICINE

## 2018-02-05 PROCEDURE — 25010000002 PIPERACILLIN-TAZOBACTAM

## 2018-02-05 PROCEDURE — 94760 N-INVAS EAR/PLS OXIMETRY 1: CPT

## 2018-02-05 PROCEDURE — 80202 ASSAY OF VANCOMYCIN: CPT | Performed by: INTERNAL MEDICINE

## 2018-02-05 RX ADMIN — PROPOFOL 50 MCG/KG/MIN: 10 INJECTION, EMULSION INTRAVENOUS at 11:44

## 2018-02-05 RX ADMIN — SUCRALFATE 1 G: 1 SUSPENSION ORAL at 23:47

## 2018-02-05 RX ADMIN — CHLORHEXIDINE GLUCONATE 15 ML: 1.2 RINSE ORAL at 09:43

## 2018-02-05 RX ADMIN — METHYLPREDNISOLONE SODIUM SUCCINATE 20 MG: 40 INJECTION, POWDER, FOR SOLUTION INTRAMUSCULAR; INTRAVENOUS at 18:54

## 2018-02-05 RX ADMIN — FLUOXETINE HYDROCHLORIDE 60 MG: 20 CAPSULE ORAL at 09:43

## 2018-02-05 RX ADMIN — TAZOBACTAM SODIUM AND PIPERACILLIN SODIUM 4.5 G: .5; 4 INJECTION, POWDER, LYOPHILIZED, FOR SOLUTION INTRAVENOUS at 13:14

## 2018-02-05 RX ADMIN — INSULIN LISPRO 2 UNITS: 100 INJECTION, SOLUTION INTRAVENOUS; SUBCUTANEOUS at 13:44

## 2018-02-05 RX ADMIN — DIAZEPAM 10 MG: 10 TABLET ORAL at 09:30

## 2018-02-05 RX ADMIN — LITHIUM CARBONATE 300 MG: 300 CAPSULE, GELATIN COATED ORAL at 20:32

## 2018-02-05 RX ADMIN — PROPOFOL 49.92 MCG/KG/MIN: 10 INJECTION, EMULSION INTRAVENOUS at 15:47

## 2018-02-05 RX ADMIN — HYDROCODONE BITARTRATE AND ACETAMINOPHEN 15 ML: 7.5; 325 SOLUTION ORAL at 17:27

## 2018-02-05 RX ADMIN — GABAPENTIN 300 MG: 300 CAPSULE ORAL at 17:28

## 2018-02-05 RX ADMIN — SUCRALFATE 1 G: 1 SUSPENSION ORAL at 13:24

## 2018-02-05 RX ADMIN — FAMOTIDINE 20 MG: 10 INJECTION, SOLUTION INTRAVENOUS at 09:43

## 2018-02-05 RX ADMIN — TAZOBACTAM SODIUM AND PIPERACILLIN SODIUM 4.5 G: .5; 4 INJECTION, POWDER, LYOPHILIZED, FOR SOLUTION INTRAVENOUS at 04:03

## 2018-02-05 RX ADMIN — IPRATROPIUM BROMIDE AND ALBUTEROL SULFATE 3 ML: 2.5; .5 SOLUTION RESPIRATORY (INHALATION) at 10:36

## 2018-02-05 RX ADMIN — LANSOPRAZOLE 30 MG: KIT at 17:43

## 2018-02-05 RX ADMIN — GUAIFENESIN 400 MG: 100 SOLUTION ORAL at 07:27

## 2018-02-05 RX ADMIN — LANSOPRAZOLE 30 MG: KIT at 09:29

## 2018-02-05 RX ADMIN — LEVOTHYROXINE SODIUM 100 MCG: 100 TABLET ORAL at 05:30

## 2018-02-05 RX ADMIN — OSELTAMIVIR PHOSPHATE 75 MG: 6 POWDER, FOR SUSPENSION ORAL at 20:32

## 2018-02-05 RX ADMIN — PROPOFOL 50 MCG/KG/MIN: 10 INJECTION, EMULSION INTRAVENOUS at 04:02

## 2018-02-05 RX ADMIN — IPRATROPIUM BROMIDE AND ALBUTEROL SULFATE 3 ML: 2.5; .5 SOLUTION RESPIRATORY (INHALATION) at 06:36

## 2018-02-05 RX ADMIN — PROPOFOL 50 MCG/KG/MIN: 10 INJECTION, EMULSION INTRAVENOUS at 23:25

## 2018-02-05 RX ADMIN — GUAIFENESIN 400 MG: 100 SOLUTION ORAL at 19:55

## 2018-02-05 RX ADMIN — GUAIFENESIN 400 MG: 100 SOLUTION ORAL at 17:28

## 2018-02-05 RX ADMIN — GUAIFENESIN 400 MG: 100 SOLUTION ORAL at 13:24

## 2018-02-05 RX ADMIN — OSELTAMIVIR PHOSPHATE 75 MG: 6 POWDER, FOR SUSPENSION ORAL at 09:43

## 2018-02-05 RX ADMIN — TAZOBACTAM SODIUM AND PIPERACILLIN SODIUM 4.5 G: .5; 4 INJECTION, POWDER, LYOPHILIZED, FOR SOLUTION INTRAVENOUS at 21:18

## 2018-02-05 RX ADMIN — VANCOMYCIN HYDROCHLORIDE 1000 MG: 1 INJECTION, SOLUTION INTRAVENOUS at 17:44

## 2018-02-05 RX ADMIN — VANCOMYCIN HYDROCHLORIDE 1000 MG: 1 INJECTION, SOLUTION INTRAVENOUS at 05:31

## 2018-02-05 RX ADMIN — SUCRALFATE 1 G: 1 SUSPENSION ORAL at 05:30

## 2018-02-05 RX ADMIN — IPRATROPIUM BROMIDE AND ALBUTEROL SULFATE 3 ML: 2.5; .5 SOLUTION RESPIRATORY (INHALATION) at 15:40

## 2018-02-05 RX ADMIN — HYDROCODONE BITARTRATE AND ACETAMINOPHEN 15 ML: 7.5; 325 SOLUTION ORAL at 09:29

## 2018-02-05 RX ADMIN — GABAPENTIN 600 MG: 300 CAPSULE ORAL at 20:32

## 2018-02-05 RX ADMIN — TAZOBACTAM SODIUM AND PIPERACILLIN SODIUM 4.5 G: .5; 4 INJECTION, POWDER, LYOPHILIZED, FOR SOLUTION INTRAVENOUS at 18:34

## 2018-02-05 RX ADMIN — DIAZEPAM 10 MG: 10 TABLET ORAL at 17:27

## 2018-02-05 RX ADMIN — DIAZEPAM 10 MG: 10 TABLET ORAL at 20:32

## 2018-02-05 RX ADMIN — FUROSEMIDE 40 MG: 10 INJECTION, SOLUTION INTRAMUSCULAR; INTRAVENOUS at 09:43

## 2018-02-05 RX ADMIN — SUCRALFATE 1 G: 1 SUSPENSION ORAL at 17:44

## 2018-02-05 RX ADMIN — IPRATROPIUM BROMIDE AND ALBUTEROL SULFATE 3 ML: 2.5; .5 SOLUTION RESPIRATORY (INHALATION) at 19:07

## 2018-02-05 RX ADMIN — GUAIFENESIN 400 MG: 100 SOLUTION ORAL at 09:44

## 2018-02-05 RX ADMIN — PROPOFOL 50 MCG/KG/MIN: 10 INJECTION, EMULSION INTRAVENOUS at 08:25

## 2018-02-05 RX ADMIN — GABAPENTIN 300 MG: 300 CAPSULE ORAL at 05:31

## 2018-02-05 RX ADMIN — IPRATROPIUM BROMIDE AND ALBUTEROL SULFATE 3 ML: 2.5; .5 SOLUTION RESPIRATORY (INHALATION) at 02:39

## 2018-02-05 RX ADMIN — METHYLPREDNISOLONE SODIUM SUCCINATE 20 MG: 40 INJECTION, POWDER, FOR SOLUTION INTRAMUSCULAR; INTRAVENOUS at 05:31

## 2018-02-05 RX ADMIN — CHLORHEXIDINE GLUCONATE 15 ML: 1.2 RINSE ORAL at 20:32

## 2018-02-05 RX ADMIN — PROPOFOL 50 MCG/KG/MIN: 10 INJECTION, EMULSION INTRAVENOUS at 00:16

## 2018-02-05 RX ADMIN — PROPOFOL 50 MCG/KG/MIN: 10 INJECTION, EMULSION INTRAVENOUS at 20:33

## 2018-02-05 RX ADMIN — SODIUM CHLORIDE AND POTASSIUM CHLORIDE 50 ML/HR: 4.5; 1.49 INJECTION, SOLUTION INTRAVENOUS at 10:16

## 2018-02-05 RX ADMIN — GUAIFENESIN 400 MG: 100 SOLUTION ORAL at 21:18

## 2018-02-06 ENCOUNTER — APPOINTMENT (OUTPATIENT)
Dept: GENERAL RADIOLOGY | Facility: HOSPITAL | Age: 43
End: 2018-02-06

## 2018-02-06 LAB
ALBUMIN SERPL-MCNC: 3.5 G/DL (ref 3.5–5)
ALBUMIN/GLOB SERPL: 1.2 G/DL (ref 1.1–2.5)
ALP SERPL-CCNC: 99 U/L (ref 24–120)
ALT SERPL W P-5'-P-CCNC: 37 U/L (ref 0–54)
ANION GAP SERPL CALCULATED.3IONS-SCNC: 10 MMOL/L (ref 4–13)
ARTERIAL PATENCY WRIST A: POSITIVE
AST SERPL-CCNC: 27 U/L (ref 7–45)
ATMOSPHERIC PRESS: 756 MMHG
BACTERIA SPEC AEROBE CULT: NORMAL
BACTERIA SPEC AEROBE CULT: NORMAL
BASE EXCESS BLDA CALC-SCNC: 9.5 MMOL/L (ref 0–2)
BASOPHILS # BLD AUTO: 0.04 10*3/MM3 (ref 0–0.2)
BASOPHILS NFR BLD AUTO: 0.4 % (ref 0–2)
BDY SITE: ABNORMAL
BILIRUB SERPL-MCNC: 0.2 MG/DL (ref 0.1–1)
BODY TEMPERATURE: 37 C
BUN BLD-MCNC: 16 MG/DL (ref 5–21)
BUN/CREAT SERPL: 23.9 (ref 7–25)
CALCIUM SPEC-SCNC: 9.1 MG/DL (ref 8.4–10.4)
CHLORIDE SERPL-SCNC: 98 MMOL/L (ref 98–110)
CO2 SERPL-SCNC: 36 MMOL/L (ref 24–31)
CREAT BLD-MCNC: 0.67 MG/DL (ref 0.5–1.4)
CYTO UR: NORMAL
DEPRECATED RDW RBC AUTO: 46.5 FL (ref 40–54)
EOSINOPHIL # BLD AUTO: 0.08 10*3/MM3 (ref 0–0.7)
EOSINOPHIL NFR BLD AUTO: 0.7 % (ref 0–4)
ERYTHROCYTE [DISTWIDTH] IN BLOOD BY AUTOMATED COUNT: 13.8 % (ref 12–15)
GFR SERPL CREATININE-BSD FRML MDRD: 96 ML/MIN/1.73
GLOBULIN UR ELPH-MCNC: 3 GM/DL
GLUCOSE BLD-MCNC: 129 MG/DL (ref 70–100)
GLUCOSE BLDC GLUCOMTR-MCNC: 103 MG/DL (ref 70–130)
GLUCOSE BLDC GLUCOMTR-MCNC: 121 MG/DL (ref 70–130)
GLUCOSE BLDC GLUCOMTR-MCNC: 132 MG/DL (ref 70–130)
GLUCOSE BLDC GLUCOMTR-MCNC: 85 MG/DL (ref 70–130)
HCO3 BLDA-SCNC: 34.8 MMOL/L (ref 20–26)
HCT VFR BLD AUTO: 37.2 % (ref 37–47)
HGB BLD-MCNC: 11.9 G/DL (ref 12–16)
HOROWITZ INDEX BLD+IHG-RTO: 30 %
IMM GRANULOCYTES # BLD: 0.48 10*3/MM3 (ref 0–0.03)
IMM GRANULOCYTES NFR BLD: 4.3 % (ref 0–5)
LAB AP CASE REPORT: NORMAL
LAB AP CLINICAL INFORMATION: NORMAL
LYMPHOCYTES # BLD AUTO: 1.31 10*3/MM3 (ref 0.72–4.86)
LYMPHOCYTES NFR BLD AUTO: 11.8 % (ref 15–45)
Lab: ABNORMAL
Lab: NORMAL
MCH RBC QN AUTO: 29.9 PG (ref 28–32)
MCHC RBC AUTO-ENTMCNC: 32 G/DL (ref 33–36)
MCV RBC AUTO: 93.5 FL (ref 82–98)
MODALITY: ABNORMAL
MONOCYTES # BLD AUTO: 0.61 10*3/MM3 (ref 0.19–1.3)
MONOCYTES NFR BLD AUTO: 5.5 % (ref 4–12)
NEUTROPHILS # BLD AUTO: 8.61 10*3/MM3 (ref 1.87–8.4)
NEUTROPHILS NFR BLD AUTO: 77.3 % (ref 39–78)
NRBC BLD MANUAL-RTO: 0.2 /100 WBC (ref 0–0)
PATH REPORT.FINAL DX SPEC: NORMAL
PATH REPORT.GROSS SPEC: NORMAL
PCO2 BLDA: 49.2 MM HG (ref 35–45)
PEEP RESPIRATORY: 6 CM[H2O]
PH BLDA: 7.46 PH UNITS (ref 7.35–7.45)
PLATELET # BLD AUTO: 249 10*3/MM3 (ref 130–400)
PMV BLD AUTO: 9.5 FL (ref 6–12)
PO2 BLDA: 87 MM HG (ref 83–108)
POTASSIUM BLD-SCNC: 3.7 MMOL/L (ref 3.5–5.3)
PROT SERPL-MCNC: 6.5 G/DL (ref 6.3–8.7)
RBC # BLD AUTO: 3.98 10*6/MM3 (ref 4.2–5.4)
SAO2 % BLDCOA: 97.1 % (ref 94–99)
SET MECH RESP RATE: 22
SODIUM BLD-SCNC: 144 MMOL/L (ref 135–145)
VENTILATOR MODE: AC
VT ON VENT VENT: 420 ML
WBC NRBC COR # BLD: 11.13 10*3/MM3 (ref 4.8–10.8)

## 2018-02-06 PROCEDURE — 94799 UNLISTED PULMONARY SVC/PX: CPT

## 2018-02-06 PROCEDURE — 25010000002 FUROSEMIDE PER 20 MG: Performed by: INTERNAL MEDICINE

## 2018-02-06 PROCEDURE — 25010000002 METHYLPREDNISOLONE PER 40 MG: Performed by: INTERNAL MEDICINE

## 2018-02-06 PROCEDURE — 80053 COMPREHEN METABOLIC PANEL: CPT | Performed by: INTERNAL MEDICINE

## 2018-02-06 PROCEDURE — 25010000002 PIPERACILLIN-TAZOBACTAM

## 2018-02-06 PROCEDURE — 25810000003 POTASSIUM CHLORIDE PER 2 MEQ: Performed by: INTERNAL MEDICINE

## 2018-02-06 PROCEDURE — 85025 COMPLETE CBC W/AUTO DIFF WBC: CPT | Performed by: INTERNAL MEDICINE

## 2018-02-06 PROCEDURE — 25010000002 PROPOFOL 1000 MG/ML EMULSION: Performed by: FAMILY MEDICINE

## 2018-02-06 PROCEDURE — 94003 VENT MGMT INPAT SUBQ DAY: CPT

## 2018-02-06 PROCEDURE — 36600 WITHDRAWAL OF ARTERIAL BLOOD: CPT

## 2018-02-06 PROCEDURE — 94770: CPT

## 2018-02-06 PROCEDURE — 71045 X-RAY EXAM CHEST 1 VIEW: CPT

## 2018-02-06 PROCEDURE — 25010000002 PIPERACILLIN-TAZOBACTAM: Performed by: NURSE PRACTITIONER

## 2018-02-06 PROCEDURE — 82803 BLOOD GASES ANY COMBINATION: CPT

## 2018-02-06 PROCEDURE — 25010000002 VANCOMYCIN PER 500 MG: Performed by: INTERNAL MEDICINE

## 2018-02-06 PROCEDURE — 82962 GLUCOSE BLOOD TEST: CPT

## 2018-02-06 PROCEDURE — 94760 N-INVAS EAR/PLS OXIMETRY 1: CPT

## 2018-02-06 RX ADMIN — TAZOBACTAM SODIUM AND PIPERACILLIN SODIUM 4.5 G: .5; 4 INJECTION, POWDER, LYOPHILIZED, FOR SOLUTION INTRAVENOUS at 18:08

## 2018-02-06 RX ADMIN — ZOLPIDEM TARTRATE 10 MG: 5 TABLET ORAL at 21:22

## 2018-02-06 RX ADMIN — VANCOMYCIN HYDROCHLORIDE 1000 MG: 1 INJECTION, SOLUTION INTRAVENOUS at 06:23

## 2018-02-06 RX ADMIN — CHLORHEXIDINE GLUCONATE 15 ML: 1.2 RINSE ORAL at 21:20

## 2018-02-06 RX ADMIN — LEVOTHYROXINE SODIUM 100 MCG: 100 TABLET ORAL at 06:22

## 2018-02-06 RX ADMIN — LANSOPRAZOLE 30 MG: KIT at 18:07

## 2018-02-06 RX ADMIN — OSELTAMIVIR PHOSPHATE 75 MG: 6 POWDER, FOR SUSPENSION ORAL at 08:50

## 2018-02-06 RX ADMIN — DIAZEPAM 10 MG: 10 TABLET ORAL at 18:06

## 2018-02-06 RX ADMIN — PROPOFOL 45 MCG/KG/MIN: 10 INJECTION, EMULSION INTRAVENOUS at 07:30

## 2018-02-06 RX ADMIN — PROPOFOL 5 MCG/KG/MIN: 10 INJECTION, EMULSION INTRAVENOUS at 20:21

## 2018-02-06 RX ADMIN — TAZOBACTAM SODIUM AND PIPERACILLIN SODIUM 4.5 G: .5; 4 INJECTION, POWDER, LYOPHILIZED, FOR SOLUTION INTRAVENOUS at 04:12

## 2018-02-06 RX ADMIN — LANSOPRAZOLE 30 MG: KIT at 08:50

## 2018-02-06 RX ADMIN — DIAZEPAM 10 MG: 10 TABLET ORAL at 21:22

## 2018-02-06 RX ADMIN — IPRATROPIUM BROMIDE AND ALBUTEROL SULFATE 3 ML: 2.5; .5 SOLUTION RESPIRATORY (INHALATION) at 07:32

## 2018-02-06 RX ADMIN — METHYLPREDNISOLONE SODIUM SUCCINATE 20 MG: 40 INJECTION, POWDER, FOR SOLUTION INTRAMUSCULAR; INTRAVENOUS at 06:23

## 2018-02-06 RX ADMIN — CHLORHEXIDINE GLUCONATE 15 ML: 1.2 RINSE ORAL at 08:51

## 2018-02-06 RX ADMIN — VANCOMYCIN HYDROCHLORIDE 1000 MG: 1 INJECTION, SOLUTION INTRAVENOUS at 18:08

## 2018-02-06 RX ADMIN — GUAIFENESIN 400 MG: 100 SOLUTION ORAL at 18:07

## 2018-02-06 RX ADMIN — GUAIFENESIN 400 MG: 100 SOLUTION ORAL at 21:21

## 2018-02-06 RX ADMIN — IPRATROPIUM BROMIDE AND ALBUTEROL SULFATE 3 ML: 2.5; .5 SOLUTION RESPIRATORY (INHALATION) at 03:52

## 2018-02-06 RX ADMIN — TAZOBACTAM SODIUM AND PIPERACILLIN SODIUM 4.5 G: .5; 4 INJECTION, POWDER, LYOPHILIZED, FOR SOLUTION INTRAVENOUS at 22:47

## 2018-02-06 RX ADMIN — LITHIUM CARBONATE 300 MG: 300 CAPSULE, GELATIN COATED ORAL at 21:21

## 2018-02-06 RX ADMIN — IPRATROPIUM BROMIDE AND ALBUTEROL SULFATE 3 ML: 2.5; .5 SOLUTION RESPIRATORY (INHALATION) at 14:54

## 2018-02-06 RX ADMIN — SUCRALFATE 1 G: 1 SUSPENSION ORAL at 12:19

## 2018-02-06 RX ADMIN — GABAPENTIN 300 MG: 300 CAPSULE ORAL at 18:06

## 2018-02-06 RX ADMIN — SODIUM CHLORIDE AND POTASSIUM CHLORIDE 30 ML/HR: 4.5; 1.49 INJECTION, SOLUTION INTRAVENOUS at 10:26

## 2018-02-06 RX ADMIN — SUCRALFATE 1 G: 1 SUSPENSION ORAL at 06:23

## 2018-02-06 RX ADMIN — OSELTAMIVIR PHOSPHATE 75 MG: 6 POWDER, FOR SUSPENSION ORAL at 21:21

## 2018-02-06 RX ADMIN — DIAZEPAM 10 MG: 10 TABLET ORAL at 08:50

## 2018-02-06 RX ADMIN — IPRATROPIUM BROMIDE AND ALBUTEROL SULFATE 3 ML: 2.5; .5 SOLUTION RESPIRATORY (INHALATION) at 00:03

## 2018-02-06 RX ADMIN — FLUOXETINE HYDROCHLORIDE 60 MG: 20 CAPSULE ORAL at 08:51

## 2018-02-06 RX ADMIN — HYDROCODONE BITARTRATE AND ACETAMINOPHEN 15 ML: 7.5; 325 SOLUTION ORAL at 08:50

## 2018-02-06 RX ADMIN — PROPOFOL 45 MCG/KG/MIN: 10 INJECTION, EMULSION INTRAVENOUS at 03:11

## 2018-02-06 RX ADMIN — GUAIFENESIN 400 MG: 100 SOLUTION ORAL at 12:20

## 2018-02-06 RX ADMIN — FUROSEMIDE 40 MG: 10 INJECTION, SOLUTION INTRAMUSCULAR; INTRAVENOUS at 08:51

## 2018-02-06 RX ADMIN — GABAPENTIN 600 MG: 300 CAPSULE ORAL at 21:21

## 2018-02-06 RX ADMIN — TAZOBACTAM SODIUM AND PIPERACILLIN SODIUM 4.5 G: .5; 4 INJECTION, POWDER, LYOPHILIZED, FOR SOLUTION INTRAVENOUS at 08:51

## 2018-02-06 RX ADMIN — GUAIFENESIN 400 MG: 100 SOLUTION ORAL at 06:23

## 2018-02-06 RX ADMIN — GABAPENTIN 300 MG: 300 CAPSULE ORAL at 06:22

## 2018-02-06 RX ADMIN — IPRATROPIUM BROMIDE AND ALBUTEROL SULFATE 3 ML: 2.5; .5 SOLUTION RESPIRATORY (INHALATION) at 20:20

## 2018-02-06 RX ADMIN — IPRATROPIUM BROMIDE AND ALBUTEROL SULFATE 3 ML: 2.5; .5 SOLUTION RESPIRATORY (INHALATION) at 11:04

## 2018-02-06 RX ADMIN — SUCRALFATE 1 G: 1 SUSPENSION ORAL at 18:06

## 2018-02-06 RX ADMIN — METHYLPREDNISOLONE SODIUM SUCCINATE 20 MG: 40 INJECTION, POWDER, FOR SOLUTION INTRAMUSCULAR; INTRAVENOUS at 18:07

## 2018-02-06 RX ADMIN — GUAIFENESIN 400 MG: 100 SOLUTION ORAL at 08:50

## 2018-02-07 ENCOUNTER — APPOINTMENT (OUTPATIENT)
Dept: GENERAL RADIOLOGY | Facility: HOSPITAL | Age: 43
End: 2018-02-07

## 2018-02-07 LAB
ALBUMIN SERPL-MCNC: 3.5 G/DL (ref 3.5–5)
ALBUMIN/GLOB SERPL: 1.2 G/DL (ref 1.1–2.5)
ALP SERPL-CCNC: 91 U/L (ref 24–120)
ALT SERPL W P-5'-P-CCNC: 33 U/L (ref 0–54)
ANION GAP SERPL CALCULATED.3IONS-SCNC: 8 MMOL/L (ref 4–13)
ARTERIAL PATENCY WRIST A: POSITIVE
AST SERPL-CCNC: 41 U/L (ref 7–45)
ATMOSPHERIC PRESS: 755 MMHG
BASE EXCESS BLDA CALC-SCNC: 9.6 MMOL/L (ref 0–2)
BASOPHILS # BLD AUTO: 0.03 10*3/MM3 (ref 0–0.2)
BASOPHILS NFR BLD AUTO: 0.2 % (ref 0–2)
BDY SITE: ABNORMAL
BILIRUB SERPL-MCNC: 0.3 MG/DL (ref 0.1–1)
BODY TEMPERATURE: 37 C
BUN BLD-MCNC: 18 MG/DL (ref 5–21)
BUN/CREAT SERPL: 24.3 (ref 7–25)
CALCIUM SPEC-SCNC: 9 MG/DL (ref 8.4–10.4)
CHLORIDE SERPL-SCNC: 97 MMOL/L (ref 98–110)
CO2 SERPL-SCNC: 37 MMOL/L (ref 24–31)
CREAT BLD-MCNC: 0.74 MG/DL (ref 0.5–1.4)
DEPRECATED RDW RBC AUTO: 47.1 FL (ref 40–54)
EOSINOPHIL # BLD AUTO: 0.15 10*3/MM3 (ref 0–0.7)
EOSINOPHIL NFR BLD AUTO: 0.9 % (ref 0–4)
ERYTHROCYTE [DISTWIDTH] IN BLOOD BY AUTOMATED COUNT: 13.7 % (ref 12–15)
GFR SERPL CREATININE-BSD FRML MDRD: 85 ML/MIN/1.73
GLOBULIN UR ELPH-MCNC: 2.9 GM/DL
GLUCOSE BLD-MCNC: 117 MG/DL (ref 70–100)
GLUCOSE BLDC GLUCOMTR-MCNC: 135 MG/DL (ref 70–130)
GLUCOSE BLDC GLUCOMTR-MCNC: 97 MG/DL (ref 70–130)
GLUCOSE BLDC GLUCOMTR-MCNC: 98 MG/DL (ref 70–130)
GLUCOSE BLDC GLUCOMTR-MCNC: 99 MG/DL (ref 70–130)
HCO3 BLDA-SCNC: 35.6 MMOL/L (ref 20–26)
HCT VFR BLD AUTO: 39.1 % (ref 37–47)
HGB BLD-MCNC: 12.2 G/DL (ref 12–16)
HOROWITZ INDEX BLD+IHG-RTO: 30 %
IMM GRANULOCYTES # BLD: 0.48 10*3/MM3 (ref 0–0.03)
IMM GRANULOCYTES NFR BLD: 3 % (ref 0–5)
LYMPHOCYTES # BLD AUTO: 1.69 10*3/MM3 (ref 0.72–4.86)
LYMPHOCYTES NFR BLD AUTO: 10.4 % (ref 15–45)
Lab: ABNORMAL
MCH RBC QN AUTO: 29.5 PG (ref 28–32)
MCHC RBC AUTO-ENTMCNC: 31.2 G/DL (ref 33–36)
MCV RBC AUTO: 94.7 FL (ref 82–98)
MODALITY: ABNORMAL
MONOCYTES # BLD AUTO: 0.81 10*3/MM3 (ref 0.19–1.3)
MONOCYTES NFR BLD AUTO: 5 % (ref 4–12)
NEUTROPHILS # BLD AUTO: 13.02 10*3/MM3 (ref 1.87–8.4)
NEUTROPHILS NFR BLD AUTO: 80.5 % (ref 39–78)
NRBC BLD MANUAL-RTO: 0 /100 WBC (ref 0–0)
PCO2 BLDA: 53.1 MM HG (ref 35–45)
PEEP RESPIRATORY: 5 CM[H2O]
PH BLDA: 7.43 PH UNITS (ref 7.35–7.45)
PLATELET # BLD AUTO: 255 10*3/MM3 (ref 130–400)
PMV BLD AUTO: 9.6 FL (ref 6–12)
PO2 BLDA: 99 MM HG (ref 83–108)
POTASSIUM BLD-SCNC: 3.5 MMOL/L (ref 3.5–5.3)
PROT SERPL-MCNC: 6.4 G/DL (ref 6.3–8.7)
RBC # BLD AUTO: 4.13 10*6/MM3 (ref 4.2–5.4)
SAO2 % BLDCOA: 98 % (ref 94–99)
SET MECH RESP RATE: 14
SODIUM BLD-SCNC: 142 MMOL/L (ref 135–145)
VENTILATOR MODE: AC
VT ON VENT VENT: 420 ML
WBC NRBC COR # BLD: 16.18 10*3/MM3 (ref 4.8–10.8)

## 2018-02-07 PROCEDURE — 25010000002 VANCOMYCIN PER 500 MG: Performed by: INTERNAL MEDICINE

## 2018-02-07 PROCEDURE — 94799 UNLISTED PULMONARY SVC/PX: CPT

## 2018-02-07 PROCEDURE — 25010000002 VANCOMYCIN PER 500 MG: Performed by: FAMILY MEDICINE

## 2018-02-07 PROCEDURE — G8997 SWALLOW GOAL STATUS: HCPCS

## 2018-02-07 PROCEDURE — 82962 GLUCOSE BLOOD TEST: CPT

## 2018-02-07 PROCEDURE — 25010000002 PIPERACILLIN-TAZOBACTAM

## 2018-02-07 PROCEDURE — 25010000002 PIPERACILLIN-TAZOBACTAM: Performed by: NURSE PRACTITIONER

## 2018-02-07 PROCEDURE — 82803 BLOOD GASES ANY COMBINATION: CPT

## 2018-02-07 PROCEDURE — 80053 COMPREHEN METABOLIC PANEL: CPT | Performed by: INTERNAL MEDICINE

## 2018-02-07 PROCEDURE — 71045 X-RAY EXAM CHEST 1 VIEW: CPT

## 2018-02-07 PROCEDURE — 94003 VENT MGMT INPAT SUBQ DAY: CPT

## 2018-02-07 PROCEDURE — 25010000002 PIPERACILLIN-TAZOBACTAM: Performed by: FAMILY MEDICINE

## 2018-02-07 PROCEDURE — 25010000002 METHYLPREDNISOLONE PER 40 MG: Performed by: INTERNAL MEDICINE

## 2018-02-07 PROCEDURE — G8996 SWALLOW CURRENT STATUS: HCPCS

## 2018-02-07 PROCEDURE — 25810000003 POTASSIUM CHLORIDE PER 2 MEQ: Performed by: INTERNAL MEDICINE

## 2018-02-07 PROCEDURE — 92610 EVALUATE SWALLOWING FUNCTION: CPT

## 2018-02-07 PROCEDURE — 85025 COMPLETE CBC W/AUTO DIFF WBC: CPT | Performed by: INTERNAL MEDICINE

## 2018-02-07 PROCEDURE — 94770: CPT

## 2018-02-07 PROCEDURE — 36600 WITHDRAWAL OF ARTERIAL BLOOD: CPT

## 2018-02-07 PROCEDURE — 25010000002 FUROSEMIDE PER 20 MG: Performed by: INTERNAL MEDICINE

## 2018-02-07 RX ORDER — GUAIFENESIN 600 MG/1
1200 TABLET, EXTENDED RELEASE ORAL EVERY 12 HOURS SCHEDULED
Status: DISCONTINUED | OUTPATIENT
Start: 2018-02-07 | End: 2018-02-10 | Stop reason: HOSPADM

## 2018-02-07 RX ORDER — PANTOPRAZOLE SODIUM 40 MG/1
40 TABLET, DELAYED RELEASE ORAL
Status: DISCONTINUED | OUTPATIENT
Start: 2018-02-08 | End: 2018-02-10 | Stop reason: HOSPADM

## 2018-02-07 RX ADMIN — IPRATROPIUM BROMIDE AND ALBUTEROL SULFATE 3 ML: 2.5; .5 SOLUTION RESPIRATORY (INHALATION) at 23:23

## 2018-02-07 RX ADMIN — NICOTINE 1 PATCH: 21 PATCH, EXTENDED RELEASE TRANSDERMAL at 18:14

## 2018-02-07 RX ADMIN — GUAIFENESIN 1200 MG: 600 TABLET, EXTENDED RELEASE ORAL at 20:29

## 2018-02-07 RX ADMIN — GABAPENTIN 300 MG: 300 CAPSULE ORAL at 07:13

## 2018-02-07 RX ADMIN — DOCUSATE SODIUM 100 MG: 100 CAPSULE ORAL at 20:29

## 2018-02-07 RX ADMIN — TAZOBACTAM SODIUM AND PIPERACILLIN SODIUM 4.5 G: .5; 4 INJECTION, POWDER, LYOPHILIZED, FOR SOLUTION INTRAVENOUS at 10:02

## 2018-02-07 RX ADMIN — GABAPENTIN 300 MG: 300 CAPSULE ORAL at 18:03

## 2018-02-07 RX ADMIN — SUCRALFATE 1 G: 1 SUSPENSION ORAL at 00:06

## 2018-02-07 RX ADMIN — SUCRALFATE 1 G: 1 SUSPENSION ORAL at 23:59

## 2018-02-07 RX ADMIN — IPRATROPIUM BROMIDE AND ALBUTEROL SULFATE 3 ML: 2.5; .5 SOLUTION RESPIRATORY (INHALATION) at 06:32

## 2018-02-07 RX ADMIN — GUAIFENESIN 400 MG: 100 SOLUTION ORAL at 07:13

## 2018-02-07 RX ADMIN — IPRATROPIUM BROMIDE AND ALBUTEROL SULFATE 3 ML: 2.5; .5 SOLUTION RESPIRATORY (INHALATION) at 11:17

## 2018-02-07 RX ADMIN — METHYLPREDNISOLONE SODIUM SUCCINATE 20 MG: 40 INJECTION, POWDER, FOR SOLUTION INTRAMUSCULAR; INTRAVENOUS at 07:13

## 2018-02-07 RX ADMIN — VANCOMYCIN HYDROCHLORIDE 1000 MG: 1 INJECTION, SOLUTION INTRAVENOUS at 07:15

## 2018-02-07 RX ADMIN — SUCRALFATE 1 G: 1 SUSPENSION ORAL at 18:04

## 2018-02-07 RX ADMIN — TAZOBACTAM SODIUM AND PIPERACILLIN SODIUM 4.5 G: .5; 4 INJECTION, POWDER, LYOPHILIZED, FOR SOLUTION INTRAVENOUS at 15:25

## 2018-02-07 RX ADMIN — IPRATROPIUM BROMIDE AND ALBUTEROL SULFATE 3 ML: 2.5; .5 SOLUTION RESPIRATORY (INHALATION) at 14:47

## 2018-02-07 RX ADMIN — FUROSEMIDE 40 MG: 10 INJECTION, SOLUTION INTRAMUSCULAR; INTRAVENOUS at 10:00

## 2018-02-07 RX ADMIN — LITHIUM CARBONATE 300 MG: 300 CAPSULE, GELATIN COATED ORAL at 20:29

## 2018-02-07 RX ADMIN — SUCRALFATE 1 G: 1 SUSPENSION ORAL at 12:31

## 2018-02-07 RX ADMIN — IPRATROPIUM BROMIDE AND ALBUTEROL SULFATE 3 ML: 2.5; .5 SOLUTION RESPIRATORY (INHALATION) at 00:48

## 2018-02-07 RX ADMIN — CHLORHEXIDINE GLUCONATE 15 ML: 1.2 RINSE ORAL at 08:00

## 2018-02-07 RX ADMIN — LEVOTHYROXINE SODIUM 100 MCG: 100 TABLET ORAL at 07:13

## 2018-02-07 RX ADMIN — VANCOMYCIN HYDROCHLORIDE 1000 MG: 1 INJECTION, SOLUTION INTRAVENOUS at 18:04

## 2018-02-07 RX ADMIN — SODIUM CHLORIDE AND POTASSIUM CHLORIDE 30 ML/HR: 4.5; 1.49 INJECTION, SOLUTION INTRAVENOUS at 19:35

## 2018-02-07 RX ADMIN — GABAPENTIN 600 MG: 300 CAPSULE ORAL at 20:29

## 2018-02-07 RX ADMIN — SUCRALFATE 1 G: 1 SUSPENSION ORAL at 07:13

## 2018-02-07 RX ADMIN — ZOLPIDEM TARTRATE 10 MG: 5 TABLET ORAL at 20:28

## 2018-02-07 RX ADMIN — GUAIFENESIN 1200 MG: 600 TABLET, EXTENDED RELEASE ORAL at 13:11

## 2018-02-07 RX ADMIN — TAZOBACTAM SODIUM AND PIPERACILLIN SODIUM 4.5 G: .5; 4 INJECTION, POWDER, LYOPHILIZED, FOR SOLUTION INTRAVENOUS at 21:20

## 2018-02-07 RX ADMIN — IPRATROPIUM BROMIDE AND ALBUTEROL SULFATE 3 ML: 2.5; .5 SOLUTION RESPIRATORY (INHALATION) at 19:06

## 2018-02-07 RX ADMIN — TAZOBACTAM SODIUM AND PIPERACILLIN SODIUM 4.5 G: .5; 4 INJECTION, POWDER, LYOPHILIZED, FOR SOLUTION INTRAVENOUS at 04:04

## 2018-02-07 RX ADMIN — LANSOPRAZOLE 30 MG: KIT at 07:14

## 2018-02-07 RX ADMIN — IPRATROPIUM BROMIDE AND ALBUTEROL SULFATE 3 ML: 2.5; .5 SOLUTION RESPIRATORY (INHALATION) at 04:18

## 2018-02-08 ENCOUNTER — APPOINTMENT (OUTPATIENT)
Dept: GENERAL RADIOLOGY | Facility: HOSPITAL | Age: 43
End: 2018-02-08

## 2018-02-08 LAB
ALBUMIN SERPL-MCNC: 3.7 G/DL (ref 3.5–5)
ALBUMIN/GLOB SERPL: 1.2 G/DL (ref 1.1–2.5)
ALP SERPL-CCNC: 92 U/L (ref 24–120)
ALT SERPL W P-5'-P-CCNC: 32 U/L (ref 0–54)
ANION GAP SERPL CALCULATED.3IONS-SCNC: 12 MMOL/L (ref 4–13)
AST SERPL-CCNC: 33 U/L (ref 7–45)
BASOPHILS # BLD AUTO: 0.05 10*3/MM3 (ref 0–0.2)
BASOPHILS NFR BLD AUTO: 0.4 % (ref 0–2)
BILIRUB SERPL-MCNC: 0.6 MG/DL (ref 0.1–1)
BUN BLD-MCNC: 19 MG/DL (ref 5–21)
BUN/CREAT SERPL: 26 (ref 7–25)
CALCIUM SPEC-SCNC: 9.5 MG/DL (ref 8.4–10.4)
CHLORIDE SERPL-SCNC: 97 MMOL/L (ref 98–110)
CO2 SERPL-SCNC: 34 MMOL/L (ref 24–31)
CREAT BLD-MCNC: 0.73 MG/DL (ref 0.5–1.4)
DEPRECATED RDW RBC AUTO: 45.2 FL (ref 40–54)
EOSINOPHIL # BLD AUTO: 0.37 10*3/MM3 (ref 0–0.7)
EOSINOPHIL NFR BLD AUTO: 2.8 % (ref 0–4)
ERYTHROCYTE [DISTWIDTH] IN BLOOD BY AUTOMATED COUNT: 13.5 % (ref 12–15)
GFR SERPL CREATININE-BSD FRML MDRD: 87 ML/MIN/1.73
GLOBULIN UR ELPH-MCNC: 3 GM/DL
GLUCOSE BLD-MCNC: 84 MG/DL (ref 70–100)
GLUCOSE BLDC GLUCOMTR-MCNC: 101 MG/DL (ref 70–130)
GLUCOSE BLDC GLUCOMTR-MCNC: 101 MG/DL (ref 70–130)
GLUCOSE BLDC GLUCOMTR-MCNC: 103 MG/DL (ref 70–130)
GLUCOSE BLDC GLUCOMTR-MCNC: 105 MG/DL (ref 70–130)
GLUCOSE BLDC GLUCOMTR-MCNC: 146 MG/DL (ref 70–130)
GLUCOSE BLDC GLUCOMTR-MCNC: 98 MG/DL (ref 70–130)
HCT VFR BLD AUTO: 38.4 % (ref 37–47)
HGB BLD-MCNC: 12.5 G/DL (ref 12–16)
IMM GRANULOCYTES # BLD: 0.33 10*3/MM3 (ref 0–0.03)
IMM GRANULOCYTES NFR BLD: 2.5 % (ref 0–5)
LYMPHOCYTES # BLD AUTO: 2.59 10*3/MM3 (ref 0.72–4.86)
LYMPHOCYTES NFR BLD AUTO: 19.7 % (ref 15–45)
MCH RBC QN AUTO: 30 PG (ref 28–32)
MCHC RBC AUTO-ENTMCNC: 32.6 G/DL (ref 33–36)
MCV RBC AUTO: 92.3 FL (ref 82–98)
MONOCYTES # BLD AUTO: 0.88 10*3/MM3 (ref 0.19–1.3)
MONOCYTES NFR BLD AUTO: 6.7 % (ref 4–12)
NEUTROPHILS # BLD AUTO: 8.96 10*3/MM3 (ref 1.87–8.4)
NEUTROPHILS NFR BLD AUTO: 67.9 % (ref 39–78)
NRBC BLD MANUAL-RTO: 0 /100 WBC (ref 0–0)
PLATELET # BLD AUTO: 257 10*3/MM3 (ref 130–400)
PMV BLD AUTO: 10 FL (ref 6–12)
POTASSIUM BLD-SCNC: 3.1 MMOL/L (ref 3.5–5.3)
PROT SERPL-MCNC: 6.7 G/DL (ref 6.3–8.7)
RBC # BLD AUTO: 4.16 10*6/MM3 (ref 4.2–5.4)
SODIUM BLD-SCNC: 143 MMOL/L (ref 135–145)
VANCOMYCIN TROUGH SERPL-MCNC: 17.25 MCG/ML (ref 10–20)
WBC NRBC COR # BLD: 13.18 10*3/MM3 (ref 4.8–10.8)

## 2018-02-08 PROCEDURE — 25010000002 PIPERACILLIN-TAZOBACTAM

## 2018-02-08 PROCEDURE — 85025 COMPLETE CBC W/AUTO DIFF WBC: CPT | Performed by: INTERNAL MEDICINE

## 2018-02-08 PROCEDURE — 80202 ASSAY OF VANCOMYCIN: CPT | Performed by: FAMILY MEDICINE

## 2018-02-08 PROCEDURE — 25010000002 FUROSEMIDE PER 20 MG: Performed by: INTERNAL MEDICINE

## 2018-02-08 PROCEDURE — G8982 BODY POS GOAL STATUS: HCPCS

## 2018-02-08 PROCEDURE — 92526 ORAL FUNCTION THERAPY: CPT | Performed by: SPEECH-LANGUAGE PATHOLOGIST

## 2018-02-08 PROCEDURE — 94799 UNLISTED PULMONARY SVC/PX: CPT

## 2018-02-08 PROCEDURE — 97162 PT EVAL MOD COMPLEX 30 MIN: CPT

## 2018-02-08 PROCEDURE — 25010000002 VANCOMYCIN PER 500 MG: Performed by: FAMILY MEDICINE

## 2018-02-08 PROCEDURE — G8981 BODY POS CURRENT STATUS: HCPCS

## 2018-02-08 PROCEDURE — 80053 COMPREHEN METABOLIC PANEL: CPT | Performed by: INTERNAL MEDICINE

## 2018-02-08 PROCEDURE — 82962 GLUCOSE BLOOD TEST: CPT

## 2018-02-08 RX ORDER — POTASSIUM CHLORIDE 750 MG/1
20 CAPSULE, EXTENDED RELEASE ORAL 2 TIMES DAILY WITH MEALS
Status: DISCONTINUED | OUTPATIENT
Start: 2018-02-08 | End: 2018-02-09

## 2018-02-08 RX ADMIN — VANCOMYCIN HYDROCHLORIDE 1000 MG: 1 INJECTION, SOLUTION INTRAVENOUS at 06:36

## 2018-02-08 RX ADMIN — GABAPENTIN 600 MG: 300 CAPSULE ORAL at 21:53

## 2018-02-08 RX ADMIN — ZOLPIDEM TARTRATE 10 MG: 5 TABLET ORAL at 21:53

## 2018-02-08 RX ADMIN — GABAPENTIN 300 MG: 300 CAPSULE ORAL at 06:36

## 2018-02-08 RX ADMIN — GUAIFENESIN 1200 MG: 600 TABLET, EXTENDED RELEASE ORAL at 21:53

## 2018-02-08 RX ADMIN — POTASSIUM CHLORIDE 20 MEQ: 750 CAPSULE, EXTENDED RELEASE ORAL at 08:57

## 2018-02-08 RX ADMIN — IPRATROPIUM BROMIDE AND ALBUTEROL SULFATE 3 ML: 2.5; .5 SOLUTION RESPIRATORY (INHALATION) at 04:13

## 2018-02-08 RX ADMIN — GUAIFENESIN 1200 MG: 600 TABLET, EXTENDED RELEASE ORAL at 08:57

## 2018-02-08 RX ADMIN — DOCUSATE SODIUM 100 MG: 100 CAPSULE ORAL at 21:54

## 2018-02-08 RX ADMIN — GABAPENTIN 300 MG: 300 CAPSULE ORAL at 18:00

## 2018-02-08 RX ADMIN — IPRATROPIUM BROMIDE AND ALBUTEROL SULFATE 3 ML: 2.5; .5 SOLUTION RESPIRATORY (INHALATION) at 10:36

## 2018-02-08 RX ADMIN — FLUOXETINE HYDROCHLORIDE 60 MG: 20 CAPSULE ORAL at 08:57

## 2018-02-08 RX ADMIN — LEVOTHYROXINE SODIUM 100 MCG: 100 TABLET ORAL at 06:40

## 2018-02-08 RX ADMIN — IPRATROPIUM BROMIDE AND ALBUTEROL SULFATE 3 ML: 2.5; .5 SOLUTION RESPIRATORY (INHALATION) at 15:30

## 2018-02-08 RX ADMIN — SUCRALFATE 1 G: 1 SUSPENSION ORAL at 06:40

## 2018-02-08 RX ADMIN — POTASSIUM CHLORIDE 20 MEQ: 750 CAPSULE, EXTENDED RELEASE ORAL at 18:00

## 2018-02-08 RX ADMIN — LITHIUM CARBONATE 300 MG: 300 CAPSULE, GELATIN COATED ORAL at 21:53

## 2018-02-08 RX ADMIN — IPRATROPIUM BROMIDE AND ALBUTEROL SULFATE 3 ML: 2.5; .5 SOLUTION RESPIRATORY (INHALATION) at 06:57

## 2018-02-08 RX ADMIN — IPRATROPIUM BROMIDE AND ALBUTEROL SULFATE 3 ML: 2.5; .5 SOLUTION RESPIRATORY (INHALATION) at 19:47

## 2018-02-08 RX ADMIN — NICOTINE 1 PATCH: 21 PATCH, EXTENDED RELEASE TRANSDERMAL at 18:03

## 2018-02-08 RX ADMIN — TAZOBACTAM SODIUM AND PIPERACILLIN SODIUM 4.5 G: .5; 4 INJECTION, POWDER, LYOPHILIZED, FOR SOLUTION INTRAVENOUS at 03:54

## 2018-02-08 RX ADMIN — PANTOPRAZOLE SODIUM 40 MG: 40 TABLET, DELAYED RELEASE ORAL at 06:36

## 2018-02-08 RX ADMIN — FUROSEMIDE 40 MG: 10 INJECTION, SOLUTION INTRAMUSCULAR; INTRAVENOUS at 08:57

## 2018-02-08 RX ADMIN — HYDROCODONE BITARTRATE AND ACETAMINOPHEN 15 ML: 7.5; 325 SOLUTION ORAL at 21:54

## 2018-02-08 RX ADMIN — SUCRALFATE 1 G: 1 SUSPENSION ORAL at 11:54

## 2018-02-08 RX ADMIN — IPRATROPIUM BROMIDE AND ALBUTEROL SULFATE 3 ML: 2.5; .5 SOLUTION RESPIRATORY (INHALATION) at 23:51

## 2018-02-08 RX ADMIN — SUCRALFATE 1 G: 1 SUSPENSION ORAL at 18:00

## 2018-02-09 LAB
ALBUMIN SERPL-MCNC: 4 G/DL (ref 3.5–5)
ALBUMIN/GLOB SERPL: 1.3 G/DL (ref 1.1–2.5)
ALP SERPL-CCNC: 102 U/L (ref 24–120)
ALT SERPL W P-5'-P-CCNC: 37 U/L (ref 0–54)
ANION GAP SERPL CALCULATED.3IONS-SCNC: 10 MMOL/L (ref 4–13)
AST SERPL-CCNC: 31 U/L (ref 7–45)
BASOPHILS # BLD AUTO: 0.04 10*3/MM3 (ref 0–0.2)
BASOPHILS NFR BLD AUTO: 0.3 % (ref 0–2)
BILIRUB SERPL-MCNC: 0.7 MG/DL (ref 0.1–1)
BUN BLD-MCNC: 17 MG/DL (ref 5–21)
BUN/CREAT SERPL: 24.3 (ref 7–25)
CALCIUM SPEC-SCNC: 9.9 MG/DL (ref 8.4–10.4)
CHLORIDE SERPL-SCNC: 98 MMOL/L (ref 98–110)
CO2 SERPL-SCNC: 34 MMOL/L (ref 24–31)
CREAT BLD-MCNC: 0.7 MG/DL (ref 0.5–1.4)
DEPRECATED RDW RBC AUTO: 45.5 FL (ref 40–54)
EOSINOPHIL # BLD AUTO: 0.31 10*3/MM3 (ref 0–0.7)
EOSINOPHIL NFR BLD AUTO: 2.5 % (ref 0–4)
ERYTHROCYTE [DISTWIDTH] IN BLOOD BY AUTOMATED COUNT: 13.4 % (ref 12–15)
GFR SERPL CREATININE-BSD FRML MDRD: 91 ML/MIN/1.73
GLOBULIN UR ELPH-MCNC: 3.2 GM/DL
GLUCOSE BLD-MCNC: 99 MG/DL (ref 70–100)
GLUCOSE BLDC GLUCOMTR-MCNC: 104 MG/DL (ref 70–130)
GLUCOSE BLDC GLUCOMTR-MCNC: 112 MG/DL (ref 70–130)
GLUCOSE BLDC GLUCOMTR-MCNC: 128 MG/DL (ref 70–130)
GLUCOSE BLDC GLUCOMTR-MCNC: 98 MG/DL (ref 70–130)
HCT VFR BLD AUTO: 40.2 % (ref 37–47)
HGB BLD-MCNC: 13.4 G/DL (ref 12–16)
IMM GRANULOCYTES # BLD: 0.26 10*3/MM3 (ref 0–0.03)
IMM GRANULOCYTES NFR BLD: 2.1 % (ref 0–5)
LYMPHOCYTES # BLD AUTO: 2.52 10*3/MM3 (ref 0.72–4.86)
LYMPHOCYTES NFR BLD AUTO: 20.7 % (ref 15–45)
MCH RBC QN AUTO: 30.8 PG (ref 28–32)
MCHC RBC AUTO-ENTMCNC: 33.3 G/DL (ref 33–36)
MCV RBC AUTO: 92.4 FL (ref 82–98)
MONOCYTES # BLD AUTO: 0.89 10*3/MM3 (ref 0.19–1.3)
MONOCYTES NFR BLD AUTO: 7.3 % (ref 4–12)
NEUTROPHILS # BLD AUTO: 8.17 10*3/MM3 (ref 1.87–8.4)
NEUTROPHILS NFR BLD AUTO: 67.1 % (ref 39–78)
NRBC BLD MANUAL-RTO: 0 /100 WBC (ref 0–0)
PLATELET # BLD AUTO: 244 10*3/MM3 (ref 130–400)
PMV BLD AUTO: 9.7 FL (ref 6–12)
POTASSIUM BLD-SCNC: 3.3 MMOL/L (ref 3.5–5.3)
PROT SERPL-MCNC: 7.2 G/DL (ref 6.3–8.7)
RBC # BLD AUTO: 4.35 10*6/MM3 (ref 4.2–5.4)
SODIUM BLD-SCNC: 142 MMOL/L (ref 135–145)
WBC NRBC COR # BLD: 12.19 10*3/MM3 (ref 4.8–10.8)

## 2018-02-09 PROCEDURE — 85025 COMPLETE CBC W/AUTO DIFF WBC: CPT | Performed by: INTERNAL MEDICINE

## 2018-02-09 PROCEDURE — 25010000002 FUROSEMIDE PER 20 MG: Performed by: INTERNAL MEDICINE

## 2018-02-09 PROCEDURE — 80053 COMPREHEN METABOLIC PANEL: CPT | Performed by: INTERNAL MEDICINE

## 2018-02-09 PROCEDURE — 97110 THERAPEUTIC EXERCISES: CPT

## 2018-02-09 PROCEDURE — 94799 UNLISTED PULMONARY SVC/PX: CPT

## 2018-02-09 PROCEDURE — 92523 SPEECH SOUND LANG COMPREHEN: CPT

## 2018-02-09 PROCEDURE — 82962 GLUCOSE BLOOD TEST: CPT

## 2018-02-09 PROCEDURE — 94760 N-INVAS EAR/PLS OXIMETRY 1: CPT

## 2018-02-09 PROCEDURE — G9175 SPEECH LANG GOAL STATUS: HCPCS

## 2018-02-09 PROCEDURE — G9176 SPEECH LANG D/C STATUS: HCPCS

## 2018-02-09 PROCEDURE — G9174 SPEECH LANG CURRENT STATUS: HCPCS

## 2018-02-09 PROCEDURE — 97116 GAIT TRAINING THERAPY: CPT

## 2018-02-09 RX ORDER — POTASSIUM CHLORIDE 750 MG/1
40 CAPSULE, EXTENDED RELEASE ORAL 2 TIMES DAILY WITH MEALS
Status: DISCONTINUED | OUTPATIENT
Start: 2018-02-09 | End: 2018-02-10 | Stop reason: HOSPADM

## 2018-02-09 RX ORDER — HYDROCODONE BITARTRATE AND ACETAMINOPHEN 7.5; 325 MG/1; MG/1
1 TABLET ORAL EVERY 4 HOURS PRN
Status: DISCONTINUED | OUTPATIENT
Start: 2018-02-09 | End: 2018-02-10 | Stop reason: HOSPADM

## 2018-02-09 RX ADMIN — DOCUSATE SODIUM 100 MG: 100 CAPSULE ORAL at 10:50

## 2018-02-09 RX ADMIN — GUAIFENESIN 1200 MG: 600 TABLET, EXTENDED RELEASE ORAL at 22:11

## 2018-02-09 RX ADMIN — POTASSIUM CHLORIDE 40 MEQ: 750 CAPSULE, EXTENDED RELEASE ORAL at 10:49

## 2018-02-09 RX ADMIN — GABAPENTIN 300 MG: 300 CAPSULE ORAL at 17:34

## 2018-02-09 RX ADMIN — HYDROCODONE BITARTRATE AND ACETAMINOPHEN 15 ML: 7.5; 325 SOLUTION ORAL at 11:06

## 2018-02-09 RX ADMIN — IPRATROPIUM BROMIDE AND ALBUTEROL SULFATE 3 ML: 2.5; .5 SOLUTION RESPIRATORY (INHALATION) at 03:39

## 2018-02-09 RX ADMIN — HYDROCODONE BITARTRATE AND ACETAMINOPHEN 15 ML: 7.5; 325 SOLUTION ORAL at 17:34

## 2018-02-09 RX ADMIN — NICOTINE 1 PATCH: 21 PATCH, EXTENDED RELEASE TRANSDERMAL at 17:35

## 2018-02-09 RX ADMIN — PANTOPRAZOLE SODIUM 40 MG: 40 TABLET, DELAYED RELEASE ORAL at 06:32

## 2018-02-09 RX ADMIN — IPRATROPIUM BROMIDE AND ALBUTEROL SULFATE 3 ML: 2.5; .5 SOLUTION RESPIRATORY (INHALATION) at 07:58

## 2018-02-09 RX ADMIN — FLUOXETINE HYDROCHLORIDE 60 MG: 20 CAPSULE ORAL at 10:49

## 2018-02-09 RX ADMIN — SUCRALFATE 1 G: 1 SUSPENSION ORAL at 12:45

## 2018-02-09 RX ADMIN — LITHIUM CARBONATE 300 MG: 300 CAPSULE, GELATIN COATED ORAL at 22:11

## 2018-02-09 RX ADMIN — IPRATROPIUM BROMIDE AND ALBUTEROL SULFATE 3 ML: 2.5; .5 SOLUTION RESPIRATORY (INHALATION) at 11:56

## 2018-02-09 RX ADMIN — SUCRALFATE 1 G: 1 SUSPENSION ORAL at 17:37

## 2018-02-09 RX ADMIN — GABAPENTIN 600 MG: 300 CAPSULE ORAL at 22:12

## 2018-02-09 RX ADMIN — SUCRALFATE 1 G: 1 SUSPENSION ORAL at 00:19

## 2018-02-09 RX ADMIN — GUAIFENESIN 1200 MG: 600 TABLET, EXTENDED RELEASE ORAL at 10:50

## 2018-02-09 RX ADMIN — DOCUSATE SODIUM 100 MG: 100 CAPSULE ORAL at 22:11

## 2018-02-09 RX ADMIN — GABAPENTIN 300 MG: 300 CAPSULE ORAL at 06:32

## 2018-02-09 RX ADMIN — FUROSEMIDE 40 MG: 10 INJECTION, SOLUTION INTRAMUSCULAR; INTRAVENOUS at 10:49

## 2018-02-09 RX ADMIN — POTASSIUM CHLORIDE 40 MEQ: 750 CAPSULE, EXTENDED RELEASE ORAL at 17:34

## 2018-02-09 RX ADMIN — HYDROCODONE BITARTRATE AND ACETAMINOPHEN 1 TABLET: 7.5; 325 TABLET ORAL at 22:11

## 2018-02-09 RX ADMIN — ZOLPIDEM TARTRATE 10 MG: 5 TABLET ORAL at 22:11

## 2018-02-09 RX ADMIN — IPRATROPIUM BROMIDE AND ALBUTEROL SULFATE 3 ML: 2.5; .5 SOLUTION RESPIRATORY (INHALATION) at 19:44

## 2018-02-10 VITALS
RESPIRATION RATE: 18 BRPM | HEIGHT: 58 IN | TEMPERATURE: 98.7 F | DIASTOLIC BLOOD PRESSURE: 66 MMHG | HEART RATE: 71 BPM | SYSTOLIC BLOOD PRESSURE: 98 MMHG | WEIGHT: 194.5 LBS | OXYGEN SATURATION: 97 % | BODY MASS INDEX: 40.83 KG/M2

## 2018-02-10 LAB
ALBUMIN SERPL-MCNC: 3.7 G/DL (ref 3.5–5)
ALBUMIN/GLOB SERPL: 1.2 G/DL (ref 1.1–2.5)
ALP SERPL-CCNC: 92 U/L (ref 24–120)
ALT SERPL W P-5'-P-CCNC: 38 U/L (ref 0–54)
ANION GAP SERPL CALCULATED.3IONS-SCNC: 10 MMOL/L (ref 4–13)
AST SERPL-CCNC: 34 U/L (ref 7–45)
BASOPHILS # BLD AUTO: 0.03 10*3/MM3 (ref 0–0.2)
BASOPHILS NFR BLD AUTO: 0.3 % (ref 0–2)
BILIRUB SERPL-MCNC: 0.5 MG/DL (ref 0.1–1)
BUN BLD-MCNC: 13 MG/DL (ref 5–21)
BUN/CREAT SERPL: 18.6 (ref 7–25)
CALCIUM SPEC-SCNC: 9.8 MG/DL (ref 8.4–10.4)
CHLORIDE SERPL-SCNC: 99 MMOL/L (ref 98–110)
CO2 SERPL-SCNC: 33 MMOL/L (ref 24–31)
CREAT BLD-MCNC: 0.7 MG/DL (ref 0.5–1.4)
DEPRECATED RDW RBC AUTO: 45.5 FL (ref 40–54)
EOSINOPHIL # BLD AUTO: 0.3 10*3/MM3 (ref 0–0.7)
EOSINOPHIL NFR BLD AUTO: 2.7 % (ref 0–4)
ERYTHROCYTE [DISTWIDTH] IN BLOOD BY AUTOMATED COUNT: 13.4 % (ref 12–15)
GFR SERPL CREATININE-BSD FRML MDRD: 91 ML/MIN/1.73
GLOBULIN UR ELPH-MCNC: 3 GM/DL
GLUCOSE BLD-MCNC: 92 MG/DL (ref 70–100)
GLUCOSE BLDC GLUCOMTR-MCNC: 97 MG/DL (ref 70–130)
HCT VFR BLD AUTO: 36.5 % (ref 37–47)
HGB BLD-MCNC: 12.2 G/DL (ref 12–16)
IMM GRANULOCYTES # BLD: 0.21 10*3/MM3 (ref 0–0.03)
IMM GRANULOCYTES NFR BLD: 1.9 % (ref 0–5)
LYMPHOCYTES # BLD AUTO: 2.59 10*3/MM3 (ref 0.72–4.86)
LYMPHOCYTES NFR BLD AUTO: 23.1 % (ref 15–45)
MCH RBC QN AUTO: 31 PG (ref 28–32)
MCHC RBC AUTO-ENTMCNC: 33.4 G/DL (ref 33–36)
MCV RBC AUTO: 92.9 FL (ref 82–98)
MONOCYTES # BLD AUTO: 0.63 10*3/MM3 (ref 0.19–1.3)
MONOCYTES NFR BLD AUTO: 5.6 % (ref 4–12)
NEUTROPHILS # BLD AUTO: 7.45 10*3/MM3 (ref 1.87–8.4)
NEUTROPHILS NFR BLD AUTO: 66.4 % (ref 39–78)
NRBC BLD MANUAL-RTO: 0 /100 WBC (ref 0–0)
PLATELET # BLD AUTO: 235 10*3/MM3 (ref 130–400)
PMV BLD AUTO: 9.9 FL (ref 6–12)
POTASSIUM BLD-SCNC: 3.4 MMOL/L (ref 3.5–5.3)
PROT SERPL-MCNC: 6.7 G/DL (ref 6.3–8.7)
RBC # BLD AUTO: 3.93 10*6/MM3 (ref 4.2–5.4)
SODIUM BLD-SCNC: 142 MMOL/L (ref 135–145)
WBC NRBC COR # BLD: 11.21 10*3/MM3 (ref 4.8–10.8)

## 2018-02-10 PROCEDURE — 80053 COMPREHEN METABOLIC PANEL: CPT | Performed by: INTERNAL MEDICINE

## 2018-02-10 PROCEDURE — 94799 UNLISTED PULMONARY SVC/PX: CPT

## 2018-02-10 PROCEDURE — 82962 GLUCOSE BLOOD TEST: CPT

## 2018-02-10 PROCEDURE — 85025 COMPLETE CBC W/AUTO DIFF WBC: CPT | Performed by: INTERNAL MEDICINE

## 2018-02-10 PROCEDURE — 0 IOHEXOL 300 MG/ML SOLUTION: Performed by: INTERNAL MEDICINE

## 2018-02-10 PROCEDURE — 25010000002 FUROSEMIDE PER 20 MG: Performed by: INTERNAL MEDICINE

## 2018-02-10 RX ORDER — IPRATROPIUM BROMIDE AND ALBUTEROL SULFATE 2.5; .5 MG/3ML; MG/3ML
3 SOLUTION RESPIRATORY (INHALATION) 4 TIMES DAILY
Qty: 360 ML | Refills: 0 | Status: SHIPPED | OUTPATIENT
Start: 2018-02-10

## 2018-02-10 RX ORDER — FLUCONAZOLE 200 MG/1
200 TABLET ORAL ONCE
Status: COMPLETED | OUTPATIENT
Start: 2018-02-10 | End: 2018-02-10

## 2018-02-10 RX ADMIN — GUAIFENESIN 1200 MG: 600 TABLET, EXTENDED RELEASE ORAL at 10:05

## 2018-02-10 RX ADMIN — DOCUSATE SODIUM 100 MG: 100 CAPSULE ORAL at 10:06

## 2018-02-10 RX ADMIN — FLUCONAZOLE 200 MG: 200 TABLET ORAL at 11:06

## 2018-02-10 RX ADMIN — IPRATROPIUM BROMIDE AND ALBUTEROL SULFATE 3 ML: 2.5; .5 SOLUTION RESPIRATORY (INHALATION) at 03:11

## 2018-02-10 RX ADMIN — GABAPENTIN 300 MG: 300 CAPSULE ORAL at 10:03

## 2018-02-10 RX ADMIN — IOHEXOL 50 ML: 300 INJECTION, SOLUTION INTRAVENOUS at 08:30

## 2018-02-10 RX ADMIN — IPRATROPIUM BROMIDE AND ALBUTEROL SULFATE 3 ML: 2.5; .5 SOLUTION RESPIRATORY (INHALATION) at 07:47

## 2018-02-10 RX ADMIN — HYDROCODONE BITARTRATE AND ACETAMINOPHEN 1 TABLET: 7.5; 325 TABLET ORAL at 10:03

## 2018-02-10 RX ADMIN — POTASSIUM CHLORIDE 40 MEQ: 750 CAPSULE, EXTENDED RELEASE ORAL at 10:04

## 2018-02-10 RX ADMIN — FLUOXETINE HYDROCHLORIDE 60 MG: 20 CAPSULE ORAL at 10:05

## 2018-02-10 RX ADMIN — IPRATROPIUM BROMIDE AND ALBUTEROL SULFATE 3 ML: 2.5; .5 SOLUTION RESPIRATORY (INHALATION) at 00:02

## 2018-02-10 RX ADMIN — FUROSEMIDE 40 MG: 10 INJECTION, SOLUTION INTRAMUSCULAR; INTRAVENOUS at 08:47

## 2018-02-12 LAB — VIRAL CULTURE, GENERAL: NORMAL

## 2018-02-15 ENCOUNTER — HOSPITAL ENCOUNTER (OUTPATIENT)
Dept: GENERAL RADIOLOGY | Facility: HOSPITAL | Age: 43
Discharge: HOME | End: 2018-02-15
Admitting: FAMILY MEDICINE

## 2018-02-15 ENCOUNTER — TRANSCRIBE ORDERS (OUTPATIENT)
Dept: ADMINISTRATIVE | Facility: HOSPITAL | Age: 43
End: 2018-02-15

## 2018-02-15 DIAGNOSIS — J15.9 PNEUMONIA, BACTERIAL: ICD-10-CM

## 2018-02-15 DIAGNOSIS — J96.00 ACUTE RESPIRATORY FAILURE, UNSPECIFIED WHETHER WITH HYPOXIA OR HYPERCAPNIA (HCC): ICD-10-CM

## 2018-02-15 DIAGNOSIS — J15.9 PNEUMONIA, BACTERIAL: Primary | ICD-10-CM

## 2018-02-15 PROCEDURE — 71046 X-RAY EXAM CHEST 2 VIEWS: CPT

## 2018-03-16 LAB
FUNGUS WND CULT: ABNORMAL
FUNGUS WND CULT: ABNORMAL

## 2018-03-21 LAB
Lab: ABNORMAL
Lab: ABNORMAL
MYCOBACTERIUM SPEC CULT: ABNORMAL
MYCOBACTERIUM SPEC CULT: ABNORMAL
NIGHT BLUE STAIN TISS: ABNORMAL
NIGHT BLUE STAIN TISS: ABNORMAL

## 2018-05-06 ENCOUNTER — APPOINTMENT (OUTPATIENT)
Dept: GENERAL RADIOLOGY | Facility: HOSPITAL | Age: 43
End: 2018-05-06

## 2018-05-06 ENCOUNTER — HOSPITAL ENCOUNTER (INPATIENT)
Facility: HOSPITAL | Age: 43
LOS: 7 days | Discharge: HOME HEALTH | End: 2018-05-13
Attending: EMERGENCY MEDICINE | Admitting: INTERNAL MEDICINE

## 2018-05-06 DIAGNOSIS — D68.9 COAGULATION DISORDER (HCC): ICD-10-CM

## 2018-05-06 DIAGNOSIS — R91.8 BILATERAL PULMONARY INFILTRATES ON CHEST X-RAY: ICD-10-CM

## 2018-05-06 DIAGNOSIS — R06.03 RESPIRATORY DISTRESS: Primary | ICD-10-CM

## 2018-05-06 DIAGNOSIS — D72.829 LEUKOCYTOSIS, UNSPECIFIED TYPE: ICD-10-CM

## 2018-05-06 DIAGNOSIS — Z98.84 HISTORY OF ROUX-EN-Y GASTRIC BYPASS: ICD-10-CM

## 2018-05-06 DIAGNOSIS — R06.82 TACHYPNEA: ICD-10-CM

## 2018-05-06 DIAGNOSIS — D64.9 ANEMIA, UNSPECIFIED TYPE: ICD-10-CM

## 2018-05-06 LAB
ALBUMIN SERPL-MCNC: 3.7 G/DL (ref 3.5–5)
ALBUMIN/GLOB SERPL: 1.1 G/DL (ref 1.1–2.5)
ALP SERPL-CCNC: 146 U/L (ref 24–120)
ALT SERPL W P-5'-P-CCNC: 64 U/L (ref 0–54)
ANION GAP SERPL CALCULATED.3IONS-SCNC: 10 MMOL/L (ref 4–13)
ARTERIAL PATENCY WRIST A: ABNORMAL
AST SERPL-CCNC: 80 U/L (ref 7–45)
ATMOSPHERIC PRESS: 750 MMHG
BASE EXCESS BLDA CALC-SCNC: 0.7 MMOL/L (ref 0–2)
BASOPHILS # BLD AUTO: 0.03 10*3/MM3 (ref 0–0.2)
BASOPHILS NFR BLD AUTO: 0.2 % (ref 0–2)
BDY SITE: ABNORMAL
BILIRUB SERPL-MCNC: 0.7 MG/DL (ref 0.1–1)
BODY TEMPERATURE: 37 C
BUN BLD-MCNC: 9 MG/DL (ref 5–21)
BUN/CREAT SERPL: 13.8 (ref 7–25)
CALCIUM SPEC-SCNC: 9.2 MG/DL (ref 8.4–10.4)
CHLORIDE SERPL-SCNC: 103 MMOL/L (ref 98–110)
CO2 SERPL-SCNC: 29 MMOL/L (ref 24–31)
CREAT BLD-MCNC: 0.65 MG/DL (ref 0.5–1.4)
D DIMER PPP FEU-MCNC: <0.22 MG/L (FEU) (ref 0–0.5)
D-LACTATE SERPL-SCNC: 1.9 MMOL/L (ref 0.5–2)
D-LACTATE SERPL-SCNC: 2.5 MMOL/L (ref 0.5–2)
DEPRECATED RDW RBC AUTO: 44.5 FL (ref 40–54)
EOSINOPHIL # BLD AUTO: 0.02 10*3/MM3 (ref 0–0.7)
EOSINOPHIL NFR BLD AUTO: 0.2 % (ref 0–4)
ERYTHROCYTE [DISTWIDTH] IN BLOOD BY AUTOMATED COUNT: 13.6 % (ref 12–15)
GFR SERPL CREATININE-BSD FRML MDRD: 99 ML/MIN/1.73
GLOBULIN UR ELPH-MCNC: 3.3 GM/DL
GLUCOSE BLD-MCNC: 126 MG/DL (ref 70–100)
HCO3 BLDA-SCNC: 25.6 MMOL/L (ref 20–26)
HCT VFR BLD AUTO: 37.5 % (ref 37–47)
HGB BLD-MCNC: 12.3 G/DL (ref 12–16)
HOLD SPECIMEN: NORMAL
HOROWITZ INDEX BLD+IHG-RTO: 100 %
IMM GRANULOCYTES # BLD: 0.17 10*3/MM3 (ref 0–0.03)
IMM GRANULOCYTES NFR BLD: 1.3 % (ref 0–5)
INR PPP: 2.16 (ref 0.91–1.09)
LYMPHOCYTES # BLD AUTO: 0.6 10*3/MM3 (ref 0.72–4.86)
LYMPHOCYTES NFR BLD AUTO: 4.5 % (ref 15–45)
Lab: ABNORMAL
MCH RBC QN AUTO: 29.3 PG (ref 28–32)
MCHC RBC AUTO-ENTMCNC: 32.8 G/DL (ref 33–36)
MCV RBC AUTO: 89.3 FL (ref 82–98)
MODALITY: ABNORMAL
MONOCYTES # BLD AUTO: 0.35 10*3/MM3 (ref 0.19–1.3)
MONOCYTES NFR BLD AUTO: 2.6 % (ref 4–12)
NEUTROPHILS # BLD AUTO: 12.13 10*3/MM3 (ref 1.87–8.4)
NEUTROPHILS NFR BLD AUTO: 91.2 % (ref 39–78)
NRBC BLD MANUAL-RTO: 0 /100 WBC (ref 0–0)
NT-PROBNP SERPL-MCNC: 2160 PG/ML (ref 0–450)
PCO2 BLDA: 41.4 MM HG (ref 35–45)
PH BLDA: 7.4 PH UNITS (ref 7.35–7.45)
PLATELET # BLD AUTO: 223 10*3/MM3 (ref 130–400)
PMV BLD AUTO: 10.2 FL (ref 6–12)
PO2 BLDA: 60 MM HG (ref 83–108)
POTASSIUM BLD-SCNC: 4.2 MMOL/L (ref 3.5–5.3)
PROT SERPL-MCNC: 7 G/DL (ref 6.3–8.7)
PROTHROMBIN TIME: 24.9 SECONDS (ref 11.9–14.6)
RBC # BLD AUTO: 4.2 10*6/MM3 (ref 4.2–5.4)
SAO2 % BLDCOA: 92.6 % (ref 94–99)
SODIUM BLD-SCNC: 142 MMOL/L (ref 135–145)
TROPONIN I SERPL-MCNC: <0.012 NG/ML (ref 0–0.03)
VENTILATOR MODE: ABNORMAL
WBC NRBC COR # BLD: 13.3 10*3/MM3 (ref 4.8–10.8)
WHOLE BLOOD HOLD SPECIMEN: NORMAL
WHOLE BLOOD HOLD SPECIMEN: NORMAL

## 2018-05-06 PROCEDURE — 85025 COMPLETE CBC W/AUTO DIFF WBC: CPT | Performed by: EMERGENCY MEDICINE

## 2018-05-06 PROCEDURE — 80053 COMPREHEN METABOLIC PANEL: CPT | Performed by: EMERGENCY MEDICINE

## 2018-05-06 PROCEDURE — 85610 PROTHROMBIN TIME: CPT | Performed by: EMERGENCY MEDICINE

## 2018-05-06 PROCEDURE — 94799 UNLISTED PULMONARY SVC/PX: CPT

## 2018-05-06 PROCEDURE — 25010000002 FUROSEMIDE PER 20 MG: Performed by: EMERGENCY MEDICINE

## 2018-05-06 PROCEDURE — 83880 ASSAY OF NATRIURETIC PEPTIDE: CPT | Performed by: EMERGENCY MEDICINE

## 2018-05-06 PROCEDURE — 82803 BLOOD GASES ANY COMBINATION: CPT

## 2018-05-06 PROCEDURE — 85379 FIBRIN DEGRADATION QUANT: CPT | Performed by: EMERGENCY MEDICINE

## 2018-05-06 PROCEDURE — 71045 X-RAY EXAM CHEST 1 VIEW: CPT

## 2018-05-06 PROCEDURE — 25010000002 METHYLPREDNISOLONE PER 125 MG: Performed by: EMERGENCY MEDICINE

## 2018-05-06 PROCEDURE — 5A09457 ASSISTANCE WITH RESPIRATORY VENTILATION, 24-96 CONSECUTIVE HOURS, CONTINUOUS POSITIVE AIRWAY PRESSURE: ICD-10-PCS | Performed by: INTERNAL MEDICINE

## 2018-05-06 PROCEDURE — 25010000002 LORAZEPAM PER 2 MG: Performed by: EMERGENCY MEDICINE

## 2018-05-06 PROCEDURE — 25010000002 PIPERACILLIN SOD-TAZOBACTAM PER 1 G: Performed by: EMERGENCY MEDICINE

## 2018-05-06 PROCEDURE — 94640 AIRWAY INHALATION TREATMENT: CPT

## 2018-05-06 PROCEDURE — 87040 BLOOD CULTURE FOR BACTERIA: CPT | Performed by: EMERGENCY MEDICINE

## 2018-05-06 PROCEDURE — 94660 CPAP INITIATION&MGMT: CPT

## 2018-05-06 PROCEDURE — 99285 EMERGENCY DEPT VISIT HI MDM: CPT

## 2018-05-06 PROCEDURE — 93010 ELECTROCARDIOGRAM REPORT: CPT | Performed by: INTERNAL MEDICINE

## 2018-05-06 PROCEDURE — 83605 ASSAY OF LACTIC ACID: CPT | Performed by: EMERGENCY MEDICINE

## 2018-05-06 PROCEDURE — 93005 ELECTROCARDIOGRAM TRACING: CPT | Performed by: EMERGENCY MEDICINE

## 2018-05-06 PROCEDURE — 36600 WITHDRAWAL OF ARTERIAL BLOOD: CPT

## 2018-05-06 PROCEDURE — 84484 ASSAY OF TROPONIN QUANT: CPT | Performed by: EMERGENCY MEDICINE

## 2018-05-06 RX ORDER — FUROSEMIDE 10 MG/ML
40 INJECTION INTRAMUSCULAR; INTRAVENOUS ONCE
Status: COMPLETED | OUTPATIENT
Start: 2018-05-06 | End: 2018-05-06

## 2018-05-06 RX ORDER — IPRATROPIUM BROMIDE AND ALBUTEROL SULFATE 2.5; .5 MG/3ML; MG/3ML
3 SOLUTION RESPIRATORY (INHALATION) ONCE
Status: COMPLETED | OUTPATIENT
Start: 2018-05-06 | End: 2018-05-06

## 2018-05-06 RX ORDER — PANTOPRAZOLE SODIUM 40 MG/1
40 TABLET, DELAYED RELEASE ORAL DAILY
COMMUNITY
End: 2018-05-13 | Stop reason: HOSPADM

## 2018-05-06 RX ORDER — LORAZEPAM 2 MG/ML
0.5 INJECTION INTRAMUSCULAR ONCE
Status: COMPLETED | OUTPATIENT
Start: 2018-05-06 | End: 2018-05-06

## 2018-05-06 RX ORDER — METHYLPREDNISOLONE SODIUM SUCCINATE 125 MG/2ML
125 INJECTION, POWDER, LYOPHILIZED, FOR SOLUTION INTRAMUSCULAR; INTRAVENOUS ONCE
Status: COMPLETED | OUTPATIENT
Start: 2018-05-06 | End: 2018-05-06

## 2018-05-06 RX ORDER — SODIUM CHLORIDE 0.9 % (FLUSH) 0.9 %
10 SYRINGE (ML) INJECTION AS NEEDED
Status: DISCONTINUED | OUTPATIENT
Start: 2018-05-06 | End: 2018-05-13 | Stop reason: HOSPADM

## 2018-05-06 RX ADMIN — LORAZEPAM 0.5 MG: 2 INJECTION INTRAMUSCULAR; INTRAVENOUS at 18:26

## 2018-05-06 RX ADMIN — FUROSEMIDE 40 MG: 10 INJECTION, SOLUTION INTRAMUSCULAR; INTRAVENOUS at 19:35

## 2018-05-06 RX ADMIN — METHYLPREDNISOLONE SODIUM SUCCINATE 125 MG: 125 INJECTION, POWDER, FOR SOLUTION INTRAMUSCULAR; INTRAVENOUS at 18:27

## 2018-05-06 RX ADMIN — TAZOBACTAM SODIUM AND PIPERACILLIN SODIUM 4.5 G: 500; 4 INJECTION, SOLUTION INTRAVENOUS at 19:31

## 2018-05-06 RX ADMIN — IPRATROPIUM BROMIDE AND ALBUTEROL SULFATE 3 ML: 2.5; .5 SOLUTION RESPIRATORY (INHALATION) at 18:28

## 2018-05-06 NOTE — ED NOTES
"Pt states she does not want to be placed on the bipap. Pt states, \" yes\" to being intubated if needed.     Arpita Garcia RN  05/06/18 4898    "

## 2018-05-06 NOTE — ED PROVIDER NOTES
Subjective   Patient is a 42-year-old female who presents with acute respiratory distress.  History of COPD, bipolar.  Patient was discharge in February with ARDS and a story failure requiring intubation.  Today, EMS was called because patient's son noted blue lips and difficulty breathing.  Saturations were in the 50s per EMS.  She received a DuoNeb prior to EMS showing up.  They did not give any treatments but did place her on 15 L nonrebreather mask.  Patient arrived and slight increased respiratory rate.  She was speaking in full sentences.  She states she does not know what happened today.  She endorses a cough which has been productive.  No hemoptysis.  Denies leg swelling or CHF history.  She does have chronic vomiting according to her which is unchanged.  She also noted chest pain earlier today but cannot describe this.  Denies any fevers.            Review of Systems   Constitutional: Negative for chills, diaphoresis, fatigue and fever.   HENT: Negative for sore throat.    Eyes: Negative for visual disturbance.   Respiratory: Positive for cough and shortness of breath. Negative for chest tightness, wheezing and stridor.    Cardiovascular: Positive for chest pain. Negative for palpitations and leg swelling.   Gastrointestinal: Positive for vomiting. Negative for abdominal pain, constipation, diarrhea and nausea.   Genitourinary: Negative for hematuria.   Musculoskeletal: Negative for back pain.   Skin: Negative for rash.   Neurological: Positive for headaches.   Psychiatric/Behavioral: Negative for confusion.       Past Medical History:   Diagnosis Date   • Acute retention of urine    • Anxiety and depression    • Bipolar 1 disorder    • COPD (chronic obstructive pulmonary disease)    • DVT (deep venous thrombosis)    • GERD (gastroesophageal reflux disease)    • Hypoglycemia    • Insomnia    • PTSD (post-traumatic stress disorder)        Allergies   Allergen Reactions   • Morphine Anaphylaxis   • Aspirin  Other (See Comments)     Child allergy, unsure what reaction  Child allergy, unsure what reaction   • Morphine And Related    • Nsaids    • Quetiapine Other (See Comments)     Muscle spasms  Muscle spasms   • Quetiapine Fumarate    • Salicylates    • Codeine Rash       Past Surgical History:   Procedure Laterality Date   • ABDOMINOPLASTY     • APPENDECTOMY     • BRONCHOSCOPY N/A 2/2/2018    Procedure: BRONCHOSCOPY AT BEDSIDE;  Surgeon: Earnest Ba MD;  Location: [facility] ENDOSCOPY;  Service:    • CHOLECYSTECTOMY     • ENDOSCOPY N/A 1/29/2018    Procedure: ESOPHAGOGASTRODUODENOSCOPY WITH ANESTHESIA;  Surgeon: Aime Reyna MD;  Location: [facility] ENDOSCOPY;  Service:    • GASTRIC BYPASS     • HYSTERECTOMY         Family History   Problem Relation Age of Onset   • Cancer Mother    • Cancer Father    • HIV Brother        Social History     Social History   • Marital status:      Social History Main Topics   • Smoking status: Current Every Day Smoker     Packs/day: 0.25     Types: Cigarettes   • Smokeless tobacco: Never Used   • Alcohol use No   • Drug use: No   • Sexual activity: No     Other Topics Concern   • Not on file       Lab Results (last 24 hours)     Procedure Component Value Units Date/Time    Blood Gas, Arterial [[id number]]  (Abnormal) Collected:  05/06/18 1823    Specimen:  Arterial Blood Updated:  05/06/18 1831     Site Right Brachial     Luis's Test N/A     pH, Arterial 7.400 pH units      pCO2, Arterial 41.4 mm Hg      pO2, Arterial 60.0 (L) mm Hg      HCO3, Arterial 25.6 mmol/L      Base Excess, Arterial 0.7 mmol/L      O2 Saturation, Arterial 92.6 (L) %      Temperature 37.0 C      Barometric Pressure for Blood Gas 750 mmHg      Modality NRB     FIO2 100 %      Ventilator Mode NA     Collected by [id number]    CBC & Differential [[id number]] Collected:  05/06/18 1828    Specimen:  Blood Updated:  05/06/18 1838    Narrative:       The following orders were created for panel order CBC &  Differential.  Procedure                               Abnormality         Status                     ---------                               -----------         ------                     CBC Auto Differential[[id number]]        Abnormal            Final result                 Please view results for these tests on the individual orders.    Comprehensive Metabolic Panel [[id number]]  (Abnormal) Collected:  05/06/18 1828    Specimen:  Blood Updated:  05/06/18 1854     Glucose 126 (H) mg/dL      BUN 9 mg/dL      Creatinine 0.65 mg/dL      Sodium 142 mmol/L      Potassium 4.2 mmol/L      Chloride 103 mmol/L      CO2 29.0 mmol/L      Calcium 9.2 mg/dL      Total Protein 7.0 g/dL      Albumin 3.70 g/dL      ALT (SGPT) 64 (H) U/L      AST (SGOT) 80 (H) U/L      Alkaline Phosphatase 146 (H) U/L      Total Bilirubin 0.7 mg/dL      eGFR Non African Amer 99 mL/min/1.73      Globulin 3.3 gm/dL      A/G Ratio 1.1 g/dL      BUN/Creatinine Ratio 13.8     Anion Gap 10.0 mmol/L     BNP [[id number]]  (Abnormal) Collected:  05/06/18 1828    Specimen:  Blood Updated:  05/06/18 1905     proBNP 2,160.0 (H) pg/mL     Troponin [[id number]]  (Normal) Collected:  05/06/18 1828    Specimen:  Blood Updated:  05/06/18 1905     Troponin I <0.012 ng/mL     Protime-INR [[id number]]  (Abnormal) Collected:  05/06/18 1828    Specimen:  Blood Updated:  05/06/18 1903     Protime 24.9 (H) Seconds      INR 2.16 (H)    CBC Auto Differential [[id number]]  (Abnormal) Collected:  05/06/18 1828    Specimen:  Blood Updated:  05/06/18 1838     WBC 13.30 (H) 10*3/mm3      RBC 4.20 10*6/mm3      Hemoglobin 12.3 g/dL      Hematocrit 37.5 %      MCV 89.3 fL      MCH 29.3 pg      MCHC 32.8 (L) g/dL      RDW 13.6 %      RDW-SD 44.5 fl      MPV 10.2 fL      Platelets 223 10*3/mm3      Neutrophil % 91.2 (H) %      Lymphocyte % 4.5 (L) %      Monocyte % 2.6 (L) %      Eosinophil % 0.2 %      Basophil % 0.2 %      Immature Grans % 1.3 %      Neutrophils, Absolute 12.13 (H)  10*3/mm3      Lymphocytes, Absolute 0.60 (L) 10*3/mm3      Monocytes, Absolute 0.35 10*3/mm3      Eosinophils, Absolute 0.02 10*3/mm3      Basophils, Absolute 0.03 10*3/mm3      Immature Grans, Absolute 0.17 (H) 10*3/mm3      nRBC 0.0 /100 WBC     D-dimer, Quantitative [[id number]]  (Normal) Collected:  05/06/18 1828    Specimen:  Blood Updated:  05/06/18 1903     D-Dimer, Quantitative <0.22 mg/L (FEU)     Narrative:       Reference Range is 0-0.50 mg/L FEU. However, results <0.50 mg/L FEU tends to rule out DVT or PE. Results >0.50 mg/L FEU are not useful in predicting absence or presence of DVT or PE.    Blood Culture - Blood, [[id number]] Collected:  05/06/18 1828    Specimen:  Blood from Arm, Left Updated:  05/06/18 1909    Lactic Acid, Plasma [[id number]]  (Abnormal) Collected:  05/06/18 1828    Specimen:  Blood Updated:  05/06/18 1856     Lactate 2.5 (C) mmol/L     Lactic Acid, Reflex Timer (This will reflex a repeat order 3-3:15 hours after ordered.) [[id number]] Collected:  05/06/18 1828    Specimen:  Blood Updated:  05/06/18 1856    Blood Culture - Blood, [[id number]] Collected:  05/06/18 1910    Specimen:  Blood from Arm, Right Updated:  05/06/18 1919          Objective   Physical Exam   Constitutional: She is oriented to person, place, and time. She appears well-nourished. She appears ill. She appears distressed.   Heart rate 103, respiratory rate 27, SPO2 92% on 15 L nonrebreather mask   HENT:   Head: Atraumatic.   Mouth/Throat: Oropharynx is clear and moist and mucous membranes are normal.   Eyes: EOM are normal. Pupils are equal, round, and reactive to light.   Neck: Normal range of motion. Neck supple. No JVD present.   Cardiovascular: Normal rate, regular rhythm and normal heart sounds.  Exam reveals no gallop and no friction rub.    No murmur heard.  Pulmonary/Chest: Accessory muscle usage present. No stridor. Tachypnea noted. She is in respiratory distress. She has no decreased breath sounds. She has no  wheezes. She has rhonchi. She has no rales.   Abdominal: Soft. There is no tenderness.   Musculoskeletal: She exhibits no edema.   Neurological: She is alert and oriented to person, place, and time.   Skin: Skin is warm and dry. Capillary refill takes less than 2 seconds.   Psychiatric: She has a normal mood and affect.   Nursing note and vitals reviewed.      ECG 12 Lead    Date/Time: 5/6/2018 6:40 PM  Performed by: ROXANE RODRIGUEZ  Authorized by: ROXANE RODRIGUEZ   Interpreted by physician  Comparison: not compared with previous ECG   Rhythm: sinus rhythm  Rate: normal  QRS axis: normal  Clinical impression: non-specific ECG  Comments: Nonspecific ST and T-wave changes.      Critical Care  Performed by: ROXANE RODRIGUEZ  Authorized by: ROXANE RODRIGUEZ     Critical care provider statement:     Critical care time (minutes):  50    Critical care time was exclusive of:  Separately billable procedures and treating other patients    Critical care was necessary to treat or prevent imminent or life-threatening deterioration of the following conditions:  Respiratory failure    Critical care was time spent personally by me on the following activities:  Development of treatment plan with patient or surrogate, evaluation of patient's response to treatment, examination of patient, obtaining history from patient or surrogate, ordering and performing treatments and interventions, ordering and review of laboratory studies, ordering and review of radiographic studies, pulse oximetry, re-evaluation of patient's condition and review of old charts    I assumed direction of critical care for this patient from another provider in my specialty: no               XR Chest 1 View   Final Result   1. Bilateral interstitial infiltrates. Differential considerations   include pneumonia versus pulmonary edema.           This report was finalized on 05/06/2018 19:07 by Dr. Zach Pham MD.          /94   Pulse 95   Temp  "98.7 °F (37.1 °C) (Temporal Artery )   Resp 26   Ht 149.9 cm (59\")   Wt 86.6 kg (191 lb)   SpO2 95%   BMI 38.58 kg/m²     ED Course    ED Course   Value Comment By Time   WBC: (!) 13.30 (Reviewed) Yung Mancini MD 05/06 1905   Lactate, Venous: (!!) 2.5 (Reviewed) Yung Mancini MD 05/06 1905   Alkaline Phosphatase: (!) 146 (Reviewed) Yung Mancini MD 05/06 1905   AST (SGOT): (!) 80 (Reviewed) Yung Mancini MD 05/06 1905   ALT (SGPT): (!) 64 (Reviewed) Yung Mancini MD 05/06 1905   pO2, Arterial: (!) 60.0 (Reviewed) Yung Mancini MD 05/06 1905   INR: (!) 2.16 (Reviewed) Yung Mancini MD 05/06 1905    This is a 42-year-old female who presents with hypoxia and acute respiratory distress.  Sats 50% per EMS.  History of ARDS and hypoxic respiratory failure.  Patient was on 15 L nonrebreather mask upon arrival.  Lungs had rhonchi bilaterally.  No signs of fluid overload, no JVD, no pitting edema.  I did have extensive discussion with the patient initially as she stated she did not want BiPAP.  I did talk to her about indications and recommended BiPAP.  She was agreeable to try.  She was placed on BiPAP and did have improvement and was comfortable.  We did give her 0.5 mg of Ativan.  Her white count is 13,000, lactate 2.5.  Her BNP is 2000 which is largely unchanged from baseline.  INR therapeutic range.  Labs otherwise benign.  Chest x-ray shows bilateral pulmonary infiltrates versus edema.  She did receive Zosyn.  We will admit her for further respiratory care and management.  She also received albuterol and steroids. Yung Mancini MD 05/06 1915    Dr. Charles agrees for admission.  Recommends 40 mg IV Lasix. Yung Mancini MD 05/06 1927       Medications   sodium chloride 0.9 % flush 10 mL (not administered)   piperacillin-tazobactam (ZOSYN) 4.5 g in iso-osmotic dextrose 100 mL IVPB (premix) (not administered)   LORazepam (ATIVAN) injection 0.5 mg (0.5 mg " Intravenous Given 5/6/18 1826)   ipratropium-albuterol (DUO-NEB) nebulizer solution 3 mL (3 mL Nebulization Given 5/6/18 1828)   methylPREDNISolone sodium succinate (SOLU-Medrol) injection 125 mg (125 mg Intravenous Given 5/6/18 1827)            MDM  Number of Diagnoses or Management Options  Bilateral pulmonary infiltrates on chest x-ray: new and requires workup  Leukocytosis, unspecified type: new and requires workup  Respiratory distress: new and requires workup  Tachypnea: new and requires workup     Amount and/or Complexity of Data Reviewed  Clinical lab tests: reviewed and ordered  Tests in the radiology section of CPT®: ordered and reviewed  Decide to obtain previous medical records or to obtain history from someone other than the patient: yes    Risk of Complications, Morbidity, and/or Mortality  Presenting problems: high  Diagnostic procedures: low  Management options: moderate    Critical Care  Total time providing critical care: 30-74 minutes    Patient Progress  Patient progress: improved      Final diagnoses:   Respiratory distress   Bilateral pulmonary infiltrates on chest x-ray   Leukocytosis, unspecified type   Tachypnea          Yung Mancini MD  05/06/18 6567

## 2018-05-07 ENCOUNTER — APPOINTMENT (OUTPATIENT)
Dept: GENERAL RADIOLOGY | Facility: HOSPITAL | Age: 43
End: 2018-05-07

## 2018-05-07 LAB
ALBUMIN SERPL-MCNC: 3.8 G/DL (ref 3.5–5)
ALBUMIN/GLOB SERPL: 1.1 G/DL (ref 1.1–2.5)
ALP SERPL-CCNC: 164 U/L (ref 24–120)
ALT SERPL W P-5'-P-CCNC: 62 U/L (ref 0–54)
ANION GAP SERPL CALCULATED.3IONS-SCNC: 10 MMOL/L (ref 4–13)
ARTERIAL PATENCY WRIST A: ABNORMAL
ARTERIAL PATENCY WRIST A: POSITIVE
AST SERPL-CCNC: 60 U/L (ref 7–45)
ATMOSPHERIC PRESS: 752 MMHG
ATMOSPHERIC PRESS: 753 MMHG
BASE EXCESS BLDA CALC-SCNC: 5.8 MMOL/L (ref 0–2)
BASE EXCESS BLDA CALC-SCNC: 6.2 MMOL/L (ref 0–2)
BASOPHILS # BLD AUTO: 0.02 10*3/MM3 (ref 0–0.2)
BASOPHILS NFR BLD AUTO: 0.2 % (ref 0–2)
BDY SITE: ABNORMAL
BDY SITE: ABNORMAL
BILIRUB SERPL-MCNC: 0.8 MG/DL (ref 0.1–1)
BODY TEMPERATURE: 37 C
BODY TEMPERATURE: 37 C
BUN BLD-MCNC: 11 MG/DL (ref 5–21)
BUN/CREAT SERPL: 16.7 (ref 7–25)
CALCIUM SPEC-SCNC: 9.4 MG/DL (ref 8.4–10.4)
CHLORIDE SERPL-SCNC: 100 MMOL/L (ref 98–110)
CO2 SERPL-SCNC: 32 MMOL/L (ref 24–31)
CREAT BLD-MCNC: 0.66 MG/DL (ref 0.5–1.4)
DEPRECATED RDW RBC AUTO: 44 FL (ref 40–54)
EOSINOPHIL # BLD AUTO: 0 10*3/MM3 (ref 0–0.7)
EOSINOPHIL NFR BLD AUTO: 0 % (ref 0–4)
EPAP: 6
ERYTHROCYTE [DISTWIDTH] IN BLOOD BY AUTOMATED COUNT: 13.5 % (ref 12–15)
GAS FLOW AIRWAY: 5 LPM
GFR SERPL CREATININE-BSD FRML MDRD: 97 ML/MIN/1.73
GLOBULIN UR ELPH-MCNC: 3.4 GM/DL
GLUCOSE BLD-MCNC: 160 MG/DL (ref 70–100)
HCO3 BLDA-SCNC: 30.9 MMOL/L (ref 20–26)
HCO3 BLDA-SCNC: 31.1 MMOL/L (ref 20–26)
HCT VFR BLD AUTO: 39.7 % (ref 37–47)
HGB BLD-MCNC: 12.9 G/DL (ref 12–16)
HOROWITZ INDEX BLD+IHG-RTO: 50 %
IMM GRANULOCYTES # BLD: 0.13 10*3/MM3 (ref 0–0.03)
IMM GRANULOCYTES NFR BLD: 1.3 % (ref 0–5)
INR PPP: 1.63 (ref 0.91–1.09)
LYMPHOCYTES # BLD AUTO: 0.5 10*3/MM3 (ref 0.72–4.86)
LYMPHOCYTES NFR BLD AUTO: 4.9 % (ref 15–45)
Lab: ABNORMAL
Lab: ABNORMAL
MAGNESIUM SERPL-MCNC: 2 MG/DL (ref 1.4–2.2)
MCH RBC QN AUTO: 28.9 PG (ref 28–32)
MCHC RBC AUTO-ENTMCNC: 32.5 G/DL (ref 33–36)
MCV RBC AUTO: 89 FL (ref 82–98)
MODALITY: ABNORMAL
MODALITY: ABNORMAL
MONOCYTES # BLD AUTO: 0.13 10*3/MM3 (ref 0.19–1.3)
MONOCYTES NFR BLD AUTO: 1.3 % (ref 4–12)
NEUTROPHILS # BLD AUTO: 9.52 10*3/MM3 (ref 1.87–8.4)
NEUTROPHILS NFR BLD AUTO: 92.3 % (ref 39–78)
NRBC BLD MANUAL-RTO: 0 /100 WBC (ref 0–0)
NT-PROBNP SERPL-MCNC: 2440 PG/ML (ref 0–450)
PCO2 BLDA: 43.7 MM HG (ref 35–45)
PCO2 BLDA: 46.5 MM HG (ref 35–45)
PH BLDA: 7.43 PH UNITS (ref 7.35–7.45)
PH BLDA: 7.46 PH UNITS (ref 7.35–7.45)
PHOSPHATE SERPL-MCNC: 3.4 MG/DL (ref 2.5–4.5)
PLATELET # BLD AUTO: 245 10*3/MM3 (ref 130–400)
PMV BLD AUTO: 10.9 FL (ref 6–12)
PO2 BLDA: 57.1 MM HG (ref 83–108)
PO2 BLDA: 67.7 MM HG (ref 83–108)
POTASSIUM BLD-SCNC: 3.9 MMOL/L (ref 3.5–5.3)
PROT SERPL-MCNC: 7.2 G/DL (ref 6.3–8.7)
PROTHROMBIN TIME: 19.9 SECONDS (ref 11.9–14.6)
RBC # BLD AUTO: 4.46 10*6/MM3 (ref 4.2–5.4)
SAO2 % BLDCOA: 91.5 % (ref 94–99)
SAO2 % BLDCOA: 94.7 % (ref 94–99)
SET MECH RESP RATE: 8
SODIUM BLD-SCNC: 142 MMOL/L (ref 135–145)
TSH SERPL DL<=0.05 MIU/L-ACNC: 0.83 MIU/ML (ref 0.47–4.68)
VENTILATOR MODE: ABNORMAL
VENTILATOR MODE: ABNORMAL
VT ON VENT VENT: 340 ML
WBC NRBC COR # BLD: 10.3 10*3/MM3 (ref 4.8–10.8)

## 2018-05-07 PROCEDURE — 25010000002 AZITHROMYCIN PER 500 MG: Performed by: FAMILY MEDICINE

## 2018-05-07 PROCEDURE — 94799 UNLISTED PULMONARY SVC/PX: CPT

## 2018-05-07 PROCEDURE — 85610 PROTHROMBIN TIME: CPT | Performed by: INTERNAL MEDICINE

## 2018-05-07 PROCEDURE — 36600 WITHDRAWAL OF ARTERIAL BLOOD: CPT

## 2018-05-07 PROCEDURE — 82803 BLOOD GASES ANY COMBINATION: CPT

## 2018-05-07 PROCEDURE — 25010000002 CEFTRIAXONE PER 250 MG: Performed by: INTERNAL MEDICINE

## 2018-05-07 PROCEDURE — 84100 ASSAY OF PHOSPHORUS: CPT | Performed by: INTERNAL MEDICINE

## 2018-05-07 PROCEDURE — 85025 COMPLETE CBC W/AUTO DIFF WBC: CPT | Performed by: INTERNAL MEDICINE

## 2018-05-07 PROCEDURE — 80053 COMPREHEN METABOLIC PANEL: CPT | Performed by: INTERNAL MEDICINE

## 2018-05-07 PROCEDURE — 83735 ASSAY OF MAGNESIUM: CPT | Performed by: INTERNAL MEDICINE

## 2018-05-07 PROCEDURE — 25010000002 FUROSEMIDE PER 20 MG: Performed by: INTERNAL MEDICINE

## 2018-05-07 PROCEDURE — 25010000002 METHYLPREDNISOLONE PER 125 MG: Performed by: INTERNAL MEDICINE

## 2018-05-07 PROCEDURE — 84443 ASSAY THYROID STIM HORMONE: CPT | Performed by: INTERNAL MEDICINE

## 2018-05-07 PROCEDURE — 83880 ASSAY OF NATRIURETIC PEPTIDE: CPT | Performed by: INTERNAL MEDICINE

## 2018-05-07 PROCEDURE — 94660 CPAP INITIATION&MGMT: CPT

## 2018-05-07 PROCEDURE — 71045 X-RAY EXAM CHEST 1 VIEW: CPT

## 2018-05-07 RX ORDER — FLUOXETINE HYDROCHLORIDE 20 MG/1
60 CAPSULE ORAL DAILY
Status: DISCONTINUED | OUTPATIENT
Start: 2018-05-07 | End: 2018-05-13 | Stop reason: HOSPADM

## 2018-05-07 RX ORDER — IPRATROPIUM BROMIDE AND ALBUTEROL SULFATE 2.5; .5 MG/3ML; MG/3ML
3 SOLUTION RESPIRATORY (INHALATION) EVERY 4 HOURS PRN
Status: DISCONTINUED | OUTPATIENT
Start: 2018-05-07 | End: 2018-05-13 | Stop reason: HOSPADM

## 2018-05-07 RX ORDER — LITHIUM CARBONATE 300 MG/1
300 CAPSULE ORAL NIGHTLY
Status: DISCONTINUED | OUTPATIENT
Start: 2018-05-07 | End: 2018-05-13 | Stop reason: HOSPADM

## 2018-05-07 RX ORDER — SODIUM CHLORIDE 0.9 % (FLUSH) 0.9 %
1-10 SYRINGE (ML) INJECTION AS NEEDED
Status: DISCONTINUED | OUTPATIENT
Start: 2018-05-07 | End: 2018-05-13 | Stop reason: HOSPADM

## 2018-05-07 RX ORDER — ZOLPIDEM TARTRATE 5 MG/1
10 TABLET ORAL NIGHTLY
Status: DISCONTINUED | OUTPATIENT
Start: 2018-05-07 | End: 2018-05-08

## 2018-05-07 RX ORDER — WARFARIN SODIUM 7.5 MG/1
7.5 TABLET ORAL
Status: DISCONTINUED | OUTPATIENT
Start: 2018-05-08 | End: 2018-05-09

## 2018-05-07 RX ORDER — PROMETHAZINE HYDROCHLORIDE 25 MG/1
25 TABLET ORAL 2 TIMES DAILY PRN
Status: DISCONTINUED | OUTPATIENT
Start: 2018-05-07 | End: 2018-05-09

## 2018-05-07 RX ORDER — GABAPENTIN 300 MG/1
300 CAPSULE ORAL EVERY 12 HOURS SCHEDULED
Status: DISCONTINUED | OUTPATIENT
Start: 2018-05-07 | End: 2018-05-07

## 2018-05-07 RX ORDER — METHYLPREDNISOLONE SODIUM SUCCINATE 125 MG/2ML
60 INJECTION, POWDER, LYOPHILIZED, FOR SOLUTION INTRAMUSCULAR; INTRAVENOUS EVERY 6 HOURS
Status: DISCONTINUED | OUTPATIENT
Start: 2018-05-07 | End: 2018-05-08

## 2018-05-07 RX ORDER — DIAZEPAM 5 MG/1
5 TABLET ORAL 3 TIMES DAILY
Status: DISCONTINUED | OUTPATIENT
Start: 2018-05-07 | End: 2018-05-13 | Stop reason: HOSPADM

## 2018-05-07 RX ORDER — GABAPENTIN 300 MG/1
600 CAPSULE ORAL NIGHTLY
Status: DISCONTINUED | OUTPATIENT
Start: 2018-05-07 | End: 2018-05-13 | Stop reason: HOSPADM

## 2018-05-07 RX ORDER — LEVOTHYROXINE SODIUM 0.1 MG/1
100 TABLET ORAL DAILY
Status: DISCONTINUED | OUTPATIENT
Start: 2018-05-07 | End: 2018-05-07

## 2018-05-07 RX ORDER — ONDANSETRON 2 MG/ML
4 INJECTION INTRAMUSCULAR; INTRAVENOUS EVERY 6 HOURS PRN
Status: DISCONTINUED | OUTPATIENT
Start: 2018-05-07 | End: 2018-05-13 | Stop reason: HOSPADM

## 2018-05-07 RX ORDER — HYDROCODONE BITARTRATE AND ACETAMINOPHEN 10; 325 MG/1; MG/1
1 TABLET ORAL 3 TIMES DAILY
Status: DISCONTINUED | OUTPATIENT
Start: 2018-05-07 | End: 2018-05-08

## 2018-05-07 RX ORDER — WARFARIN SODIUM 7.5 MG/1
7.5 TABLET ORAL
COMMUNITY
End: 2018-05-13 | Stop reason: HOSPADM

## 2018-05-07 RX ORDER — ACETAMINOPHEN 325 MG/1
650 TABLET ORAL EVERY 4 HOURS PRN
Status: DISCONTINUED | OUTPATIENT
Start: 2018-05-07 | End: 2018-05-13 | Stop reason: HOSPADM

## 2018-05-07 RX ORDER — WARFARIN SODIUM 5 MG/1
5 TABLET ORAL 3 TIMES WEEKLY
Status: DISCONTINUED | OUTPATIENT
Start: 2018-05-07 | End: 2018-05-09

## 2018-05-07 RX ORDER — SUCRALFATE ORAL 1 G/10ML
1 SUSPENSION ORAL EVERY 6 HOURS SCHEDULED
Status: DISCONTINUED | OUTPATIENT
Start: 2018-05-07 | End: 2018-05-07

## 2018-05-07 RX ORDER — IPRATROPIUM BROMIDE AND ALBUTEROL SULFATE 2.5; .5 MG/3ML; MG/3ML
3 SOLUTION RESPIRATORY (INHALATION)
Status: DISCONTINUED | OUTPATIENT
Start: 2018-05-07 | End: 2018-05-09

## 2018-05-07 RX ORDER — ACETAMINOPHEN 650 MG/1
650 SUPPOSITORY RECTAL EVERY 4 HOURS PRN
Status: DISCONTINUED | OUTPATIENT
Start: 2018-05-07 | End: 2018-05-09

## 2018-05-07 RX ORDER — DIAZEPAM 10 MG/1
10 TABLET ORAL 3 TIMES DAILY
Status: DISCONTINUED | OUTPATIENT
Start: 2018-05-07 | End: 2018-05-07

## 2018-05-07 RX ORDER — PANTOPRAZOLE SODIUM 40 MG/1
40 TABLET, DELAYED RELEASE ORAL EVERY MORNING
Status: DISCONTINUED | OUTPATIENT
Start: 2018-05-07 | End: 2018-05-13 | Stop reason: HOSPADM

## 2018-05-07 RX ORDER — GUAIFENESIN 600 MG/1
1200 TABLET, EXTENDED RELEASE ORAL EVERY 12 HOURS SCHEDULED
Status: DISCONTINUED | OUTPATIENT
Start: 2018-05-07 | End: 2018-05-13 | Stop reason: HOSPADM

## 2018-05-07 RX ORDER — NICOTINE 21 MG/24HR
1 PATCH, TRANSDERMAL 24 HOURS TRANSDERMAL EVERY 24 HOURS
Status: DISCONTINUED | OUTPATIENT
Start: 2018-05-07 | End: 2018-05-13 | Stop reason: HOSPADM

## 2018-05-07 RX ORDER — LEVOTHYROXINE SODIUM 112 UG/1
112 TABLET ORAL DAILY
Status: DISCONTINUED | OUTPATIENT
Start: 2018-05-08 | End: 2018-05-13 | Stop reason: HOSPADM

## 2018-05-07 RX ORDER — GABAPENTIN 300 MG/1
300 CAPSULE ORAL 2 TIMES DAILY
Status: DISCONTINUED | OUTPATIENT
Start: 2018-05-07 | End: 2018-05-13 | Stop reason: HOSPADM

## 2018-05-07 RX ORDER — FUROSEMIDE 10 MG/ML
40 INJECTION INTRAMUSCULAR; INTRAVENOUS EVERY 12 HOURS
Status: DISCONTINUED | OUTPATIENT
Start: 2018-05-07 | End: 2018-05-07

## 2018-05-07 RX ADMIN — SUCRALFATE 1 G: 1 SUSPENSION ORAL at 02:59

## 2018-05-07 RX ADMIN — PANTOPRAZOLE SODIUM 40 MG: 40 TABLET, DELAYED RELEASE ORAL at 08:42

## 2018-05-07 RX ADMIN — FUROSEMIDE 40 MG: 10 INJECTION, SOLUTION INTRAMUSCULAR; INTRAVENOUS at 08:43

## 2018-05-07 RX ADMIN — IPRATROPIUM BROMIDE AND ALBUTEROL SULFATE 3 ML: 2.5; .5 SOLUTION RESPIRATORY (INHALATION) at 14:54

## 2018-05-07 RX ADMIN — HYDROCODONE BITARTRATE AND ACETAMINOPHEN 1 TABLET: 10; 325 TABLET ORAL at 17:21

## 2018-05-07 RX ADMIN — LITHIUM CARBONATE 300 MG: 300 CAPSULE, GELATIN COATED ORAL at 20:39

## 2018-05-07 RX ADMIN — DIAZEPAM 10 MG: 10 TABLET ORAL at 08:42

## 2018-05-07 RX ADMIN — METHYLPREDNISOLONE SODIUM SUCCINATE 60 MG: 125 INJECTION, POWDER, FOR SOLUTION INTRAMUSCULAR; INTRAVENOUS at 14:05

## 2018-05-07 RX ADMIN — HYDROCODONE BITARTRATE AND ACETAMINOPHEN 1 TABLET: 10; 325 TABLET ORAL at 08:42

## 2018-05-07 RX ADMIN — IPRATROPIUM BROMIDE AND ALBUTEROL SULFATE 3 ML: 2.5; .5 SOLUTION RESPIRATORY (INHALATION) at 03:53

## 2018-05-07 RX ADMIN — WARFARIN SODIUM 5 MG: 5 TABLET ORAL at 08:42

## 2018-05-07 RX ADMIN — METHYLPREDNISOLONE SODIUM SUCCINATE 60 MG: 125 INJECTION, POWDER, FOR SOLUTION INTRAMUSCULAR; INTRAVENOUS at 02:59

## 2018-05-07 RX ADMIN — NICOTINE 1 PATCH: 21 PATCH, EXTENDED RELEASE TRANSDERMAL at 03:00

## 2018-05-07 RX ADMIN — IPRATROPIUM BROMIDE AND ALBUTEROL SULFATE 3 ML: 2.5; .5 SOLUTION RESPIRATORY (INHALATION) at 22:37

## 2018-05-07 RX ADMIN — METHYLPREDNISOLONE SODIUM SUCCINATE 60 MG: 125 INJECTION, POWDER, FOR SOLUTION INTRAMUSCULAR; INTRAVENOUS at 20:41

## 2018-05-07 RX ADMIN — GABAPENTIN 300 MG: 300 CAPSULE ORAL at 17:21

## 2018-05-07 RX ADMIN — GABAPENTIN 300 MG: 300 CAPSULE ORAL at 08:42

## 2018-05-07 RX ADMIN — GABAPENTIN 600 MG: 300 CAPSULE ORAL at 20:40

## 2018-05-07 RX ADMIN — IPRATROPIUM BROMIDE AND ALBUTEROL SULFATE 3 ML: 2.5; .5 SOLUTION RESPIRATORY (INHALATION) at 07:31

## 2018-05-07 RX ADMIN — LEVOTHYROXINE SODIUM 100 MCG: 100 TABLET ORAL at 08:42

## 2018-05-07 RX ADMIN — GUAIFENESIN 1200 MG: 600 TABLET, EXTENDED RELEASE ORAL at 21:28

## 2018-05-07 RX ADMIN — CEFTRIAXONE SODIUM 1 G: 1 INJECTION, POWDER, FOR SOLUTION INTRAMUSCULAR; INTRAVENOUS at 02:59

## 2018-05-07 RX ADMIN — LITHIUM CARBONATE 300 MG: 300 CAPSULE, GELATIN COATED ORAL at 02:59

## 2018-05-07 RX ADMIN — HYDROCODONE BITARTRATE AND ACETAMINOPHEN 1 TABLET: 10; 325 TABLET ORAL at 20:40

## 2018-05-07 RX ADMIN — AZITHROMYCIN MONOHYDRATE 500 MG: 500 INJECTION, POWDER, LYOPHILIZED, FOR SOLUTION INTRAVENOUS at 14:05

## 2018-05-07 RX ADMIN — DIAZEPAM 5 MG: 5 TABLET ORAL at 20:40

## 2018-05-07 RX ADMIN — SUCRALFATE 1 G: 1 SUSPENSION ORAL at 05:41

## 2018-05-07 RX ADMIN — IPRATROPIUM BROMIDE AND ALBUTEROL SULFATE 3 ML: 2.5; .5 SOLUTION RESPIRATORY (INHALATION) at 11:05

## 2018-05-07 RX ADMIN — GABAPENTIN 300 MG: 300 CAPSULE ORAL at 02:59

## 2018-05-07 RX ADMIN — ZOLPIDEM TARTRATE 10 MG: 5 TABLET ORAL at 20:39

## 2018-05-07 RX ADMIN — SUCRALFATE 1 G: 1 SUSPENSION ORAL at 14:05

## 2018-05-07 RX ADMIN — FLUOXETINE HYDROCHLORIDE 60 MG: 20 CAPSULE ORAL at 08:42

## 2018-05-07 RX ADMIN — DIAZEPAM 5 MG: 5 TABLET ORAL at 17:21

## 2018-05-07 RX ADMIN — ZOLPIDEM TARTRATE 10 MG: 5 TABLET ORAL at 02:59

## 2018-05-07 RX ADMIN — IPRATROPIUM BROMIDE AND ALBUTEROL SULFATE 3 ML: 2.5; .5 SOLUTION RESPIRATORY (INHALATION) at 18:51

## 2018-05-07 RX ADMIN — METHYLPREDNISOLONE SODIUM SUCCINATE 60 MG: 125 INJECTION, POWDER, FOR SOLUTION INTRAMUSCULAR; INTRAVENOUS at 08:43

## 2018-05-07 NOTE — PLAN OF CARE
Problem: Patient Care Overview  Goal: Plan of Care Review  Outcome: Ongoing (interventions implemented as appropriate)   05/07/18 9260   OTHER   Outcome Summary RD assessment completed. RN states that pt is eating and drinking well, no po intake recorded at this time. Will continue to follow for d/c needs, however none are noted at this time   Plan of Care Review   Progress improving

## 2018-05-07 NOTE — PAYOR COMM NOTE
"ADMIT INPT CCU 5-6-18  UR PHONE    [phone]    Samantha Solomon (42 y.o. Female)     Date of Birth Social Security Number Address Home Phone MRN    1975  [address]  formerly [facility] [phone] [id number]    Druze Marital Status          None        Admission Date Admission Type Admitting Provider Attending Provider Department, Room/Bed    5/6/18 Emergency Loy Charles MD Finney, Patrick C, MD [facility] CARDIAC CARE, C007/1    Discharge Date Discharge Disposition Discharge Destination                       Attending Provider:  Loy Charles MD    Allergies:  Morphine, Aspirin, Morphine And Related, Nsaids, Quetiapine, Quetiapine Fumarate, Salicylates, Codeine    Isolation:  None   Infection:  None   Code Status:  FULL    Ht:  149.9 cm (59\")   Wt:  87.5 kg (192 lb 14.4 oz)    Admission Cmt:  None   Principal Problem:  None                Active Insurance as of 5/6/2018     Primary Coverage     Payor Plan Insurance Group Employer/Plan Group    MEDICARE MEDICARE A & B      Payor Plan Address Payor Plan Phone Number Effective From Effective To    [address] [phone] 4/1/2013     [place] [address]       Subscriber Name Subscriber Birth Date Member ID       SAMANTHA SOLOMON 1975 [id number]           Secondary Coverage     Payor Plan Insurance Group Employer/Plan Group    [employer] [place] WELL[place] MEDICAID      Payor Plan Address Payor Plan Phone Number Effective From Effective To    [address] [address] [phone] 9/19/2016     [place] [address]       Subscriber Name Subscriber Birth Date Member ID       SAMANTHA SOLOMON 1975 [id number]                 Emergency Contacts      (Rel.) Home Phone Work Phone Mobile Phone    Delmy Del Rio (Daughter) [phone] -- --               History & Physical      Loy Charles MD at 5/7/2018  5:00 AM              [facility] Medicine " Services  HISTORY AND PHYSICAL    Date of Admission: 5/6/2018  Primary Care Physician: Neel Valencia Jr., MD    Subjective     Chief Complaint: Shortness of breath    History of Present Illness  Patient is a 42-year-old female past medical history of recent COPD exacerbation with ARDS in February requiring intubation because of respiratory failure.  She presents with increasing shortness of breath today having trouble speaking full sentences she has had a cough she denies any swelling no hemoptysis or fevers or chills she also had some chest pain earlier today she presented to the emergency room in moderate respiratory distress and subsequently had to be placed on BiPAP stabilize her.  She is being admitted for further evaluation and management treatment of COPD exacerbation and CHF.  She is resting stable on BiPAP in the ICU upon my evaluation with normal oxygen saturation.        Review of Systems   14 point review of systems negative except for as per HPI  Otherwise complete ROS reviewed and negative except as mentioned in the HPI.    Past Medical History:   Past Medical History:   Diagnosis Date   • Acute retention of urine    • Anxiety and depression    • Bipolar 1 disorder    • COPD (chronic obstructive pulmonary disease)    • DVT (deep venous thrombosis)    • GERD (gastroesophageal reflux disease)    • Hypoglycemia    • Insomnia    • PTSD (post-traumatic stress disorder)      Past Surgical History:  Past Surgical History:   Procedure Laterality Date   • ABDOMINOPLASTY     • APPENDECTOMY     • BRONCHOSCOPY N/A 2/2/2018    Procedure: BRONCHOSCOPY AT BEDSIDE;  Surgeon: Earnest Ba MD;  Location: [facility] ENDOSCOPY;  Service:    • CHOLECYSTECTOMY     • ENDOSCOPY N/A 1/29/2018    Procedure: ESOPHAGOGASTRODUODENOSCOPY WITH ANESTHESIA;  Surgeon: Aime Reyna MD;  Location: [facility] ENDOSCOPY;  Service:    • GASTRIC BYPASS     • HYSTERECTOMY       Social History:  reports that she has been smoking  Cigarettes.  She has been smoking about 0.25 packs per day. She has never used smokeless tobacco. She reports that she does not drink alcohol or use drugs.    Family History: family history includes Cancer in her father and mother; HIV in her brother.       Allergies:  Allergies   Allergen Reactions   • Morphine Anaphylaxis   • Aspirin Other (See Comments)     Child allergy, unsure what reaction  Child allergy, unsure what reaction   • Morphine And Related    • Nsaids    • Quetiapine Other (See Comments)     Muscle spasms  Muscle spasms   • Quetiapine Fumarate    • Salicylates    • Codeine Rash     Medications:  Prior to Admission medications    Medication Sig Start Date End Date Taking? Authorizing Provider   FLUoxetine (PROzac) 20 MG capsule Take 60 mg by mouth Daily.   Yes Historical Provider, MD   gabapentin (NEURONTIN) 300 MG capsule Take 600 mg by mouth Every Night.   Yes Historical Provider, MD   HYDROcodone-acetaminophen (NORCO)  MG per tablet Take 1 tablet by mouth 3 (Three) Times a Day.   Yes Historical Provider, MD   ipratropium-albuterol (DUO-NEB) 0.5-2.5 mg/mL nebulizer Take 3 mL by nebulization 4 (Four) Times a Day. 2/10/18  Yes Emelina Ogden,    levothyroxine (SYNTHROID, LEVOTHROID) 100 MCG tablet Take 100 mcg by mouth Daily. 9/24/16  Yes Historical Provider, MD   lithium carbonate 300 MG capsule Take 300 mg by mouth Every Night.   Yes Historical Provider, MD   O2 (OXYGEN) Inhale 2 L/min Continuous. At bedtime only (per patient.)   Yes Historical Provider, MD   omeprazole (priLOSEC) 20 MG capsule Take 20 mg by mouth Daily.   Yes Historical Provider, MD   pantoprazole (PROTONIX) 20 MG EC tablet Take 20 mg by mouth Daily.   Yes Historical Provider, MD   PROAIR  (90 BASE) MCG/ACT inhaler Inhale 2 puffs Every 4 (Four) Hours As Needed for Wheezing or Shortness of Air. 6/3/17  Yes Historical Provider, MD   warfarin (COUMADIN) 5 MG tablet Take 7.5 mg by mouth Daily. Tuesday, Thursday,  "Saturday and Sunday.   Yes Historical Provider, MD   warfarin (COUMADIN) 5 MG tablet Take 5 mg by mouth 3 (Three) Times a Week. Monday, Wednesday and Friday.   Yes Historical Provider, MD   zolpidem (AMBIEN) 10 MG tablet Take 10 mg by mouth Every Night.   Yes Historical Provider, MD   diazePAM (VALIUM) 10 MG tablet Take 10 mg by mouth 3 (Three) Times a Day. 2/14/17   Historical Provider, MD   gabapentin (NEURONTIN) 300 MG capsule Take 300 mg by mouth 2 (Two) Times a Day.    Historical Provider, MD   nicotine (NICODERM CQ) 21 MG/24HR patch Place 1 patch on the skin Daily. 1/30/18   PARIS Junior   promethazine (PHENERGAN) 25 MG tablet Take 25 mg by mouth 2 (Two) Times a Day As Needed for Nausea or Vomiting. 9/2/16   Historical Provider, MD   sucralfate (CARAFATE) 1 GM/10ML suspension Take 10 mL by mouth Every 6 (Six) Hours. 1/30/18   PARIS Junior     Objective     Vital Signs: /72   Pulse 73   Temp 98.1 °F (36.7 °C) (Axillary)   Resp 21   Ht 149.9 cm (59\")   Wt 87.5 kg (192 lb 14.4 oz)   SpO2 93%   BMI 38.96 kg/m²    Physical Exam  Constitutional: She is oriented to person, place, and time. She appears well-nourished. She appears ill. She appears distressed.   HENT: Bipap in place  Head: Atraumatic.   Mouth/Throat: Oropharynx is clear and moist and mucous membranes are normal.   Eyes: EOM are normal. Pupils are equal, round, and reactive to light.   Neck: Normal range of motion. Neck supple. No JVD present.   Cardiovascular: Normal rate, regular rhythm and normal heart sounds.  Exam reveals no gallop and no friction rub.    No murmur heard.  Pulmonary/Chest: Accessory muscle usage present. No stridor. Tachypnea noted. She is in . She has no decreased breath sounds. She has no wheezes. She has rhonchi. She has no rales.   Abdominal: Soft. There is no tenderness.   Musculoskeletal: She exhibits no edema.   Neurological: She is alert and oriented to person, place, and time.   Skin: Skin " is warm and dry. Capillary refill takes less than 2 seconds.   Psychiatric: She has a normal mood and affect.   Nursing note and vitals reviewed.      Results Reviewed:  Lab Results (last 24 hours)     Procedure Component Value Units Date/Time    Blood Gas, Arterial [[id number]]  (Abnormal) Collected:  05/07/18 0436    Specimen:  Arterial Blood Updated:  05/07/18 0448     Site Right Radial     Luis's Test Positive     pH, Arterial 7.433 pH units      pCO2, Arterial 46.5 (H) mm Hg      pO2, Arterial 67.7 (L) mm Hg      HCO3, Arterial 31.1 (H) mmol/L      Base Excess, Arterial 5.8 (H) mmol/L      O2 Saturation, Arterial 94.7 %      Temperature 37.0 C      Barometric Pressure for Blood Gas 752 mmHg      Modality BiPap     FIO2 50 %      Ventilator Mode AVAP     Set Tidal Volume 340     Set Mech Resp Rate 8.0     EPAP 6     Collected by [id number]    TSH [[id number]]  (Normal) Collected:  05/07/18 0257    Specimen:  Blood Updated:  05/07/18 0443     TSH 0.826 mIU/mL     BNP [[id number]]  (Abnormal) Collected:  05/07/18 0257    Specimen:  Blood Updated:  05/07/18 0422     proBNP 2,440.0 (H) pg/mL     Comprehensive Metabolic Panel [[id number]]  (Abnormal) Collected:  05/07/18 0257    Specimen:  Blood Updated:  05/07/18 0417     Glucose 160 (H) mg/dL      BUN 11 mg/dL      Creatinine 0.66 mg/dL      Sodium 142 mmol/L      Potassium 3.9 mmol/L      Chloride 100 mmol/L      CO2 32.0 (H) mmol/L      Calcium 9.4 mg/dL      Total Protein 7.2 g/dL      Albumin 3.80 g/dL      ALT (SGPT) 62 (H) U/L      AST (SGOT) 60 (H) U/L      Alkaline Phosphatase 164 (H) U/L      Total Bilirubin 0.8 mg/dL      eGFR Non African Amer 97 mL/min/1.73      Globulin 3.4 gm/dL      A/G Ratio 1.1 g/dL      BUN/Creatinine Ratio 16.7     Anion Gap 10.0 mmol/L     Magnesium [[id number]]  (Normal) Collected:  05/07/18 0257    Specimen:  Blood Updated:  05/07/18 0417     Magnesium 2.0 mg/dL     Phosphorus [[id number]]  (Normal) Collected:  05/07/18 0257     Specimen:  Blood Updated:  05/07/18 0417     Phosphorus 3.4 mg/dL     Protime-INR [[id number]]  (Abnormal) Collected:  05/07/18 0257    Specimen:  Blood Updated:  05/07/18 0410     Protime 19.9 (H) Seconds      INR 1.63 (H)    CBC & Differential [[id number]] Collected:  05/07/18 0257    Specimen:  Blood Updated:  05/07/18 0404    Narrative:       The following orders were created for panel order CBC & Differential.  Procedure                               Abnormality         Status                     ---------                               -----------         ------                     CBC Auto Differential[[id number]]        Abnormal            Final result                 Please view results for these tests on the individual orders.    CBC Auto Differential [[id number]]  (Abnormal) Collected:  05/07/18 0257    Specimen:  Blood Updated:  05/07/18 0404     WBC 10.30 10*3/mm3      RBC 4.46 10*6/mm3      Hemoglobin 12.9 g/dL      Hematocrit 39.7 %      MCV 89.0 fL      MCH 28.9 pg      MCHC 32.5 (L) g/dL      RDW 13.5 %      RDW-SD 44.0 fl      MPV 10.9 fL      Platelets 245 10*3/mm3      Neutrophil % 92.3 (H) %      Lymphocyte % 4.9 (L) %      Monocyte % 1.3 (L) %      Eosinophil % 0.0 %      Basophil % 0.2 %      Immature Grans % 1.3 %      Neutrophils, Absolute 9.52 (H) 10*3/mm3      Lymphocytes, Absolute 0.50 (L) 10*3/mm3      Monocytes, Absolute 0.13 (L) 10*3/mm3      Eosinophils, Absolute 0.00 10*3/mm3      Basophils, Absolute 0.02 10*3/mm3      Immature Grans, Absolute 0.13 (H) 10*3/mm3      nRBC 0.0 /100 WBC     Lactic Acid, Reflex [[id number]]  (Normal) Collected:  05/06/18 2308    Specimen:  Blood Updated:  05/06/18 2326     Lactate 1.9 mmol/L     Lactic Acid, Reflex Timer (This will reflex a repeat order 3-3:15 hours after ordered.) [[id number]] Collected:  05/06/18 1828    Specimen:  Blood Updated:  05/06/18 2200     Extra Tube Hold for add-ons.     Comment: Auto resulted.       Murray Draw [[id number]]  Collected:  05/06/18 1828    Specimen:  Blood Updated:  05/06/18 1930    Narrative:       The following orders were created for panel order Barto Draw.  Procedure                               Abnormality         Status                     ---------                               -----------         ------                     Light Blue Top[[id number]]                                   Final result               Green Top (Gel)[[id number]]                                  Final result               Lavender Top[[id number]]                                     Final result               Red Top[[id number]]                                          Final result                 Please view results for these tests on the individual orders.    Light Blue Top [[id number]] Collected:  05/06/18 1828    Specimen:  Blood Updated:  05/06/18 1930     Extra Tube hold for add-on     Comment: Auto resulted       Green Top (Gel) [[id number]] Collected:  05/06/18 1828    Specimen:  Blood Updated:  05/06/18 1930     Extra Tube Hold for add-ons.     Comment: Auto resulted.       Lavender Top [[id number]] Collected:  05/06/18 1828    Specimen:  Blood Updated:  05/06/18 1930     Extra Tube hold for add-on     Comment: Auto resulted       Red Top [[id number]] Collected:  05/06/18 1828    Specimen:  Blood Updated:  05/06/18 1930     Extra Tube Hold for add-ons.     Comment: Auto resulted.       Blood Culture - Blood, [[id number]] Collected:  05/06/18 1910    Specimen:  Blood from Arm, Right Updated:  05/06/18 1919    Blood Culture - Blood, [[id number]] Collected:  05/06/18 1828    Specimen:  Blood from Arm, Left Updated:  05/06/18 1909    BNP [[id number]]  (Abnormal) Collected:  05/06/18 1828    Specimen:  Blood Updated:  05/06/18 1905     proBNP 2,160.0 (H) pg/mL     Troponin [[id number]]  (Normal) Collected:  05/06/18 1828    Specimen:  Blood Updated:  05/06/18 1905     Troponin I <0.012 ng/mL     Protime-INR [[id number]]  (Abnormal) Collected:   05/06/18 1828    Specimen:  Blood Updated:  05/06/18 1903     Protime 24.9 (H) Seconds      INR 2.16 (H)    D-dimer, Quantitative [[id number]]  (Normal) Collected:  05/06/18 1828    Specimen:  Blood Updated:  05/06/18 1903     D-Dimer, Quantitative <0.22 mg/L (FEU)     Narrative:       Reference Range is 0-0.50 mg/L FEU. However, results <0.50 mg/L FEU tends to rule out DVT or PE. Results >0.50 mg/L FEU are not useful in predicting absence or presence of DVT or PE.    Lactic Acid, Plasma [[id number]]  (Abnormal) Collected:  05/06/18 1828    Specimen:  Blood Updated:  05/06/18 1856     Lactate 2.5 (C) mmol/L     Comprehensive Metabolic Panel [[id number]]  (Abnormal) Collected:  05/06/18 1828    Specimen:  Blood Updated:  05/06/18 1854     Glucose 126 (H) mg/dL      BUN 9 mg/dL      Creatinine 0.65 mg/dL      Sodium 142 mmol/L      Potassium 4.2 mmol/L      Chloride 103 mmol/L      CO2 29.0 mmol/L      Calcium 9.2 mg/dL      Total Protein 7.0 g/dL      Albumin 3.70 g/dL      ALT (SGPT) 64 (H) U/L      AST (SGOT) 80 (H) U/L      Alkaline Phosphatase 146 (H) U/L      Total Bilirubin 0.7 mg/dL      eGFR Non African Amer 99 mL/min/1.73      Globulin 3.3 gm/dL      A/G Ratio 1.1 g/dL      BUN/Creatinine Ratio 13.8     Anion Gap 10.0 mmol/L     CBC & Differential [[id number]] Collected:  05/06/18 1828    Specimen:  Blood Updated:  05/06/18 1838    Narrative:       The following orders were created for panel order CBC & Differential.  Procedure                               Abnormality         Status                     ---------                               -----------         ------                     CBC Auto Differential[[id number]]        Abnormal            Final result                 Please view results for these tests on the individual orders.    CBC Auto Differential [[id number]]  (Abnormal) Collected:  05/06/18 1828    Specimen:  Blood Updated:  05/06/18 1838     WBC 13.30 (H) 10*3/mm3      RBC 4.20 10*6/mm3       Hemoglobin 12.3 g/dL      Hematocrit 37.5 %      MCV 89.3 fL      MCH 29.3 pg      MCHC 32.8 (L) g/dL      RDW 13.6 %      RDW-SD 44.5 fl      MPV 10.2 fL      Platelets 223 10*3/mm3      Neutrophil % 91.2 (H) %      Lymphocyte % 4.5 (L) %      Monocyte % 2.6 (L) %      Eosinophil % 0.2 %      Basophil % 0.2 %      Immature Grans % 1.3 %      Neutrophils, Absolute 12.13 (H) 10*3/mm3      Lymphocytes, Absolute 0.60 (L) 10*3/mm3      Monocytes, Absolute 0.35 10*3/mm3      Eosinophils, Absolute 0.02 10*3/mm3      Basophils, Absolute 0.03 10*3/mm3      Immature Grans, Absolute 0.17 (H) 10*3/mm3      nRBC 0.0 /100 WBC     Blood Gas, Arterial [[id number]]  (Abnormal) Collected:  05/06/18 1823    Specimen:  Arterial Blood Updated:  05/06/18 1831     Site Right Brachial     Luis's Test N/A     pH, Arterial 7.400 pH units      pCO2, Arterial 41.4 mm Hg      pO2, Arterial 60.0 (L) mm Hg      HCO3, Arterial 25.6 mmol/L      Base Excess, Arterial 0.7 mmol/L      O2 Saturation, Arterial 92.6 (L) %      Temperature 37.0 C      Barometric Pressure for Blood Gas 750 mmHg      Modality NRB     FIO2 100 %      Ventilator Mode NA     Collected by [id number]        Imaging Results (last 24 hours)     Procedure Component Value Units Date/Time    XR Chest 1 View [[id number]] Updated:  05/07/18 0425    XR Chest 1 View [[id number]] Collected:  05/06/18 1907     Updated:  05/06/18 1911    Narrative:       EXAMINATION:   XR CHEST 1 VW-  5/6/2018 7:07 PM CDT     HISTORY: Short of air     Frontal upright radiograph of the chest 5/6/2018 6:25 PM CDT     COMPARISON: February 15, 2018.     FINDINGS:   Bilateral interstitial infiltrates are present.. The cardiac silhouette  is normal. These infiltrates may be due to either pulmonary edema or  possibly pneumonia..      The osseous structures and surrounding soft tissues demonstrate no acute  abnormality.       Impression:       1. Bilateral interstitial infiltrates. Differential considerations  include  "pneumonia versus pulmonary edema.        This report was finalized on 05/06/2018 19:07 by Dr. Zach Pham MD.        I have personally reviewed and interpreted the radiology studies and ECG obtained at time of admission.     Assessment / Plan     Assessment:   Hospital Problem List     Respiratory distress      1.  Acute on chronic respiratory failure due to COPD and CHF plan is to admit patient diurese with IV Lasix continue BiPAP will start antibiotics empirically as well as Solu-Medrol as a believe she has a component of both COPD and CHF.  We will check 2-D echo monitor patient ICU on BiPAP and try to wean her off of it back to oxygen.  2.  History of DVT patient is on Coumadin and continue warfarin monitor pro time and INR      Patient seen after midnight         Code Status: Full     I discussed the patients findings and my recommendations with the patient    Estimated length of stay 3-4 days    Loy Charles MD   05/07/18   5:00 AM              Electronically signed by Loy Charles MD at 5/7/2018  5:08 AM          Emergency Department Notes      Arpita Garcia RN at 5/6/2018  6:10 PM        Pt states she does not want to be placed on the bipap. Pt states, \" yes\" to being intubated if needed.     Arpita Garcia RN  05/06/18 1810      Electronically signed by Arpita Garcia RN at 5/6/2018  6:10 PM     Yung Mancini MD at 5/6/2018  6:21 PM      Procedure Orders:    1. ECG 12 Lead [[id number]] ordered by Yung Mancini MD at 05/06/18 1809     2. Critical Care [[id number]] ordered by Yung Mancini MD at 05/06/18 1929                Subjective   Patient is a 42-year-old female who presents with acute respiratory distress.  History of COPD, bipolar.  Patient was discharge in February with ARDS and a story failure requiring intubation.  Today, EMS was called because patient's son noted blue lips and difficulty breathing.  Saturations were in the 50s per EMS.  She received a DuoNeb " prior to EMS showing up.  They did not give any treatments but did place her on 15 L nonrebreather mask.  Patient arrived and slight increased respiratory rate.  She was speaking in full sentences.  She states she does not know what happened today.  She endorses a cough which has been productive.  No hemoptysis.  Denies leg swelling or CHF history.  She does have chronic vomiting according to her which is unchanged.  She also noted chest pain earlier today but cannot describe this.  Denies any fevers.            Review of Systems   Constitutional: Negative for chills, diaphoresis, fatigue and fever.   HENT: Negative for sore throat.    Eyes: Negative for visual disturbance.   Respiratory: Positive for cough and shortness of breath. Negative for chest tightness, wheezing and stridor.    Cardiovascular: Positive for chest pain. Negative for palpitations and leg swelling.   Gastrointestinal: Positive for vomiting. Negative for abdominal pain, constipation, diarrhea and nausea.   Genitourinary: Negative for hematuria.   Musculoskeletal: Negative for back pain.   Skin: Negative for rash.   Neurological: Positive for headaches.   Psychiatric/Behavioral: Negative for confusion.       Past Medical History:   Diagnosis Date   • Acute retention of urine    • Anxiety and depression    • Bipolar 1 disorder    • COPD (chronic obstructive pulmonary disease)    • DVT (deep venous thrombosis)    • GERD (gastroesophageal reflux disease)    • Hypoglycemia    • Insomnia    • PTSD (post-traumatic stress disorder)        Allergies   Allergen Reactions   • Morphine Anaphylaxis   • Aspirin Other (See Comments)     Child allergy, unsure what reaction  Child allergy, unsure what reaction   • Morphine And Related    • Nsaids    • Quetiapine Other (See Comments)     Muscle spasms  Muscle spasms   • Quetiapine Fumarate    • Salicylates    • Codeine Rash       Past Surgical History:   Procedure Laterality Date   • ABDOMINOPLASTY     •  APPENDECTOMY     • BRONCHOSCOPY N/A 2/2/2018    Procedure: BRONCHOSCOPY AT BEDSIDE;  Surgeon: Earnest Ba MD;  Location: [facility] ENDOSCOPY;  Service:    • CHOLECYSTECTOMY     • ENDOSCOPY N/A 1/29/2018    Procedure: ESOPHAGOGASTRODUODENOSCOPY WITH ANESTHESIA;  Surgeon: Aime Reyna MD;  Location: [facility] ENDOSCOPY;  Service:    • GASTRIC BYPASS     • HYSTERECTOMY         Family History   Problem Relation Age of Onset   • Cancer Mother    • Cancer Father    • HIV Brother        Social History     Social History   • Marital status:      Social History Main Topics   • Smoking status: Current Every Day Smoker     Packs/day: 0.25     Types: Cigarettes   • Smokeless tobacco: Never Used   • Alcohol use No   • Drug use: No   • Sexual activity: No     Other Topics Concern   • Not on file       Lab Results (last 24 hours)     Procedure Component Value Units Date/Time    Blood Gas, Arterial [[id number]]  (Abnormal) Collected:  05/06/18 1823    Specimen:  Arterial Blood Updated:  05/06/18 1831     Site Right Brachial     Luis's Test N/A     pH, Arterial 7.400 pH units      pCO2, Arterial 41.4 mm Hg      pO2, Arterial 60.0 (L) mm Hg      HCO3, Arterial 25.6 mmol/L      Base Excess, Arterial 0.7 mmol/L      O2 Saturation, Arterial 92.6 (L) %      Temperature 37.0 C      Barometric Pressure for Blood Gas 750 mmHg      Modality NRB     FIO2 100 %      Ventilator Mode NA     Collected by [id number]    CBC & Differential [[id number]] Collected:  05/06/18 1828    Specimen:  Blood Updated:  05/06/18 1838    Narrative:       The following orders were created for panel order CBC & Differential.  Procedure                               Abnormality         Status                     ---------                               -----------         ------                     CBC Auto Differential[[id number]]        Abnormal            Final result                 Please view results for these tests on the individual orders.     Comprehensive Metabolic Panel [[id number]]  (Abnormal) Collected:  05/06/18 1828    Specimen:  Blood Updated:  05/06/18 1854     Glucose 126 (H) mg/dL      BUN 9 mg/dL      Creatinine 0.65 mg/dL      Sodium 142 mmol/L      Potassium 4.2 mmol/L      Chloride 103 mmol/L      CO2 29.0 mmol/L      Calcium 9.2 mg/dL      Total Protein 7.0 g/dL      Albumin 3.70 g/dL      ALT (SGPT) 64 (H) U/L      AST (SGOT) 80 (H) U/L      Alkaline Phosphatase 146 (H) U/L      Total Bilirubin 0.7 mg/dL      eGFR Non African Amer 99 mL/min/1.73      Globulin 3.3 gm/dL      A/G Ratio 1.1 g/dL      BUN/Creatinine Ratio 13.8     Anion Gap 10.0 mmol/L     BNP [[id number]]  (Abnormal) Collected:  05/06/18 1828    Specimen:  Blood Updated:  05/06/18 1905     proBNP 2,160.0 (H) pg/mL     Troponin [[id number]]  (Normal) Collected:  05/06/18 1828    Specimen:  Blood Updated:  05/06/18 1905     Troponin I <0.012 ng/mL     Protime-INR [[id number]]  (Abnormal) Collected:  05/06/18 1828    Specimen:  Blood Updated:  05/06/18 1903     Protime 24.9 (H) Seconds      INR 2.16 (H)    CBC Auto Differential [[id number]]  (Abnormal) Collected:  05/06/18 1828    Specimen:  Blood Updated:  05/06/18 1838     WBC 13.30 (H) 10*3/mm3      RBC 4.20 10*6/mm3      Hemoglobin 12.3 g/dL      Hematocrit 37.5 %      MCV 89.3 fL      MCH 29.3 pg      MCHC 32.8 (L) g/dL      RDW 13.6 %      RDW-SD 44.5 fl      MPV 10.2 fL      Platelets 223 10*3/mm3      Neutrophil % 91.2 (H) %      Lymphocyte % 4.5 (L) %      Monocyte % 2.6 (L) %      Eosinophil % 0.2 %      Basophil % 0.2 %      Immature Grans % 1.3 %      Neutrophils, Absolute 12.13 (H) 10*3/mm3      Lymphocytes, Absolute 0.60 (L) 10*3/mm3      Monocytes, Absolute 0.35 10*3/mm3      Eosinophils, Absolute 0.02 10*3/mm3      Basophils, Absolute 0.03 10*3/mm3      Immature Grans, Absolute 0.17 (H) 10*3/mm3      nRBC 0.0 /100 WBC     D-dimer, Quantitative [[id number]]  (Normal) Collected:  05/06/18 7907    Specimen:  Blood  Updated:  05/06/18 1903     D-Dimer, Quantitative <0.22 mg/L (FEU)     Narrative:       Reference Range is 0-0.50 mg/L FEU. However, results <0.50 mg/L FEU tends to rule out DVT or PE. Results >0.50 mg/L FEU are not useful in predicting absence or presence of DVT or PE.    Blood Culture - Blood, [[id number]] Collected:  05/06/18 1828    Specimen:  Blood from Arm, Left Updated:  05/06/18 1909    Lactic Acid, Plasma [[id number]]  (Abnormal) Collected:  05/06/18 1828    Specimen:  Blood Updated:  05/06/18 1856     Lactate 2.5 (C) mmol/L     Lactic Acid, Reflex Timer (This will reflex a repeat order 3-3:15 hours after ordered.) [[id number]] Collected:  05/06/18 1828    Specimen:  Blood Updated:  05/06/18 1856    Blood Culture - Blood, [[id number]] Collected:  05/06/18 1910    Specimen:  Blood from Arm, Right Updated:  05/06/18 1919          Objective   Physical Exam   Constitutional: She is oriented to person, place, and time. She appears well-nourished. She appears ill. She appears distressed.   Heart rate 103, respiratory rate 27, SPO2 92% on 15 L nonrebreather mask   HENT:   Head: Atraumatic.   Mouth/Throat: Oropharynx is clear and moist and mucous membranes are normal.   Eyes: EOM are normal. Pupils are equal, round, and reactive to light.   Neck: Normal range of motion. Neck supple. No JVD present.   Cardiovascular: Normal rate, regular rhythm and normal heart sounds.  Exam reveals no gallop and no friction rub.    No murmur heard.  Pulmonary/Chest: Accessory muscle usage present. No stridor. Tachypnea noted. She is in respiratory distress. She has no decreased breath sounds. She has no wheezes. She has rhonchi. She has no rales.   Abdominal: Soft. There is no tenderness.   Musculoskeletal: She exhibits no edema.   Neurological: She is alert and oriented to person, place, and time.   Skin: Skin is warm and dry. Capillary refill takes less than 2 seconds.   Psychiatric: She has a normal mood and affect.   Nursing note  "and vitals reviewed.      ECG 12 Lead    Date/Time: 5/6/2018 6:40 PM  Performed by: YUNG MANCINI  Authorized by: YUNG MANCINI   Interpreted by physician  Comparison: not compared with previous ECG   Rhythm: sinus rhythm  Rate: normal  QRS axis: normal  Clinical impression: non-specific ECG  Comments: Nonspecific ST and T-wave changes.      Critical Care  Performed by: YUNG MANCINI  Authorized by: YUNG MANCINI     Critical care provider statement:     Critical care time (minutes):  50    Critical care time was exclusive of:  Separately billable procedures and treating other patients    Critical care was necessary to treat or prevent imminent or life-threatening deterioration of the following conditions:  Respiratory failure    Critical care was time spent personally by me on the following activities:  Development of treatment plan with patient or surrogate, evaluation of patient's response to treatment, examination of patient, obtaining history from patient or surrogate, ordering and performing treatments and interventions, ordering and review of laboratory studies, ordering and review of radiographic studies, pulse oximetry, re-evaluation of patient's condition and review of old charts    I assumed direction of critical care for this patient from another provider in my specialty: no              XR Chest 1 View   Final Result   1. Bilateral interstitial infiltrates. Differential considerations   include pneumonia versus pulmonary edema.           This report was finalized on 05/06/2018 19:07 by Dr. Zach Pham MD.          /94   Pulse 95   Temp 98.7 °F (37.1 °C) (Temporal Artery )   Resp 26   Ht 149.9 cm (59\")   Wt 86.6 kg (191 lb)   SpO2 95%   BMI 38.58 kg/m²      ED Course    ED Course   Value Comment By Time   WBC: (!) 13.30 (Reviewed) Yung Mancini MD 05/06 1905   Lactate, Venous: (!!) 2.5 (Reviewed) Yung Mancini MD 05/06 1905   Alkaline Phosphatase: (!) " 146 (Reviewed) Yung Mancini MD 05/06 1905   AST (SGOT): (!) 80 (Reviewed) Yung Mancini MD 05/06 1905   ALT (SGPT): (!) 64 (Reviewed) Yung Mancini MD 05/06 1905   pO2, Arterial: (!) 60.0 (Reviewed) Yung Mancini MD 05/06 1905   INR: (!) 2.16 (Reviewed) Yung Mancini MD 05/06 1905    This is a 42-year-old female who presents with hypoxia and acute respiratory distress.  Sats 50% per EMS.  History of ARDS and hypoxic respiratory failure.  Patient was on 15 L nonrebreather mask upon arrival.  Lungs had rhonchi bilaterally.  No signs of fluid overload, no JVD, no pitting edema.  I did have extensive discussion with the patient initially as she stated she did not want BiPAP.  I did talk to her about indications and recommended BiPAP.  She was agreeable to try.  She was placed on BiPAP and did have improvement and was comfortable.  We did give her 0.5 mg of Ativan.  Her white count is 13,000, lactate 2.5.  Her BNP is 2000 which is largely unchanged from baseline.  INR therapeutic range.  Labs otherwise benign.  Chest x-ray shows bilateral pulmonary infiltrates versus edema.  She did receive Zosyn.  We will admit her for further respiratory care and management.  She also received albuterol and steroids. Yung Mancini MD 05/06 1915    Dr. Charles agrees for admission.  Recommends 40 mg IV Lasix. Yung Mancini MD 05/06 1927       Medications   sodium chloride 0.9 % flush 10 mL (not administered)   piperacillin-tazobactam (ZOSYN) 4.5 g in iso-osmotic dextrose 100 mL IVPB (premix) (not administered)   LORazepam (ATIVAN) injection 0.5 mg (0.5 mg Intravenous Given 5/6/18 1826)   ipratropium-albuterol (DUO-NEB) nebulizer solution 3 mL (3 mL Nebulization Given 5/6/18 1828)   methylPREDNISolone sodium succinate (SOLU-Medrol) injection 125 mg (125 mg Intravenous Given 5/6/18 1827)            MDM  Number of Diagnoses or Management Options  Bilateral pulmonary infiltrates on chest x-ray: new  and requires workup  Leukocytosis, unspecified type: new and requires workup  Respiratory distress: new and requires workup  Tachypnea: new and requires workup     Amount and/or Complexity of Data Reviewed  Clinical lab tests: reviewed and ordered  Tests in the radiology section of CPT®:  ordered and reviewed  Decide to obtain previous medical records or to obtain history from someone other than the patient: yes    Risk of Complications, Morbidity, and/or Mortality  Presenting problems: high  Diagnostic procedures: low  Management options: moderate    Critical Care  Total time providing critical care: 30-74 minutes    Patient Progress  Patient progress: improved      Final diagnoses:   Respiratory distress   Bilateral pulmonary infiltrates on chest x-ray   Leukocytosis, unspecified type   Tachypnea          Yung Mancini MD  05/06/18 1929      Electronically signed by Yung Mancini MD at 5/6/2018  7:29 PM             ICU Vital Signs     Row Name 05/07/18 0405 05/07/18 0356 05/07/18 0353 05/07/18 0335 05/07/18 0305       Vitals    Pulse 73  -- 67 70 79    Resp  --  -- 21  --  --    Resp Rate (Observed) Vent  -- 24  --  --  --    /72  --  -- 99/72 101/66    Noninvasive MAP (mmHg) 81  --  -- 81 78       Oxygen Therapy    SpO2 93 %  -- 96 % 93 % (!)  88 %    Row Name 05/07/18 0205 05/07/18 0135 05/07/18 0100 05/07/18 0035 05/07/18 0005       Vitals    Pulse 68 70 68 69 70    /74 104/77 100/73 97/71 99/75    Noninvasive MAP (mmHg) 88 86 84 81 84       Oxygen Therapy    SpO2 96 % 93 % 95 % 94 % 94 %    Row Name 05/06/18 2330 05/06/18 2326 05/06/18 2300 05/06/18 2235 05/06/18 2200       Vitals    Pulse 72  -- 78 78 81    Resp Rate (Observed) Vent  -- 25  --  --  --    /74  -- 106/72 107/76 103/80    Noninvasive MAP (mmHg) 84  -- 82 86 88       Oxygen Therapy    SpO2 94 %  -- 93 % 92 % 92 %    Row Name 05/06/18 2131 05/06/18 2125 05/06/18 2121 05/06/18 2115 05/06/18 1935       Height and Weight  "   Height  --  --  -- 149.9 cm (59\")  --    Weight  --  --  -- 87.5 kg (192 lb 14.4 oz)  --    Weight Method  --  --  -- Bed scale  --    Ideal Body Weight (IBW) (kg)  --  --  -- 43.57  --    BSA (Calculated - sq m)  --  --  -- 1.82 sq meters  --    BMI (Calculated)  --  --  -- 38.9  --    Weight in (lb) to have BMI = 25  --  --  -- 123.5  --       Vitals    Temp  --  --  -- 98.1 °F (36.7 °C)  --    Temp src  --  --  -- Axillary  --    Pulse 82 84 82  -- 89    Resp  --  -- 28  --  --    /78 111/77  --  -- 111/65    Noninvasive MAP (mmHg) 88 88  --  --  --       Oxygen Therapy    SpO2 94 % 93 % 94 %  --  --    Pulse Oximetry Type  --  -- Continuous  --  --    Device (Oxygen Therapy)  --  -- NPPV/NIV  --  --    Oxygen Concentration (%)  --  -- 50  --  --    Row Name 05/06/18 1931 05/06/18 1900 05/06/18 1835 05/06/18 1828 05/06/18 1825       Vitals    Pulse 92  -- 95 96 100    Resp  --  -- 26 24 24    Resp Rate (Observed) Vent  -- 31  --  -- 24    /65  --  --  --  --       Oxygen Therapy    SpO2 91 %  -- 95 % 95 % 90 %    Pulse Oximetry Type  --  -- Continuous Continuous Continuous    Device (Oxygen Therapy)  --  -- NPPV/NIV NPPV/NIV NPPV/NIV    Oxygen Concentration (%)  --  -- 50 50 50    Row Name 05/06/18 1816 05/06/18 1811                Height and Weight    Height  -- 149.9 cm (59\")       Height Method  -- Stated       Weight  -- 86.6 kg (191 lb)       Weight Method  -- Stated       Ideal Body Weight (IBW) (kg)  -- 43.57       BSA (Calculated - sq m)  -- 1.81 sq meters       BMI (Calculated)  -- 38.6       Weight in (lb) to have BMI = 25  -- 123.5          Vitals    Temp 98.7 °F (37.1 °C)  --       Temp src Temporal Artery   --       Pulse  -- 103       Resp  -- 27       BP  -- 126/94          Oxygen Therapy    SpO2  -- 92 %       Device (Oxygen Therapy)  -- nonrebreather mask       Flow (L/min)  -- 15           Intake & Output (last 3 days)       05/04 0701 - 05/05 0700 05/05 0701 - 05/06 0700 05/06 " "0701 - 05/07 0700    Urine (mL/kg/hr)   600    Total Output     600    Net     -600               Lines, Drains & Airways    Active LDAs     Name:   Placement date:   Placement time:   Site:   Days:    Peripheral IV 05/06/18 Left Antecubital  05/06/18        Antecubital    1    Peripheral IV 05/06/18 1937 Left Forearm  05/06/18 1937    Forearm    less than 1         Inactive LDAs     None                Hospital Medications (all)       Dose Frequency Start End    acetaminophen (TYLENOL) suppository 650 mg 650 mg Every 4 Hours PRN 5/7/2018     Sig - Route: Insert 1 suppository into the rectum Every 4 (Four) Hours As Needed for Fever (temperature greater than 101.5 F or headache). - Rectal    Linked Group 1:  \"Or\" Linked Group Details        acetaminophen (TYLENOL) tablet 650 mg 650 mg Every 4 Hours PRN 5/7/2018     Sig - Route: Take 2 tablets by mouth Every 4 (Four) Hours As Needed for Headache or Fever (fever greater than 101.5 F). - Oral    Linked Group 1:  \"Or\" Linked Group Details        cefTRIAXone (ROCEPHIN) 1 g/10mL IV PUSH syringe 1 g Daily 5/7/2018 5/14/2018    Sig - Route: Infuse 10 mL into a venous catheter Daily. - Intravenous    diazePAM (VALIUM) tablet 10 mg 10 mg 3 Times Daily 5/7/2018     Sig - Route: Take 1 tablet by mouth 3 (Three) Times a Day. - Oral    FLUoxetine (PROzac) capsule 60 mg 60 mg Daily 5/7/2018     Sig - Route: Take 3 capsules by mouth Daily. - Oral    furosemide (LASIX) injection 40 mg 40 mg Once 5/6/2018 5/6/2018    Sig - Route: Infuse 4 mL into a venous catheter 1 (One) Time. - Intravenous    furosemide (LASIX) injection 40 mg 40 mg Every 12 Hours 5/7/2018     Sig - Route: Infuse 4 mL into a venous catheter Every 12 (Twelve) Hours. - Intravenous    gabapentin (NEURONTIN) capsule 300 mg 300 mg Every 12 Hours Scheduled 5/7/2018     Sig - Route: Take 1 capsule by mouth Every 12 (Twelve) Hours. - Oral    gabapentin (NEURONTIN) capsule 600 mg 600 mg Nightly 5/7/2018     Sig - Route: " Take 2 capsules by mouth Every Night. - Oral    HYDROcodone-acetaminophen (NORCO)  MG per tablet 1 tablet 1 tablet 3 Times Daily 5/7/2018     Sig - Route: Take 1 tablet by mouth 3 (Three) Times a Day. - Oral    ipratropium-albuterol (DUO-NEB) nebulizer solution 3 mL 3 mL Once 5/6/2018 5/6/2018    Sig - Route: Take 3 mL by nebulization 1 (One) Time. - Nebulization    ipratropium-albuterol (DUO-NEB) nebulizer solution 3 mL 3 mL Every 4 Hours - RT 5/7/2018     Sig - Route: Take 3 mL by nebulization Every 4 (Four) Hours. - Nebulization    ipratropium-albuterol (DUO-NEB) nebulizer solution 3 mL 3 mL Every 4 Hours PRN 5/7/2018     Sig - Route: Take 3 mL by nebulization Every 4 (Four) Hours As Needed for Shortness of Air. - Nebulization    levothyroxine (SYNTHROID, LEVOTHROID) tablet 100 mcg 100 mcg Daily 5/7/2018     Sig - Route: Take 1 tablet by mouth Daily. - Oral    lithium carbonate capsule 300 mg 300 mg Nightly 5/7/2018     Sig - Route: Take 1 capsule by mouth Every Night. - Oral    LORazepam (ATIVAN) injection 0.5 mg 0.5 mg Once 5/6/2018 5/6/2018    Sig - Route: Infuse 0.25 mL into a venous catheter 1 (One) Time. - Intravenous    methylPREDNISolone sodium succinate (SOLU-Medrol) injection 125 mg 125 mg Once 5/6/2018 5/6/2018    Sig - Route: Infuse 2 mL into a venous catheter 1 (One) Time. - Intravenous    methylPREDNISolone sodium succinate (SOLU-Medrol) injection 60 mg 60 mg Every 6 Hours 5/7/2018     Sig - Route: Infuse 0.96 mL into a venous catheter Every 6 (Six) Hours. - Intravenous    nicotine (NICODERM CQ) 21 MG/24HR patch 1 patch 1 patch Every 24 Hours 5/7/2018     Sig - Route: Place 1 patch on the skin Daily. - Transdermal    O2 (OXYGEN) 2 L/min Continuous 5/7/2018     Sig - Route: Inhale 2 L/min Continuous. - Inhalation    ondansetron (ZOFRAN) injection 4 mg 4 mg Every 6 Hours PRN 5/7/2018     Sig - Route: Infuse 2 mL into a venous catheter Every 6 (Six) Hours As Needed for Nausea or Vomiting. -  Intravenous    pantoprazole (PROTONIX) EC tablet 40 mg 40 mg Every Morning 5/7/2018     Sig - Route: Take 1 tablet by mouth Every Morning. - Oral    piperacillin-tazobactam (ZOSYN) 4.5 g in iso-osmotic dextrose 100 mL IVPB (premix) 4.5 g Once 5/6/2018 5/6/2018    Sig - Route: Infuse 100 mL into a venous catheter 1 (One) Time. - Intravenous    promethazine (PHENERGAN) tablet 25 mg 25 mg 2 Times Daily PRN 5/7/2018     Sig - Route: Take 1 tablet by mouth 2 (Two) Times a Day As Needed for Nausea or Vomiting. - Oral    sodium chloride 0.9 % flush 1-10 mL 1-10 mL As Needed 5/7/2018     Sig - Route: Infuse 1-10 mL into a venous catheter As Needed for Line Care. - Intravenous    sodium chloride 0.9 % flush 10 mL 10 mL As Needed 5/6/2018     Sig - Route: Infuse 10 mL into a venous catheter As Needed for Line Care. - Intravenous    Cosign for Ordering: Accepted by Yung Mancini MD on 5/6/2018  7:13 PM    sucralfate (CARAFATE) 1 GM/10ML suspension 1 g 1 g Every 6 Hours Scheduled 5/7/2018     Sig - Route: Take 10 mL by mouth Every 6 (Six) Hours. - Oral    warfarin (COUMADIN) tablet 5 mg 5 mg 3 Times Weekly 5/7/2018     Sig - Route: Take 1 tablet by mouth Once per day on Mon Wed Fri. - Oral    warfarin (COUMADIN) tablet 7.5 mg 7.5 mg User Specified 5/8/2018     Sig - Route: Take 1 tablet by mouth Once per day on Sun Tue Thu Sat. - Oral    zolpidem (AMBIEN) tablet 10 mg 10 mg Nightly 5/7/2018     Sig - Route: Take 2 tablets by mouth Every Night. - Oral          Lab Results (all)     Procedure Component Value Units Date/Time    Blood Gas, Arterial [[id number]]  (Abnormal) Collected:  05/07/18 0436    Specimen:  Arterial Blood Updated:  05/07/18 0448     Site Right Radial     Luis's Test Positive     pH, Arterial 7.433 pH units      pCO2, Arterial 46.5 (H) mm Hg      pO2, Arterial 67.7 (L) mm Hg      HCO3, Arterial 31.1 (H) mmol/L      Base Excess, Arterial 5.8 (H) mmol/L      O2 Saturation, Arterial 94.7 %      Temperature  37.0 C      Barometric Pressure for Blood Gas 752 mmHg      Modality BiPap     FIO2 50 %      Ventilator Mode AVAP     Set Tidal Volume 340     Set Mech Resp Rate 8.0     EPAP 6     Collected by [id number]    TSH [[id number]]  (Normal) Collected:  05/07/18 0257    Specimen:  Blood Updated:  05/07/18 0443     TSH 0.826 mIU/mL     BNP [[id number]]  (Abnormal) Collected:  05/07/18 0257    Specimen:  Blood Updated:  05/07/18 0422     proBNP 2,440.0 (H) pg/mL     Comprehensive Metabolic Panel [[id number]]  (Abnormal) Collected:  05/07/18 0257    Specimen:  Blood Updated:  05/07/18 0417     Glucose 160 (H) mg/dL      BUN 11 mg/dL      Creatinine 0.66 mg/dL      Sodium 142 mmol/L      Potassium 3.9 mmol/L      Chloride 100 mmol/L      CO2 32.0 (H) mmol/L      Calcium 9.4 mg/dL      Total Protein 7.2 g/dL      Albumin 3.80 g/dL      ALT (SGPT) 62 (H) U/L      AST (SGOT) 60 (H) U/L      Alkaline Phosphatase 164 (H) U/L      Total Bilirubin 0.8 mg/dL      eGFR Non African Amer 97 mL/min/1.73      Globulin 3.4 gm/dL      A/G Ratio 1.1 g/dL      BUN/Creatinine Ratio 16.7     Anion Gap 10.0 mmol/L     Magnesium [[id number]]  (Normal) Collected:  05/07/18 0257    Specimen:  Blood Updated:  05/07/18 0417     Magnesium 2.0 mg/dL     Phosphorus [[id number]]  (Normal) Collected:  05/07/18 0257    Specimen:  Blood Updated:  05/07/18 0417     Phosphorus 3.4 mg/dL     Protime-INR [[id number]]  (Abnormal) Collected:  05/07/18 0257    Specimen:  Blood Updated:  05/07/18 0410     Protime 19.9 (H) Seconds      INR 1.63 (H)    CBC & Differential [[id number]] Collected:  05/07/18 0257    Specimen:  Blood Updated:  05/07/18 0404    Narrative:       The following orders were created for panel order CBC & Differential.  Procedure                               Abnormality         Status                     ---------                               -----------         ------                     CBC Auto Differential[[id number]]        Abnormal            Final  result                 Please view results for these tests on the individual orders.    CBC Auto Differential [[id number]]  (Abnormal) Collected:  05/07/18 0257    Specimen:  Blood Updated:  05/07/18 0404     WBC 10.30 10*3/mm3      RBC 4.46 10*6/mm3      Hemoglobin 12.9 g/dL      Hematocrit 39.7 %      MCV 89.0 fL      MCH 28.9 pg      MCHC 32.5 (L) g/dL      RDW 13.5 %      RDW-SD 44.0 fl      MPV 10.9 fL      Platelets 245 10*3/mm3      Neutrophil % 92.3 (H) %      Lymphocyte % 4.9 (L) %      Monocyte % 1.3 (L) %      Eosinophil % 0.0 %      Basophil % 0.2 %      Immature Grans % 1.3 %      Neutrophils, Absolute 9.52 (H) 10*3/mm3      Lymphocytes, Absolute 0.50 (L) 10*3/mm3      Monocytes, Absolute 0.13 (L) 10*3/mm3      Eosinophils, Absolute 0.00 10*3/mm3      Basophils, Absolute 0.02 10*3/mm3      Immature Grans, Absolute 0.13 (H) 10*3/mm3      nRBC 0.0 /100 WBC     Lactic Acid, Reflex [[id number]]  (Normal) Collected:  05/06/18 2308    Specimen:  Blood Updated:  05/06/18 2326     Lactate 1.9 mmol/L     Lactic Acid, Reflex Timer (This will reflex a repeat order 3-3:15 hours after ordered.) [[id number]] Collected:  05/06/18 1828    Specimen:  Blood Updated:  05/06/18 2200     Extra Tube Hold for add-ons.     Comment: Auto resulted.       Mitchell Draw [[id number]] Collected:  05/06/18 1828    Specimen:  Blood Updated:  05/06/18 1930    Narrative:       The following orders were created for panel order Mitchell Draw.  Procedure                               Abnormality         Status                     ---------                               -----------         ------                     Light Blue Top[[id number]]                                   Final result               Green Top (Gel)[[id number]]                                  Final result               Lavender Top[[id number]]                                     Final result               Red Top[[id number]]                                          Final result                  Please view results for these tests on the individual orders.    Light Blue Top [[id number]] Collected:  05/06/18 1828    Specimen:  Blood Updated:  05/06/18 1930     Extra Tube hold for add-on     Comment: Auto resulted       Green Top (Gel) [[id number]] Collected:  05/06/18 1828    Specimen:  Blood Updated:  05/06/18 1930     Extra Tube Hold for add-ons.     Comment: Auto resulted.       Lavender Top [[id number]] Collected:  05/06/18 1828    Specimen:  Blood Updated:  05/06/18 1930     Extra Tube hold for add-on     Comment: Auto resulted       Red Top [[id number]] Collected:  05/06/18 1828    Specimen:  Blood Updated:  05/06/18 1930     Extra Tube Hold for add-ons.     Comment: Auto resulted.       Blood Culture - Blood, [[id number]] Collected:  05/06/18 1910    Specimen:  Blood from Arm, Right Updated:  05/06/18 1919    Blood Culture - Blood, [[id number]] Collected:  05/06/18 1828    Specimen:  Blood from Arm, Left Updated:  05/06/18 1909    BNP [[id number]]  (Abnormal) Collected:  05/06/18 1828    Specimen:  Blood Updated:  05/06/18 1905     proBNP 2,160.0 (H) pg/mL     Troponin [[id number]]  (Normal) Collected:  05/06/18 1828    Specimen:  Blood Updated:  05/06/18 1905     Troponin I <0.012 ng/mL     Protime-INR [[id number]]  (Abnormal) Collected:  05/06/18 1828    Specimen:  Blood Updated:  05/06/18 1903     Protime 24.9 (H) Seconds      INR 2.16 (H)    D-dimer, Quantitative [[id number]]  (Normal) Collected:  05/06/18 1828    Specimen:  Blood Updated:  05/06/18 1903     D-Dimer, Quantitative <0.22 mg/L (FEU)     Narrative:       Reference Range is 0-0.50 mg/L FEU. However, results <0.50 mg/L FEU tends to rule out DVT or PE. Results >0.50 mg/L FEU are not useful in predicting absence or presence of DVT or PE.    Lactic Acid, Plasma [[id number]]  (Abnormal) Collected:  05/06/18 1828    Specimen:  Blood Updated:  05/06/18 1856     Lactate 2.5 (C) mmol/L     Comprehensive Metabolic Panel [[id number]]  (Abnormal)  Collected:  05/06/18 1828    Specimen:  Blood Updated:  05/06/18 1854     Glucose 126 (H) mg/dL      BUN 9 mg/dL      Creatinine 0.65 mg/dL      Sodium 142 mmol/L      Potassium 4.2 mmol/L      Chloride 103 mmol/L      CO2 29.0 mmol/L      Calcium 9.2 mg/dL      Total Protein 7.0 g/dL      Albumin 3.70 g/dL      ALT (SGPT) 64 (H) U/L      AST (SGOT) 80 (H) U/L      Alkaline Phosphatase 146 (H) U/L      Total Bilirubin 0.7 mg/dL      eGFR Non African Amer 99 mL/min/1.73      Globulin 3.3 gm/dL      A/G Ratio 1.1 g/dL      BUN/Creatinine Ratio 13.8     Anion Gap 10.0 mmol/L     CBC & Differential [[id number]] Collected:  05/06/18 1828    Specimen:  Blood Updated:  05/06/18 1838    Narrative:       The following orders were created for panel order CBC & Differential.  Procedure                               Abnormality         Status                     ---------                               -----------         ------                     CBC Auto Differential[[id number]]        Abnormal            Final result                 Please view results for these tests on the individual orders.    CBC Auto Differential [[id number]]  (Abnormal) Collected:  05/06/18 1828    Specimen:  Blood Updated:  05/06/18 1838     WBC 13.30 (H) 10*3/mm3      RBC 4.20 10*6/mm3      Hemoglobin 12.3 g/dL      Hematocrit 37.5 %      MCV 89.3 fL      MCH 29.3 pg      MCHC 32.8 (L) g/dL      RDW 13.6 %      RDW-SD 44.5 fl      MPV 10.2 fL      Platelets 223 10*3/mm3      Neutrophil % 91.2 (H) %      Lymphocyte % 4.5 (L) %      Monocyte % 2.6 (L) %      Eosinophil % 0.2 %      Basophil % 0.2 %      Immature Grans % 1.3 %      Neutrophils, Absolute 12.13 (H) 10*3/mm3      Lymphocytes, Absolute 0.60 (L) 10*3/mm3      Monocytes, Absolute 0.35 10*3/mm3      Eosinophils, Absolute 0.02 10*3/mm3      Basophils, Absolute 0.03 10*3/mm3      Immature Grans, Absolute 0.17 (H) 10*3/mm3      nRBC 0.0 /100 WBC     Blood Gas, Arterial [[id number]]  (Abnormal)  Collected:  05/06/18 1823    Specimen:  Arterial Blood Updated:  05/06/18 1831     Site Right Brachial     Luis's Test N/A     pH, Arterial 7.400 pH units      pCO2, Arterial 41.4 mm Hg      pO2, Arterial 60.0 (L) mm Hg      HCO3, Arterial 25.6 mmol/L      Base Excess, Arterial 0.7 mmol/L      O2 Saturation, Arterial 92.6 (L) %      Temperature 37.0 C      Barometric Pressure for Blood Gas 750 mmHg      Modality NRB     FIO2 100 %      Ventilator Mode NA     Collected by [id number]          Imaging Results (all)     Procedure Component Value Units Date/Time    XR Chest 1 View [[id number]] Updated:  05/07/18 0425    XR Chest 1 View [[id number]] Collected:  05/06/18 1907     Updated:  05/06/18 1911    Narrative:       EXAMINATION:   XR CHEST 1 VW-  5/6/2018 7:07 PM CDT     HISTORY: Short of air     Frontal upright radiograph of the chest 5/6/2018 6:25 PM CDT     COMPARISON: February 15, 2018.     FINDINGS:   Bilateral interstitial infiltrates are present.. The cardiac silhouette  is normal. These infiltrates may be due to either pulmonary edema or  possibly pneumonia..      The osseous structures and surrounding soft tissues demonstrate no acute  abnormality.       Impression:       1. Bilateral interstitial infiltrates. Differential considerations  include pneumonia versus pulmonary edema.        This report was finalized on 05/06/2018 19:07 by Dr. Zach Pham MD.          Orders (all)     Start     Ordered    05/08/18 1800  warfarin (COUMADIN) tablet 7.5 mg  Once per day on Sun Tue Thu Sat      05/07/18 0202    05/07/18 0900  diazePAM (VALIUM) tablet 10 mg  3 Times Daily      05/07/18 0202    05/07/18 0900  FLUoxetine (PROzac) capsule 60 mg  Daily      05/07/18 0202    05/07/18 0900  HYDROcodone-acetaminophen (NORCO)  MG per tablet 1 tablet  3 Times Daily      05/07/18 0202    05/07/18 0900  levothyroxine (SYNTHROID, LEVOTHROID) tablet 100 mcg  Daily      05/07/18 0202    05/07/18 0900  warfarin (COUMADIN)  tablet 5 mg  Once per day on Mon Wed Fri 05/07/18 0202    05/07/18 0800  furosemide (LASIX) injection 40 mg  Every 12 Hours      05/07/18 0203    05/07/18 0700  pantoprazole (PROTONIX) EC tablet 40 mg  Every Morning      05/07/18 0202    05/07/18 0600  CBC & Differential  Morning Draw      05/07/18 0137    05/07/18 0600  Comprehensive Metabolic Panel  Morning Draw      05/07/18 0137    05/07/18 0600  Magnesium  Morning Draw      05/07/18 0137    05/07/18 0600  CBC Auto Differential  PROCEDURE ONCE      05/07/18 0137    05/07/18 0600  Incentive Spirometry  Every 4 Hours While Awake      05/07/18 0202    05/07/18 0600  BNP  Morning Draw,   Status:  Canceled      05/07/18 0202    05/07/18 0600  CBC Auto Differential  Morning Draw,   Status:  Canceled      05/07/18 0202    05/07/18 0600  Comprehensive Metabolic Panel  Morning Draw,   Status:  Canceled      05/07/18 0202    05/07/18 0600  Magnesium  Morning Draw,   Status:  Canceled      05/07/18 0202    05/07/18 0600  Phosphorus  Morning Draw      05/07/18 0202    05/07/18 0600  TSH  Morning Draw      05/07/18 0202    05/07/18 0448  Blood Gas, Arterial  Once      05/07/18 0447    05/07/18 0330  ipratropium-albuterol (DUO-NEB) nebulizer solution 3 mL  Every 4 Hours - RT      05/07/18 0202    05/07/18 0300  Blood Gas, Arterial  Once      05/07/18 0137    05/07/18 0300  BNP  Once      05/07/18 0137    05/07/18 0245  gabapentin (NEURONTIN) capsule 300 mg  Every 12 Hours Scheduled      05/07/18 0202    05/07/18 0245  gabapentin (NEURONTIN) capsule 600 mg  Nightly      05/07/18 0202    05/07/18 0245  lithium carbonate capsule 300 mg  Nightly      05/07/18 0202    05/07/18 0245  nicotine (NICODERM CQ) 21 MG/24HR patch 1 patch  Every 24 Hours      05/07/18 0202    05/07/18 0245  O2 (OXYGEN)  Continuous      05/07/18 0202    05/07/18 0245  sucralfate (CARAFATE) 1 GM/10ML suspension 1 g  Every 6 Hours Scheduled      05/07/18 0202    05/07/18 0245  zolpidem (AMBIEN) tablet 10  mg  Nightly      05/07/18 0202    05/07/18 0245  methylPREDNISolone sodium succinate (SOLU-Medrol) injection 60 mg  Every 6 Hours      05/07/18 0203    05/07/18 0245  cefTRIAXone (ROCEPHIN) 1 g/10mL IV PUSH syringe  Daily      05/07/18 0203    05/07/18 0204  Protime-INR  Daily      05/07/18 0202    05/07/18 0204  Daily Weights  Daily      05/07/18 0202    05/07/18 0200  acetaminophen (TYLENOL) tablet 650 mg  Every 4 Hours PRN      05/07/18 0203    05/07/18 0200  acetaminophen (TYLENOL) suppository 650 mg  Every 4 Hours PRN      05/07/18 0203    05/07/18 0200  Vital Signs Every Hour and Per Hospital Policy Based on Patient Condition  Every Hour      05/07/18 0202    05/07/18 0200  Intake and Output  Every Hour      05/07/18 0202    05/07/18 0155  ondansetron (ZOFRAN) injection 4 mg  Every 6 Hours PRN      05/07/18 0203    05/07/18 0155  ipratropium-albuterol (DUO-NEB) nebulizer solution 3 mL  Every 4 Hours PRN      05/07/18 0203    05/07/18 0149  Diet Regular  Diet Effective Now      05/07/18 0202    05/07/18 0145  Cardiac Monitoring  Continuous      05/07/18 0202    05/07/18 0145  Continuous Pulse Oximetry  Continuous      05/07/18 0202    05/07/18 0145  Strict Bed Rest  Until Discontinued      05/07/18 0202    05/07/18 0145  Use Mobility Guidelines for Advancement of Activity  Continuous      05/07/18 0202    05/07/18 0145  Height & Weight  Once      05/07/18 0202    05/07/18 0145  Insert Peripheral IV  Once      05/07/18 0202    05/07/18 0145  Saline Lock & Maintain IV Access  Continuous      05/07/18 0202    05/07/18 0145  Full Code  Continuous      05/07/18 0202    05/07/18 0145  VTE Risk Assessment - Moderate Risk  Once      05/07/18 0202    05/07/18 0145  Pharmacologic VTE Prophylaxis Not Indicated: Currently Anticoagulated  Once      05/07/18 0202    05/07/18 0145  Mechanical VTE Prophylaxis Not Indicated: Currently Anticoagulated  Once      05/07/18 0202    05/07/18 0144  sodium chloride 0.9 % flush 1-10 mL   As Needed      05/07/18 0202    05/07/18 0141  promethazine (PHENERGAN) tablet 25 mg  2 Times Daily PRN      05/07/18 0202    05/07/18 0136  XR Chest 1 View  1 Time Imaging      05/07/18 0137    05/06/18 2201  Lactic Acid, Reflex  STAT      05/06/18 2200    05/06/18 1932  furosemide (LASIX) injection 40 mg  Once      05/06/18 1930    05/06/18 1930  Critical Care  Once     Comments:  This order was created via procedure documentation    05/06/18 1929 05/06/18 1929  Inpatient Admission  Once      05/06/18 1928 05/06/18 1859  piperacillin-tazobactam (ZOSYN) 4.5 g in iso-osmotic dextrose 100 mL IVPB (premix)  Once      05/06/18 1857 05/06/18 1857  Lactic Acid, Reflex Timer (This will reflex a repeat order 3-3:15 hours after ordered.)  Once      05/06/18 1856    05/06/18 1832  Blood Gas, Arterial  Once      05/06/18 1831    05/06/18 1831  Respiratory communication  Once     Comments:  Okay for AVAPS    05/06/18 1830    05/06/18 1826  ipratropium-albuterol (DUO-NEB) nebulizer solution 3 mL  Once      05/06/18 1824    05/06/18 1826  methylPREDNISolone sodium succinate (SOLU-Medrol) injection 125 mg  Once      05/06/18 1824    05/06/18 1825  NIPPV CPAP OR BIPAP  Until Discontinued      05/07/18 0103    05/06/18 1823  LORazepam (ATIVAN) injection 0.5 mg  Once      05/06/18 1821    05/06/18 1821  Blood Gas, Arterial  Once      05/06/18 1821    05/06/18 1821  D-dimer, Quantitative  Once      05/06/18 1821    05/06/18 1821  Blood Culture - Blood,  Once      05/06/18 1821    05/06/18 1821  Blood Culture - Blood,  Once      05/06/18 1821    05/06/18 1821  Lactic Acid, Plasma  Once      05/06/18 1821    05/06/18 1821  ECG 12 Lead  Once,   Status:  Canceled      05/06/18 1821 05/06/18 1821  Oxygen Therapy- Nasal Cannula; Titrate for SPO2: 90%  Continuous      05/06/18 1821 05/06/18 1810  NPO Diet  Diet Effective Now,   Status:  Canceled      05/06/18 1809 05/06/18 1810  Undress and Gown  Once      05/06/18 1809     05/06/18 1810  Cardiac monitoring  Per Hospital Policy,   Status:  Canceled      05/06/18 1809    05/06/18 1810  Continuous Pulse Oximetry  Per Hospital Policy,   Status:  Canceled      05/06/18 1809    05/06/18 1810  Oxygen Therapy- Nasal Cannula; 2 LPM; Titrate for SPO2: equal to or greater than, 92%  Continuous      05/06/18 1809    05/06/18 1810  Vital Signs  Per Hospital Policy      05/06/18 1809    05/06/18 1810  ECG 12 Lead  Once      05/06/18 1809    05/06/18 1810  Insert peripheral IV  Once      05/06/18 1809    05/06/18 1810  [place] Draw  Once      05/06/18 1809    05/06/18 1810  CBC & Differential  Once      05/06/18 1809    05/06/18 1810  Comprehensive Metabolic Panel  Once      05/06/18 1809    05/06/18 1810  BNP  Once      05/06/18 1809    05/06/18 1810  Troponin  Once      05/06/18 1809    05/06/18 1810  XR Chest 1 View  1 Time Imaging      05/06/18 1809    05/06/18 1810  Protime-INR  Once      05/06/18 1809    05/06/18 1810  POC Protime / INR  Once,   Status:  Canceled      05/06/18 1809    05/06/18 1810  Light Blue Top  PROCEDURE ONCE      05/06/18 1809    05/06/18 1810  Green Top (Gel)  PROCEDURE ONCE      05/06/18 1809    05/06/18 1810  Lavender Top  PROCEDURE ONCE      05/06/18 1809    05/06/18 1810  Red Top  PROCEDURE ONCE      05/06/18 1809    05/06/18 1810  CBC Auto Differential  PROCEDURE ONCE      05/06/18 1810    05/06/18 1809  sodium chloride 0.9 % flush 10 mL  As Needed      05/06/18 1809    --  pantoprazole (PROTONIX) 20 MG EC tablet  Daily      05/06/18 1815          Ventilator/Non-Invasive Ventilation Settings     Start     Ordered    05/06/18 1825  NIPPV CPAP OR BIPAP  Until Discontinued      05/07/18 0103          Physician Progress Notes (all)     No notes of this type exist for this encounter.

## 2018-05-07 NOTE — H&P
[facility] Medicine Services  HISTORY AND PHYSICAL    Date of Admission: 5/6/2018  Primary Care Physician: Neel Valencia Jr., MD    Subjective     Chief Complaint: Shortness of breath    History of Present Illness  Patient is a 42-year-old female past medical history of recent COPD exacerbation with ARDS in February requiring intubation because of respiratory failure.  She presents with increasing shortness of breath today having trouble speaking full sentences she has had a cough she denies any swelling no hemoptysis or fevers or chills she also had some chest pain earlier today she presented to the emergency room in moderate respiratory distress and subsequently had to be placed on BiPAP stabilize her.  She is being admitted for further evaluation and management treatment of COPD exacerbation and CHF.  She is resting stable on BiPAP in the ICU upon my evaluation with normal oxygen saturation.        Review of Systems   14 point review of systems negative except for as per HPI  Otherwise complete ROS reviewed and negative except as mentioned in the HPI.    Past Medical History:   Past Medical History:   Diagnosis Date   • Acute retention of urine    • Anxiety and depression    • Bipolar 1 disorder    • COPD (chronic obstructive pulmonary disease)    • DVT (deep venous thrombosis)    • GERD (gastroesophageal reflux disease)    • Hypoglycemia    • Insomnia    • PTSD (post-traumatic stress disorder)      Past Surgical History:  Past Surgical History:   Procedure Laterality Date   • ABDOMINOPLASTY     • APPENDECTOMY     • BRONCHOSCOPY N/A 2/2/2018    Procedure: BRONCHOSCOPY AT BEDSIDE;  Surgeon: Earnest Ba MD;  Location: [facility] ENDOSCOPY;  Service:    • CHOLECYSTECTOMY     • ENDOSCOPY N/A 1/29/2018    Procedure: ESOPHAGOGASTRODUODENOSCOPY WITH ANESTHESIA;  Surgeon: Aime Reyna MD;  Location: [facility] ENDOSCOPY;  Service:    • GASTRIC BYPASS     • HYSTERECTOMY        Social History:  reports that she has been smoking Cigarettes.  She has been smoking about 0.25 packs per day. She has never used smokeless tobacco. She reports that she does not drink alcohol or use drugs.    Family History: family history includes Cancer in her father and mother; HIV in her brother.       Allergies:  Allergies   Allergen Reactions   • Morphine Anaphylaxis   • Aspirin Other (See Comments)     Child allergy, unsure what reaction  Child allergy, unsure what reaction   • Morphine And Related    • Nsaids    • Quetiapine Other (See Comments)     Muscle spasms  Muscle spasms   • Quetiapine Fumarate    • Salicylates    • Codeine Rash     Medications:  Prior to Admission medications    Medication Sig Start Date End Date Taking? Authorizing Provider   FLUoxetine (PROzac) 20 MG capsule Take 60 mg by mouth Daily.   Yes Historical Provider, MD   gabapentin (NEURONTIN) 300 MG capsule Take 600 mg by mouth Every Night.   Yes Historical Provider, MD   HYDROcodone-acetaminophen (NORCO)  MG per tablet Take 1 tablet by mouth 3 (Three) Times a Day.   Yes Historical Provider, MD   ipratropium-albuterol (DUO-NEB) 0.5-2.5 mg/mL nebulizer Take 3 mL by nebulization 4 (Four) Times a Day. 2/10/18  Yes Emelina Ogden,    levothyroxine (SYNTHROID, LEVOTHROID) 100 MCG tablet Take 100 mcg by mouth Daily. 9/24/16  Yes Historical Provider, MD   lithium carbonate 300 MG capsule Take 300 mg by mouth Every Night.   Yes Historical Provider, MD   O2 (OXYGEN) Inhale 2 L/min Continuous. At bedtime only (per patient.)   Yes Historical Provider, MD   omeprazole (priLOSEC) 20 MG capsule Take 20 mg by mouth Daily.   Yes Historical Provider, MD   pantoprazole (PROTONIX) 20 MG EC tablet Take 20 mg by mouth Daily.   Yes Historical Provider, MD   PROAIR  (90 BASE) MCG/ACT inhaler Inhale 2 puffs Every 4 (Four) Hours As Needed for Wheezing or Shortness of Air. 6/3/17  Yes Historical Provider, MD   warfarin (COUMADIN) 5 MG  "tablet Take 7.5 mg by mouth Daily. Tuesday, Thursday, Saturday and Sunday.   Yes Historical Provider, MD   warfarin (COUMADIN) 5 MG tablet Take 5 mg by mouth 3 (Three) Times a Week. Monday, Wednesday and Friday.   Yes Historical Provider, MD   zolpidem (AMBIEN) 10 MG tablet Take 10 mg by mouth Every Night.   Yes Historical Provider, MD   diazePAM (VALIUM) 10 MG tablet Take 10 mg by mouth 3 (Three) Times a Day. 2/14/17   Historical Provider, MD   gabapentin (NEURONTIN) 300 MG capsule Take 300 mg by mouth 2 (Two) Times a Day.    Historical Provider, MD   nicotine (NICODERM CQ) 21 MG/24HR patch Place 1 patch on the skin Daily. 1/30/18   PARIS Junior   promethazine (PHENERGAN) 25 MG tablet Take 25 mg by mouth 2 (Two) Times a Day As Needed for Nausea or Vomiting. 9/2/16   Historical Provider, MD   sucralfate (CARAFATE) 1 GM/10ML suspension Take 10 mL by mouth Every 6 (Six) Hours. 1/30/18   PARIS Junior     Objective     Vital Signs: /72   Pulse 73   Temp 98.1 °F (36.7 °C) (Axillary)   Resp 21   Ht 149.9 cm (59\")   Wt 87.5 kg (192 lb 14.4 oz)   SpO2 93%   BMI 38.96 kg/m²   Physical Exam  Constitutional: She is oriented to person, place, and time. She appears well-nourished. She appears ill. She appears distressed.   HENT: Bipap in place  Head: Atraumatic.   Mouth/Throat: Oropharynx is clear and moist and mucous membranes are normal.   Eyes: EOM are normal. Pupils are equal, round, and reactive to light.   Neck: Normal range of motion. Neck supple. No JVD present.   Cardiovascular: Normal rate, regular rhythm and normal heart sounds.  Exam reveals no gallop and no friction rub.    No murmur heard.  Pulmonary/Chest: Accessory muscle usage present. No stridor. Tachypnea noted. She is in . She has no decreased breath sounds. She has no wheezes. She has rhonchi. She has no rales.   Abdominal: Soft. There is no tenderness.   Musculoskeletal: She exhibits no edema.   Neurological: She is alert " and oriented to person, place, and time.   Skin: Skin is warm and dry. Capillary refill takes less than 2 seconds.   Psychiatric: She has a normal mood and affect.   Nursing note and vitals reviewed.      Results Reviewed:  Lab Results (last 24 hours)     Procedure Component Value Units Date/Time    Blood Gas, Arterial [[id number]]  (Abnormal) Collected:  05/07/18 0436    Specimen:  Arterial Blood Updated:  05/07/18 0448     Site Right Radial     Luis's Test Positive     pH, Arterial 7.433 pH units      pCO2, Arterial 46.5 (H) mm Hg      pO2, Arterial 67.7 (L) mm Hg      HCO3, Arterial 31.1 (H) mmol/L      Base Excess, Arterial 5.8 (H) mmol/L      O2 Saturation, Arterial 94.7 %      Temperature 37.0 C      Barometric Pressure for Blood Gas 752 mmHg      Modality BiPap     FIO2 50 %      Ventilator Mode AVAP     Set Tidal Volume 340     Set Mech Resp Rate 8.0     EPAP 6     Collected by [id number]    TSH [[id number]]  (Normal) Collected:  05/07/18 0257    Specimen:  Blood Updated:  05/07/18 0443     TSH 0.826 mIU/mL     BNP [[id number]]  (Abnormal) Collected:  05/07/18 0257    Specimen:  Blood Updated:  05/07/18 0422     proBNP 2,440.0 (H) pg/mL     Comprehensive Metabolic Panel [[id number]]  (Abnormal) Collected:  05/07/18 0257    Specimen:  Blood Updated:  05/07/18 0417     Glucose 160 (H) mg/dL      BUN 11 mg/dL      Creatinine 0.66 mg/dL      Sodium 142 mmol/L      Potassium 3.9 mmol/L      Chloride 100 mmol/L      CO2 32.0 (H) mmol/L      Calcium 9.4 mg/dL      Total Protein 7.2 g/dL      Albumin 3.80 g/dL      ALT (SGPT) 62 (H) U/L      AST (SGOT) 60 (H) U/L      Alkaline Phosphatase 164 (H) U/L      Total Bilirubin 0.8 mg/dL      eGFR Non African Amer 97 mL/min/1.73      Globulin 3.4 gm/dL      A/G Ratio 1.1 g/dL      BUN/Creatinine Ratio 16.7     Anion Gap 10.0 mmol/L     Magnesium [[id number]]  (Normal) Collected:  05/07/18 0257    Specimen:  Blood Updated:  05/07/18 0417     Magnesium 2.0 mg/dL     Phosphorus  [[id number]]  (Normal) Collected:  05/07/18 0257    Specimen:  Blood Updated:  05/07/18 0417     Phosphorus 3.4 mg/dL     Protime-INR [[id number]]  (Abnormal) Collected:  05/07/18 0257    Specimen:  Blood Updated:  05/07/18 0410     Protime 19.9 (H) Seconds      INR 1.63 (H)    CBC & Differential [[id number]] Collected:  05/07/18 0257    Specimen:  Blood Updated:  05/07/18 0404    Narrative:       The following orders were created for panel order CBC & Differential.  Procedure                               Abnormality         Status                     ---------                               -----------         ------                     CBC Auto Differential[[id number]]        Abnormal            Final result                 Please view results for these tests on the individual orders.    CBC Auto Differential [[id number]]  (Abnormal) Collected:  05/07/18 0257    Specimen:  Blood Updated:  05/07/18 0404     WBC 10.30 10*3/mm3      RBC 4.46 10*6/mm3      Hemoglobin 12.9 g/dL      Hematocrit 39.7 %      MCV 89.0 fL      MCH 28.9 pg      MCHC 32.5 (L) g/dL      RDW 13.5 %      RDW-SD 44.0 fl      MPV 10.9 fL      Platelets 245 10*3/mm3      Neutrophil % 92.3 (H) %      Lymphocyte % 4.9 (L) %      Monocyte % 1.3 (L) %      Eosinophil % 0.0 %      Basophil % 0.2 %      Immature Grans % 1.3 %      Neutrophils, Absolute 9.52 (H) 10*3/mm3      Lymphocytes, Absolute 0.50 (L) 10*3/mm3      Monocytes, Absolute 0.13 (L) 10*3/mm3      Eosinophils, Absolute 0.00 10*3/mm3      Basophils, Absolute 0.02 10*3/mm3      Immature Grans, Absolute 0.13 (H) 10*3/mm3      nRBC 0.0 /100 WBC     Lactic Acid, Reflex [[id number]]  (Normal) Collected:  05/06/18 2308    Specimen:  Blood Updated:  05/06/18 2326     Lactate 1.9 mmol/L     Lactic Acid, Reflex Timer (This will reflex a repeat order 3-3:15 hours after ordered.) [[id number]] Collected:  05/06/18 1828    Specimen:  Blood Updated:  05/06/18 2200     Extra Tube Hold for add-ons.     Comment:  Auto resulted.       [place] Draw [[id number]] Collected:  05/06/18 1828    Specimen:  Blood Updated:  05/06/18 1930    Narrative:       The following orders were created for panel order [place] Draw.  Procedure                               Abnormality         Status                     ---------                               -----------         ------                     Light Blue Top[[id number]]                                   Final result               Green Top (Gel)[[id number]]                                  Final result               Lavender Top[[id number]]                                     Final result               Red Top[[id number]]                                          Final result                 Please view results for these tests on the individual orders.    Light Blue Top [[id number]] Collected:  05/06/18 1828    Specimen:  Blood Updated:  05/06/18 1930     Extra Tube hold for add-on     Comment: Auto resulted       Green Top (Gel) [[id number]] Collected:  05/06/18 1828    Specimen:  Blood Updated:  05/06/18 1930     Extra Tube Hold for add-ons.     Comment: Auto resulted.       Lavender Top [[id number]] Collected:  05/06/18 1828    Specimen:  Blood Updated:  05/06/18 1930     Extra Tube hold for add-on     Comment: Auto resulted       Red Top [[id number]] Collected:  05/06/18 1828    Specimen:  Blood Updated:  05/06/18 1930     Extra Tube Hold for add-ons.     Comment: Auto resulted.       Blood Culture - Blood, [[id number]] Collected:  05/06/18 1910    Specimen:  Blood from Arm, Right Updated:  05/06/18 1919    Blood Culture - Blood, [[id number]] Collected:  05/06/18 1828    Specimen:  Blood from Arm, Left Updated:  05/06/18 1909    BNP [[id number]]  (Abnormal) Collected:  05/06/18 1828    Specimen:  Blood Updated:  05/06/18 1905     proBNP 2,160.0 (H) pg/mL     Troponin [[id number]]  (Normal) Collected:  05/06/18 1828    Specimen:  Blood Updated:  05/06/18 1905     Troponin I <0.012 ng/mL      Protime-INR [[id number]]  (Abnormal) Collected:  05/06/18 1828    Specimen:  Blood Updated:  05/06/18 1903     Protime 24.9 (H) Seconds      INR 2.16 (H)    D-dimer, Quantitative [[id number]]  (Normal) Collected:  05/06/18 1828    Specimen:  Blood Updated:  05/06/18 1903     D-Dimer, Quantitative <0.22 mg/L (FEU)     Narrative:       Reference Range is 0-0.50 mg/L FEU. However, results <0.50 mg/L FEU tends to rule out DVT or PE. Results >0.50 mg/L FEU are not useful in predicting absence or presence of DVT or PE.    Lactic Acid, Plasma [[id number]]  (Abnormal) Collected:  05/06/18 1828    Specimen:  Blood Updated:  05/06/18 1856     Lactate 2.5 (C) mmol/L     Comprehensive Metabolic Panel [[id number]]  (Abnormal) Collected:  05/06/18 1828    Specimen:  Blood Updated:  05/06/18 1854     Glucose 126 (H) mg/dL      BUN 9 mg/dL      Creatinine 0.65 mg/dL      Sodium 142 mmol/L      Potassium 4.2 mmol/L      Chloride 103 mmol/L      CO2 29.0 mmol/L      Calcium 9.2 mg/dL      Total Protein 7.0 g/dL      Albumin 3.70 g/dL      ALT (SGPT) 64 (H) U/L      AST (SGOT) 80 (H) U/L      Alkaline Phosphatase 146 (H) U/L      Total Bilirubin 0.7 mg/dL      eGFR Non African Amer 99 mL/min/1.73      Globulin 3.3 gm/dL      A/G Ratio 1.1 g/dL      BUN/Creatinine Ratio 13.8     Anion Gap 10.0 mmol/L     CBC & Differential [[id number]] Collected:  05/06/18 1828    Specimen:  Blood Updated:  05/06/18 1838    Narrative:       The following orders were created for panel order CBC & Differential.  Procedure                               Abnormality         Status                     ---------                               -----------         ------                     CBC Auto Differential[[id number]]        Abnormal            Final result                 Please view results for these tests on the individual orders.    CBC Auto Differential [[id number]]  (Abnormal) Collected:  05/06/18 1828    Specimen:  Blood Updated:  05/06/18 1838     WBC  13.30 (H) 10*3/mm3      RBC 4.20 10*6/mm3      Hemoglobin 12.3 g/dL      Hematocrit 37.5 %      MCV 89.3 fL      MCH 29.3 pg      MCHC 32.8 (L) g/dL      RDW 13.6 %      RDW-SD 44.5 fl      MPV 10.2 fL      Platelets 223 10*3/mm3      Neutrophil % 91.2 (H) %      Lymphocyte % 4.5 (L) %      Monocyte % 2.6 (L) %      Eosinophil % 0.2 %      Basophil % 0.2 %      Immature Grans % 1.3 %      Neutrophils, Absolute 12.13 (H) 10*3/mm3      Lymphocytes, Absolute 0.60 (L) 10*3/mm3      Monocytes, Absolute 0.35 10*3/mm3      Eosinophils, Absolute 0.02 10*3/mm3      Basophils, Absolute 0.03 10*3/mm3      Immature Grans, Absolute 0.17 (H) 10*3/mm3      nRBC 0.0 /100 WBC     Blood Gas, Arterial [[id number]]  (Abnormal) Collected:  05/06/18 1823    Specimen:  Arterial Blood Updated:  05/06/18 1831     Site Right Brachial     Luis's Test N/A     pH, Arterial 7.400 pH units      pCO2, Arterial 41.4 mm Hg      pO2, Arterial 60.0 (L) mm Hg      HCO3, Arterial 25.6 mmol/L      Base Excess, Arterial 0.7 mmol/L      O2 Saturation, Arterial 92.6 (L) %      Temperature 37.0 C      Barometric Pressure for Blood Gas 750 mmHg      Modality NRB     FIO2 100 %      Ventilator Mode NA     Collected by [id number]        Imaging Results (last 24 hours)     Procedure Component Value Units Date/Time    XR Chest 1 View [[id number]] Updated:  05/07/18 0425    XR Chest 1 View [[id number]] Collected:  05/06/18 1907     Updated:  05/06/18 1911    Narrative:       EXAMINATION:   XR CHEST 1 VW-  5/6/2018 7:07 PM CDT     HISTORY: Short of air     Frontal upright radiograph of the chest 5/6/2018 6:25 PM CDT     COMPARISON: February 15, 2018.     FINDINGS:   Bilateral interstitial infiltrates are present.. The cardiac silhouette  is normal. These infiltrates may be due to either pulmonary edema or  possibly pneumonia..      The osseous structures and surrounding soft tissues demonstrate no acute  abnormality.       Impression:       1. Bilateral interstitial  infiltrates. Differential considerations  include pneumonia versus pulmonary edema.        This report was finalized on 05/06/2018 19:07 by Dr. Zach Pham MD.        I have personally reviewed and interpreted the radiology studies and ECG obtained at time of admission.     Assessment / Plan     Assessment:   Hospital Problem List     Respiratory distress      1.  Acute on chronic respiratory failure due to COPD and CHF plan is to admit patient diurese with IV Lasix continue BiPAP will start antibiotics empirically as well as Solu-Medrol as a believe she has a component of both COPD and CHF.  We will check 2-D echo monitor patient ICU on BiPAP and try to wean her off of it back to oxygen.  2.  History of DVT patient is on Coumadin and continue warfarin monitor pro time and INR      Patient seen after midnight         Code Status: Full     I discussed the patients findings and my recommendations with the patient    Estimated length of stay 3-4 days    Loy Charles MD   05/07/18   5:00 AM

## 2018-05-07 NOTE — PLAN OF CARE
Problem: Fall Risk (Adult)  Goal: Identify Related Risk Factors and Signs and Symptoms  Outcome: Ongoing (interventions implemented as appropriate)    Goal: Absence of Fall  Outcome: Ongoing (interventions implemented as appropriate)      Problem: Patient Care Overview  Goal: Plan of Care Review  Outcome: Ongoing (interventions implemented as appropriate)   05/07/18 0605   Coping/Psychosocial   Plan of Care Reviewed With patient   Coping/Psychosocial   Patient Agreement with Plan of Care agrees   OTHER   Outcome Summary Pt admitted to CCU with pneumonia on bipap, no change to settings, clinical presentation improved.      Goal: Individualization and Mutuality  Outcome: Ongoing (interventions implemented as appropriate)    Goal: Discharge Needs Assessment  Outcome: Ongoing (interventions implemented as appropriate)    Goal: Interprofessional Rounds/Family Conf  Outcome: Ongoing (interventions implemented as appropriate)      Problem: ARDS (Acute Resp Distress Syndrome) (Adult)  Goal: Signs and Symptoms of Listed Potential Problems Will be Absent, Minimized or Managed (ARDS)  Outcome: Ongoing (interventions implemented as appropriate)      Problem: Pneumonia (Adult)  Goal: Signs and Symptoms of Listed Potential Problems Will be Absent, Minimized or Managed (Pneumonia)  Outcome: Ongoing (interventions implemented as appropriate)

## 2018-05-07 NOTE — PROGRESS NOTES
PT TRANSPORTED TO CCU ON NPPV ON 50%. PT REMAINED STABLE BEFORE, DURING AND AFTER TRANSPORT. PT SPO2 REMAINED STABLE AT 94% DURING TRANSPORT TO CCU. PT HAD NO ISSUES DURING TRANSPORT. REPORT GIVEN TO BRITTANEY TORIBOI CRT.

## 2018-05-07 NOTE — PROGRESS NOTES
Patient medical by Dr. Charles overnight.  Patient was seen and examined.  Medications labs vitals are reviewed.  Patient currently is on 5 L nasal cannula.  Blood pressure has dropped a bit however has received Lasix for now elevated BNP and her hypoxia.  Will hold further Lasix this evening.  We will check an ABG as her repeat ABG on BiPAP was showing continued hypoxia and her current sats only 90%.  Discussion with the patient she is very adamant she would not want to be resuscitated or intubated at this time.  Should also in wish for this to not be discussed with her children.  Her to avoid further BiPAP if at all possible. Paola Valencia MD

## 2018-05-07 NOTE — PLAN OF CARE
Problem: Fall Risk (Adult)  Goal: Identify Related Risk Factors and Signs and Symptoms  Outcome: Ongoing (interventions implemented as appropriate)      Problem: Patient Care Overview  Goal: Plan of Care Review  Outcome: Ongoing (interventions implemented as appropriate)   05/07/18 0605   Coping/Psychosocial   Plan of Care Reviewed With patient   Coping/Psychosocial   Patient Agreement with Plan of Care agrees   OTHER   Outcome Summary Pt admitted to CCU with pneumonia on bipap, no change to settings, clinical presentation improved.        Problem: ARDS (Acute Resp Distress Syndrome) (Adult)  Goal: Signs and Symptoms of Listed Potential Problems Will be Absent, Minimized or Managed (ARDS)  Outcome: Ongoing (interventions implemented as appropriate)      Problem: Pneumonia (Adult)  Goal: Signs and Symptoms of Listed Potential Problems Will be Absent, Minimized or Managed (Pneumonia)  Outcome: Ongoing (interventions implemented as appropriate)

## 2018-05-07 NOTE — PROGRESS NOTES
Discharge Planning Assessment   [place]     Patient Name: Vianca Spencer  MRN: [id number]  Today's Date: 5/7/2018    Admit Date: 5/6/2018          Discharge Needs Assessment     Row Name 05/07/18 1428       Living Environment    Lives With child(laura), adult;child(laura), dependent    Current Living Arrangements home/apartment/condo    Primary Care Provided by self;child(laura)    Provides Primary Care For no one    Family Caregiver if Needed child(laura), adult    Quality of Family Relationships supportive    Able to Return to Prior Arrangements yes       Resource/Environmental Concerns    Resource/Environmental Concerns none    Transportation Concerns car, none       Transition Planning    Patient/Family Anticipates Transition to home with family    Patient/Family Anticipated Services at Transition home health care    Transportation Anticipated car, drives self;family or friend will provide       Discharge Needs Assessment    Readmission Within the Last 30 Days no previous admission in last 30 days    Concerns to be Addressed care coordination/care conferences;discharge planning    Equipment Currently Used at Home oxygen;walker, rolling;wheelchair    Equipment Needed After Discharge shower chair    Outpatient/Agency/Support Group Needs homecare agency    Discharge Facility/Level of Care Needs home with home health    Current Discharge Risk chronically ill    Discharge Coordination/Progress Spoke to patient regarding discharge plan/needs.  Patient states she resides at home with her 17 year old son and her 26 year old son.  Patient states her children assist with her care.  [facility]  Patient has a PCP and RX coverage.  Patient is agreeable to  services at discharge.  Patient stated she is in need of a shower chair but is aware that Medicare does not cover the cost of this item.   will provide patient with information on [employer], who assists with indigent medical equipment.               Discharge Plan    No documentation.       Destination     No service coordination in this encounter.      Durable Medical Equipment     No service coordination in this encounter.      Dialysis/Infusion     No service coordination in this encounter.      Home Medical Care     No service coordination in this encounter.      Social Care     No service coordination in this encounter.                Demographic Summary    No documentation.           Functional Status    No documentation.           Psychosocial    No documentation.           Abuse/Neglect    No documentation.           Legal    No documentation.           Substance Abuse    No documentation.           Patient Forms    No documentation.         SIDRA Gracia

## 2018-05-08 ENCOUNTER — APPOINTMENT (OUTPATIENT)
Dept: CT IMAGING | Facility: HOSPITAL | Age: 43
End: 2018-05-08

## 2018-05-08 ENCOUNTER — APPOINTMENT (OUTPATIENT)
Dept: CARDIOLOGY | Facility: HOSPITAL | Age: 43
End: 2018-05-08
Attending: INTERNAL MEDICINE

## 2018-05-08 LAB
BH CV ECHO MEAS - AO MAX PG (FULL): 2.2 MMHG
BH CV ECHO MEAS - AO MAX PG: 8.3 MMHG
BH CV ECHO MEAS - AO MEAN PG (FULL): 1 MMHG
BH CV ECHO MEAS - AO MEAN PG: 4 MMHG
BH CV ECHO MEAS - AO ROOT AREA (BSA CORRECTED): 1.2
BH CV ECHO MEAS - AO ROOT AREA: 4 CM^2
BH CV ECHO MEAS - AO ROOT DIAM: 2.3 CM
BH CV ECHO MEAS - AO V2 MAX: 144 CM/SEC
BH CV ECHO MEAS - AO V2 MEAN: 84.5 CM/SEC
BH CV ECHO MEAS - AO V2 VTI: 31.4 CM
BH CV ECHO MEAS - AVA(I,A): 4.3 CM^2
BH CV ECHO MEAS - AVA(I,D): 4.3 CM^2
BH CV ECHO MEAS - AVA(V,A): 3.2 CM^2
BH CV ECHO MEAS - AVA(V,D): 3.2 CM^2
BH CV ECHO MEAS - BSA(HAYCOCK): 1.9 M^2
BH CV ECHO MEAS - BSA: 1.8 M^2
BH CV ECHO MEAS - BZI_BMI: 38.4 KILOGRAMS/M^2
BH CV ECHO MEAS - BZI_METRIC_HEIGHT: 149.9 CM
BH CV ECHO MEAS - BZI_METRIC_WEIGHT: 86.2 KG
BH CV ECHO MEAS - CONTRAST EF 4CH: 70.8 ML/M^2
BH CV ECHO MEAS - EDV(CUBED): 125 ML
BH CV ECHO MEAS - EDV(MOD-SP4): 83.7 ML
BH CV ECHO MEAS - EDV(TEICH): 118.2 ML
BH CV ECHO MEAS - EF(CUBED): 87.2 %
BH CV ECHO MEAS - EF(MOD-SP4): 70.8 %
BH CV ECHO MEAS - EF(TEICH): 80.7 %
BH CV ECHO MEAS - ESV(CUBED): 16 ML
BH CV ECHO MEAS - ESV(MOD-SP4): 24.4 ML
BH CV ECHO MEAS - ESV(TEICH): 22.8 ML
BH CV ECHO MEAS - FS: 49.6 %
BH CV ECHO MEAS - IVS/LVPW: 1
BH CV ECHO MEAS - IVSD: 1 CM
BH CV ECHO MEAS - LA DIMENSION: 3.3 CM
BH CV ECHO MEAS - LA/AO: 1.5
BH CV ECHO MEAS - LAT PEAK E' VEL: 10 CM/SEC
BH CV ECHO MEAS - LV DIASTOLIC VOL/BSA (35-75): 46.4 ML/M^2
BH CV ECHO MEAS - LV MASS(C)D: 186.9 GRAMS
BH CV ECHO MEAS - LV MASS(C)DI: 103.6 GRAMS/M^2
BH CV ECHO MEAS - LV MAX PG: 6.1 MMHG
BH CV ECHO MEAS - LV MEAN PG: 3 MMHG
BH CV ECHO MEAS - LV SYSTOLIC VOL/BSA (12-30): 13.5 ML/M^2
BH CV ECHO MEAS - LV V1 MAX: 123 CM/SEC
BH CV ECHO MEAS - LV V1 MEAN: 70.6 CM/SEC
BH CV ECHO MEAS - LV V1 VTI: 35.6 CM
BH CV ECHO MEAS - LVIDD: 5 CM
BH CV ECHO MEAS - LVIDS: 2.5 CM
BH CV ECHO MEAS - LVLD AP4: 7 CM
BH CV ECHO MEAS - LVLS AP4: 4.9 CM
BH CV ECHO MEAS - LVOT AREA (M): 3.8 CM^2
BH CV ECHO MEAS - LVOT AREA: 3.8 CM^2
BH CV ECHO MEAS - LVOT DIAM: 2.2 CM
BH CV ECHO MEAS - LVPWD: 1 CM
BH CV ECHO MEAS - MED PEAK E' VEL: 10.6 CM/SEC
BH CV ECHO MEAS - MR MAX PG: 82.1 MMHG
BH CV ECHO MEAS - MR MAX VEL: 453 CM/SEC
BH CV ECHO MEAS - MR MEAN PG: 50 MMHG
BH CV ECHO MEAS - MR MEAN VEL: 314 CM/SEC
BH CV ECHO MEAS - MR VTI: 140 CM
BH CV ECHO MEAS - MV A MAX VEL: 94.6 CM/SEC
BH CV ECHO MEAS - MV DEC TIME: 0.18 SEC
BH CV ECHO MEAS - MV E MAX VEL: 100 CM/SEC
BH CV ECHO MEAS - MV E/A: 1.1
BH CV ECHO MEAS - RAP SYSTOLE: 3 MMHG
BH CV ECHO MEAS - RVDD: 3.1 CM
BH CV ECHO MEAS - RVSP: 35.9 MMHG
BH CV ECHO MEAS - SI(AO): 69.2 ML/M^2
BH CV ECHO MEAS - SI(CUBED): 60.4 ML/M^2
BH CV ECHO MEAS - SI(LVOT): 75 ML/M^2
BH CV ECHO MEAS - SI(MOD-SP4): 32.9 ML/M^2
BH CV ECHO MEAS - SI(TEICH): 52.9 ML/M^2
BH CV ECHO MEAS - SV(AO): 124.8 ML
BH CV ECHO MEAS - SV(CUBED): 109 ML
BH CV ECHO MEAS - SV(LVOT): 135.3 ML
BH CV ECHO MEAS - SV(MOD-SP4): 59.3 ML
BH CV ECHO MEAS - SV(TEICH): 95.5 ML
BH CV ECHO MEAS - TR MAX VEL: 278 CM/SEC
BH CV ECHO MEASUREMENTS AVERAGE E/E' RATIO: 9.71
GLUCOSE BLDC GLUCOMTR-MCNC: 171 MG/DL (ref 70–130)
INR PPP: 1.45 (ref 0.91–1.09)
LEFT ATRIUM VOLUME INDEX: 24.4 ML/M2
LEFT ATRIUM VOLUME: 43.9 CM3
MAXIMAL PREDICTED HEART RATE: 176 BPM
PROTHROMBIN TIME: 18.1 SECONDS (ref 11.9–14.6)
STRESS TARGET HR: 150 BPM

## 2018-05-08 PROCEDURE — 93325 DOPPLER ECHO COLOR FLOW MAPG: CPT | Performed by: INTERNAL MEDICINE

## 2018-05-08 PROCEDURE — 25010000002 CEFEPIME PER 500 MG: Performed by: INTERNAL MEDICINE

## 2018-05-08 PROCEDURE — 25010000002 CEFEPIME 1 G/11.7ML IV PUSH SYRINGE KIT (PAD): Performed by: INTERNAL MEDICINE

## 2018-05-08 PROCEDURE — 94799 UNLISTED PULMONARY SVC/PX: CPT

## 2018-05-08 PROCEDURE — 25010000002 LEVOFLOXACIN PER 250 MG: Performed by: INTERNAL MEDICINE

## 2018-05-08 PROCEDURE — 94760 N-INVAS EAR/PLS OXIMETRY 1: CPT

## 2018-05-08 PROCEDURE — 63710000001 PROMETHAZINE PER 25 MG: Performed by: INTERNAL MEDICINE

## 2018-05-08 PROCEDURE — 71250 CT THORAX DX C-: CPT

## 2018-05-08 PROCEDURE — 94660 CPAP INITIATION&MGMT: CPT

## 2018-05-08 PROCEDURE — 93325 DOPPLER ECHO COLOR FLOW MAPG: CPT

## 2018-05-08 PROCEDURE — 93321 DOPPLER ECHO F-UP/LMTD STD: CPT | Performed by: INTERNAL MEDICINE

## 2018-05-08 PROCEDURE — 93321 DOPPLER ECHO F-UP/LMTD STD: CPT

## 2018-05-08 PROCEDURE — 93308 TTE F-UP OR LMTD: CPT

## 2018-05-08 PROCEDURE — 82962 GLUCOSE BLOOD TEST: CPT

## 2018-05-08 PROCEDURE — 25010000002 METHYLPREDNISOLONE PER 125 MG: Performed by: INTERNAL MEDICINE

## 2018-05-08 PROCEDURE — 93308 TTE F-UP OR LMTD: CPT | Performed by: INTERNAL MEDICINE

## 2018-05-08 PROCEDURE — 25010000002 METHYLPREDNISOLONE PER 40 MG: Performed by: INTERNAL MEDICINE

## 2018-05-08 PROCEDURE — 85610 PROTHROMBIN TIME: CPT | Performed by: INTERNAL MEDICINE

## 2018-05-08 RX ORDER — ZOLPIDEM TARTRATE 5 MG/1
10 TABLET ORAL NIGHTLY PRN
Status: DISCONTINUED | OUTPATIENT
Start: 2018-05-08 | End: 2018-05-13 | Stop reason: HOSPADM

## 2018-05-08 RX ORDER — LEVOFLOXACIN 5 MG/ML
750 INJECTION, SOLUTION INTRAVENOUS EVERY 24 HOURS
Status: COMPLETED | OUTPATIENT
Start: 2018-05-08 | End: 2018-05-12

## 2018-05-08 RX ORDER — HYDROCODONE BITARTRATE AND ACETAMINOPHEN 10; 325 MG/1; MG/1
1 TABLET ORAL EVERY 6 HOURS PRN
Status: DISCONTINUED | OUTPATIENT
Start: 2018-05-08 | End: 2018-05-11

## 2018-05-08 RX ORDER — METHYLPREDNISOLONE SODIUM SUCCINATE 40 MG/ML
40 INJECTION, POWDER, LYOPHILIZED, FOR SOLUTION INTRAMUSCULAR; INTRAVENOUS EVERY 6 HOURS
Status: DISCONTINUED | OUTPATIENT
Start: 2018-05-08 | End: 2018-05-10

## 2018-05-08 RX ADMIN — HYDROCODONE BITARTRATE AND ACETAMINOPHEN 1 TABLET: 10; 325 TABLET ORAL at 08:59

## 2018-05-08 RX ADMIN — CEFEPIME HYDROCHLORIDE 1 G: 1 INJECTION, POWDER, FOR SOLUTION INTRAMUSCULAR; INTRAVENOUS at 16:30

## 2018-05-08 RX ADMIN — METHYLPREDNISOLONE SODIUM SUCCINATE 60 MG: 125 INJECTION, POWDER, FOR SOLUTION INTRAMUSCULAR; INTRAVENOUS at 01:49

## 2018-05-08 RX ADMIN — IPRATROPIUM BROMIDE AND ALBUTEROL SULFATE 3 ML: 2.5; .5 SOLUTION RESPIRATORY (INHALATION) at 19:07

## 2018-05-08 RX ADMIN — CEFEPIME HYDROCHLORIDE 1 G: 1 INJECTION, POWDER, FOR SOLUTION INTRAMUSCULAR; INTRAVENOUS at 08:55

## 2018-05-08 RX ADMIN — PANTOPRAZOLE SODIUM 40 MG: 40 TABLET, DELAYED RELEASE ORAL at 06:31

## 2018-05-08 RX ADMIN — METHYLPREDNISOLONE SODIUM SUCCINATE 40 MG: 125 INJECTION, POWDER, FOR SOLUTION INTRAMUSCULAR; INTRAVENOUS at 20:20

## 2018-05-08 RX ADMIN — LEVOTHYROXINE SODIUM 112 MCG: 112 TABLET ORAL at 08:55

## 2018-05-08 RX ADMIN — IPRATROPIUM BROMIDE AND ALBUTEROL SULFATE 3 ML: 2.5; .5 SOLUTION RESPIRATORY (INHALATION) at 23:40

## 2018-05-08 RX ADMIN — LEVOFLOXACIN 750 MG: 5 INJECTION, SOLUTION INTRAVENOUS at 08:55

## 2018-05-08 RX ADMIN — IPRATROPIUM BROMIDE AND ALBUTEROL SULFATE 3 ML: 2.5; .5 SOLUTION RESPIRATORY (INHALATION) at 03:20

## 2018-05-08 RX ADMIN — ZOLPIDEM TARTRATE 10 MG: 5 TABLET, COATED ORAL at 20:26

## 2018-05-08 RX ADMIN — LITHIUM CARBONATE 300 MG: 300 CAPSULE, GELATIN COATED ORAL at 20:19

## 2018-05-08 RX ADMIN — IPRATROPIUM BROMIDE AND ALBUTEROL SULFATE 3 ML: 2.5; .5 SOLUTION RESPIRATORY (INHALATION) at 07:06

## 2018-05-08 RX ADMIN — METHYLPREDNISOLONE SODIUM SUCCINATE 40 MG: 125 INJECTION, POWDER, FOR SOLUTION INTRAMUSCULAR; INTRAVENOUS at 14:13

## 2018-05-08 RX ADMIN — DIAZEPAM 5 MG: 5 TABLET ORAL at 08:54

## 2018-05-08 RX ADMIN — NICOTINE 1 PATCH: 21 PATCH, EXTENDED RELEASE TRANSDERMAL at 01:50

## 2018-05-08 RX ADMIN — HYDROCODONE BITARTRATE AND ACETAMINOPHEN 1 TABLET: 10; 325 TABLET ORAL at 16:17

## 2018-05-08 RX ADMIN — DIAZEPAM 5 MG: 5 TABLET ORAL at 16:17

## 2018-05-08 RX ADMIN — GUAIFENESIN 1200 MG: 600 TABLET, EXTENDED RELEASE ORAL at 20:19

## 2018-05-08 RX ADMIN — HYDROCODONE BITARTRATE AND ACETAMINOPHEN 1 TABLET: 10; 325 TABLET ORAL at 20:27

## 2018-05-08 RX ADMIN — GABAPENTIN 600 MG: 300 CAPSULE ORAL at 20:19

## 2018-05-08 RX ADMIN — PROMETHAZINE HYDROCHLORIDE 25 MG: 25 TABLET ORAL at 11:29

## 2018-05-08 RX ADMIN — GABAPENTIN 300 MG: 300 CAPSULE ORAL at 16:17

## 2018-05-08 RX ADMIN — IPRATROPIUM BROMIDE AND ALBUTEROL SULFATE 3 ML: 2.5; .5 SOLUTION RESPIRATORY (INHALATION) at 10:48

## 2018-05-08 RX ADMIN — FLUOXETINE HYDROCHLORIDE 40 MG: 20 CAPSULE ORAL at 08:54

## 2018-05-08 RX ADMIN — METHYLPREDNISOLONE SODIUM SUCCINATE 40 MG: 125 INJECTION, POWDER, FOR SOLUTION INTRAMUSCULAR; INTRAVENOUS at 08:55

## 2018-05-08 RX ADMIN — IPRATROPIUM BROMIDE AND ALBUTEROL SULFATE 3 ML: 2.5; .5 SOLUTION RESPIRATORY (INHALATION) at 14:30

## 2018-05-08 RX ADMIN — WARFARIN SODIUM 7.5 MG: 7.5 TABLET ORAL at 18:18

## 2018-05-08 RX ADMIN — DIAZEPAM 5 MG: 5 TABLET ORAL at 20:19

## 2018-05-08 RX ADMIN — GABAPENTIN 300 MG: 300 CAPSULE ORAL at 08:54

## 2018-05-08 RX ADMIN — PROMETHAZINE HYDROCHLORIDE 25 MG: 25 TABLET ORAL at 20:40

## 2018-05-08 RX ADMIN — GUAIFENESIN 1200 MG: 600 TABLET, EXTENDED RELEASE ORAL at 08:55

## 2018-05-08 NOTE — PLAN OF CARE
Problem: Fall Risk (Adult)  Goal: Absence of Fall  Outcome: Ongoing (interventions implemented as appropriate)      Problem: Patient Care Overview  Goal: Plan of Care Review  Outcome: Ongoing (interventions implemented as appropriate)  Patient restless, hands shaking, flushed. Possibly the nicotine patch or solumedrol. Patient tolerated 9L HF NC during the night.    05/08/18 0631   Coping/Psychosocial   Plan of Care Reviewed With patient   Coping/Psychosocial   Patient Agreement with Plan of Care agrees with comment (describe)   Plan of Care Review   Progress improving       Problem: ARDS (Acute Resp Distress Syndrome) (Adult)  Goal: Signs and Symptoms of Listed Potential Problems Will be Absent, Minimized or Managed (ARDS)  Outcome: Ongoing (interventions implemented as appropriate)      Problem: Pneumonia (Adult)  Goal: Signs and Symptoms of Listed Potential Problems Will be Absent, Minimized or Managed (Pneumonia)  Outcome: Ongoing (interventions implemented as appropriate)

## 2018-05-08 NOTE — PROGRESS NOTES
[facility] Medicine Services  INPATIENT PROGRESS NOTE    Length of Stay: 2  Date of Admission: 5/6/2018  Primary Care Physician: Neel Valencia Jr., MD    Subjective   Chief Complaint: Shortness of breath  HPI   Patient reports that she started feeling poorly a couple days prior to this hospitalization.  She states that she has a cough productive of white or clear sputum.  She did report subjective fevers and chills preceding this hospitalization.  She reports she continues to smoke 2-3 cigarettes per day.  She has pain in her chest that she reports is related to persistent cough.      Review of Systems     All pertinent negatives and positives are as above. All other systems have been reviewed and are negative unless otherwise stated.     Objective    Temp:  [97.5 °F (36.4 °C)-98.4 °F (36.9 °C)] 98.2 °F (36.8 °C)  Heart Rate:  [] 83  Resp:  [16-24] 18  BP: ()/() 104/64  FiO2 (%):  [50 %] 50 %  Physical Exam   Constitutional: She is oriented to person, place, and time. No distress.   On high flow 02 at 9L   HENT:   Head: Normocephalic.   Mouth/Throat: No oropharyngeal exudate.   Eyes: No scleral icterus.   Neck: No tracheal deviation present.   Cardiovascular: Normal rate and regular rhythm.    Pulmonary/Chest: Effort normal. She has wheezes (faint). She has no rales.   Abdominal: Soft.   Musculoskeletal: She exhibits no deformity.   Neurological: She is alert and oriented to person, place, and time.   Skin: Skin is warm and dry. She is not diaphoretic.   Vitals reviewed.    Results Review:  I have reviewed the labs, radiology results, and diagnostic studies.    Laboratory Data:     Results from last 7 days  Lab Units 05/07/18  0257 05/06/18  1828   WBC 10*3/mm3 10.30 13.30*   HEMOGLOBIN g/dL 12.9 12.3   HEMATOCRIT % 39.7 37.5   PLATELETS 10*3/mm3 245 223          Results from last 7 days  Lab Units 05/07/18  0257 05/06/18  1828   SODIUM mmol/L 142 142    POTASSIUM mmol/L 3.9 4.2   CHLORIDE mmol/L 100 103   CO2 mmol/L 32.0* 29.0   BUN mg/dL 11 9   CREATININE mg/dL 0.66 0.65   CALCIUM mg/dL 9.4 9.2   BILIRUBIN mg/dL 0.8 0.7   ALK PHOS U/L 164* 146*   ALT (SGPT) U/L 62* 64*   AST (SGOT) U/L 60* 80*   GLUCOSE mg/dL 160* 126*       Culture Data:   Blood Culture   Date Value Ref Range Status   05/06/2018 No growth at 24 hours  Preliminary   05/06/2018 No growth at 24 hours  Preliminary       Radiology Data:   Imaging Results (last 24 hours)     Procedure Component Value Units Date/Time    XR Chest 1 View [[id number]] Collected:  05/07/18 0729     Updated:  05/07/18 0734    Narrative:       EXAMINATION: XR CHEST 1 VW- 5/7/2018 7:29 AM CDT     HISTORY: hypoxia/pneumonia; R06.00-Dyspnea, unspecified; R91.8-Other  nonspecific abnormal finding of lung field; D72.829-Elevated white blood  cell count, unspecified; R06.82-Tachypnea, not elsewhere classified.     REPORT: Comparison is made with the study from 5/6/2018 1829 hours.     Lungs are hypoaerated, there is consolidating infiltrate in the right  upper lobe which is unchanged. There is no focal infiltrate the left  lung. Heart size is normal. No pneumothorax or pleural effusion is  identified. The osseous structures are unremarkable.       Impression:       Asymmetric consolidating right lung infiltrate with interval  improvement in aeration of the left lung. Right-sided pneumonia is  likely.  This report was finalized on 05/07/2018 07:30 by Dr. Matt Herndon MD.          I have reviewed the patient current medications.     Assessment/Plan     Hospital Problem List     Respiratory distress        Assessment:  1.  Acute on chronic hypoxemic respiratory failure  2.  Pneumonia - persistent infiltrate noted in right lung  3.  Right heart enlargement with mildly reduced RVEF and mild pulmonary HTN on previous echo in February 2018  4.  History of DVT on chronic anticoagulation with Coumadin  5.  COPD with acute  exacerbation  6.  Bipolar disorder  7.  History of pervious gastric bypass procedure  8.  GERD with h/o esophagitis and gastritis  9.  Transaminitis-mild  10.  Tobacco dependence  11.  Generalized weakness    Plan:  1.  Will broaden Abx therapy to Cefepime and Levaquin for now  2.  IV Solumedrol  3.  Scheduled nebs  4.  Diuretics on hold  5.  High flow 02  6.  Pulmonary consult  7.  On Coumadin; monitor closely while on fluoroquinolone; trend PT/INR  8.  PT and OT eval  9.  Nicotine patch  10.  Limited Echo (compare to previous Echo in 2/2018)  11.  Incentive spirometry  12.  Workup continues      David Hernandez MD   05/08/18   7:19 AM

## 2018-05-08 NOTE — CONSULTS
PULMONARY & CRITICAL CARE CONSULT - [facility]    18, 9:29 AM  Patient Care Team:  Neel Valencia Jr., MD as PCP - General (Family Medicine)  Devonte Amaya MD as Consulting Physician (Urology)  Name: Vianca Spencer  : 1975  MRN: [id number]  Contact Serial Number [id number]    Chief complaint: respiratory failure  HPI:  We have been consulted by David Hernandez MD to see this 42 y.o. year old female born on 1975.  Patient complains of dyspnea in the chest for 3 days. Severity: moderate.  Aggravating factors: coughing and walking.   Alleviating factors: medication(s) (none) Associated symptoms: fatigue. Sputum is scant.  Patient currently is on oxygen at 2 L/min per nasal cannula.. Respiratory history: pneumonia and chronic respiratory failure, previous episode of pneumonia earlier this year.  She had gone home on home oxygen.  She says she was diagnosed with emphysema about 3 years ago and was told she only had 5 years to live.  She has been admitted to the ICU with profound dyspnea and worsening hypoxia and pulmonary infiltrates, now requiring high flow oxygen.  She says she falls a lot and needs a walker to walk  Past Medical History:  Past Medical History:   Diagnosis Date   • Acute retention of urine    • Anxiety and depression    • Bipolar 1 disorder    • COPD (chronic obstructive pulmonary disease)    • DVT (deep venous thrombosis)    • GERD (gastroesophageal reflux disease)    • Hypoglycemia    • Insomnia    • PTSD (post-traumatic stress disorder)      Past Surgical History:   Procedure Laterality Date   • ABDOMINOPLASTY     • APPENDECTOMY     • BRONCHOSCOPY N/A 2018    Procedure: BRONCHOSCOPY AT BEDSIDE;  Surgeon: Earnest Ba MD;  Location: [facility] ENDOSCOPY;  Service:    • CHOLECYSTECTOMY     • ENDOSCOPY N/A 2018    Procedure: ESOPHAGOGASTRODUODENOSCOPY WITH ANESTHESIA;  Surgeon: Aime Reyna MD;  Location: [facility] ENDOSCOPY;   Service:    • GASTRIC BYPASS     • HYSTERECTOMY       Allergies   Allergen Reactions   • Morphine Anaphylaxis   • Aspirin Other (See Comments)     Child allergy, unsure what reaction  Child allergy, unsure what reaction   • Morphine And Related    • Nsaids    • Quetiapine Other (See Comments)     Muscle spasms  Muscle spasms   • Quetiapine Fumarate    • Salicylates    • Codeine Rash     Medications:    cefepime 1 g Intravenous Q8H   diazePAM 5 mg Oral TID   FLUoxetine 60 mg Oral Daily   gabapentin 300 mg Oral BID   gabapentin 600 mg Oral Nightly   guaiFENesin 1,200 mg Oral Q12H   ipratropium-albuterol 3 mL Nebulization Q4H - RT   levoFLOXacin 750 mg Intravenous Q24H   levothyroxine 112 mcg Oral Daily   lithium carbonate 300 mg Oral Nightly   methylPREDNISolone sodium succinate 40 mg Intravenous Q6H   nicotine 1 patch Transdermal Q24H   pantoprazole 40 mg Oral QAM   warfarin 5 mg Oral Once per day on Mon Wed Fri   warfarin 7.5 mg Oral Once per day on Sun Tue Thu Sat       O2 2 L/min     Family History:  Family History   Problem Relation Age of Onset   • Cancer Mother    • Cancer Father    • HIV Brother      Social History:   reports that she has been smoking Cigarettes.  She has been smoking about 0.25 packs per day. She has never used smokeless tobacco. She reports that she does not drink alcohol or use drugs.  Review of Systems:  Review of Systems   Constitutional: Positive for fatigue. Negative for appetite change, diaphoresis and fever.   HENT: Negative for congestion, sore throat and trouble swallowing.    Eyes: Negative for visual disturbance.   Respiratory: Positive for chest tightness and shortness of breath. Negative for choking, wheezing and stridor.    Cardiovascular: Negative for leg swelling.   Gastrointestinal: Negative for abdominal pain, constipation, diarrhea, nausea and vomiting.   Endocrine: Negative for heat intolerance.   Genitourinary: Negative for dysuria, hematuria and urgency.    Musculoskeletal: Negative for joint swelling.   Neurological: Negative for dizziness, seizures and syncope.   Hematological: Negative for adenopathy.   Psychiatric/Behavioral: Negative for agitation. The patient is nervous/anxious.       Physical Exam:  Temp:  [97.5 °F (36.4 °C)-98.4 °F (36.9 °C)] 98.2 °F (36.8 °C)  Heart Rate:  [] 83  Resp:  [16-24] 18  BP: ()/() 104/64  Intake/Output Summary (Last 24 hours) at 05/08/18 0929  Last data filed at 05/08/18 0400   Gross per 24 hour   Intake              620 ml   Output              850 ml   Net             -230 ml     1    05/06/18  1811 05/06/18  2115 05/08/18  0102   Weight: 86.6 kg (191 lb) 87.5 kg (192 lb 14.4 oz) 86.4 kg (190 lb 6.4 oz)     SpO2 Percentage    05/08/18 0605 05/08/18 0706 05/08/18 0711   SpO2: 92% 96% 99%     Physical Exam   Constitutional: She appears well-developed. She is active.  Non-toxic appearance. She has a sickly appearance. She appears ill. No distress. Nasal cannula in place.   HENT:   Nose: Nose normal.   Eyes: EOM are normal. No scleral icterus.   Neck: No JVD present. No tracheal deviation present.   Cardiovascular: Normal rate and regular rhythm.    No murmur heard.  Pulmonary/Chest: No accessory muscle usage. Tachypnea noted. No respiratory distress. She has decreased breath sounds. She has rales.   Abdominal: Soft. Bowel sounds are normal. There is no tenderness. There is no guarding.   obese   Musculoskeletal: She exhibits no edema.   Neurological: She is alert. She exhibits normal muscle tone.   Skin: Skin is warm and dry. She is not diaphoretic.   Psychiatric: She has a normal mood and affect. Her behavior is normal. Her speech is not slurred. She is not agitated.       Results from last 7 days  Lab Units 05/07/18  0257 05/06/18  1828   WBC 10*3/mm3 10.30 13.30*   HEMOGLOBIN g/dL 12.9 12.3   PLATELETS 10*3/mm3 245 223       Results from last 7 days  Lab Units 05/07/18  0257 05/06/18  1828   SODIUM mmol/L 142  142   POTASSIUM mmol/L 3.9 4.2   CO2 mmol/L 32.0* 29.0   BUN mg/dL 11 9   CREATININE mg/dL 0.66 0.65   MAGNESIUM mg/dL 2.0  --    PHOSPHORUS mg/dL 3.4  --    GLUCOSE mg/dL 160* 126*       Results from last 7 days  Lab Units 05/07/18  1538 05/07/18  0436 05/06/18  1823   PH, ARTERIAL pH units 7.458* 7.433 7.400   PCO2, ARTERIAL mm Hg 43.7 46.5* 41.4   PO2 ART mm Hg 57.1* 67.7* 60.0*   FIO2 %  --  50 100     Lab Results   Component Value Date    PROBNP 2,440.0 (H) 05/07/2018     Blood Culture   Date Value Ref Range Status   05/06/2018 No growth at 24 hours  Preliminary   05/06/2018 No growth at 24 hours  Preliminary     Recent radiology:   Imaging Results (last 72 hours)     Procedure Component Value Units Date/Time    XR Chest 1 View [[id number]] Collected:  05/07/18 0729     Updated:  05/07/18 0734    Narrative:       EXAMINATION: XR CHEST 1 VW- 5/7/2018 7:29 AM CDT  HISTORY: hypoxia/pneumonia; R06.00-Dyspnea, unspecified; R91.8-Other  nonspecific abnormal finding of lung field; D72.829-Elevated white blood  cell count, unspecified; R06.82-Tachypnea, not elsewhere classified.  REPORT: Comparison is made with the study from 5/6/2018 1829 hours.  Lungs are hypoaerated, there is consolidating infiltrate in the right  upper lobe which is unchanged. There is no focal infiltrate the left  lung. Heart size is normal. No pneumothorax or pleural effusion is  identified. The osseous structures are unremarkable.    Impression:       Asymmetric consolidating right lung infiltrate with interval  improvement in aeration of the left lung. Right-sided pneumonia is  likely.  This report was finalized on 05/07/2018 07:30 by Dr. Matt Herndon MD.    XR Chest 1 View [[id number]] Collected:  05/06/18 1907     Updated:  05/06/18 1911    Narrative:       EXAMINATION:   XR CHEST 1 VW-  5/6/2018 7:07 PM CDT     HISTORY: Short of air  Frontal upright radiograph of the chest 5/6/2018 6:25 PM CDT  COMPARISON: February 15, 2018.  FINDINGS:    Bilateral interstitial infiltrates are present.. The cardiac silhouette  is normal. These infiltrates may be due to either pulmonary edema or  possibly pneumonia..   The osseous structures and surrounding soft tissues demonstrate no acute  abnormality.    Impression:       1. Bilateral interstitial infiltrates. Differential considerations  include pneumonia versus pulmonary edema.  This report was finalized on 05/06/2018 19:07 by Dr. Zach Pham MD.        My radiograph interpretation/independent review of imaging: infiltrate in RUL, this is similar to the film during last admission  Other test results (not lab or imaging): 2/2/18 echo:  · Left ventricular systolic function is normal.  · Right ventricular cavity is borderline dilated.  · Mild tricuspid valve regurgitation is present.  · Mildly reduced right ventricular systolic function noted.     NORMAL LV FUNCTION  RIGHT HEART ENLARGEMENT WITH MILDLY REDUCED RVEF  MILD PULMONARY HTN  NO SIGNIFICANT VALVULAR DYSFUNCTION    Independent review of ekg: normal  Patient Active Problem List   Diagnosis   • Fe deficiency anemia   • Anemia   • Anxiety   • Biphasic positive airway pressure dependence   • Chronic pain   • Chronic obstructive pulmonary disease   • Depression   • Gastroesophageal reflux disease   • Bariatric surgery status   • Hypercoagulable state   • Opioid dependence   • Obstructive sleep apnea syndrome   • Seizure disorder   • Coagulation disorder   • BiPAP (biphasic positive airway pressure) dependence   • Bipolar disorder   • COPD (chronic obstructive pulmonary disease)   • Essential tremor   • GERD (gastroesophageal reflux disease)   • History of Stefanie-en-Y gastric bypass   • Hypercoagulopathy   • Incisional hernia   • 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 misti stable  2. Presumed dx of copd, spirometry unknown  3. Pulmonary hypertension, likely group 2  4. Pulmonary infiltrate, recurrent, possible interstitial lung disease  5. Bipolar disorder and multiple other problems in problem list under chronic management elsewhere, overall stable    Recommend/plan:   · High resolution ct chest  · Continue high flow oxygen, taper as tolerated  · Judicious use of medications inducing imbalance; particularly narcotics.  · Continue empiric abx, although I'm not convinced she has bacterial pneumonia.  · Continue steroids, begin taper once we have more definite dx of pulmonary process    Electronically signed by Earnest Ba MD, 18, 9:29 AM

## 2018-05-08 NOTE — PROGRESS NOTES
Discharge Planning Assessment   [place]     Patient Name: Vianca Spencer  MRN: [id number]  Today's Date: 5/8/2018    Admit Date: 5/6/2018          Discharge Needs Assessment     Row Name 05/08/18 0855       Living Environment    Lives With child(laura), adult;child(laura), dependent    Current Living Arrangements home/apartment/condo    Primary Care Provided by self;child(laura)    Provides Primary Care For no one    Family Caregiver if Needed child(laura), adult    Quality of Family Relationships supportive    Able to Return to Prior Arrangements yes       Resource/Environmental Concerns    Resource/Environmental Concerns none    Transportation Concerns car, none       Transition Planning    Patient/Family Anticipates Transition to home with family    Patient/Family Anticipated Services at Transition home health care    Transportation Anticipated car, drives self;family or friend will provide       Discharge Needs Assessment    Readmission Within the Last 30 Days no previous admission in last 30 days    Concerns to be Addressed care coordination/care conferences;discharge planning    Equipment Currently Used at Home oxygen;walker, rolling;wheelchair    Equipment Needed After Discharge shower chair    Outpatient/Agency/Support Group Needs homecare agency    Discharge Facility/Level of Care Needs home with home health    Current Discharge Risk chronically ill    Discharge Coordination/Progress Spoke to patient regarding discharge plan/needs.  Patient states she resides at home with her 17 year old son and her 26 year old son.  Patient states her children assist with her care.  [facility]  Patient has a PCP and RX coverage.  Patient is agreeable to  services at discharge.  Patient states she is need of a shower chair but is aware that Medicare does not cover the cost of this time.   will provide patient with information on [employer], who assists with indigent medical equipment.             Discharge Plan    No documentation.       Destination     No service coordination in this encounter.      Durable Medical Equipment     No service coordination in this encounter.      Dialysis/Infusion     No service coordination in this encounter.      Home Medical Care     No service coordination in this encounter.      Social Care     No service coordination in this encounter.                Demographic Summary    No documentation.           Functional Status    No documentation.           Psychosocial    No documentation.           Abuse/Neglect    No documentation.           Legal    No documentation.           Substance Abuse    No documentation.           Patient Forms    No documentation.         SIDRA Gracia

## 2018-05-09 ENCOUNTER — ANESTHESIA EVENT (OUTPATIENT)
Dept: PERIOP | Facility: HOSPITAL | Age: 43
End: 2018-05-09

## 2018-05-09 ENCOUNTER — ANESTHESIA (OUTPATIENT)
Dept: PERIOP | Facility: HOSPITAL | Age: 43
End: 2018-05-09

## 2018-05-09 ENCOUNTER — APPOINTMENT (OUTPATIENT)
Dept: GENERAL RADIOLOGY | Facility: HOSPITAL | Age: 43
End: 2018-05-09

## 2018-05-09 LAB
ABO GROUP BLD: NORMAL
ALBUMIN SERPL-MCNC: 3.8 G/DL (ref 3.5–5)
ALBUMIN/GLOB SERPL: 1.2 G/DL (ref 1.1–2.5)
ALP SERPL-CCNC: 129 U/L (ref 24–120)
ALT SERPL W P-5'-P-CCNC: 36 U/L (ref 0–54)
ANION GAP SERPL CALCULATED.3IONS-SCNC: 14 MMOL/L (ref 4–13)
APTT PPP: 34.3 SECONDS (ref 24.1–34.8)
ARTERIAL PATENCY WRIST A: ABNORMAL
AST SERPL-CCNC: 26 U/L (ref 7–45)
ATMOSPHERIC PRESS: 748 MMHG
BASE EXCESS BLDA CALC-SCNC: 4.4 MMOL/L (ref 0–2)
BDY SITE: ABNORMAL
BILIRUB SERPL-MCNC: 0.3 MG/DL (ref 0.1–1)
BODY TEMPERATURE: 37 C
BUN BLD-MCNC: 14 MG/DL (ref 5–21)
BUN/CREAT SERPL: 20.6 (ref 7–25)
CA-I BLD-MCNC: 5.08 MG/DL (ref 4.6–5.4)
CALCIUM SPEC-SCNC: 9.7 MG/DL (ref 8.4–10.4)
CHLORIDE SERPL-SCNC: 101 MMOL/L (ref 98–110)
CO2 SERPL-SCNC: 27 MMOL/L (ref 24–31)
COHGB MFR BLD: 0.6 % (ref 0–5)
CREAT BLD-MCNC: 0.68 MG/DL (ref 0.5–1.4)
DEPRECATED RDW RBC AUTO: 46.4 FL (ref 40–54)
ERYTHROCYTE [DISTWIDTH] IN BLOOD BY AUTOMATED COUNT: 13.7 % (ref 12–15)
GAS FLOW AIRWAY: 3 LPM
GFR SERPL CREATININE-BSD FRML MDRD: 94 ML/MIN/1.73
GLOBULIN UR ELPH-MCNC: 3.2 GM/DL
GLUCOSE BLD-MCNC: 222 MG/DL (ref 70–100)
HCO3 BLDA-SCNC: 29.4 MMOL/L (ref 20–26)
HCT VFR BLD AUTO: 39.9 % (ref 37–47)
HCT VFR BLD CALC: 38.7 % (ref 38–51)
HGB BLD-MCNC: 12.8 G/DL (ref 12–16)
HGB BLDA-MCNC: 12.6 G/DL (ref 13.5–17.5)
INR PPP: 1.52 (ref 0.91–1.09)
INR PPP: 1.62 (ref 0.91–1.09)
Lab: ABNORMAL
MCH RBC QN AUTO: 29.4 PG (ref 28–32)
MCHC RBC AUTO-ENTMCNC: 32.1 G/DL (ref 33–36)
MCV RBC AUTO: 91.7 FL (ref 82–98)
METHGB BLD QL: 1.1 % (ref 0–3)
MODALITY: ABNORMAL
OXYHGB MFR BLDV: 95.7 % (ref 94–99)
PCO2 BLDA: 44.2 MM HG (ref 35–45)
PCO2 TEMP ADJ BLD: ABNORMAL MM HG (ref 35–45)
PH BLDA: 7.43 PH UNITS (ref 7.35–7.45)
PH, TEMP CORRECTED: ABNORMAL PH UNITS (ref 7.35–7.45)
PLATELET # BLD AUTO: 283 10*3/MM3 (ref 130–400)
PMV BLD AUTO: 10.2 FL (ref 6–12)
PO2 BLDA: 87.5 MM HG (ref 83–108)
PO2 TEMP ADJ BLD: ABNORMAL MM HG (ref 83–108)
POTASSIUM BLD-SCNC: 4.2 MMOL/L (ref 3.5–5.3)
POTASSIUM BLDA-SCNC: 4.4 MMOL/L (ref 3.5–5.2)
PROT SERPL-MCNC: 7 G/DL (ref 6.3–8.7)
PROTHROMBIN TIME: 18.8 SECONDS (ref 11.9–14.6)
PROTHROMBIN TIME: 19.8 SECONDS (ref 11.9–14.6)
RBC # BLD AUTO: 4.35 10*6/MM3 (ref 4.2–5.4)
RH BLD: NEGATIVE
SAO2 % BLDCOA: 97.4 % (ref 94–99)
SODIUM BLD-SCNC: 142 MMOL/L (ref 135–145)
SODIUM BLDA-SCNC: 143 MMOL/L (ref 136–145)
VENTILATOR MODE: ABNORMAL
WBC NRBC COR # BLD: 13.42 10*3/MM3 (ref 4.8–10.8)

## 2018-05-09 PROCEDURE — 25010000002 HYDROMORPHONE PER 4 MG: Performed by: ANESTHESIOLOGY

## 2018-05-09 PROCEDURE — 83050 HGB METHEMOGLOBIN QUAN: CPT

## 2018-05-09 PROCEDURE — G8980 MOBILITY D/C STATUS: HCPCS

## 2018-05-09 PROCEDURE — 82375 ASSAY CARBOXYHB QUANT: CPT

## 2018-05-09 PROCEDURE — 97161 PT EVAL LOW COMPLEX 20 MIN: CPT

## 2018-05-09 PROCEDURE — 25010000002 METHYLPREDNISOLONE PER 125 MG: Performed by: INTERNAL MEDICINE

## 2018-05-09 PROCEDURE — 25010000002 ENOXAPARIN PER 10 MG: Performed by: INTERNAL MEDICINE

## 2018-05-09 PROCEDURE — 85027 COMPLETE CBC AUTOMATED: CPT | Performed by: INTERNAL MEDICINE

## 2018-05-09 PROCEDURE — 87205 SMEAR GRAM STAIN: CPT | Performed by: THORACIC SURGERY (CARDIOTHORACIC VASCULAR SURGERY)

## 2018-05-09 PROCEDURE — 97166 OT EVAL MOD COMPLEX 45 MIN: CPT | Performed by: OCCUPATIONAL THERAPIST

## 2018-05-09 PROCEDURE — 25010000002 FENTANYL CITRATE (PF) 100 MCG/2ML SOLUTION: Performed by: ANESTHESIOLOGY

## 2018-05-09 PROCEDURE — 25010000002 LEVOFLOXACIN PER 250 MG: Performed by: INTERNAL MEDICINE

## 2018-05-09 PROCEDURE — 25010000002 DEXAMETHASONE PER 1 MG: Performed by: NURSE ANESTHETIST, CERTIFIED REGISTERED

## 2018-05-09 PROCEDURE — 88307 TISSUE EXAM BY PATHOLOGIST: CPT | Performed by: THORACIC SURGERY (CARDIOTHORACIC VASCULAR SURGERY)

## 2018-05-09 PROCEDURE — 85610 PROTHROMBIN TIME: CPT | Performed by: ANESTHESIOLOGY

## 2018-05-09 PROCEDURE — 87206 SMEAR FLUORESCENT/ACID STAI: CPT | Performed by: THORACIC SURGERY (CARDIOTHORACIC VASCULAR SURGERY)

## 2018-05-09 PROCEDURE — 87075 CULTR BACTERIA EXCEPT BLOOD: CPT | Performed by: THORACIC SURGERY (CARDIOTHORACIC VASCULAR SURGERY)

## 2018-05-09 PROCEDURE — 25010000002 ONDANSETRON PER 1 MG: Performed by: NURSE ANESTHETIST, CERTIFIED REGISTERED

## 2018-05-09 PROCEDURE — 87116 MYCOBACTERIA CULTURE: CPT | Performed by: THORACIC SURGERY (CARDIOTHORACIC VASCULAR SURGERY)

## 2018-05-09 PROCEDURE — P9017 PLASMA 1 DONOR FRZ W/IN 8 HR: HCPCS

## 2018-05-09 PROCEDURE — 86927 PLASMA FRESH FROZEN: CPT

## 2018-05-09 PROCEDURE — G8978 MOBILITY CURRENT STATUS: HCPCS

## 2018-05-09 PROCEDURE — 94799 UNLISTED PULMONARY SVC/PX: CPT

## 2018-05-09 PROCEDURE — 25010000002 METHYLPREDNISOLONE PER 40 MG: Performed by: INTERNAL MEDICINE

## 2018-05-09 PROCEDURE — 36430 TRANSFUSION BLD/BLD COMPNT: CPT

## 2018-05-09 PROCEDURE — 99223 1ST HOSP IP/OBS HIGH 75: CPT | Performed by: THORACIC SURGERY (CARDIOTHORACIC VASCULAR SURGERY)

## 2018-05-09 PROCEDURE — 25010000002 FENTANYL CITRATE (PF) 250 MCG/5ML SOLUTION: Performed by: NURSE ANESTHETIST, CERTIFIED REGISTERED

## 2018-05-09 PROCEDURE — 32607 THORACOSCOPY W/BX INFILTRATE: CPT | Performed by: THORACIC SURGERY (CARDIOTHORACIC VASCULAR SURGERY)

## 2018-05-09 PROCEDURE — 25010000002 MIDAZOLAM PER 1 MG: Performed by: ANESTHESIOLOGY

## 2018-05-09 PROCEDURE — 71045 X-RAY EXAM CHEST 1 VIEW: CPT

## 2018-05-09 PROCEDURE — 86900 BLOOD TYPING SEROLOGIC ABO: CPT | Performed by: THORACIC SURGERY (CARDIOTHORACIC VASCULAR SURGERY)

## 2018-05-09 PROCEDURE — 85730 THROMBOPLASTIN TIME PARTIAL: CPT | Performed by: ANESTHESIOLOGY

## 2018-05-09 PROCEDURE — 25010000002 NEOSTIGMINE PER 0.5 MG: Performed by: NURSE ANESTHETIST, CERTIFIED REGISTERED

## 2018-05-09 PROCEDURE — 25010000002 CEFEPIME 1 G/11.7ML IV PUSH SYRINGE KIT (PAD): Performed by: INTERNAL MEDICINE

## 2018-05-09 PROCEDURE — G8987 SELF CARE CURRENT STATUS: HCPCS | Performed by: OCCUPATIONAL THERAPIST

## 2018-05-09 PROCEDURE — 82805 BLOOD GASES W/O2 SATURATION: CPT

## 2018-05-09 PROCEDURE — 25010000002 DEXAMETHASONE PER 1 MG: Performed by: ANESTHESIOLOGY

## 2018-05-09 PROCEDURE — 0BBJ4ZX EXCISION OF LEFT LOWER LUNG LOBE, PERCUTANEOUS ENDOSCOPIC APPROACH, DIAGNOSTIC: ICD-10-PCS | Performed by: THORACIC SURGERY (CARDIOTHORACIC VASCULAR SURGERY)

## 2018-05-09 PROCEDURE — G8979 MOBILITY GOAL STATUS: HCPCS

## 2018-05-09 PROCEDURE — 25010000002 CEFEPIME PER 500 MG: Performed by: INTERNAL MEDICINE

## 2018-05-09 PROCEDURE — 80053 COMPREHEN METABOLIC PANEL: CPT | Performed by: INTERNAL MEDICINE

## 2018-05-09 PROCEDURE — 85610 PROTHROMBIN TIME: CPT | Performed by: INTERNAL MEDICINE

## 2018-05-09 PROCEDURE — C1729 CATH, DRAINAGE: HCPCS | Performed by: THORACIC SURGERY (CARDIOTHORACIC VASCULAR SURGERY)

## 2018-05-09 PROCEDURE — 87176 TISSUE HOMOGENIZATION CULTR: CPT | Performed by: THORACIC SURGERY (CARDIOTHORACIC VASCULAR SURGERY)

## 2018-05-09 PROCEDURE — 87070 CULTURE OTHR SPECIMN AEROBIC: CPT | Performed by: THORACIC SURGERY (CARDIOTHORACIC VASCULAR SURGERY)

## 2018-05-09 PROCEDURE — 30233K1 TRANSFUSION OF NONAUTOLOGOUS FROZEN PLASMA INTO PERIPHERAL VEIN, PERCUTANEOUS APPROACH: ICD-10-PCS | Performed by: THORACIC SURGERY (CARDIOTHORACIC VASCULAR SURGERY)

## 2018-05-09 PROCEDURE — G8989 SELF CARE D/C STATUS: HCPCS | Performed by: OCCUPATIONAL THERAPIST

## 2018-05-09 PROCEDURE — 86901 BLOOD TYPING SEROLOGIC RH(D): CPT | Performed by: THORACIC SURGERY (CARDIOTHORACIC VASCULAR SURGERY)

## 2018-05-09 PROCEDURE — 87102 FUNGUS ISOLATION CULTURE: CPT | Performed by: THORACIC SURGERY (CARDIOTHORACIC VASCULAR SURGERY)

## 2018-05-09 PROCEDURE — G8988 SELF CARE GOAL STATUS: HCPCS | Performed by: OCCUPATIONAL THERAPIST

## 2018-05-09 PROCEDURE — 94660 CPAP INITIATION&MGMT: CPT

## 2018-05-09 PROCEDURE — 25010000002 PROPOFOL 10 MG/ML EMULSION: Performed by: NURSE ANESTHETIST, CERTIFIED REGISTERED

## 2018-05-09 RX ORDER — PROPOFOL 10 MG/ML
VIAL (ML) INTRAVENOUS AS NEEDED
Status: DISCONTINUED | OUTPATIENT
Start: 2018-05-09 | End: 2018-05-09 | Stop reason: SURG

## 2018-05-09 RX ORDER — FLUMAZENIL 0.1 MG/ML
0.2 INJECTION INTRAVENOUS AS NEEDED
Status: DISCONTINUED | OUTPATIENT
Start: 2018-05-09 | End: 2018-05-09 | Stop reason: HOSPADM

## 2018-05-09 RX ORDER — ONDANSETRON 2 MG/ML
INJECTION INTRAMUSCULAR; INTRAVENOUS AS NEEDED
Status: DISCONTINUED | OUTPATIENT
Start: 2018-05-09 | End: 2018-05-09 | Stop reason: SURG

## 2018-05-09 RX ORDER — SODIUM CHLORIDE, SODIUM LACTATE, POTASSIUM CHLORIDE, CALCIUM CHLORIDE 600; 310; 30; 20 MG/100ML; MG/100ML; MG/100ML; MG/100ML
INJECTION, SOLUTION INTRAVENOUS CONTINUOUS PRN
Status: DISCONTINUED | OUTPATIENT
Start: 2018-05-09 | End: 2018-05-09 | Stop reason: SURG

## 2018-05-09 RX ORDER — VECURONIUM BROMIDE 1 MG/ML
INJECTION, POWDER, LYOPHILIZED, FOR SOLUTION INTRAVENOUS AS NEEDED
Status: DISCONTINUED | OUTPATIENT
Start: 2018-05-09 | End: 2018-05-09 | Stop reason: SURG

## 2018-05-09 RX ORDER — LIDOCAINE HYDROCHLORIDE 20 MG/ML
INJECTION, SOLUTION INFILTRATION; PERINEURAL AS NEEDED
Status: DISCONTINUED | OUTPATIENT
Start: 2018-05-09 | End: 2018-05-09 | Stop reason: SURG

## 2018-05-09 RX ORDER — IPRATROPIUM BROMIDE AND ALBUTEROL SULFATE 2.5; .5 MG/3ML; MG/3ML
3 SOLUTION RESPIRATORY (INHALATION) ONCE AS NEEDED
Status: DISCONTINUED | OUTPATIENT
Start: 2018-05-09 | End: 2018-05-09 | Stop reason: HOSPADM

## 2018-05-09 RX ORDER — MEPERIDINE HYDROCHLORIDE 50 MG/ML
12.5 INJECTION INTRAMUSCULAR; INTRAVENOUS; SUBCUTANEOUS
Status: DISCONTINUED | OUTPATIENT
Start: 2018-05-09 | End: 2018-05-09 | Stop reason: HOSPADM

## 2018-05-09 RX ORDER — HYDROMORPHONE HYDROCHLORIDE 1 MG/ML
0.5 INJECTION, SOLUTION INTRAMUSCULAR; INTRAVENOUS; SUBCUTANEOUS
Status: DISCONTINUED | OUTPATIENT
Start: 2018-05-09 | End: 2018-05-09 | Stop reason: HOSPADM

## 2018-05-09 RX ORDER — METOCLOPRAMIDE HYDROCHLORIDE 5 MG/ML
5 INJECTION INTRAMUSCULAR; INTRAVENOUS
Status: DISCONTINUED | OUTPATIENT
Start: 2018-05-09 | End: 2018-05-09 | Stop reason: HOSPADM

## 2018-05-09 RX ORDER — HYDRALAZINE HYDROCHLORIDE 20 MG/ML
5 INJECTION INTRAMUSCULAR; INTRAVENOUS
Status: DISCONTINUED | OUTPATIENT
Start: 2018-05-09 | End: 2018-05-09 | Stop reason: HOSPADM

## 2018-05-09 RX ORDER — NALOXONE HCL 0.4 MG/ML
0.04 VIAL (ML) INJECTION AS NEEDED
Status: DISCONTINUED | OUTPATIENT
Start: 2018-05-09 | End: 2018-05-09 | Stop reason: HOSPADM

## 2018-05-09 RX ORDER — MAGNESIUM HYDROXIDE 1200 MG/15ML
LIQUID ORAL AS NEEDED
Status: DISCONTINUED | OUTPATIENT
Start: 2018-05-09 | End: 2018-05-09 | Stop reason: HOSPADM

## 2018-05-09 RX ORDER — ONDANSETRON 2 MG/ML
4 INJECTION INTRAMUSCULAR; INTRAVENOUS AS NEEDED
Status: DISCONTINUED | OUTPATIENT
Start: 2018-05-09 | End: 2018-05-09 | Stop reason: HOSPADM

## 2018-05-09 RX ORDER — LABETALOL HYDROCHLORIDE 5 MG/ML
5 INJECTION, SOLUTION INTRAVENOUS
Status: DISCONTINUED | OUTPATIENT
Start: 2018-05-09 | End: 2018-05-09 | Stop reason: HOSPADM

## 2018-05-09 RX ORDER — MIDAZOLAM HYDROCHLORIDE 1 MG/ML
1 INJECTION INTRAMUSCULAR; INTRAVENOUS
Status: DISCONTINUED | OUTPATIENT
Start: 2018-05-09 | End: 2018-05-09 | Stop reason: HOSPADM

## 2018-05-09 RX ORDER — FENTANYL CITRATE 50 UG/ML
25 INJECTION, SOLUTION INTRAMUSCULAR; INTRAVENOUS AS NEEDED
Status: DISCONTINUED | OUTPATIENT
Start: 2018-05-09 | End: 2018-05-09 | Stop reason: HOSPADM

## 2018-05-09 RX ORDER — MIDAZOLAM HYDROCHLORIDE 1 MG/ML
2 INJECTION INTRAMUSCULAR; INTRAVENOUS
Status: DISCONTINUED | OUTPATIENT
Start: 2018-05-09 | End: 2018-05-09 | Stop reason: HOSPADM

## 2018-05-09 RX ORDER — DEXAMETHASONE SODIUM PHOSPHATE 4 MG/ML
INJECTION, SOLUTION INTRA-ARTICULAR; INTRALESIONAL; INTRAMUSCULAR; INTRAVENOUS; SOFT TISSUE AS NEEDED
Status: DISCONTINUED | OUTPATIENT
Start: 2018-05-09 | End: 2018-05-09 | Stop reason: SURG

## 2018-05-09 RX ORDER — SODIUM CHLORIDE, SODIUM LACTATE, POTASSIUM CHLORIDE, CALCIUM CHLORIDE 600; 310; 30; 20 MG/100ML; MG/100ML; MG/100ML; MG/100ML
100 INJECTION, SOLUTION INTRAVENOUS CONTINUOUS
Status: DISCONTINUED | OUTPATIENT
Start: 2018-05-09 | End: 2018-05-10

## 2018-05-09 RX ORDER — ALBUTEROL SULFATE 1.25 MG/3ML
1.25 SOLUTION RESPIRATORY (INHALATION)
Status: DISCONTINUED | OUTPATIENT
Start: 2018-05-09 | End: 2018-05-13 | Stop reason: HOSPADM

## 2018-05-09 RX ORDER — SODIUM CHLORIDE 0.9 % (FLUSH) 0.9 %
1-10 SYRINGE (ML) INJECTION AS NEEDED
Status: DISCONTINUED | OUTPATIENT
Start: 2018-05-09 | End: 2018-05-09 | Stop reason: HOSPADM

## 2018-05-09 RX ORDER — GLYCOPYRROLATE 0.2 MG/ML
INJECTION INTRAMUSCULAR; INTRAVENOUS AS NEEDED
Status: DISCONTINUED | OUTPATIENT
Start: 2018-05-09 | End: 2018-05-09 | Stop reason: SURG

## 2018-05-09 RX ORDER — ACETAMINOPHEN 500 MG
1000 TABLET ORAL ONCE
Status: COMPLETED | OUTPATIENT
Start: 2018-05-09 | End: 2018-05-09

## 2018-05-09 RX ORDER — FENTANYL CITRATE 50 UG/ML
INJECTION, SOLUTION INTRAMUSCULAR; INTRAVENOUS AS NEEDED
Status: DISCONTINUED | OUTPATIENT
Start: 2018-05-09 | End: 2018-05-09 | Stop reason: SURG

## 2018-05-09 RX ORDER — IPRATROPIUM BROMIDE AND ALBUTEROL SULFATE 2.5; .5 MG/3ML; MG/3ML
3 SOLUTION RESPIRATORY (INHALATION) ONCE
Status: COMPLETED | OUTPATIENT
Start: 2018-05-09 | End: 2018-05-09

## 2018-05-09 RX ORDER — DEXAMETHASONE SODIUM PHOSPHATE 4 MG/ML
4 INJECTION, SOLUTION INTRA-ARTICULAR; INTRALESIONAL; INTRAMUSCULAR; INTRAVENOUS; SOFT TISSUE ONCE AS NEEDED
Status: COMPLETED | OUTPATIENT
Start: 2018-05-09 | End: 2018-05-09

## 2018-05-09 RX ORDER — PROMETHAZINE HYDROCHLORIDE 25 MG/1
12.5 TABLET ORAL 2 TIMES DAILY PRN
Status: DISCONTINUED | OUTPATIENT
Start: 2018-05-09 | End: 2018-05-13 | Stop reason: HOSPADM

## 2018-05-09 RX ADMIN — PANTOPRAZOLE SODIUM 40 MG: 40 TABLET, DELAYED RELEASE ORAL at 06:43

## 2018-05-09 RX ADMIN — SODIUM CHLORIDE, SODIUM LACTATE, POTASSIUM CHLORIDE, AND CALCIUM CHLORIDE 100 ML/HR: 600; 310; 30; 20 INJECTION, SOLUTION INTRAVENOUS at 15:13

## 2018-05-09 RX ADMIN — ENOXAPARIN SODIUM 90 MG: 100 INJECTION SUBCUTANEOUS at 20:42

## 2018-05-09 RX ADMIN — HYDROCODONE BITARTRATE AND ACETAMINOPHEN 1 TABLET: 10; 325 TABLET ORAL at 08:35

## 2018-05-09 RX ADMIN — LEVOFLOXACIN 750 MG: 5 INJECTION, SOLUTION INTRAVENOUS at 08:18

## 2018-05-09 RX ADMIN — GUAIFENESIN 1200 MG: 600 TABLET, EXTENDED RELEASE ORAL at 20:57

## 2018-05-09 RX ADMIN — IPRATROPIUM BROMIDE AND ALBUTEROL SULFATE 3 ML: 2.5; .5 SOLUTION RESPIRATORY (INHALATION) at 15:17

## 2018-05-09 RX ADMIN — LITHIUM CARBONATE 300 MG: 300 CAPSULE, GELATIN COATED ORAL at 20:57

## 2018-05-09 RX ADMIN — FENTANYL CITRATE 100 MCG: 50 INJECTION INTRAMUSCULAR; INTRAVENOUS at 16:02

## 2018-05-09 RX ADMIN — HYDROMORPHONE HYDROCHLORIDE 0.5 MG: 1 INJECTION, SOLUTION INTRAMUSCULAR; INTRAVENOUS; SUBCUTANEOUS at 17:45

## 2018-05-09 RX ADMIN — FLUOXETINE HYDROCHLORIDE 60 MG: 20 CAPSULE ORAL at 08:19

## 2018-05-09 RX ADMIN — CEFEPIME HYDROCHLORIDE 1 G: 1 INJECTION, POWDER, FOR SOLUTION INTRAMUSCULAR; INTRAVENOUS at 08:18

## 2018-05-09 RX ADMIN — GLYCOPYRROLATE 0.4 MG: 0.2 INJECTION, SOLUTION INTRAMUSCULAR; INTRAVENOUS at 16:40

## 2018-05-09 RX ADMIN — NICOTINE 1 PATCH: 21 PATCH, EXTENDED RELEASE TRANSDERMAL at 02:21

## 2018-05-09 RX ADMIN — METHYLPREDNISOLONE SODIUM SUCCINATE 40 MG: 125 INJECTION, POWDER, FOR SOLUTION INTRAMUSCULAR; INTRAVENOUS at 08:18

## 2018-05-09 RX ADMIN — GUAIFENESIN 1200 MG: 600 TABLET, EXTENDED RELEASE ORAL at 08:18

## 2018-05-09 RX ADMIN — HYDROCODONE BITARTRATE AND ACETAMINOPHEN 1 TABLET: 10; 325 TABLET ORAL at 19:52

## 2018-05-09 RX ADMIN — METHYLPREDNISOLONE SODIUM SUCCINATE 40 MG: 125 INJECTION, POWDER, FOR SOLUTION INTRAMUSCULAR; INTRAVENOUS at 02:23

## 2018-05-09 RX ADMIN — SODIUM CHLORIDE, SODIUM LACTATE, POTASSIUM CHLORIDE, AND CALCIUM CHLORIDE 100 ML/HR: 600; 310; 30; 20 INJECTION, SOLUTION INTRAVENOUS at 21:01

## 2018-05-09 RX ADMIN — DIAZEPAM 5 MG: 5 TABLET ORAL at 08:18

## 2018-05-09 RX ADMIN — DIAZEPAM 5 MG: 5 TABLET ORAL at 20:43

## 2018-05-09 RX ADMIN — LEVOTHYROXINE SODIUM 112 MCG: 112 TABLET ORAL at 08:18

## 2018-05-09 RX ADMIN — METHYLPREDNISOLONE SODIUM SUCCINATE 40 MG: 125 INJECTION, POWDER, FOR SOLUTION INTRAMUSCULAR; INTRAVENOUS at 20:42

## 2018-05-09 RX ADMIN — ACETAMINOPHEN 1000 MG: 500 TABLET ORAL at 15:18

## 2018-05-09 RX ADMIN — VECURONIUM BROMIDE 7 MG: 1 INJECTION, POWDER, LYOPHILIZED, FOR SOLUTION INTRAVENOUS at 15:31

## 2018-05-09 RX ADMIN — ZOLPIDEM TARTRATE 10 MG: 5 TABLET, COATED ORAL at 20:43

## 2018-05-09 RX ADMIN — DEXAMETHASONE SODIUM PHOSPHATE 4 MG: 4 INJECTION, SOLUTION INTRA-ARTICULAR; INTRALESIONAL; INTRAMUSCULAR; INTRAVENOUS; SOFT TISSUE at 15:14

## 2018-05-09 RX ADMIN — GABAPENTIN 300 MG: 300 CAPSULE ORAL at 08:18

## 2018-05-09 RX ADMIN — FENTANYL CITRATE 25 MCG: 50 INJECTION INTRAMUSCULAR; INTRAVENOUS at 17:39

## 2018-05-09 RX ADMIN — GABAPENTIN 300 MG: 300 CAPSULE ORAL at 20:43

## 2018-05-09 RX ADMIN — Medication 4 MG: at 16:40

## 2018-05-09 RX ADMIN — DEXAMETHASONE SODIUM PHOSPHATE 4 MG: 4 INJECTION, SOLUTION INTRAMUSCULAR; INTRAVENOUS at 16:38

## 2018-05-09 RX ADMIN — ALBUTEROL SULFATE 1.25 MG: 1.25 SOLUTION RESPIRATORY (INHALATION) at 11:31

## 2018-05-09 RX ADMIN — SODIUM CHLORIDE, POTASSIUM CHLORIDE, SODIUM LACTATE AND CALCIUM CHLORIDE: 600; 310; 30; 20 INJECTION, SOLUTION INTRAVENOUS at 15:23

## 2018-05-09 RX ADMIN — FENTANYL CITRATE 100 MCG: 50 INJECTION INTRAMUSCULAR; INTRAVENOUS at 15:35

## 2018-05-09 RX ADMIN — ONDANSETRON HYDROCHLORIDE 4 MG: 2 SOLUTION INTRAMUSCULAR; INTRAVENOUS at 16:38

## 2018-05-09 RX ADMIN — FENTANYL CITRATE 150 MCG: 50 INJECTION INTRAMUSCULAR; INTRAVENOUS at 15:31

## 2018-05-09 RX ADMIN — IPRATROPIUM BROMIDE AND ALBUTEROL SULFATE 3 ML: 2.5; .5 SOLUTION RESPIRATORY (INHALATION) at 07:22

## 2018-05-09 RX ADMIN — IPRATROPIUM BROMIDE AND ALBUTEROL SULFATE 3 ML: 2.5; .5 SOLUTION RESPIRATORY (INHALATION) at 03:07

## 2018-05-09 RX ADMIN — LIDOCAINE HYDROCHLORIDE 50 MG: 20 INJECTION, SOLUTION INFILTRATION; PERINEURAL at 15:31

## 2018-05-09 RX ADMIN — CEFEPIME HYDROCHLORIDE 1 G: 1 INJECTION, POWDER, FOR SOLUTION INTRAMUSCULAR; INTRAVENOUS at 00:05

## 2018-05-09 RX ADMIN — PROPOFOL 200 MG: 10 INJECTION, EMULSION INTRAVENOUS at 15:31

## 2018-05-09 RX ADMIN — ENOXAPARIN SODIUM 90 MG: 100 INJECTION SUBCUTANEOUS at 08:19

## 2018-05-09 RX ADMIN — MIDAZOLAM HYDROCHLORIDE 2 MG: 1 INJECTION, SOLUTION INTRAMUSCULAR; INTRAVENOUS at 15:17

## 2018-05-09 NOTE — OP NOTE
Cardiothoracic Surgery Operative Note    Reoperative Diagnosis: Respiratory failure  Postoperative Diagnosis: Same    Operation: Left VATS thoracotomy and biopsy of left lower lobe    Surgeon: Dr. Case Moctezuma    Anesthesia: Gen. double lumen    Procedure: Mrs. Vianca Spencer was taken to the operating room and after appropriate timeout was performed to lumen endotracheal intubation and general anesthesia was performed by anesthesia.  The patient was then placed in a semi-right lateral decubitus position.  She was then prepped and draped in a sterile fashion.  A 1 inch incision was made at approximately the anterior axillary line at approximately the sixth interspace.  The scope was then introduced.  The patient had somewhat hyperemic lungs but no visible vessel or visceral pleural lesions.  Other port site was placed more anteriorly.  We then utilized the Ethicon endostapler with the green loads for obtaining a wedge biopsy of the left lower lobe.  Part was sent for culture the rest for pathology.  I talked with the pathologist and 90 understand why this biopsy was undertaken and what we are looking- probable interstitial lung disease.  Through the anterior port site I placed a #28 chest tube posteriorly superiorly.  This was sutured in position.  The posterior port site was closed utilizing 2-0 Vicry sutures for the muscle and subcutaneous tissue and 4-0 Vicryl subcutaneous tissue for the skin was closed.  The Pleur-evac was connected to suction.  The patient's lung had expanded well with resumption of the ventilation of the left side.  After sterile dressings were applied patient was extubated and taken directly to the postanesthesia care unit

## 2018-05-09 NOTE — PROGRESS NOTES
[facility]  INPATIENT PROGRESS NOTE    Length of Stay: 3  Date of Admission: 5/6/2018  Primary Care Physician: Neel Valencia Jr., MD    Subjective   Chief Complaint: Shortness of breath  HPI   Patient reports that she continues have cough; mostly nonproductive.  Has pain from time to time associated with coughing spells.  Still on high flow oxygen but denies work of breathing.  Still has generalized weakness and has not worked with physical therapy yet.      Review of Systems   Respiratory: Positive for shortness of breath.         All pertinent negatives and positives are as above. All other systems have been reviewed and are negative unless otherwise stated.     Objective    Temp:  [98.2 °F (36.8 °C)] 98.2 °F (36.8 °C)  Heart Rate:  [63-91] 73  Resp:  [14-21] 16  BP: ()/(55-98) 129/91  Physical Exam   Constitutional: She is oriented to person, place, and time. No distress.   On high flow 02    HENT:   Head: Normocephalic.   Mouth/Throat: No oropharyngeal exudate.   Eyes: No scleral icterus.   Neck: No tracheal deviation present.   Cardiovascular: Normal rate and regular rhythm.    Pulmonary/Chest: Effort normal. She has no wheezes. She has no rales.   Scattered coarse BSs noted; no work of breathing or respiratory distress   Abdominal: Soft.   Musculoskeletal: She exhibits no deformity.   Neurological: She is alert and oriented to person, place, and time.   Skin: Skin is warm and dry. She is not diaphoretic.   Vitals reviewed.    Results Review:  I have reviewed the labs, radiology results, and diagnostic studies.    Laboratory Data:     Results from last 7 days  Lab Units 05/07/18  0257 05/06/18  1828   WBC 10*3/mm3 10.30 13.30*   HEMOGLOBIN g/dL 12.9 12.3   HEMATOCRIT % 39.7 37.5   PLATELETS 10*3/mm3 245 223          Results from last 7 days  Lab Units 05/07/18  0257 05/06/18  1828   SODIUM mmol/L 142 142   POTASSIUM mmol/L 3.9 4.2   CHLORIDE  mmol/L 100 103   CO2 mmol/L 32.0* 29.0   BUN mg/dL 11 9   CREATININE mg/dL 0.66 0.65   CALCIUM mg/dL 9.4 9.2   BILIRUBIN mg/dL 0.8 0.7   ALK PHOS U/L 164* 146*   ALT (SGPT) U/L 62* 64*   AST (SGOT) U/L 60* 80*   GLUCOSE mg/dL 160* 126*       Culture Data:   Blood Culture   Date Value Ref Range Status   05/06/2018 No growth at 24 hours  Preliminary   05/06/2018 No growth at 24 hours  Preliminary       Radiology Data:   Imaging Results (last 24 hours)     Procedure Component Value Units Date/Time    CT Chest Hi Resolution [[id number]] Collected:  05/08/18 1739     Updated:  05/08/18 1744    Narrative:       CT CHEST HI RESOLUTION- 5/8/2018 5:16 PM CDT     HISTORY: Respiratory failure, not elsewhere classified; R06.00-Dyspnea,  unspecified; R91.8-Other nonspecific abnormal finding of lung field;  D72.829-Elevated white blood cell count, unspecified; R06.82-Tachypnea,  not elsewhere classified     COMPARISON: January 1, 2016     DOSE LENGTH PRODUCT: 562 mGy cm. Automated exposure control was also  utilized to decrease patient radiation dose.     TECHNIQUE: High-resolution CT of the chest was performed without  contrast.     FINDINGS:  Neck base: The imaged portion of the neck and thyroid gland is  unremarkable.      Lungs: Severe interstitial thickening and interstitial fibrotic changes  noted diffusely throughout the right and left lung.. No nodules are  seen. There is no focal consolidation or pleural effusion. The trachea  and bronchial tree are patent. No bronchiectasis.     Heart and mediastinum: The heart, great vessels, and pulmonary vessels  are normal in appearance within limits of a noncontrast study. There is  no pericardial effusion. No enlarged axillary or mediastinal lymph nodes  are present.      Bones and soft tissues: No acute findings are seen in the bones or  surrounding soft tissues.     Upper abdomen: Postsurgical change from gastric surgery is noted.       Impression:       1. Severe changes of  interstitial fibrosis are present throughout the  right and left lung. This may have progressed when compared to prior  study January 1, 2016.        This report was finalized on 05/08/2018 17:40 by Dr. Zach Pham MD.          I have reviewed the patient current medications.     Assessment/Plan     Hospital Problem List     Respiratory distress        Assessment:  1.  Acute on chronic hypoxemic respiratory failure  2.  Severe changes of interstitial fibrosis bilateral lungs  3.  Right heart enlargement with mildly reduced RVEF and mild pulmonary HTN on previous echo in February 2018; NORMAL RV size and function noted on Echo performed 5/8/2018  4.  History of DVT on chronic anticoagulation with Coumadin  5.  COPD with acute exacerbation  6.  Bipolar disorder  7.  History of pervious gastric bypass procedure  8.  GERD with h/o esophagitis and gastritis  9.  Transaminitis-mild  10.  Tobacco dependence  11.  Generalized weakness    Plan:  1.  Cefepime and Levaquin day 2   2.  IV Solumedrol  3.  Scheduled nebs  4.  Diuretics on hold  5.  High flow 02  6.  Appreciate Pulmonary input especially as it pertains to severe changes of interstitial fibrosis seen on high resolution CT chest  7.  Hold Coumadin and start Lovenox given plans for CT Surg consult and possible open lung biopsy  8.  Labs pending from this AM  9.  PT and OT eval  10.  Nicotine patch; she must stop smoking!  11.  Incentive spirometry  12.  Discussed with Dr. Ba  13.  CT Surg consult for open lung biopsy given CT findings      David Hernandez MD   05/09/18   7:22 AM

## 2018-05-09 NOTE — PROGRESS NOTES
PULMONARY AND CRITICAL CARE PROGRESS NOTE - [facility]    Patient: Vianca Spencer  1975   MR# [id number]   Acct# [id number]  05/09/18   8:29 AM  Referring Provider: David Hernandez MD    Chief Complaint: hypoxia and ild   Interval history: remains on high flow oxygen.  No fevers, chills, sweats still chronically dyspnea in chest, moderate to severe, alleviated by oxygen, aggravated by exertion  Meds:    cefepime 1 g Intravenous Q8H   diazePAM 5 mg Oral TID   enoxaparin 1 mg/kg Subcutaneous Q12H   FLUoxetine 60 mg Oral Daily   gabapentin 300 mg Oral BID   gabapentin 600 mg Oral Nightly   guaiFENesin 1,200 mg Oral Q12H   ipratropium-albuterol 3 mL Nebulization Q4H - RT   levoFLOXacin 750 mg Intravenous Q24H   levothyroxine 112 mcg Oral Daily   lithium carbonate 300 mg Oral Nightly   methylPREDNISolone sodium succinate 40 mg Intravenous Q6H   nicotine 1 patch Transdermal Q24H   pantoprazole 40 mg Oral QAM       O2 2 L/min     Review of Systems:   Review of Systems   Constitutional: Negative for chills and fever.   Respiratory: Positive for shortness of breath. Negative for wheezing.    Gastrointestinal: Negative for abdominal distention, diarrhea, nausea and vomiting.   Hematological: Does not bruise/bleed easily.   Psychiatric/Behavioral: Negative for agitation.     Physical Exam:  SpO2 Percentage    05/09/18 0601 05/09/18 0722 05/09/18 0727   SpO2: 95% 94% 98%     Temp:  [98.2 °F (36.8 °C)] 98.2 °F (36.8 °C)  Heart Rate:  [63-83] 70  Resp:  [14-22] 22  BP: ()/(55-98) 129/91  Intake/Output Summary (Last 24 hours) at 05/09/18 0829  Last data filed at 05/09/18 0500   Gross per 24 hour   Intake              190 ml   Output             1175 ml   Net             -985 ml     Physical Exam   Constitutional: She appears well-developed. She is active. She has a sickly appearance. She appears ill. No distress. Nasal cannula in place.   Eyes: EOM are normal. No scleral icterus.   Neck: No JVD  present. No tracheal deviation present.   Cardiovascular: Normal rate and regular rhythm.    Pulmonary/Chest: No accessory muscle usage. No tachypnea. No respiratory distress. She has decreased breath sounds. She has rales in the right lower field and the left lower field.   Abdominal: Soft. There is no tenderness. There is no guarding.   Musculoskeletal: She exhibits no edema.   Neurological: She is alert.   Skin: Skin is warm and dry. She is not diaphoretic.   Psychiatric: She has a normal mood and affect. Her behavior is normal.     Laboratory Data:    Results from last 7 days  Lab Units 05/09/18  0703 05/07/18  0257 05/06/18  1828   WBC 10*3/mm3 13.42* 10.30 13.30*   HEMOGLOBIN g/dL 12.8 12.9 12.3   PLATELETS 10*3/mm3 283 245 223       Results from last 7 days  Lab Units 05/09/18  0703 05/08/18  0851 05/07/18  0257 05/06/18  1828   SODIUM mmol/L 142  --  142 142   POTASSIUM mmol/L 4.2  --  3.9 4.2   BUN mg/dL 14  --  11 9   CREATININE mg/dL 0.68  --  0.66 0.65   INR  1.62* 1.45* 1.63* 2.16*       Results from last 7 days  Lab Units 05/07/18  1538 05/07/18  0436 05/06/18  1823   PH, ARTERIAL pH units 7.458* 7.433 7.400   PCO2, ARTERIAL mm Hg 43.7 46.5* 41.4   PO2 ART mm Hg 57.1* 67.7* 60.0*   FIO2 %  --  50 100     Blood Culture   Date Value Ref Range Status   05/06/2018 No growth at 2 days  Preliminary   05/06/2018 No growth at 2 days  Preliminary     Recent films:  Imaging Results (last 24 hours)     Procedure Component Value Units Date/Time    CT Chest Hi Resolution [[id number]] Collected:  05/08/18 1739     Updated:  05/08/18 1744    Narrative:       CT CHEST HI RESOLUTION- 5/8/2018 5:16 PM CDT     HISTORY: Respiratory failure, not elsewhere classified; R06.00-Dyspnea,  unspecified; R91.8-Other nonspecific abnormal finding of lung field;  D72.829-Elevated white blood cell count, unspecified; R06.82-Tachypnea,  not elsewhere classified     COMPARISON: January 1, 2016     DOSE LENGTH PRODUCT: 562 mGy cm.  Automated exposure control was also  utilized to decrease patient radiation dose.     TECHNIQUE: High-resolution CT of the chest was performed without  contrast.     FINDINGS:  Neck base: The imaged portion of the neck and thyroid gland is  unremarkable.      Lungs: Severe interstitial thickening and interstitial fibrotic changes  noted diffusely throughout the right and left lung.. No nodules are  seen. There is no focal consolidation or pleural effusion. The trachea  and bronchial tree are patent. No bronchiectasis.     Heart and mediastinum: The heart, great vessels, and pulmonary vessels  are normal in appearance within limits of a noncontrast study. There is  no pericardial effusion. No enlarged axillary or mediastinal lymph nodes  are present.      Bones and soft tissues: No acute findings are seen in the bones or  surrounding soft tissues.     Upper abdomen: Postsurgical change from gastric surgery is noted.       Impression:       1. Severe changes of interstitial fibrosis are present throughout the  right and left lung. This may have progressed when compared to prior  study January 1, 2016.        This report was finalized on 05/08/2018 17:40 by Dr. Zach Pham MD.        Films reviewed personally by me.  My interpretation: diffuse ILD with fibrosis, does not look like UIP.  Pulmonary Assessment:  1. Chronic ild.  Does not look like copd.  2. Acute and chronic rf with hypoxia, decompensated but somewhat stabilized  3. Bipolar stable  4. Nicotine dependency, needs to stop    Recommend:   · Discussed with Dr. Hernandez and Dr. Moctezuma. Consult ct surgery for open lung biopsy  · Continue oxygen  · Continue icu care  · Continue steroids for now.    Electronically signed by Earnest Ba MD, 05/09/18, 8:29 AM

## 2018-05-09 NOTE — PLAN OF CARE
Problem: Patient Care Overview  Goal: Plan of Care Review  Outcome: Ongoing (interventions implemented as appropriate)   05/09/18 0836   Coping/Psychosocial   Plan of Care Reviewed With patient   Coping/Psychosocial   Patient Agreement with Plan of Care agrees   OTHER   Outcome Summary OT eval completed. Pt. performed all fxl mobility & ADLs at Mod I. SHe took rest breaks PRN & her SATs remained WNL of supplemental O2. She has no further OT tx needs at this time & reports confidence in her abilities. Rec HH upon DC.   Plan of Care Review   Progress improving

## 2018-05-09 NOTE — ANESTHESIA PROCEDURE NOTES
Arterial Line    Patient location during procedure: pre-op  Start time: 5/9/2018 2:58 PM  Stop Time:5/9/2018 3:00 PM       Line placed for hemodynamic monitoring, ABGs/Labs/ISTAT and MD/Surgeon request.  Performed By   Anesthesiologist: ACE QUINONEZ  Preanesthetic Checklist  Completed: patient identified, site marked, surgical consent, pre-op evaluation, timeout performed, IV checked, risks and benefits discussed and monitors and equipment checked  Arterial Line Prep   Sterile Tech: gloves, sterile barriers and cap  Prep: ChloraPrep  Patient monitoring: blood pressure monitoring, continuous pulse oximetry and EKG  Arterial Line Procedure   Laterality:right  Location:  radial artery  Catheter size: 20 G   Guidance: ultrasound guided  PROCEDURE NOTE/ULTRASOUND INTERPRETATION.  Using ultrasound guidance the potential vascular sites for insertion of the catheter were visualized to determine the patency of the vessel to be used for vascular access.  After selecting the appropriate site for insertion, the needle was visualized under ultrasound being inserted into the radial artery, followed by ultrasound confirmation of wire and catheter placement. There were no abnormalities seen on ultrasound; an image was taken; and the patient tolerated the procedure with no complications.   Number of attempts: 1  Successful placement: yes          Post Assessment   Dressing Type: occlusive dressing applied, secured with tape and wrist guard applied.   Complications no  Circ/Move/Sens Assessment: normal.   Patient Tolerance: patient tolerated the procedure well with no apparent complications

## 2018-05-09 NOTE — THERAPY DISCHARGE NOTE
Acute Care - Occupational Therapy Initial Eval/Discharge  [facility]     Patient Name: Vianca Spencer  : 1975  MRN: [id number]  Today's Date: 2018  Onset of Illness/Injury or Date of Surgery: 18  Date of Referral to OT: 18  Referring Physician: Dr. Hernandez      Admit Date: 2018       ICD-10-CM ICD-9-CM   1. Respiratory distress R06.00 786.09   2. Bilateral pulmonary infiltrates on chest x-ray R91.8 793.19   3. Leukocytosis, unspecified type D72.829 288.60   4. Tachypnea R06.82 786.06     Patient Active Problem List   Diagnosis   • Fe deficiency anemia   • Anemia   • Anxiety   • Biphasic positive airway pressure dependence   • Chronic pain   • Chronic obstructive pulmonary disease   • Depression   • Gastroesophageal reflux disease   • Bariatric surgery status   • Hypercoagulable state   • Opioid dependence   • Obstructive sleep apnea syndrome   • Seizure disorder   • Coagulation disorder   • BiPAP (biphasic positive airway pressure) dependence   • Bipolar disorder   • COPD (chronic obstructive pulmonary disease)   • Essential tremor   • GERD (gastroesophageal reflux disease)   • History of Stefanie-en-Y gastric bypass   • Hypercoagulopathy   • Incisional hernia   • 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, intract, 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   • Morbid obesity due to excess calories   • Narcotic dependence   • Obstructive sleep apnea   • Warfarin-induced coagulopathy   • Multiple falls   • Upper GI bleed   • Pneumonia of right lung due to infectious organism   • Respiratory distress     Past Medical History:   Diagnosis Date   • Acute retention of urine    • Anxiety and depression    • Bipolar 1 disorder    • COPD (chronic obstructive pulmonary disease)    • DVT (deep venous thrombosis)    • GERD (gastroesophageal reflux disease)    • Hypoglycemia    • Insomnia    • PTSD (post-traumatic stress disorder)      Past Surgical History:   Procedure Laterality Date   • ABDOMINOPLASTY     • APPENDECTOMY      • BRONCHOSCOPY N/A 2/2/2018    Procedure: BRONCHOSCOPY AT BEDSIDE;  Surgeon: Earnest Ba MD;  Location: [facility] ENDOSCOPY;  Service:    • CHOLECYSTECTOMY     • ENDOSCOPY N/A 1/29/2018    Procedure: ESOPHAGOGASTRODUODENOSCOPY WITH ANESTHESIA;  Surgeon: Aime Reyna MD;  Location: [facility] ENDOSCOPY;  Service:    • GASTRIC BYPASS     • HYSTERECTOMY            OT ASSESSMENT FLOWSHEET (last 72 hours)      Occupational Therapy Evaluation     Row Name 05/09/18 0836 05/09/18 0818                OT Evaluation Time/Intention    Subjective Information complains of;dyspnea;weakness;fatigue;dizziness;pain;numbness  -  --       Document Type evaluation  -  --       Mode of Treatment occupational therapy  -  --          General Information    Patient Profile Reviewed? yes  -  --       Onset of Illness/Injury or Date of Surgery 05/06/18  -  --       Referring Physician Dr. Hernandez  -  --       Patient Observations alert;cooperative;agree to therapy  -  --       General Observations of Patient supine, 8L O2 per hi flow, O2 monitor, BP cuff  -  --       Prior Level of Function independent:;all household mobility   uses rwx in home, wc comm mobility  -  --       Equipment Currently Used at Home walker, rolling;wheelchair  -  --       Pertinent History of Current Functional Problem CC: increased SOB, cough, chest pain. Dx: Acute on chronic resp. failure due to COPD & CHF, PNA, severe changes of interstitial fibrosis B lungs.   -CH  --       Existing Precautions/Restrictions fall  -  --       Risks Reviewed patient:;LOB;increased discomfort;dizziness;change in vital signs  -  --       Benefits Reviewed patient:;improve function;increase independence;increase strength;increase balance  -  --          Relationship/Environment    Lives With child(laura), dependent;child(laura), adult  -  --          Resource/Environmental Concerns    Current Living Arrangements home/apartment/condo  -  --           Cognitive Assessment/Interventions    Additional Documentation Cognitive Assessment/Intervention (Group)  -CH  --          Cognitive Assessment/Intervention- PT/OT    Affect/Mental Status (Cognitive) WNL  -CH  --       Follows Commands (Cognition) WNL  -CH  --       Cognitive Function (Cognitive) WNL  -CH  --       Personal Safety Interventions fall prevention program maintained;gait belt;muscle strengthening facilitated;nonskid shoes/slippers when out of bed  -CH  --          Bed Mobility Assessment/Treatment    Bed Mobility Assessment/Treatment supine-sit-supine  -CH  --       Supine-Sit-Supine [place] (Bed Mobility) independent  -CH  --       Assistive Device (Bed Mobility) head of bed elevated  -CH  --          Transfer Assessment/Treatment    Transfer Assessment/Treatment sit-stand transfer;stand-sit transfer  -CH  --       Sit-Stand [place] (Transfers) conditional independence  -CH  --       Stand-Sit [place] (Transfers) conditional independence  -CH  --          Sit-Stand Transfer    Assistive Device (Sit-Stand Transfers) walker, front-wheeled  -CH  --          Stand-Sit Transfer    Assistive Device (Stand-Sit Transfers) walker, front-wheeled  -CH  --          ADL Assessment/Intervention    BADL Assessment/Intervention lower body dressing  -CH  --          Lower Body Dressing Assessment/Training    Lower Body Dressing [place] Level conditional independence;don;doff;socks  -CH  --       Lower Body Dressing Position unsupported sitting  -CH  --          BADL Safety/Performance    Impairments, BADL Safety/Performance endurance/activity tolerance  -CH  --          General ROM    GENERAL ROM COMMENTS BUE AROM WFL  -CH  --          General Assessment (Manual Muscle Testing)    Comment, General Manual Muscle Testing (MMT) Assessment BUE 4/5  -CH  --          Balance    Balance static sitting balance;static standing balance  -CH  --          Static Sitting Balance    Level of [place]  (Unsupported Sitting, Static Balance) independent  -CH  --       Sitting Position (Unsupported Sitting, Static Balance) sitting on edge of bed  -CH  --          Static Standing Balance    Level of [place] (Supported Standing, Static Balance) conditional independence  -CH  --       Assistive Device Utilized (Supported Standing, Static Balance) rolling walker  -CH  --          Positioning and Restraints    Pre-Treatment Position in bed  -CH  --       Post Treatment Position bed  -CH  --       In Bed notified nsg;fowlers;call light within reach;encouraged to call for assist;side rails up x2;patient within staff view  -CH  --          Clinical Impression (OT)    Date of Referral to OT 05/08/18  -CH --  -       Criteria for Skilled Therapeutic Interventions Met (OT Eval) no;treatment indicated  -CH  --       Therapy Frequency (OT Eval) evaluation only  -  --       Care Plan Review (OT) evaluation/treatment results reviewed;care plan/treatment goals reviewed;risks/benefits reviewed;current/potential barriers reviewed;patient/other agree to care plan  -  --       Anticipated Discharge Disposition (OT) home with home health;home or self care  -  --          Vital Signs    Pretreatment Heart Rate (beats/min) 75  -CH  --       Pretreatment Resp Rate (breaths/min) 13  -CH  --       Pre SpO2 (%) 97  -CH  --       O2 Delivery Pre Treatment supplemental O2  -CH  --       Intra SpO2 (%) 98  -CH  --       O2 Delivery Intra Treatment supplemental O2  -CH  --       Post SpO2 (%) 93  -CH  --       O2 Delivery Post Treatment supplemental O2  -CH  --       Pre Patient Position Supine  -CH  --       Intra Patient Position Standing  -CH  --       Post Patient Position Supine  -CH  --          Discharge Summary (Occupational Therapy)    Additional Documentation Discharge Summary, OT Eval (Group)  -CH  --          Discharge Summary, OT Eval    Reason for Discharge (OT Discharge Summary) no further needs identified  -CH  --           Living Environment    Home Accessibility tub/shower is not walk in  -CH  --         User Key  (r) = Recorded By, (t) = Taken By, (c) = Cosigned By    Initials Name Effective Dates    CH Yesica K Gerard, OTR/L 08/02/16 -               OT Recommendation and Plan  Outcome Summary/Treatment Plan (OT)  Anticipated Discharge Disposition (OT): home with home health  Reason for Discharge (OT Discharge Summary): no further needs identified  Therapy Frequency (OT Eval): evaluation only  Plan of Care Review  Plan of Care Reviewed With: patient  Plan of Care Reviewed With: patient  Outcome Summary: OT eval completed.  Pt. performed all fxl mobility & ADLs at Mod I.  SHe took rest breaks PRN & her SATs remained WNL of supplemental O2.  She has no further OT tx needs at this time & reports confidence in her abilities.  Rec HH upon DC.               Outcome Measures     Row Name 05/09/18 0836 05/09/18 0818          How much help from another person do you currently need...    Turning from your back to your side while in flat bed without using bedrails?  -- 4  -DELICIA     Moving from lying on back to sitting on the side of a flat bed without bedrails?  -- 4  -DELICIA     Moving to and from a bed to a chair (including a wheelchair)?  -- 4  -DELICIA     Standing up from a chair using your arms (e.g., wheelchair, bedside chair)?  -- 4  -DELICIA     Climbing 3-5 steps with a railing?  -- 3  -DELICIA     To walk in hospital room?  -- 4  -DELICIA     AM-PAC 6 Clicks Score  -- 23  -DELICIA        How much help from another is currently needed...    Putting on and taking off regular lower body clothing? 4  -CH  --     Bathing (including washing, rinsing, and drying) 3  -CH  --     Toileting (which includes using toilet bed pan or urinal) 4  -CH  --     Putting on and taking off regular upper body clothing 4  -CH  --     Taking care of personal grooming (such as brushing teeth) 4  -CH  --     Eating meals 4  -CH  --     Score 23  -CH  --        Functional Assessment     Outcome Measure Options AM-PAC 6 Clicks Daily Activity (OT)  -CH AM-PAC 6 Clicks Basic Mobility (PT)  -DELICIA       User Key  (r) = Recorded By, (t) = Taken By, (c) = Cosigned By    Initials Name Provider Type     Yesica Gee, OTR/L Occupational Therapist    DELICIA Do, PT DPT Physical Therapist          Time Calculation:         Time Calculation- OT     Row Name 05/09/18 0836             Time Calculation- OT    OT Start Time 0836  -CH      OT Stop Time 0930  -CH      OT Time Calculation (min) 54 min  -      OT Received On 05/09/18  -        User Key  (r) = Recorded By, (t) = Taken By, (c) = Cosigned By    Initials Name Provider Type     Yesica Gee OTR/L Occupational Therapist          Therapy Charges for Today     Code Description Service Date Service Provider Modifiers Qty    93948988444 HC OT SELFCARE CURRENT 5/9/2018 Yesica Gee, OTR/L GO, CI 1    92818650223 HC OT SELFCARE PROJECTED 5/9/2018 Yesica Gee OTR/L GO, CI 1    07743379538 HC OT SELFCARE DISCHARGE 5/9/2018 Yesica Gee, OTR/L GO, CI 1    12502406929 HC OT EVAL MOD COMPLEXITY 4 5/9/2018 Yesica Gee OTR/L GO, KX 1          OT G-codes  OT Professional Judgement Used?: Yes  OT Functional Scales Options: AM-PAC 6 Clicks Daily Activity (OT)  Score: 23  Functional Limitation: Self care  Self Care Current Status (): At least 1 percent but less than 20 percent impaired, limited or restricted  Self Care Goal Status (): At least 1 percent but less than 20 percent impaired, limited or restricted  Self Care Discharge Status (): At least 1 percent but less than 20 percent impaired, limited or restricted    OT Discharge Summary  Anticipated Discharge Disposition (OT): home with home health  Reason for Discharge: At baseline function  Outcomes Achieved: Refer to plan of care for updates on goals achieved  Discharge Destination: Home, Home with assist, Home with home health    Yesica Gee OTR/L  5/9/2018

## 2018-05-09 NOTE — THERAPY DISCHARGE NOTE
Acute Care - Physical Therapy Initial Eval/Discharge  [facility]     Patient Name: Vianca Spencer  : 1975  MRN: [id number]  Today's Date: 2018   Onset of Illness/Injury or Date of Surgery: 18  Date of Referral to PT: 18  Referring Physician: Dr. Hernandez      Admit Date: 2018    Visit Dx:    ICD-10-CM ICD-9-CM   1. Respiratory distress R06.00 786.09   2. Bilateral pulmonary infiltrates on chest x-ray R91.8 793.19   3. Leukocytosis, unspecified type D72.829 288.60   4. Tachypnea R06.82 786.06     Patient Active Problem List   Diagnosis   • Fe deficiency anemia   • Anemia   • Anxiety   • Biphasic positive airway pressure dependence   • Chronic pain   • Chronic obstructive pulmonary disease   • Depression   • Gastroesophageal reflux disease   • Bariatric surgery status   • Hypercoagulable state   • Opioid dependence   • Obstructive sleep apnea syndrome   • Seizure disorder   • Coagulation disorder   • BiPAP (biphasic positive airway pressure) dependence   • Bipolar disorder   • COPD (chronic obstructive pulmonary disease)   • Essential tremor   • GERD (gastroesophageal reflux disease)   • History of Stefanie-en-Y gastric bypass   • Hypercoagulopathy   • Incisional hernia   • 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, intract, 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   • Morbid obesity due to excess calories   • Narcotic dependence   • Obstructive sleep apnea   • Warfarin-induced coagulopathy   • Multiple falls   • Upper GI bleed   • Pneumonia of right lung due to infectious organism   • Respiratory distress     Past Medical History:   Diagnosis Date   • Acute retention of urine    • Anxiety and depression    • Bipolar 1 disorder    • COPD (chronic obstructive pulmonary disease)    • DVT (deep venous thrombosis)    • GERD (gastroesophageal reflux disease)    • Hypoglycemia    • Insomnia    • PTSD (post-traumatic stress disorder)      Past Surgical History:   Procedure Laterality Date   • ABDOMINOPLASTY     •  APPENDECTOMY     • BRONCHOSCOPY N/A 2/2/2018    Procedure: BRONCHOSCOPY AT BEDSIDE;  Surgeon: Earnest Ba MD;  Location: [facility] ENDOSCOPY;  Service:    • CHOLECYSTECTOMY     • ENDOSCOPY N/A 1/29/2018    Procedure: ESOPHAGOGASTRODUODENOSCOPY WITH ANESTHESIA;  Surgeon: Aime Reyna MD;  Location: [facility] ENDOSCOPY;  Service:    • GASTRIC BYPASS     • HYSTERECTOMY            PT ASSESSMENT (last 12 hours)      Physical Therapy Evaluation     Row Name 05/09/18 0818          PT Evaluation Time/Intention    Subjective Information complains of;dyspnea;weakness;fatigue;dizziness;pain;numbness  -DELICIA     Document Type evaluation  -DELICIA     Mode of Treatment physical therapy  -DELICIA     Row Name 05/09/18 0818          General Information    Patient Profile Reviewed? yes  -DELICIA     Onset of Illness/Injury or Date of Surgery 05/06/18  -DELICIA     Referring Physician Dr. Hernandez  -DELICIA     Patient Observations alert;cooperative;agree to therapy  -DELICIA     General Observations of Patient fowlers in bed, 8L/nc  -DELICIA     Prior Level of Function independent:;all household mobility   uses rwx in home, wc comm mobility  -DELICIA     Equipment Currently Used at Home walker, rolling;wheelchair  -DELICIA     Pertinent History of Current Functional Problem CC: increased SOB, cough, chest pain. Dx: Acute on chronic resp. failure due to COPD & CHF, PNA, severe changes of interstitial fibrosis B lungs.   -DELICIA     Existing Precautions/Restrictions fall  -DELICIA     Risks Reviewed patient:;LOB;nausea/vomiting;dizziness;increased discomfort;change in vital signs  -DELICIA     Benefits Reviewed patient:;improve function;increase independence;increase strength;increase balance;decrease pain;increase knowledge;improve skin integrity  -DELICIA     Barriers to Rehab medically complex  -DELICIA     Row Name 05/09/18 0818          Relationship/Environment    Lives With child(laura), adult;child(laura), dependent  -DELICIA     Row Name 05/09/18 0818          Resource/Environmental Concerns     Current Living Arrangements home/apartment/condo  -DELICIA     Row Name 05/09/18 0818          Cognitive Assessment/Interventions    Additional Documentation Cognitive Assessment/Intervention (Group)  -DELICIA     Row Name 05/09/18 0818          Cognitive Assessment/Intervention- PT/OT    Orientation Status (Cognition) oriented x 4  -DELICIA     Follows Commands (Cognition) WFL  -DELICIA     Personal Safety Interventions fall prevention program maintained;nonskid shoes/slippers when out of bed;supervised activity  -DELICIA     Row Name 05/09/18 0818          Bed Mobility Assessment/Treatment    Bed Mobility Assessment/Treatment supine-sit-supine  -DELICIA     Supine-Sit-Supine [place] (Bed Mobility) independent  -DELICIA     Assistive Device (Bed Mobility) head of bed elevated  -DELICIA     Row Name 05/09/18 0818          Transfer Assessment/Treatment    Transfer Assessment/Treatment sit-stand transfer;stand-sit transfer  -DELICIA     Sit-Stand [place] (Transfers) conditional independence  -DELICIA     Stand-Sit [place] (Transfers) conditional independence  -DELICIA     Row Name 05/09/18 0818          Sit-Stand Transfer    Assistive Device (Sit-Stand Transfers) walker, front-wheeled  -DELICIA     Row Name 05/09/18 0818          Stand-Sit Transfer    Assistive Device (Stand-Sit Transfers) walker, front-wheeled  -DELICIA     Row Name 05/09/18 0818          Gait/Stairs Assessment/Training    Gait/Stairs Assessment/Training gait/ambulation independence;gait/ambulation assistive device;distance ambulated;gait pattern;gait deviations  -DELICIA     [place] Level (Gait) conditional independence  -DELICIA     Assistive Device (Gait) walker, front-wheeled  -DELICIA     Distance in Feet (Gait) 200  -DELICIA     Pattern (Gait) swing-through  -DELICIA     Deviations/Abnormal Patterns (Gait) bilateral deviations;boubacar decreased  -DELICIA     Row Name 05/09/18 0818          General ROM    GENERAL ROM COMMENTS B LE AROM WFL  -DELICIA     Row Name 05/09/18 0818          General Assessment (Manual Muscle  Testing)    Comment, General Manual Muscle Testing (MMT) Assessment B LE 5/5  -DELICIA Guo Name 05/09/18 0818          Motor Assessment/Intervention    Additional Documentation Balance (Group)  -DELICIA Guo Name 05/09/18 0818          Balance    Balance static sitting balance;static standing balance  -DELICIA Guo Name 05/09/18 0818          Static Sitting Balance    Level of [place] (Unsupported Sitting, Static Balance) independent  -DELICIA     Sitting Position (Unsupported Sitting, Static Balance) sitting on edge of bed  -DELICIA Guo Name 05/09/18 0818          Static Standing Balance    Level of [place] (Supported Standing, Static Balance) conditional independence  -DELICIA     Assistive Device Utilized (Supported Standing, Static Balance) rolling walker  -DELICIA Guo Name 05/09/18 0818          Pain Assessment    Additional Documentation Pain Scale: FACES Pre/Post-Treatment (Group)  -DELICIA Guo Name 05/09/18 0818          Pain Scale: FACES Pre/Post-Treatment    Pain: FACES Scale, Pretreatment 2-->hurts little bit  -DELICIA Guo Name 05/09/18 0818          Health Promotion    Additional Documentation Coping (Group);Plan of Care Review (Group)  -DELICIA Guo Name 05/09/18 0818          Coping    Observed Emotional State accepting;cooperative  -DELICIA     Row Name 05/09/18 0818          Plan of Care Review    Plan of Care Reviewed With patient  -DELICIA     Row Name 05/09/18 0818          Physical Therapy Clinical Impression    Date of Referral to PT 05/08/18  -DELICIA     Patient/Family Goals Statement (PT Clinical Impression) go home with family  -DELICIA     Criteria for Skilled Interventions Met (PT Clinical Impression) no;current level of function same as previous level of function  -DELICIA     Care Plan Review (PT) evaluation/treatment results reviewed;risks/benefits reviewed;current/potential barriers reviewed;patient/other agree to care plan  -DELICIA Guo Name 05/09/18 0818          Vital Signs    Pretreatment Heart Rate (beats/min) 75   -DELICIA     Intratreatment Heart Rate (beats/min) 100  -DELICIA     Pre SpO2 (%) 97  -DELICIA     O2 Delivery Pre Treatment supplemental O2   8L/NC  -DELICIA     Intra SpO2 (%) 98  -DELICIA     O2 Delivery Intra Treatment supplemental O2  -DELICIA     Post SpO2 (%) 93  -DELICIA     O2 Delivery Post Treatment supplemental O2  -DELICIA     Pre Patient Position Supine  -DELICIA     Intra Patient Position Standing  -DELICIA     Post Patient Position Supine  -DELICIA     Row Name 05/09/18 0818          Positioning and Restraints    Pre-Treatment Position in bed  -DELICIA     Post Treatment Position bed  -DELICIA     In Bed notified nsg;fowlers;call light within reach;encouraged to call for assist;patient within staff view  -DELICIA     Row Name 05/09/18 0818          Physical Therapy Discharge Summary    Additional Documentation Discharge Summary, PT Eval (Group)  -DELICIA     Row Name 05/09/18 0818          Discharge Summary, PT Eval    Reason for Discharge (PT Discharge Summary) no further needs identified  -DELICIA     Outcomes Achieved Upon Discharge (PT Discharge Summary) evaluation only  -DELICIA     Row Name 05/09/18 0818          Living Environment    Home Accessibility tub/shower is not walk in  -DELICIA       User Key  (r) = Recorded By, (t) = Taken By, (c) = Cosigned By    Initials Name Provider Type    DELICIA Do, PT DPT Physical Therapist          Physical Therapy Education     Title: PT OT SLP Therapies (Resolved)     Topic: Physical Therapy (Resolved)     Point: Mobility training (Resolved)    Learning Progress Summary     Learner Status Readiness Method Response Comment Documented by    Patient Done JEFFREY Banks Educated pt. on benefits of activity, 1 time eval, gait safety. DELICIA 05/09/18 0818                      User Key     Initials Effective Dates Name Provider Type Discipline    DELICIA 08/02/16 -  Diaz Do, PT DPT Physical Therapist PT                PT Recommendation and Plan  Anticipated Discharge Disposition (PT): home or self care  Therapy Frequency (PT Clinical  Impression): evaluation only  Outcome Summary/Treatment Plan (PT)  Anticipated Discharge Disposition (PT): home or self care  Reason for Discharge (PT Discharge Summary): no further needs identified  Plan of Care Reviewed With: patient  Outcome Summary: PT kaleigh completed. She was independent with bed mobility, t/f and gait with RW ~ 200 ft. She remained on 8L/NC and O2 sat stayed WNL with all activity. Pt. has no further PT needs, questions, or concerns as she is currently at her baseline mobility. PT to sign off, anticipate d/c home with family.           Outcome Measures     Row Name 05/09/18 0818             How much help from another person do you currently need...    Turning from your back to your side while in flat bed without using bedrails? 4  -DELICIA      Moving from lying on back to sitting on the side of a flat bed without bedrails? 4  -DELICIA      Moving to and from a bed to a chair (including a wheelchair)? 4  -DELICIA      Standing up from a chair using your arms (e.g., wheelchair, bedside chair)? 4  -DELICIA      Climbing 3-5 steps with a railing? 3  -DELICIA      To walk in hospital room? 4  -DELICIA      AM-PAC 6 Clicks Score 23  -DELICIA         Functional Assessment    Outcome Measure Options AM-PAC 6 Clicks Basic Mobility (PT)  -DELICIA        User Key  (r) = Recorded By, (t) = Taken By, (c) = Cosigned By    Initials Name Provider Type    DELICIA Do, SANDHYA DPT Physical Therapist           Time Calculation:         PT Charges     Row Name 05/09/18 1002             Time Calculation    Start Time 0818  -DELICIA      Stop Time 0922  -DELICIA      Time Calculation (min) 64 min  -DELICIA      PT Received On 05/09/18  -DELICIA        User Key  (r) = Recorded By, (t) = Taken By, (c) = Cosigned By    Initials Name Provider Type    DELICIA Do PT DPT Physical Therapist          Therapy Charges for Today     Code Description Service Date Service Provider Modifiers Qty    19464737215  PT MOBILITY CURRENT 5/9/2018 Diaz Do PT DPT GP, CI 1     [id number] HC PT MOBILITY PROJECTED 5/9/2018 Diaz Do, PT DPT GP, CI 1    18516132948 HC PT MOBILITY DISCHARGE 5/9/2018 Diaz Do, PT DPT GP, CI 1    [id number] HC PT EVAL LOW COMPLEXITY 4 5/9/2018 Diaz Do, PT DPT GP 1          PT G-Codes  Outcome Measure Options: AM-PAC 6 Clicks Basic Mobility (PT)  Score: 23  Functional Limitation: Mobility: Walking and moving around  Mobility: Walking and Moving Around Current Status (): At least 1 percent but less than 20 percent impaired, limited or restricted  Mobility: Walking and Moving Around Goal Status (): At least 1 percent but less than 20 percent impaired, limited or restricted  Mobility: Walking and Moving Around Discharge Status (): At least 1 percent but less than 20 percent impaired, limited or restricted    PT Discharge Summary  Anticipated Discharge Disposition (PT): home or self care    Diaz Do, PT DPT  5/9/2018

## 2018-05-09 NOTE — PLAN OF CARE
Problem: Patient Care Overview  Goal: Plan of Care Review  Outcome: Ongoing (interventions implemented as appropriate)   05/09/18 0726   Coping/Psychosocial   Plan of Care Reviewed With patient   Coping/Psychosocial   Patient Agreement with Plan of Care agrees   OTHER   Outcome Summary pt is up ad aris in room. WIll go for thoracotomy this afternoon. open lung biospy for worsening interstitial fibrosis.    Plan of Care Review   Progress improving       Problem: ARDS (Acute Resp Distress Syndrome) (Adult)  Goal: Signs and Symptoms of Listed Potential Problems Will be Absent, Minimized or Managed (ARDS)  Outcome: Ongoing (interventions implemented as appropriate)      Problem: Pneumonia (Adult)  Goal: Signs and Symptoms of Listed Potential Problems Will be Absent, Minimized or Managed (Pneumonia)  Outcome: Ongoing (interventions implemented as appropriate)

## 2018-05-09 NOTE — ANESTHESIA PREPROCEDURE EVALUATION
Anesthesia Evaluation     Patient summary reviewed   no history of anesthetic complications:  NPO Solid Status: > 6 hours  NPO Liquid Status: > 6 hours           Airway   Mallampati: II  Neck ROM: full  No difficulty expected  Dental    (+) edentulous    Pulmonary    (+) COPD, shortness of breath, sleep apnea,     ROS comment: Concern for interstitial lung disease  Cardiovascular   Exercise tolerance: poor (<4 METS)    ECG reviewed  PT is on anticoagulation therapy    (+) DVT,   (-) hypertension, cardiac stents      Neuro/Psych  (+) seizures (hypoglycemia), psychiatric history Bipolar,     (-) TIA, CVA  GI/Hepatic/Renal/Endo    (+) obesity,  GERD,  diabetes mellitus (no meds since gastric bypass), hypothyroidism,     ROS Comment: S/p gastric bypass    Musculoskeletal (-) negative ROS    Abdominal    Substance History      OB/GYN          Other - negative ROS                     Anesthesia Plan    ASA 3     general     intravenous induction   Anesthetic plan and risks discussed with patient.

## 2018-05-09 NOTE — PROGRESS NOTES
Continued Stay Note   Len     Patient Name: Vianca Spencer  MRN: [id number]  Today's Date: 5/9/2018    Admit Date: 5/6/2018          Discharge Plan     Row Name 05/09/18 1057       Plan    Plan LTAC referral pending    Plan Comments Patient having surgery/biopsy today.  Will follow for needs post op.              Discharge Codes    No documentation.           SIDRA Gracia

## 2018-05-09 NOTE — ANESTHESIA PROCEDURE NOTES
Airway  Airway not difficult    General Information and Staff    Patient location during procedure: OR  CRNA: YASMEEN MARES    Indications and Patient Condition  Indications for airway management: airway protection    Preoxygenated: yes  Mask difficulty assessment: 1 - vent by mask    Final Airway Details  Final airway type: endotracheal airway      Successful airway: ETT and ETT - double lumen left  Cuffed: yes   Successful intubation technique: direct laryngoscopy  Blade: Marianne  Blade size: #3  ETT DL size: 37 fr  Cormack-Lehane Classification: grade I - full view of glottis  Placement verified by: chest auscultation, bronchoscopy, capnometry and single lung ventilation   Number of attempts at approach: 1

## 2018-05-09 NOTE — CONSULTS
"Referring Provider: Dr. Alan Hernandez hospitalist service/Dr. Earnest Ba pulmonology service  Reason for Consultation: Request for open lung biopsy    Patient Care Team:  Neel Valencia Jr., MD as PCP - General (Family Medicine)  Devonte Amaya MD as Consulting Physician (Urology)    Chief Complaint:  Shortness of breath    Subjective .     History of Present illness:  Mrs. Vianca Spencer is a 42-year-old  female with a long history of lung disease.  She was diagnosed with emphysema approximately 3 years ago and has had hospitalizations for pneumonia in the recent past.  She has had a bronchoscopy by Dr. Ba in the past.  She is intubated on her last admission here and went home on home oxygen.  He now has a complaint of increasing dyspnea for the last 3 days.  She presented to the [facility] emergency department where she was hospitalized for respiratory failure and was taken to the intensive care unit  and treated with high flow oxygen.  Ulnar consult was obtained and the assessment was COPD, pulmonary hypertension, pulmonary infiltrate with probable interstitial lung disease.  Dr. Ba thought the best way to a tentative diagnosis was open lung biopsy and the cardiothoracic surgical service was consulted for this.    Despite her lung problems she has been a long-term smoker until approximately 2 months ago at her last hospitalization when she quit.  However she still states that she still \"holds a cigarette or 2 a day\".  She has bipolar disorder and multiple medical problems including deep venous thrombosis history and Coumadin treatment.  She has a history of morbid obesity status post gastric bypass surgery.  She states that this procedure had complications and she has not been \"right\" with her GI tract since that time.  She denies heart disease but she has been diagnosed with pulmonary hypertension.  She is still obese with difficulty with ambulation and has generalized weakness " throughout.    History:  Past Medical History:   Diagnosis Date   • Acute retention of urine    • Anxiety and depression    • Bipolar 1 disorder    • COPD (chronic obstructive pulmonary disease)    • DVT (deep venous thrombosis)    • GERD (gastroesophageal reflux disease)    • Hypoglycemia    • Insomnia    • PTSD (post-traumatic stress disorder)    ,   Past Surgical History:   Procedure Laterality Date   • ABDOMINOPLASTY     • APPENDECTOMY     • BRONCHOSCOPY N/A 2/2/2018    Procedure: BRONCHOSCOPY AT BEDSIDE;  Surgeon: Earnest Ba MD;  Location: [facility] ENDOSCOPY;  Service:    • CHOLECYSTECTOMY     • ENDOSCOPY N/A 1/29/2018    Procedure: ESOPHAGOGASTRODUODENOSCOPY WITH ANESTHESIA;  Surgeon: Aime Reyna MD;  Location: [facility] ENDOSCOPY;  Service:    • GASTRIC BYPASS     • HYSTERECTOMY     ,   Family History   Problem Relation Age of Onset   • Cancer Mother    • Cancer Father    • HIV Brother    ,  Social History   Substance Use Topics   • Smoking status: Current Every Day Smoker     Packs/day: 0.25     Types: Cigarettes   • Smokeless tobacco: Never Used   • Alcohol use No   ,  Prescriptions Prior to Admission   Medication Sig Dispense Refill Last Dose   • diazePAM (VALIUM) 5 MG tablet Take 5 mg by mouth 3 (Three) Times a Day.   Past Week at Unknown time   • FLUoxetine (PROzac) 20 MG capsule Take 60 mg by mouth Daily.   Past Week at Unknown time   • gabapentin (NEURONTIN) 300 MG capsule Take 300 mg by mouth 2 (Two) Times a Day.   Past Week at Unknown time   • gabapentin (NEURONTIN) 300 MG capsule Take 600 mg by mouth Every Night.   Past Week at Unknown time   • HYDROcodone-acetaminophen (NORCO)  MG per tablet Take 1 tablet by mouth 3 (Three) Times a Day.   Past Week at Unknown time   • levothyroxine (SYNTHROID, LEVOTHROID) 112 MCG tablet Take 112 mcg by mouth Daily.   Past Week at Unknown time   • lithium carbonate 300 MG capsule Take 300 mg by mouth Every Night.   Past Week at Unknown time   •  omeprazole (priLOSEC) 20 MG capsule Take 20 mg by mouth Daily.   Past Week at Unknown time   • pantoprazole (PROTONIX) 40 MG EC tablet Take 40 mg by mouth Daily.   Past Week at Unknown time   • PROAIR  (90 BASE) MCG/ACT inhaler Inhale 2 puffs Every 4 (Four) Hours As Needed for Wheezing or Shortness of Air.  2 Past Month at Unknown time   • warfarin (COUMADIN) 5 MG tablet Take 5 mg by mouth 3 (Three) Times a Week. Monday, Wednesday and Friday.   Past Week at Unknown time   • warfarin (COUMADIN) 7.5 MG tablet Take 7.5 mg by mouth 4 (Four) Times a Week. Tuesday; Thursday; Saturday; Sunday   Past Week at Unknown time   • zolpidem (AMBIEN) 10 MG tablet Take 10 mg by mouth Every Night.   Past Week at Unknown time   • ipratropium-albuterol (DUO-NEB) 0.5-2.5 mg/mL nebulizer Take 3 mL by nebulization 4 (Four) Times a Day. 360 mL 0 Unknown at Unknown time   • promethazine (PHENERGAN) 25 MG tablet Take 25 mg by mouth 2 (Two) Times a Day As Needed for Nausea or Vomiting.   Unknown at Unknown time   ,  Allergies: Morphine; Aspirin; Morphine and related; Nsaids; Quetiapine; Quetiapine fumarate; Salicylates; and Codeine      Review of Systems  negative except for that listed in present illness         Objective     Vital Signs:   Temp:  [98.2 °F (36.8 °C)] 98.2 °F (36.8 °C)  Heart Rate:  [63-83] 70  Resp:  [14-22] 22  BP: ()/(55-98) 129/91    Physical Exam:  General:chronically ill-appearing 42-year-old  female with nasal cannula O2 but no acute respiratory distress at present time  Pupils equal round and react to light. EOMs are intact.  ENT: Nose, mouth,throat are benign. Neck is supple without thyromegaly, mass or bruit.  Chdecreased breath sounds bilaterally with some wheezing bilaterally.   Heart:  A regular rate and rhythm without murmur, gallop or rub. (normal sinus rhythm on monitor)   Abdomen:   Beese Soft, nontender without rebound guarding or mass  Extremities:  Without clubbing cyanosis or  edema.  Neurologic: Alert and oriented.Motor and sensation are grossly intact    LAB:   CBC:  Results from last 7 days  Lab Units 05/09/18  0703 05/07/18  0257 05/06/18  1828   WBC 10*3/mm3 13.42* 10.30 13.30*   HEMATOCRIT % 39.9 39.7 37.5   PLATELETS 10*3/mm3 283 245 223        BMP:  Results from last 7 days  Lab Units 05/09/18  0703 05/07/18  0257 05/06/18  1828   SODIUM mmol/L 142 142 142   POTASSIUM mmol/L 4.2 3.9 4.2   CHLORIDE mmol/L 101 100 103   CO2 mmol/L 27.0 32.0* 29.0   GLUCOSE mg/dL 222* 160* 126*   BUN mg/dL 14 11 9   CREATININE mg/dL 0.68 0.66 0.65        COAG:  Results from last 7 days  Lab Units 05/09/18  0703   INR  1.62*           IMAGES:      Imaging Results (last 24 hours)     Procedure Component Value Units Date/Time    CT Chest Hi Resolution [[id number]] Collected:  05/08/18 1739     Updated:  05/08/18 1744    Narrative:       CT CHEST HI RESOLUTION- 5/8/2018 5:16 PM CDT     HISTORY: Respiratory failure, not elsewhere classified; R06.00-Dyspnea,  unspecified; R91.8-Other nonspecific abnormal finding of lung field;  D72.829-Elevated white blood cell count, unspecified; R06.82-Tachypnea,  not elsewhere classified     COMPARISON: January 1, 2016     DOSE LENGTH PRODUCT: 562 mGy cm. Automated exposure control was also  utilized to decrease patient radiation dose.     TECHNIQUE: High-resolution CT of the chest was performed without  contrast.     FINDINGS:  Neck base: The imaged portion of the neck and thyroid gland is  unremarkable.      Lungs: Severe interstitial thickening and interstitial fibrotic changes  noted diffusely throughout the right and left lung.. No nodules are  seen. There is no focal consolidation or pleural effusion. The trachea  and bronchial tree are patent. No bronchiectasis.     Heart and mediastinum: The heart, great vessels, and pulmonary vessels  are normal in appearance within limits of a noncontrast study. There is  no pericardial effusion. No enlarged axillary or  mediastinal lymph nodes  are present.      Bones and soft tissues: No acute findings are seen in the bones or  surrounding soft tissues.     Upper abdomen: Postsurgical change from gastric surgery is noted.       Impression:       1. Severe changes of interstitial fibrosis are present throughout the  right and left lung. This may have progressed when compared to prior  study January 1, 2016.        This report was finalized on 05/08/2018 17:40 by Dr. Zach Pham MD.          ASSESSMENT: 42-year-old  female with multiple medical problems including acute respiratory failure.  CXR and CT scan show emphysematous changes and changes of pulmonary fibrosis.  Dr. Ba favors cystoscopy lung disease and wishes for a open lung biopsy.    I have reviewed her films and hospital records.  I examined her and talked with her at length.  We discussed open lung biopsy (we will try VATS if she can stand one lung anesthesia).  We discussed the procedure, its indications, options and possible complications.  She understands and wishes to proceed as soon as possible.  Although she did eat some breakfast this morning I discussed with anesthesia and they would be willing to place her under general anesthesia approximately 3 PM this afternoon.  Her INR is 1.62 and I will transfer a unit of fresh frozen plasma.  Plan on as a VATS thoracotomy and open lung biopsy today.        Plan: As above      Case Moctezuma MD  05/09/18  8:52 AM

## 2018-05-09 NOTE — PLAN OF CARE
Problem: Patient Care Overview  Goal: Plan of Care Review  Outcome: Ongoing (interventions implemented as appropriate)   05/09/18 0818   Coping/Psychosocial   Plan of Care Reviewed With patient   OTHER   Outcome Summary PT eval completed. She was independent with bed mobility, t/f and gait with RW ~ 200 ft. She remained on 8L/NC and O2 sat stayed WNL with all activity. Pt. has no further PT needs, questions, or concerns as she is currently at her baseline mobility. PT to sign off, anticipate d/c home with family.

## 2018-05-10 ENCOUNTER — APPOINTMENT (OUTPATIENT)
Dept: GENERAL RADIOLOGY | Facility: HOSPITAL | Age: 43
End: 2018-05-10

## 2018-05-10 LAB
ABO + RH BLD: NORMAL
ANION GAP SERPL CALCULATED.3IONS-SCNC: 9 MMOL/L (ref 4–13)
BH BB BLOOD EXPIRATION DATE: NORMAL
BH BB BLOOD TYPE BARCODE: 9500
BH BB DISPENSE STATUS: NORMAL
BH BB PRODUCT CODE: NORMAL
BH BB UNIT NUMBER: NORMAL
BUN BLD-MCNC: 16 MG/DL (ref 5–21)
BUN/CREAT SERPL: 20.3 (ref 7–25)
CALCIUM SPEC-SCNC: 9.4 MG/DL (ref 8.4–10.4)
CHLORIDE SERPL-SCNC: 100 MMOL/L (ref 98–110)
CO2 SERPL-SCNC: 32 MMOL/L (ref 24–31)
CREAT BLD-MCNC: 0.79 MG/DL (ref 0.5–1.4)
DEPRECATED RDW RBC AUTO: 46.2 FL (ref 40–54)
ERYTHROCYTE [DISTWIDTH] IN BLOOD BY AUTOMATED COUNT: 13.6 % (ref 12–15)
GFR SERPL CREATININE-BSD FRML MDRD: 79 ML/MIN/1.73
GLUCOSE BLD-MCNC: 156 MG/DL (ref 70–100)
HCT VFR BLD AUTO: 37.7 % (ref 37–47)
HGB BLD-MCNC: 11.9 G/DL (ref 12–16)
INR PPP: 1.72 (ref 0.91–1.09)
MCH RBC QN AUTO: 29.2 PG (ref 28–32)
MCHC RBC AUTO-ENTMCNC: 31.6 G/DL (ref 33–36)
MCV RBC AUTO: 92.4 FL (ref 82–98)
PLATELET # BLD AUTO: 289 10*3/MM3 (ref 130–400)
PMV BLD AUTO: 10.1 FL (ref 6–12)
POTASSIUM BLD-SCNC: 4.4 MMOL/L (ref 3.5–5.3)
PROTHROMBIN TIME: 20.8 SECONDS (ref 11.9–14.6)
RBC # BLD AUTO: 4.08 10*6/MM3 (ref 4.2–5.4)
SODIUM BLD-SCNC: 141 MMOL/L (ref 135–145)
UNIT  ABO: NORMAL
UNIT  RH: NORMAL
WBC NRBC COR # BLD: 14.11 10*3/MM3 (ref 4.8–10.8)

## 2018-05-10 PROCEDURE — 25010000002 LEVOFLOXACIN PER 250 MG: Performed by: INTERNAL MEDICINE

## 2018-05-10 PROCEDURE — 71045 X-RAY EXAM CHEST 1 VIEW: CPT

## 2018-05-10 PROCEDURE — 25010000002 METHYLPREDNISOLONE PER 40 MG: Performed by: INTERNAL MEDICINE

## 2018-05-10 PROCEDURE — 94660 CPAP INITIATION&MGMT: CPT

## 2018-05-10 PROCEDURE — 94799 UNLISTED PULMONARY SVC/PX: CPT

## 2018-05-10 PROCEDURE — 25010000002 CEFEPIME PER 500 MG: Performed by: INTERNAL MEDICINE

## 2018-05-10 PROCEDURE — 25010000002 METHYLPREDNISOLONE PER 40 MG: Performed by: NURSE PRACTITIONER

## 2018-05-10 PROCEDURE — 63710000001 PROMETHAZINE PER 25 MG: Performed by: INTERNAL MEDICINE

## 2018-05-10 PROCEDURE — 85027 COMPLETE CBC AUTOMATED: CPT | Performed by: THORACIC SURGERY (CARDIOTHORACIC VASCULAR SURGERY)

## 2018-05-10 PROCEDURE — 99024 POSTOP FOLLOW-UP VISIT: CPT | Performed by: THORACIC SURGERY (CARDIOTHORACIC VASCULAR SURGERY)

## 2018-05-10 PROCEDURE — 80048 BASIC METABOLIC PNL TOTAL CA: CPT | Performed by: THORACIC SURGERY (CARDIOTHORACIC VASCULAR SURGERY)

## 2018-05-10 PROCEDURE — 85610 PROTHROMBIN TIME: CPT | Performed by: INTERNAL MEDICINE

## 2018-05-10 RX ORDER — METHYLPREDNISOLONE SODIUM SUCCINATE 40 MG/ML
40 INJECTION, POWDER, LYOPHILIZED, FOR SOLUTION INTRAMUSCULAR; INTRAVENOUS EVERY 12 HOURS
Status: DISCONTINUED | OUTPATIENT
Start: 2018-05-10 | End: 2018-05-11

## 2018-05-10 RX ORDER — METHYLPREDNISOLONE SODIUM SUCCINATE 40 MG/ML
40 INJECTION, POWDER, LYOPHILIZED, FOR SOLUTION INTRAMUSCULAR; INTRAVENOUS EVERY 8 HOURS
Status: DISCONTINUED | OUTPATIENT
Start: 2018-05-10 | End: 2018-05-10

## 2018-05-10 RX ORDER — WARFARIN SODIUM 5 MG/1
5 TABLET ORAL
Status: DISCONTINUED | OUTPATIENT
Start: 2018-05-10 | End: 2018-05-13 | Stop reason: HOSPADM

## 2018-05-10 RX ADMIN — GABAPENTIN 300 MG: 300 CAPSULE ORAL at 13:29

## 2018-05-10 RX ADMIN — HYDROCODONE BITARTRATE AND ACETAMINOPHEN 1 TABLET: 10; 325 TABLET ORAL at 02:07

## 2018-05-10 RX ADMIN — ALBUTEROL SULFATE 1.25 MG: 1.25 SOLUTION RESPIRATORY (INHALATION) at 20:25

## 2018-05-10 RX ADMIN — ALBUTEROL SULFATE 1.25 MG: 1.25 SOLUTION RESPIRATORY (INHALATION) at 07:32

## 2018-05-10 RX ADMIN — HYDROCODONE BITARTRATE AND ACETAMINOPHEN 1 TABLET: 10; 325 TABLET ORAL at 21:25

## 2018-05-10 RX ADMIN — HYDROCODONE BITARTRATE AND ACETAMINOPHEN 1 TABLET: 10; 325 TABLET ORAL at 08:23

## 2018-05-10 RX ADMIN — CEFEPIME HYDROCHLORIDE 1 G: 1 INJECTION, POWDER, FOR SOLUTION INTRAMUSCULAR; INTRAVENOUS at 08:23

## 2018-05-10 RX ADMIN — DIAZEPAM 5 MG: 5 TABLET ORAL at 16:41

## 2018-05-10 RX ADMIN — ALBUTEROL SULFATE 1.25 MG: 1.25 SOLUTION RESPIRATORY (INHALATION) at 15:42

## 2018-05-10 RX ADMIN — ZOLPIDEM TARTRATE 10 MG: 5 TABLET, COATED ORAL at 21:25

## 2018-05-10 RX ADMIN — PANTOPRAZOLE SODIUM 40 MG: 40 TABLET, DELAYED RELEASE ORAL at 06:54

## 2018-05-10 RX ADMIN — GUAIFENESIN 1200 MG: 600 TABLET, EXTENDED RELEASE ORAL at 21:24

## 2018-05-10 RX ADMIN — PROMETHAZINE HYDROCHLORIDE 12.5 MG: 25 TABLET ORAL at 21:25

## 2018-05-10 RX ADMIN — ALBUTEROL SULFATE 1.25 MG: 1.25 SOLUTION RESPIRATORY (INHALATION) at 10:46

## 2018-05-10 RX ADMIN — GABAPENTIN 600 MG: 300 CAPSULE ORAL at 21:24

## 2018-05-10 RX ADMIN — CEFEPIME HYDROCHLORIDE 1 G: 1 INJECTION, POWDER, FOR SOLUTION INTRAMUSCULAR; INTRAVENOUS at 00:36

## 2018-05-10 RX ADMIN — HYDROCODONE BITARTRATE AND ACETAMINOPHEN 1 TABLET: 10; 325 TABLET ORAL at 15:01

## 2018-05-10 RX ADMIN — METHYLPREDNISOLONE SODIUM SUCCINATE 40 MG: 40 INJECTION, POWDER, FOR SOLUTION INTRAMUSCULAR; INTRAVENOUS at 13:30

## 2018-05-10 RX ADMIN — WARFARIN SODIUM 5 MG: 5 TABLET ORAL at 21:24

## 2018-05-10 RX ADMIN — GUAIFENESIN 1200 MG: 600 TABLET, EXTENDED RELEASE ORAL at 08:24

## 2018-05-10 RX ADMIN — LEVOTHYROXINE SODIUM 112 MCG: 112 TABLET ORAL at 08:23

## 2018-05-10 RX ADMIN — FLUOXETINE HYDROCHLORIDE 60 MG: 20 CAPSULE ORAL at 08:23

## 2018-05-10 RX ADMIN — LEVOFLOXACIN 750 MG: 5 INJECTION, SOLUTION INTRAVENOUS at 08:23

## 2018-05-10 RX ADMIN — CEFEPIME HYDROCHLORIDE 1 G: 1 INJECTION, POWDER, FOR SOLUTION INTRAMUSCULAR; INTRAVENOUS at 15:01

## 2018-05-10 RX ADMIN — DIAZEPAM 5 MG: 5 TABLET ORAL at 08:24

## 2018-05-10 RX ADMIN — LITHIUM CARBONATE 300 MG: 300 CAPSULE, GELATIN COATED ORAL at 21:25

## 2018-05-10 RX ADMIN — GABAPENTIN 300 MG: 300 CAPSULE ORAL at 08:24

## 2018-05-10 RX ADMIN — DIAZEPAM 5 MG: 5 TABLET ORAL at 21:25

## 2018-05-10 RX ADMIN — METHYLPREDNISOLONE SODIUM SUCCINATE 40 MG: 125 INJECTION, POWDER, FOR SOLUTION INTRAMUSCULAR; INTRAVENOUS at 02:49

## 2018-05-10 NOTE — ANESTHESIA POSTPROCEDURE EVALUATION
"Patient: Vianca Spencer    Procedure Summary     Date:  05/09/18 Room / Location:   [facility]  /  [facility]    Anesthesia Start:  1523 Anesthesia Stop:  1717    Procedure:  BRONCHOSCOPY, LEFT VIDEO ASSISTED THORACOSCOPY, LEFT LOWER LOBE LUNG BIOPSY (Left Chest) Diagnosis:       Respiratory distress      (Respiratory distress [R06.00])    Surgeon:  Case Moctezuma MD Provider:  Priyank Alvarez CRNA    Anesthesia Type:  general ASA Status:  3          Anesthesia Type: general  Last vitals  BP   124/85 (05/09/18 1805)   Temp   97.6 °F (36.4 °C) (05/09/18 1805)   Pulse   70 (05/09/18 1805)   Resp   15 (05/09/18 1805)     SpO2   92 % (05/09/18 1805)     Post Anesthesia Care and Evaluation    Patient location during evaluation: PACU  Patient participation: complete - patient participated  Level of consciousness: awake and alert  Pain management: adequate  Airway patency: patent  Anesthetic complications: No anesthetic complications    Cardiovascular status: acceptable  Respiratory status: acceptable  Hydration status: acceptable    Comments: Blood pressure 124/85, pulse 70, temperature 97.6 °F (36.4 °C), temperature source Temporal Artery , resp. rate 15, height 149.9 cm (59\"), weight 86.4 kg (190 lb 6.4 oz), SpO2 92 %.    Pt discharged from PACU based on radha score >8      "

## 2018-05-10 NOTE — PROGRESS NOTES
[facility] Medicine Services  INPATIENT PROGRESS NOTE    Length of Stay: 4  Date of Admission: 5/6/2018  Primary Care Physician: Neel Valencia Jr., MD    Subjective   Chief Complaint: Shortness of breath  Shortness of Breath     Patient complaining of left sided lung pain. States that it is worse with movement. We discuss that she has to stop smoking. Patient asks family for fried chicken, and we review her dietary habits. Apparently poor compliance and poor habits. No urinary or bowel complaints.    Review of Systems   Respiratory: Positive for shortness of breath.         All pertinent negatives and positives are as above. All other systems have been reviewed and are negative unless otherwise stated.     Objective    Temp:  [96.7 °F (35.9 °C)-98.9 °F (37.2 °C)] 96.7 °F (35.9 °C)  Heart Rate:  [59-93] 65  Resp:  [14-22] 17  BP: (101-154)/() 101/75  Arterial Line BP: ()/(62-88) 125/78  Physical Exam   Constitutional: She is oriented to person, place, and time. No distress.   On high flow 02    HENT:   Head: Normocephalic.   Mouth/Throat: No oropharyngeal exudate.   Eyes: No scleral icterus.   Neck: No tracheal deviation present.   Cardiovascular: Normal rate and regular rhythm.    Pulmonary/Chest: Effort normal. No respiratory distress. She has no wheezes. She has no rales.   Abdominal: Soft.   Musculoskeletal: She exhibits no edema or deformity.   Neurological: She is alert and oriented to person, place, and time.   Skin: Skin is warm and dry. She is not diaphoretic.   Vitals reviewed.    Results Review:  I have reviewed the labs, radiology results, and diagnostic studies.    Laboratory Data:     Results from last 7 days  Lab Units 05/10/18  0248 05/09/18  0703 05/07/18  0257   WBC 10*3/mm3 14.11* 13.42* 10.30   HEMOGLOBIN g/dL 11.9* 12.8 12.9   HEMATOCRIT % 37.7 39.9 39.7   PLATELETS 10*3/mm3 289 283 245          Results from last 7 days  Lab Units  05/10/18  0248 05/09/18  1510 05/09/18  0703 05/07/18  0257 05/06/18  1828   SODIUM mmol/L 141  --  142 142 142   SODIUM, ARTERIAL mmol/L  --  143  --   --   --    POTASSIUM mmol/L 4.4  --  4.2 3.9 4.2   CHLORIDE mmol/L 100  --  101 100 103   CO2 mmol/L 32.0*  --  27.0 32.0* 29.0   BUN mg/dL 16  --  14 11 9   CREATININE mg/dL 0.79  --  0.68 0.66 0.65   CALCIUM mg/dL 9.4  --  9.7 9.4 9.2   BILIRUBIN mg/dL  --   --  0.3 0.8 0.7   ALK PHOS U/L  --   --  129* 164* 146*   ALT (SGPT) U/L  --   --  36 62* 64*   AST (SGOT) U/L  --   --  26 60* 80*   GLUCOSE mg/dL 156*  --  222* 160* 126*       Culture Data:   Blood Culture   Date Value Ref Range Status   05/06/2018 No growth at 24 hours  Preliminary   05/06/2018 No growth at 24 hours  Preliminary       Radiology Data:   Imaging Results (last 24 hours)     Procedure Component Value Units Date/Time    XR Chest 1 View [[id number]] Collected:  05/10/18 0733     Updated:  05/10/18 0738    Narrative:       EXAMINATION: XR CHEST 1 VW- 5/10/2018 7:33 AM CDT     HISTORY: Left thoracotomy and wedge resection left lower lobe;  R06.00-Dyspnea, unspecified; R91.8-Other nonspecific abnormal finding of  lung field; D72.829-Elevated white blood cell count, unspecified;  R06.82-Tachypnea, not elsewhere classified.     REPORT: Comparison is made with the chest x-ray from 5/9/2018 1737  hours.     Lungs remain hypoaerated, there are reticular interstitial opacities  from earlier within the right lung. This is stable. No pneumothorax is  identified. The left thoracostomy tube appears in good position as  before. Heart size appears normal.       Impression:       Stable 1 day appearance of the chest.  This report was finalized on 05/10/2018 07:35 by Dr. Matt Herndon MD.    XR Chest 1 View [[id number]] Collected:  05/09/18 1753     Updated:  05/09/18 1757    Narrative:       EXAMINATION:   XR CHEST 1 VW-  5/9/2018 5:53 PM CDT     HISTORY: Left thoracotomy     Frontal upright radiograph of the  chest 5/9/2018 5:36 PM CDT     COMPARISON: May 7, 2018     FINDINGS:   Left chest tube is present. Interstitial infiltrates noted in the left  lung. There is a interstitial infiltrate in the right lung. Infiltrate  trach the right lung is unchanged from May 7. This may be an  inflammatory process.. Cardiac silhouettes within normal limits..      The osseous structures and surrounding soft tissues demonstrate no acute  abnormality.       Impression:       1. Persistent infiltrate right lung unchanged from a 7.  Interstitial infiltrate in the left lung base is now present. May be an  inflammatory process. Underlying pulmonary vascular congestion cannot be  excluded.        This report was finalized on 05/09/2018 17:54 by Dr. Zach Pham MD.          I have reviewed the patient current medications.     Assessment/Plan     Hospital Problem List     Respiratory distress        Assessment:  1.  Acute on chronic hypoxemic respiratory failure  2.  Severe changes of interstitial fibrosis bilateral lungs POD #2  left VATS thoracotomy with lung biopsy  3.  Right heart enlargement with mildly reduced RVEF and mild pulmonary HTN on previous echo in February 2018; NORMAL RV size and function noted on Echo performed 5/8/2018  4.  History of DVT on chronic anticoagulation with Coumadin  5.  COPD with acute exacerbation  6.  Bipolar disorder  7.  History of pervious gastric bypass procedure  8.  GERD with h/o esophagitis and gastritis  9.  Transaminitis-improved  10.  Tobacco dependence  11.  Generalized weakness    Plan:  1.  Cefepime and Levaquin day 4  2.  Start to taper IV Solumedrol  3.  Scheduled nebs  4.  Diuretics on hold  5.  High flow 02 and will wean as tolerated  6.  Incentive spirometry  7.  Restart Coumadin and monitor INR closely (especially as she is on Levaquin)  8.  PT and OT eval  9.  D/C Martel  10.  Nicotine patch; she must stop smoking!  11.  Follow-up path from lung biopsy     Change pain medication from  norco to percocet.       David Hernandez MD   05/10/18   7:52 AM

## 2018-05-10 NOTE — PLAN OF CARE
Problem: Patient Care Overview  Goal: Plan of Care Review  Outcome: Ongoing (interventions implemented as appropriate)   05/10/18 1141   OTHER   Outcome Summary Cardiac diet, oral inake insufficient w/ meals. 100% of snacks documented. cont to follow   Plan of Care Review   Progress improving

## 2018-05-10 NOTE — NURSING NOTE
Pt up and walking halls with assistance today. And up in chair. She knocked over her atrium due to unknown reason. Atrium was taped on the floor. Aids found atrium with fluid in 3 different chambers. Will change it out and record amounts.

## 2018-05-10 NOTE — PROGRESS NOTES
"Patient: Vianca Spencer  : 1975     Procedure: Procedure(s):  BRONCHOSCOPY, LEFT VIDEO ASSISTED THORACOSCOPY, LEFT LOWER LOBE LUNG BIOPSY    Procedure Date: 2018 - 2018    POD: 1 Day Post-Op      Subjective   Sitting up in bed and in no respiratory distress.  She states that her pain medicine has not helped her as much as she likes.  \"I think I am getting used to it\".     Objective   Visit Vitals  /75   Pulse 66   Temp 96.7 °F (35.9 °C) (Temporal Artery )   Resp 15   Ht 149.9 cm (59\")   Wt 88 kg (194 lb 1.6 oz)   SpO2 97%   BMI 39.20 kg/m²       Intake/Output Summary (Last 24 hours) at 05/10/18 0717  Last data filed at 05/10/18 024   Gross per 24 hour   Intake             3283 ml   Output             1265 ml   Net             2018 ml     Thoracostomy tube at 95 mL with no air leak                                   Lab Results:    CBC:   Results from last 7 days  Lab Units 05/10/18  0248 18  0703 18  0257   WBC 10*3/mm3 14.11* 13.42* 10.30   HEMATOCRIT % 37.7 39.9 39.7   PLATELETS 10*3/mm3 289 283 245     BMP:   Results from last 7 days  Lab Units 05/10/18  0248 18  1510 18  0703 18  0257   SODIUM mmol/L 141  --  142 142   SODIUM, ARTERIAL mmol/L  --  143  --   --    POTASSIUM mmol/L 4.4  --  4.2 3.9   CHLORIDE mmol/L 100  --  101 100   CO2 mmol/L 32.0*  --  27.0 32.0*   GLUCOSE mg/dL 156*  --  222* 160*   BUN mg/dL 16  --  14 11   CREATININE mg/dL 0.79  --  0.68 0.66     Coag:   Results from last 7 days  Lab Units 05/10/18  0248 18  1501   INR  1.72* 1.52*   APTT seconds  --  34.3       Images:  Imaging Results (last 24 hours)     Procedure Component Value Units Date/Time    XR Chest 1 View [[id number]] Updated:  05/10/18 034    XR Chest 1 View [[id number]] Collected:  18 1753     Updated:  18 1757    Narrative:       EXAMINATION:   XR CHEST 1 VW-  2018 5:53 PM CDT     HISTORY: Left thoracotomy     Frontal upright radiograph of the chest " 5/9/2018 5:36 PM CDT     COMPARISON: May 7, 2018     FINDINGS:   Left chest tube is present. Interstitial infiltrates noted in the left  lung. There is a interstitial infiltrate in the right lung. Infiltrate  trach the right lung is unchanged from May 7. This may be an  inflammatory process.. Cardiac silhouettes within normal limits..      The osseous structures and surrounding soft tissues demonstrate no acute  abnormality.       Impression:       1. Persistent infiltrate right lung unchanged from a 7.  Interstitial infiltrate in the left lung base is now present. May be an  inflammatory process. Underlying pulmonary vascular congestion cannot be  excluded.        This report was finalized on 05/09/2018 17:54 by Dr. Zach Pham MD.        Images Today:Chest x-ray shows left thoracostomy tube in place and lung expanded with no pneumothorax.      Physical Exam:   Physical Exam:  General: Alert and oriented.  No respiratory distress.  Chest: Slightly decreased breath sounds on the operative side.  Chest tubes in place and wound normal for post thoracotomy.  Heart: Regular rate and rhythm without murmur gallop or rub.      Impression: Doing fairly well status post VATS thoracotomy on the left and lung biopsy.    Plan: Keep chest tubes for today.  The medical and pain management per hospitalist service    Case Moctezuma MD  05/10/18  7:17 AM

## 2018-05-10 NOTE — PROGRESS NOTES
"    PULMONARY AND CRITICAL CARE PROGRESS NOTE - [facility]    Patient: Vianca Spencer  1975   MR# [id number]   Acct# [id number]  05/10/18   10:03 AM  Referring Provider: David Hernandez MD    Chief Complaint: Hypoxia and ILD    Interval history: Remains on high flow oxygen. Did well with operation yesterday. CT now in. She complains of \"family drama\" and inability to sleep. States she usually takes an Ambien at home but is asking for \"stadol or dilaudid because I know it will knock me out so I can sleep\". Denies pain at the present but does have it with movement. No family present. No fevers, chills, sweats still chronically dyspnea in chest, moderate to severe, alleviated by oxygen, aggravated by exertion    Meds:    albuterol 1.25 mg Nebulization 4x Daily - RT   cefepime 1 g Intravenous Q8H   diazePAM 5 mg Oral TID   FLUoxetine 60 mg Oral Daily   gabapentin 300 mg Oral BID   gabapentin 600 mg Oral Nightly   guaiFENesin 1,200 mg Oral Q12H   levoFLOXacin 750 mg Intravenous Q24H   levothyroxine 112 mcg Oral Daily   lithium carbonate 300 mg Oral Nightly   methylPREDNISolone sodium succinate 40 mg Intravenous Q12H   nicotine 1 patch Transdermal Q24H   pantoprazole 40 mg Oral QAM   warfarin 5 mg Oral Daily       O2 2 L/min     Review of Systems:   Review of Systems   Constitutional: Negative for chills and fever.   Respiratory: Positive for shortness of breath. Negative for wheezing.    Gastrointestinal: Negative for abdominal distention, diarrhea, nausea and vomiting.   Hematological: Does not bruise/bleed easily.   Psychiatric/Behavioral: Negative for agitation.     Physical Exam:  SpO2 Percentage    05/10/18 0507 05/10/18 0732 05/10/18 0800   SpO2: 97% 98% 97%     Temp:  [96.7 °F (35.9 °C)-98.9 °F (37.2 °C)] 97.8 °F (36.6 °C)  Heart Rate:  [59-93] 67  Resp:  [14-22] 16  BP: (101-154)/() 122/95  Arterial Line BP: ()/(62-88) 125/78    Intake/Output Summary (Last 24 hours) at " 05/10/18 1003  Last data filed at 05/10/18 0800   Gross per 24 hour   Intake             3283 ml   Output             1765 ml   Net             1518 ml     Physical Exam   Constitutional: She appears well-developed. She is active. She has a sickly appearance. She appears ill. No distress. Nasal cannula in place.   Eyes: EOM are normal. No scleral icterus.   Neck: No JVD present. No tracheal deviation present.   Cardiovascular: Normal rate and regular rhythm.    Pulmonary/Chest: No accessory muscle usage. No tachypnea. No respiratory distress. She has decreased breath sounds. She has rales in the right lower field and the left lower field.   CT on the left   Abdominal: Soft. There is no tenderness. There is no guarding.   Musculoskeletal: She exhibits no edema.   Neurological: She is alert.   Skin: Skin is warm and dry. She is not diaphoretic.   Psychiatric: She has a normal mood and affect. Her behavior is normal.     Laboratory Data:    Results from last 7 days  Lab Units 05/10/18  0248 05/09/18  0703 05/07/18  0257   WBC 10*3/mm3 14.11* 13.42* 10.30   HEMOGLOBIN g/dL 11.9* 12.8 12.9   PLATELETS 10*3/mm3 289 283 245       Results from last 7 days  Lab Units 05/10/18  0248 05/09/18  1510 05/09/18  1501 05/09/18  0703  05/07/18  0257   SODIUM mmol/L 141  --   --  142  --  142   SODIUM, ARTERIAL mmol/L  --  143  --   --   --   --    POTASSIUM mmol/L 4.4  --   --  4.2  --  3.9   BUN mg/dL 16  --   --  14  --  11   CREATININE mg/dL 0.79  --   --  0.68  --  0.66   INR  1.72*  --  1.52* 1.62*  < > 1.63*   < > = values in this interval not displayed.    Results from last 7 days  Lab Units 05/09/18  1510 05/07/18  1538 05/07/18  0436 05/06/18  1823   PH, ARTERIAL pH units 7.431 7.458* 7.433 7.400   PCO2, ARTERIAL mm Hg 44.2 43.7 46.5* 41.4   PO2 ART mm Hg 87.5 57.1* 67.7* 60.0*   FIO2 %  --   --  50 100     Blood Culture   Date Value Ref Range Status   05/06/2018 No growth at 2 days  Preliminary   05/06/2018 No growth at 2  days  Preliminary     Recent films:  Imaging Results (last 24 hours)     Procedure Component Value Units Date/Time    XR Chest 1 View [[id number]] Collected:  05/10/18 0733     Updated:  05/10/18 0738    Narrative:       EXAMINATION: XR CHEST 1 VW- 5/10/2018 7:33 AM CDT     HISTORY: Left thoracotomy and wedge resection left lower lobe;  R06.00-Dyspnea, unspecified; R91.8-Other nonspecific abnormal finding of  lung field; D72.829-Elevated white blood cell count, unspecified;  R06.82-Tachypnea, not elsewhere classified.     REPORT: Comparison is made with the chest x-ray from 5/9/2018 1737  hours.     Lungs remain hypoaerated, there are reticular interstitial opacities  from earlier within the right lung. This is stable. No pneumothorax is  identified. The left thoracostomy tube appears in good position as  before. Heart size appears normal.       Impression:       Stable 1 day appearance of the chest.  This report was finalized on 05/10/2018 07:35 by Dr. Matt Herndon MD.    XR Chest 1 View [[id number]] Collected:  05/09/18 1753     Updated:  05/09/18 1757    Narrative:       EXAMINATION:   XR CHEST 1 VW-  5/9/2018 5:53 PM CDT     HISTORY: Left thoracotomy     Frontal upright radiograph of the chest 5/9/2018 5:36 PM CDT     COMPARISON: May 7, 2018     FINDINGS:   Left chest tube is present. Interstitial infiltrates noted in the left  lung. There is a interstitial infiltrate in the right lung. Infiltrate  trach the right lung is unchanged from May 7. This may be an  inflammatory process.. Cardiac silhouettes within normal limits..      The osseous structures and surrounding soft tissues demonstrate no acute  abnormality.       Impression:       1. Persistent infiltrate right lung unchanged from a 7.  Interstitial infiltrate in the left lung base is now present. May be an  inflammatory process. Underlying pulmonary vascular congestion cannot be  excluded.        This report was finalized on 05/09/2018 17:54 by   Zach Pham MD.        Films reviewed personally by me.  My interpretation: diffuse ILD with fibrosis, does not look like UIP., CT now on left    Pulmonary Assessment:  1. Chronic ild.  Does not look like copd.  2. Acute and chronic rf with hypoxia, decompensated but somewhat stabilized  3. Bipolar stable  4. Insomnia  5. Nicotine dependency, needs to stop    Recommend:     · OK to the floor today.   · Wean O2 as tolerated  · Ambulate as tolerated  · Keep sat above 90%  · Taper steroids  · Abx coverage per attending  · Nicotine replacement  · Daily CXR while CT are in   · Defer insomnia issues to attending    Electronically signed by PARIS Del Rio, 05/10/18, 10:03 AM      She feels better.  She is wanting her pain medicines and the nurses are working on that today.  She remains on oxygen nasal cannula.  She asked when she can go home.  Her waiting on pathology.  Physical exam shows her to be on high flow nasal cannula still.  She is awake alert chest has diminished breath sounds with crackles.  She is obese.  Plan awaiting pathology continue oxygen and taper as possible.  Taper steroids.    I have seen and examined patient personally, performing a face-to-face diagnostic evaluation with plan of care reviewed and developed with APRN and nursing staff. I have addended and/or modified the above history of present illness, physical examination, and assessment and plan to reflect my findings and impressions. Essential elements of the care plan were discussed with APRN above.  Agree with findings and assessment/plan as documented above.    Electronically signed by Earnest Ba MD, on 5/10/2018, 5:40 PM    EMR Dragon/Transcription disclaimer: Much of this encounter note is an electronic transcription/translation of spoken language to printed text. The electronic translation of spoken language may permit erroneous, or at times, nonsensical words or phrases to be inadvertently transcribed; although I have  reviewed the note for such errors, some may still exist.

## 2018-05-11 ENCOUNTER — APPOINTMENT (OUTPATIENT)
Dept: GENERAL RADIOLOGY | Facility: HOSPITAL | Age: 43
End: 2018-05-11

## 2018-05-11 LAB
ANION GAP SERPL CALCULATED.3IONS-SCNC: 7 MMOL/L (ref 4–13)
BACTERIA SPEC AEROBE CULT: NORMAL
BACTERIA SPEC AEROBE CULT: NORMAL
BUN BLD-MCNC: 14 MG/DL (ref 5–21)
BUN/CREAT SERPL: 18.4 (ref 7–25)
CALCIUM SPEC-SCNC: 9.6 MG/DL (ref 8.4–10.4)
CHLORIDE SERPL-SCNC: 99 MMOL/L (ref 98–110)
CO2 SERPL-SCNC: 36 MMOL/L (ref 24–31)
CREAT BLD-MCNC: 0.76 MG/DL (ref 0.5–1.4)
DEPRECATED RDW RBC AUTO: 45.4 FL (ref 40–54)
ERYTHROCYTE [DISTWIDTH] IN BLOOD BY AUTOMATED COUNT: 13.3 % (ref 12–15)
GFR SERPL CREATININE-BSD FRML MDRD: 83 ML/MIN/1.73
GLUCOSE BLD-MCNC: 98 MG/DL (ref 70–100)
HCT VFR BLD AUTO: 40.6 % (ref 37–47)
HGB BLD-MCNC: 12.9 G/DL (ref 12–16)
INR PPP: 1.62 (ref 0.91–1.09)
MCH RBC QN AUTO: 29.5 PG (ref 28–32)
MCHC RBC AUTO-ENTMCNC: 31.8 G/DL (ref 33–36)
MCV RBC AUTO: 92.9 FL (ref 82–98)
PLATELET # BLD AUTO: 281 10*3/MM3 (ref 130–400)
PMV BLD AUTO: 10.1 FL (ref 6–12)
POTASSIUM BLD-SCNC: 4.1 MMOL/L (ref 3.5–5.3)
PROTHROMBIN TIME: 19.8 SECONDS (ref 11.9–14.6)
RBC # BLD AUTO: 4.37 10*6/MM3 (ref 4.2–5.4)
SODIUM BLD-SCNC: 142 MMOL/L (ref 135–145)
WBC NRBC COR # BLD: 15.69 10*3/MM3 (ref 4.8–10.8)

## 2018-05-11 PROCEDURE — 25010000002 LEVOFLOXACIN PER 250 MG: Performed by: INTERNAL MEDICINE

## 2018-05-11 PROCEDURE — 25010000002 CEFEPIME PER 500 MG: Performed by: INTERNAL MEDICINE

## 2018-05-11 PROCEDURE — 99024 POSTOP FOLLOW-UP VISIT: CPT | Performed by: THORACIC SURGERY (CARDIOTHORACIC VASCULAR SURGERY)

## 2018-05-11 PROCEDURE — 94799 UNLISTED PULMONARY SVC/PX: CPT

## 2018-05-11 PROCEDURE — 63710000001 PROMETHAZINE PER 25 MG: Performed by: INTERNAL MEDICINE

## 2018-05-11 PROCEDURE — 80048 BASIC METABOLIC PNL TOTAL CA: CPT | Performed by: INTERNAL MEDICINE

## 2018-05-11 PROCEDURE — 94760 N-INVAS EAR/PLS OXIMETRY 1: CPT

## 2018-05-11 PROCEDURE — 71045 X-RAY EXAM CHEST 1 VIEW: CPT

## 2018-05-11 PROCEDURE — 25010000002 METHYLPREDNISOLONE PER 40 MG: Performed by: INTERNAL MEDICINE

## 2018-05-11 PROCEDURE — 85027 COMPLETE CBC AUTOMATED: CPT | Performed by: INTERNAL MEDICINE

## 2018-05-11 PROCEDURE — 85610 PROTHROMBIN TIME: CPT | Performed by: INTERNAL MEDICINE

## 2018-05-11 PROCEDURE — 25010000002 METHYLPREDNISOLONE PER 40 MG: Performed by: NURSE PRACTITIONER

## 2018-05-11 RX ORDER — METHYLPREDNISOLONE SODIUM SUCCINATE 40 MG/ML
20 INJECTION, POWDER, LYOPHILIZED, FOR SOLUTION INTRAMUSCULAR; INTRAVENOUS EVERY 12 HOURS
Status: DISCONTINUED | OUTPATIENT
Start: 2018-05-11 | End: 2018-05-12

## 2018-05-11 RX ORDER — OXYCODONE AND ACETAMINOPHEN 7.5; 325 MG/1; MG/1
1 TABLET ORAL EVERY 6 HOURS PRN
Status: DISCONTINUED | OUTPATIENT
Start: 2018-05-11 | End: 2018-05-13 | Stop reason: HOSPADM

## 2018-05-11 RX ADMIN — OXYCODONE AND ACETAMINOPHEN 1 TABLET: 7.5; 325 TABLET ORAL at 21:15

## 2018-05-11 RX ADMIN — ALBUTEROL SULFATE 1.25 MG: 1.25 SOLUTION RESPIRATORY (INHALATION) at 15:09

## 2018-05-11 RX ADMIN — METHYLPREDNISOLONE SODIUM SUCCINATE 20 MG: 40 INJECTION, POWDER, FOR SOLUTION INTRAMUSCULAR; INTRAVENOUS at 14:53

## 2018-05-11 RX ADMIN — PROMETHAZINE HYDROCHLORIDE 12.5 MG: 25 TABLET ORAL at 21:15

## 2018-05-11 RX ADMIN — LEVOTHYROXINE SODIUM 112 MCG: 112 TABLET ORAL at 08:54

## 2018-05-11 RX ADMIN — ZOLPIDEM TARTRATE 10 MG: 5 TABLET, COATED ORAL at 21:15

## 2018-05-11 RX ADMIN — PANTOPRAZOLE SODIUM 40 MG: 40 TABLET, DELAYED RELEASE ORAL at 17:24

## 2018-05-11 RX ADMIN — GABAPENTIN 300 MG: 300 CAPSULE ORAL at 17:24

## 2018-05-11 RX ADMIN — FLUOXETINE HYDROCHLORIDE 60 MG: 20 CAPSULE ORAL at 08:54

## 2018-05-11 RX ADMIN — ALBUTEROL SULFATE 1.25 MG: 1.25 SOLUTION RESPIRATORY (INHALATION) at 11:32

## 2018-05-11 RX ADMIN — OXYCODONE AND ACETAMINOPHEN 1 TABLET: 7.5; 325 TABLET ORAL at 14:54

## 2018-05-11 RX ADMIN — GUAIFENESIN 1200 MG: 600 TABLET, EXTENDED RELEASE ORAL at 08:54

## 2018-05-11 RX ADMIN — DIAZEPAM 5 MG: 5 TABLET ORAL at 21:15

## 2018-05-11 RX ADMIN — HYDROCODONE BITARTRATE AND ACETAMINOPHEN 1 TABLET: 10; 325 TABLET ORAL at 03:30

## 2018-05-11 RX ADMIN — DIAZEPAM 5 MG: 5 TABLET ORAL at 17:24

## 2018-05-11 RX ADMIN — LEVOFLOXACIN 750 MG: 5 INJECTION, SOLUTION INTRAVENOUS at 09:57

## 2018-05-11 RX ADMIN — CEFEPIME HYDROCHLORIDE 1 G: 1 INJECTION, POWDER, FOR SOLUTION INTRAMUSCULAR; INTRAVENOUS at 17:25

## 2018-05-11 RX ADMIN — PANTOPRAZOLE SODIUM 40 MG: 40 TABLET, DELAYED RELEASE ORAL at 05:58

## 2018-05-11 RX ADMIN — WARFARIN SODIUM 5 MG: 5 TABLET ORAL at 17:24

## 2018-05-11 RX ADMIN — GABAPENTIN 600 MG: 300 CAPSULE ORAL at 21:15

## 2018-05-11 RX ADMIN — GUAIFENESIN 1200 MG: 600 TABLET, EXTENDED RELEASE ORAL at 21:15

## 2018-05-11 RX ADMIN — CEFEPIME HYDROCHLORIDE 1 G: 1 INJECTION, POWDER, FOR SOLUTION INTRAMUSCULAR; INTRAVENOUS at 08:54

## 2018-05-11 RX ADMIN — HYDROCODONE BITARTRATE AND ACETAMINOPHEN 1 TABLET: 10; 325 TABLET ORAL at 09:57

## 2018-05-11 RX ADMIN — ALBUTEROL SULFATE 1.25 MG: 1.25 SOLUTION RESPIRATORY (INHALATION) at 20:21

## 2018-05-11 RX ADMIN — DIAZEPAM 5 MG: 5 TABLET ORAL at 08:54

## 2018-05-11 RX ADMIN — ALBUTEROL SULFATE 1.25 MG: 1.25 SOLUTION RESPIRATORY (INHALATION) at 07:50

## 2018-05-11 RX ADMIN — GABAPENTIN 300 MG: 300 CAPSULE ORAL at 08:54

## 2018-05-11 RX ADMIN — METHYLPREDNISOLONE SODIUM SUCCINATE 40 MG: 40 INJECTION, POWDER, FOR SOLUTION INTRAMUSCULAR; INTRAVENOUS at 02:22

## 2018-05-11 RX ADMIN — LITHIUM CARBONATE 300 MG: 300 CAPSULE, GELATIN COATED ORAL at 21:15

## 2018-05-11 RX ADMIN — CEFEPIME HYDROCHLORIDE 1 G: 1 INJECTION, POWDER, FOR SOLUTION INTRAMUSCULAR; INTRAVENOUS at 00:33

## 2018-05-11 NOTE — PROGRESS NOTES
Continued Stay Note  RADHA Harrington     Patient Name: Vianca Spencer  MRN: [id number]  Today's Date: 5/11/2018    Admit Date: 5/6/2018          Discharge Plan     Row Name 05/11/18 1419       Plan    Plan Pt plans home    Patient/Family in Agreement with Plan yes    Plan Comments Had rec'd a call from Nichelle from [facility] [phone] following up on pt. Pt is now on 8 liters of oxygen. Spoke with pt and she said she just wants to go home at d/c. She does not want to go to the LT[facility]. Will follow.               Discharge Codes    No documentation.           LAQUITA Hopper

## 2018-05-11 NOTE — PROGRESS NOTES
"Patient: Vianca Spencer  : 1975     Procedure: Procedure(s):  BRONCHOSCOPY, LEFT VIDEO ASSISTED THORACOSCOPY, LEFT LOWER LOBE LUNG BIOPSY    Procedure Date: 2018 - 2018    POD: 2 Days Post-Op      Subjective   She is alert and oriented today.  She slept well.  She walked in shafer yesterday.  No respiratory distress     Objective   Visit Vitals  /70 (BP Location: Left leg, Patient Position: Lying)   Pulse 77   Temp 97.6 °F (36.4 °C) (Temporal Artery )   Resp 16   Ht 149.9 cm (59\")   Wt 88 kg (194 lb 1.6 oz)   SpO2 93%   BMI 39.20 kg/m²       Intake/Output Summary (Last 24 hours) at 18  Last data filed at 05/10/18 1800   Gross per 24 hour   Intake                0 ml   Output              570 ml   Net             -570 ml                                        Lab Results:    CBC:   Results from last 7 days  Lab Units 05/10/18  0248 18  0703 18  0257   WBC 10*3/mm3 14.11* 13.42* 10.30   HEMATOCRIT % 37.7 39.9 39.7   PLATELETS 10*3/mm3 289 283 245     BMP:   Results from last 7 days  Lab Units 05/10/18  0248 18  1510 18  0703 18  0257   SODIUM mmol/L 141  --  142 142   SODIUM, ARTERIAL mmol/L  --  143  --   --    POTASSIUM mmol/L 4.4  --  4.2 3.9   CHLORIDE mmol/L 100  --  101 100   CO2 mmol/L 32.0*  --  27.0 32.0*   GLUCOSE mg/dL 156*  --  222* 160*   BUN mg/dL 16  --  14 11   CREATININE mg/dL 0.79  --  0.68 0.66     Coag:   Results from last 7 days  Lab Units 05/10/18  0248 18  1501   INR  1.72* 1.52*   APTT seconds  --  34.3       Images:  Imaging Results (last 24 hours)     Procedure Component Value Units Date/Time    XR Chest 1 View [[id number]] Updated:  18 0507    XR Chest 1 View [[id number]] Collected:  05/10/18 0733     Updated:  05/10/18 0738    Narrative:       EXAMINATION: XR CHEST 1 VW- 5/10/2018 7:33 AM CDT     HISTORY: Left thoracotomy and wedge resection left lower lobe;  R06.00-Dyspnea, unspecified; R91.8-Other nonspecific " abnormal finding of  lung field; D72.829-Elevated white blood cell count, unspecified;  R06.82-Tachypnea, not elsewhere classified.     REPORT: Comparison is made with the chest x-ray from 5/9/2018 1737  hours.     Lungs remain hypoaerated, there are reticular interstitial opacities  from earlier within the right lung. This is stable. No pneumothorax is  identified. The left thoracostomy tube appears in good position as  before. Heart size appears normal.       Impression:       Stable 1 day appearance of the chest.  This report was finalized on 05/10/2018 07:35 by Dr. Matt Herndon MD.        Images Today: Left lung is expanded with no accumulation of fluid.  No pneumothorax.      Physical Exam:   Physical Exam:  General: Alert and oriented.  No respiratory distress.  Chest: Slightly decreased breath sounds on the operative side.  Chest tubes in place and wound normal for post thoracotomy.  Minimal drainage from thoracostomy tube.  No air leak  Heart: Regular rate and rhythm without murmur gallop or rub.      Impression: Recovering well from lung biopsy.  No air leak and minimal drainage.  No growth from cultures on preliminary results.  Pathology is pending.    Plan: Discontinue thoracostomy tubes.  Recheck PA and lateral chest x-ray tomorrow.  Continue ambulation and pulmonary toilet    Case Moctezuma MD  05/11/18  6:13 AM

## 2018-05-11 NOTE — PLAN OF CARE
Problem: Fall Risk (Adult)  Goal: Identify Related Risk Factors and Signs and Symptoms  Outcome: Ongoing (interventions implemented as appropriate)   05/11/18 0414   Fall Risk (Adult)   Related Risk Factors (Fall Risk) fatigue/slow reaction;gait/mobility problems   Signs and Symptoms (Fall Risk) presence of risk factors     Goal: Absence of Fall  Outcome: Ongoing (interventions implemented as appropriate)   05/11/18 0414   Fall Risk (Adult)   Absence of Fall achieves outcome       Problem: Patient Care Overview  Goal: Plan of Care Review  Outcome: Ongoing (interventions implemented as appropriate)   05/11/18 0414   Coping/Psychosocial   Plan of Care Reviewed With patient   OTHER   Outcome Summary Left chest tube to water seal dry suction. 8L HF; VSS. PRN pain med given with relief.    Plan of Care Review   Progress no change       Problem: ARDS (Acute Resp Distress Syndrome) (Adult)  Goal: Signs and Symptoms of Listed Potential Problems Will be Absent, Minimized or Managed (ARDS)  Outcome: Ongoing (interventions implemented as appropriate)   05/11/18 0414   Goal/Outcome Evaluation   Problems Assessed (Acute Respiratory Distress Syndrome) all   Problems Present (ARDS) none       Problem: Pneumonia (Adult)  Goal: Signs and Symptoms of Listed Potential Problems Will be Absent, Minimized or Managed (Pneumonia)  Outcome: Ongoing (interventions implemented as appropriate)   05/11/18 0414   Goal/Outcome Evaluation   Problems Assessed (Pneumonia) all   Problems Present (Pneumonia) none       Problem: Skin Injury Risk (Adult)  Goal: Identify Related Risk Factors and Signs and Symptoms  Outcome: Ongoing (interventions implemented as appropriate)   05/11/18 0414   Skin Injury Risk (Adult)   Related Risk Factors (Skin Injury Risk) medical devices;mobility impaired     Goal: Skin Health and Integrity  Outcome: Ongoing (interventions implemented as appropriate)   05/11/18 0414   Skin Injury Risk (Adult)   Skin Health and  Integrity making progress toward outcome

## 2018-05-11 NOTE — PROGRESS NOTES
PULMONARY AND CRITICAL CARE PROGRESS NOTE - [facility]    Patient: Vianca Spencer  1975   MR# [id number]   Acct# [id number]  05/11/18   8:54 AM  Referring Provider: Alix Kingsley DO    Chief Complaint: Hypoxia and ILD    Interval history: She has moved out of the ICU to a floor.  She remains on high flow nasal cannula with a green hose.  We discussed that this is going to be limiting her from getting home.  She asks when she can go home but currently she remains on 2 Kristen intravenous antibiotics, remains on high flow oxygen.  No increased pain no untoward pain no increased shortness of breath.  Steroids have been tapered recently and she does not notice any decline.    Meds:    albuterol 1.25 mg Nebulization 4x Daily - RT   cefepime 1 g Intravenous Q8H   diazePAM 5 mg Oral TID   FLUoxetine 60 mg Oral Daily   gabapentin 300 mg Oral BID   gabapentin 600 mg Oral Nightly   guaiFENesin 1,200 mg Oral Q12H   levoFLOXacin 750 mg Intravenous Q24H   levothyroxine 112 mcg Oral Daily   lithium carbonate 300 mg Oral Nightly   methylPREDNISolone sodium succinate 40 mg Intravenous Q12H   nicotine 1 patch Transdermal Q24H   pantoprazole 40 mg Oral QAM   warfarin 5 mg Oral Daily       O2 2 L/min     Review of Systems:   Review of Systems   Constitutional: Negative for chills and fever.   HENT: Negative for sore throat.    Respiratory: Positive for cough. Negative for chest tightness.    Gastrointestinal: Negative for abdominal distention, diarrhea, nausea and vomiting.   Psychiatric/Behavioral: Negative for agitation.     Physical Exam:  SpO2 Percentage    05/11/18 0337 05/11/18 0750 05/11/18 0835   SpO2: 93% 91% 92%     Temp:  [97.3 °F (36.3 °C)-98.3 °F (36.8 °C)] 97.9 °F (36.6 °C)  Heart Rate:  [54-87] 75  Resp:  [14-20] 16  BP: ()/(54-89) 123/66    Intake/Output Summary (Last 24 hours) at 05/11/18 0854  Last data filed at 05/10/18 1800   Gross per 24 hour   Intake                0 ml   Output                70 ml   Net              -70 ml     Physical Exam   Constitutional: She appears well-developed. She is active. She has a sickly appearance. She appears ill. No distress. Nasal cannula in place.   HENT:   Head: Normocephalic and atraumatic.   Nose: Nose normal.   Eyes: EOM are normal. No scleral icterus.   Neck: Neck supple. No tracheal deviation present.   Cardiovascular: Normal rate and regular rhythm.    Pulmonary/Chest: Effort normal. No accessory muscle usage. No tachypnea. No respiratory distress. She has decreased breath sounds. She has rales in the right lower field and the left lower field.   CT on the left   Abdominal: Soft. She exhibits no distension. There is no tenderness.   Obese   Musculoskeletal: She exhibits no edema.   Neurological: She is alert.   Skin: Skin is warm and dry. She is not diaphoretic.   Psychiatric: She has a normal mood and affect. Her behavior is normal.     Laboratory Data:    Results from last 7 days  Lab Units 05/11/18  0541 05/10/18  0248 05/09/18  0703   WBC 10*3/mm3 15.69* 14.11* 13.42*   HEMOGLOBIN g/dL 12.9 11.9* 12.8   PLATELETS 10*3/mm3 281 289 283       Results from last 7 days  Lab Units 05/11/18  0541 05/10/18  0248 05/09/18  1510 05/09/18  1501 05/09/18  0703   SODIUM mmol/L 142 141  --   --  142   SODIUM, ARTERIAL mmol/L  --   --  143  --   --    POTASSIUM mmol/L 4.1 4.4  --   --  4.2   BUN mg/dL 14 16  --   --  14   CREATININE mg/dL 0.76 0.79  --   --  0.68   INR  1.62* 1.72*  --  1.52* 1.62*       Results from last 7 days  Lab Units 05/09/18  1510 05/07/18  1538 05/07/18  0436 05/06/18  1823   PH, ARTERIAL pH units 7.431 7.458* 7.433 7.400   PCO2, ARTERIAL mm Hg 44.2 43.7 46.5* 41.4   PO2 ART mm Hg 87.5 57.1* 67.7* 60.0*   FIO2 %  --   --  50 100     Blood Culture   Date Value Ref Range Status   05/06/2018 No growth at 2 days  Preliminary   05/06/2018 No growth at 2 days  Preliminary     Recent films:  Imaging Results (last 24 hours)     Procedure  Component Value Units Date/Time    XR Chest 1 View [[id number]] Collected:  05/11/18 0724     Updated:  05/11/18 0730    Narrative:       EXAMINATION: XR CHEST 1 VW- 5/11/2018 7:24 AM CDT     HISTORY: Left thoracotomy and wedge resection left lower lobe;  R06.00-Dyspnea, unspecified; R91.8-Other nonspecific abnormal finding of  lung field; D72.829-Elevated white blood cell count, unspecified;  R06.82-Tachypnea, not elsewhere classified.     REPORT: Comparison is made with the study from 5/10/2018 0330 hours.     Left thoracostomy tube appears in satisfactory position as before, no  pneumothorax is visualized. The lungs are hypoaerated. Hazy opacities  are present over the right hemithorax, questionable for edema or  layering posterior effusion or combination of both. This is stable.  Heart size is normal. No significant change.       Impression:       Continued hypoaeration of lungs with stable position of the  left thoracostomy tube, no pneumothorax is visualized. Hazy opacities  over the right hemithorax are again noted, without consolidation.  This report was finalized on 05/11/2018 07:27 by Dr. Matt Herndon MD.        Films reviewed personally by me.  My interpretation: Bilateral interstitial changes.  Large left-sided chest tube.  Lung expanded.    Pulmonary Assessment:  1. Chronic ild.  Does not look like copd. stable, awaiting path from open lung biopsy  2. Acute and chronic rf with hypoxia, decompensated but improving  3. Bipolar stable on medications  4. Nicotine dependency, needs to stop    Recommend:     · Mobilize as tolerated  · Wean O2 as tolerated  · Keep sat above 90%  · Taper steroids chronically, keep dose the same today.  · Finish course of antibiotics  · Nicotine replacement  · Daily CXR while CT are in   · Remains on Coumadin so additional enoxaparin or other DVT prophylaxis is not really needed    Electronically signed by Earnest Ba MD, 05/11/18, 8:54 AM     EMR Dragon/Transcription  disclaimer: Much of this encounter note is an electronic transcription/translation of spoken language to printed text. The electronic translation of spoken language may permit erroneous, or at times, nonsensical words or phrases to be inadvertently transcribed; although I have reviewed the note for such errors, some may still exist.

## 2018-05-12 ENCOUNTER — APPOINTMENT (OUTPATIENT)
Dept: GENERAL RADIOLOGY | Facility: HOSPITAL | Age: 43
End: 2018-05-12

## 2018-05-12 LAB
ANION GAP SERPL CALCULATED.3IONS-SCNC: 11 MMOL/L (ref 4–13)
BUN BLD-MCNC: 17 MG/DL (ref 5–21)
BUN/CREAT SERPL: 21.5 (ref 7–25)
CALCIUM SPEC-SCNC: 9.3 MG/DL (ref 8.4–10.4)
CHLORIDE SERPL-SCNC: 97 MMOL/L (ref 98–110)
CO2 SERPL-SCNC: 34 MMOL/L (ref 24–31)
CREAT BLD-MCNC: 0.79 MG/DL (ref 0.5–1.4)
DEPRECATED RDW RBC AUTO: 45.3 FL (ref 40–54)
ERYTHROCYTE [DISTWIDTH] IN BLOOD BY AUTOMATED COUNT: 13.3 % (ref 12–15)
GFR SERPL CREATININE-BSD FRML MDRD: 79 ML/MIN/1.73
GLUCOSE BLD-MCNC: 149 MG/DL (ref 70–100)
HCT VFR BLD AUTO: 41.7 % (ref 37–47)
HGB BLD-MCNC: 13.2 G/DL (ref 12–16)
INR PPP: 1.96 (ref 0.91–1.09)
LYMPHOCYTES # BLD MANUAL: 0.83 10*3/MM3 (ref 0.72–4.86)
LYMPHOCYTES NFR BLD MANUAL: 2 % (ref 4–12)
LYMPHOCYTES NFR BLD MANUAL: 5 % (ref 15–45)
MCH RBC QN AUTO: 29.5 PG (ref 28–32)
MCHC RBC AUTO-ENTMCNC: 31.7 G/DL (ref 33–36)
MCV RBC AUTO: 93.3 FL (ref 82–98)
MONOCYTES # BLD AUTO: 0.33 10*3/MM3 (ref 0.19–1.3)
MYELOCYTES NFR BLD MANUAL: 10 % (ref 0–0)
NEUTROPHILS # BLD AUTO: 13.56 10*3/MM3 (ref 1.87–8.4)
NEUTROPHILS NFR BLD MANUAL: 81 % (ref 39–78)
NEUTS BAND NFR BLD MANUAL: 1 % (ref 0–10)
PLAT MORPH BLD: NORMAL
PLATELET # BLD AUTO: 263 10*3/MM3 (ref 130–400)
PMV BLD AUTO: 9.9 FL (ref 6–12)
POTASSIUM BLD-SCNC: 4.3 MMOL/L (ref 3.5–5.3)
PROTHROMBIN TIME: 23 SECONDS (ref 11.9–14.6)
RBC # BLD AUTO: 4.47 10*6/MM3 (ref 4.2–5.4)
RBC MORPH BLD: NORMAL
SODIUM BLD-SCNC: 142 MMOL/L (ref 135–145)
VARIANT LYMPHS NFR BLD MANUAL: 1 % (ref 0–5)
WBC MORPH BLD: NORMAL
WBC NRBC COR # BLD: 16.54 10*3/MM3 (ref 4.8–10.8)

## 2018-05-12 PROCEDURE — 94799 UNLISTED PULMONARY SVC/PX: CPT

## 2018-05-12 PROCEDURE — 94760 N-INVAS EAR/PLS OXIMETRY 1: CPT

## 2018-05-12 PROCEDURE — 80048 BASIC METABOLIC PNL TOTAL CA: CPT | Performed by: INTERNAL MEDICINE

## 2018-05-12 PROCEDURE — 85025 COMPLETE CBC W/AUTO DIFF WBC: CPT | Performed by: INTERNAL MEDICINE

## 2018-05-12 PROCEDURE — 25010000002 LEVOFLOXACIN PER 250 MG: Performed by: INTERNAL MEDICINE

## 2018-05-12 PROCEDURE — 99231 SBSQ HOSP IP/OBS SF/LOW 25: CPT | Performed by: THORACIC SURGERY (CARDIOTHORACIC VASCULAR SURGERY)

## 2018-05-12 PROCEDURE — 71046 X-RAY EXAM CHEST 2 VIEWS: CPT

## 2018-05-12 PROCEDURE — 25010000002 METHYLPREDNISOLONE PER 40 MG: Performed by: INTERNAL MEDICINE

## 2018-05-12 PROCEDURE — 85610 PROTHROMBIN TIME: CPT | Performed by: INTERNAL MEDICINE

## 2018-05-12 PROCEDURE — 63710000001 PREDNISONE PER 1 MG: Performed by: INTERNAL MEDICINE

## 2018-05-12 PROCEDURE — 25010000002 CEFEPIME PER 500 MG: Performed by: INTERNAL MEDICINE

## 2018-05-12 PROCEDURE — 85007 BL SMEAR W/DIFF WBC COUNT: CPT | Performed by: INTERNAL MEDICINE

## 2018-05-12 RX ORDER — PREDNISONE 20 MG/1
20 TABLET ORAL
Status: DISCONTINUED | OUTPATIENT
Start: 2018-05-12 | End: 2018-05-13 | Stop reason: HOSPADM

## 2018-05-12 RX ADMIN — METHYLPREDNISOLONE SODIUM SUCCINATE 20 MG: 40 INJECTION, POWDER, FOR SOLUTION INTRAMUSCULAR; INTRAVENOUS at 03:14

## 2018-05-12 RX ADMIN — LEVOFLOXACIN 750 MG: 5 INJECTION, SOLUTION INTRAVENOUS at 10:24

## 2018-05-12 RX ADMIN — DIAZEPAM 5 MG: 5 TABLET ORAL at 20:44

## 2018-05-12 RX ADMIN — LITHIUM CARBONATE 300 MG: 300 CAPSULE, GELATIN COATED ORAL at 20:44

## 2018-05-12 RX ADMIN — PREDNISONE 20 MG: 20 TABLET ORAL at 15:29

## 2018-05-12 RX ADMIN — ALBUTEROL SULFATE 1.25 MG: 1.25 SOLUTION RESPIRATORY (INHALATION) at 20:46

## 2018-05-12 RX ADMIN — CEFEPIME HYDROCHLORIDE 1 G: 1 INJECTION, POWDER, FOR SOLUTION INTRAMUSCULAR; INTRAVENOUS at 01:11

## 2018-05-12 RX ADMIN — ALBUTEROL SULFATE 1.25 MG: 1.25 SOLUTION RESPIRATORY (INHALATION) at 15:49

## 2018-05-12 RX ADMIN — ZOLPIDEM TARTRATE 10 MG: 5 TABLET, COATED ORAL at 21:46

## 2018-05-12 RX ADMIN — GUAIFENESIN 1200 MG: 600 TABLET, EXTENDED RELEASE ORAL at 08:44

## 2018-05-12 RX ADMIN — GUAIFENESIN 1200 MG: 600 TABLET, EXTENDED RELEASE ORAL at 21:42

## 2018-05-12 RX ADMIN — GABAPENTIN 300 MG: 300 CAPSULE ORAL at 15:34

## 2018-05-12 RX ADMIN — FLUOXETINE HYDROCHLORIDE 60 MG: 20 CAPSULE ORAL at 08:45

## 2018-05-12 RX ADMIN — PANTOPRAZOLE SODIUM 40 MG: 40 TABLET, DELAYED RELEASE ORAL at 06:11

## 2018-05-12 RX ADMIN — OXYCODONE AND ACETAMINOPHEN 1 TABLET: 7.5; 325 TABLET ORAL at 04:49

## 2018-05-12 RX ADMIN — DIAZEPAM 5 MG: 5 TABLET ORAL at 15:34

## 2018-05-12 RX ADMIN — GABAPENTIN 600 MG: 300 CAPSULE ORAL at 20:44

## 2018-05-12 RX ADMIN — WARFARIN SODIUM 5 MG: 5 TABLET ORAL at 17:17

## 2018-05-12 RX ADMIN — DIAZEPAM 5 MG: 5 TABLET ORAL at 08:46

## 2018-05-12 RX ADMIN — OXYCODONE AND ACETAMINOPHEN 1 TABLET: 7.5; 325 TABLET ORAL at 11:06

## 2018-05-12 RX ADMIN — ALBUTEROL SULFATE 1.25 MG: 1.25 SOLUTION RESPIRATORY (INHALATION) at 07:38

## 2018-05-12 RX ADMIN — LEVOTHYROXINE SODIUM 112 MCG: 112 TABLET ORAL at 08:45

## 2018-05-12 RX ADMIN — OXYCODONE AND ACETAMINOPHEN 1 TABLET: 7.5; 325 TABLET ORAL at 21:42

## 2018-05-12 RX ADMIN — GABAPENTIN 300 MG: 300 CAPSULE ORAL at 08:44

## 2018-05-12 RX ADMIN — CEFEPIME HYDROCHLORIDE 1 G: 1 INJECTION, POWDER, FOR SOLUTION INTRAMUSCULAR; INTRAVENOUS at 08:54

## 2018-05-12 RX ADMIN — CEFEPIME HYDROCHLORIDE 1 G: 1 INJECTION, POWDER, FOR SOLUTION INTRAMUSCULAR; INTRAVENOUS at 15:30

## 2018-05-12 RX ADMIN — ALBUTEROL SULFATE 1.25 MG: 1.25 SOLUTION RESPIRATORY (INHALATION) at 11:36

## 2018-05-12 NOTE — PROGRESS NOTES
[facility] Medicine Services  INPATIENT PROGRESS NOTE    Length of Stay: 6  Date of Admission: 5/6/2018  Primary Care Physician: Neel Valencia Jr., MD    Subjective   Chief Complaint: Shortness of breath   Shortness of Breath     Patient sleeping but easily awakens. Pain appears better. Patient moved bowels yesterday. Did not eat much breakfast. Patient not getting OOB or moving much. Discussed going home and decreasing 02 use. Patient voiced understanding. No complaints at the time of exam.     Review of Systems   Respiratory: Positive for shortness of breath.         All pertinent negatives and positives are as above. All other systems have been reviewed and are negative unless otherwise stated.     Objective    Temp:  [96.9 °F (36.1 °C)-97.6 °F (36.4 °C)] 97.3 °F (36.3 °C)  Heart Rate:  [56-80] 64  Resp:  [16-18] 18  BP: (114-130)/(70-81) 128/70  Physical Exam   Constitutional: She is oriented to person, place, and time. No distress.   On high flow 02    HENT:   Head: Normocephalic.   Mouth/Throat: No oropharyngeal exudate.   Eyes: No scleral icterus.   Neck: No tracheal deviation present.   Cardiovascular: Normal rate and regular rhythm.    Pulmonary/Chest: Effort normal. No respiratory distress. She has no wheezes. She has no rales.   Abdominal: Soft.   Musculoskeletal: She exhibits no edema or deformity.   Neurological: She is alert and oriented to person, place, and time.   Skin: Skin is warm and dry. She is not diaphoretic.   Vitals reviewed.    Results Review:  I have reviewed the labs, radiology results, and diagnostic studies.    Laboratory Data:     Results from last 7 days  Lab Units 05/12/18 0457 05/11/18  0541 05/10/18  0248   WBC 10*3/mm3 16.54* 15.69* 14.11*   HEMOGLOBIN g/dL 13.2 12.9 11.9*   HEMATOCRIT % 41.7 40.6 37.7   PLATELETS 10*3/mm3 263 281 289          Results from last 7 days  Lab Units 05/12/18  0457 05/11/18  0541 05/10/18  0248  05/09/18  0703  05/07/18  0257 05/06/18  1828   SODIUM mmol/L 142 142 141  --  142 142 142   SODIUM, ARTERIAL   --   --   --   < >  --   --   --    POTASSIUM mmol/L 4.3 4.1 4.4  --  4.2 3.9 4.2   CHLORIDE mmol/L 97* 99 100  --  101 100 103   CO2 mmol/L 34.0* 36.0* 32.0*  --  27.0 32.0* 29.0   BUN mg/dL 17 14 16  --  14 11 9   CREATININE mg/dL 0.79 0.76 0.79  --  0.68 0.66 0.65   CALCIUM mg/dL 9.3 9.6 9.4  --  9.7 9.4 9.2   BILIRUBIN mg/dL  --   --   --   --  0.3 0.8 0.7   ALK PHOS U/L  --   --   --   --  129* 164* 146*   ALT (SGPT) U/L  --   --   --   --  36 62* 64*   AST (SGOT) U/L  --   --   --   --  26 60* 80*   GLUCOSE mg/dL 149* 98 156*  --  222* 160* 126*   < > = values in this interval not displayed.    Culture Data:   Blood Culture   Date Value Ref Range Status   05/06/2018 No growth at 24 hours  Preliminary   05/06/2018 No growth at 24 hours  Preliminary       Radiology Data:   Imaging Results (last 24 hours)     Procedure Component Value Units Date/Time    XR Chest PA & Lateral [[id number]] Collected:  05/12/18 0737     Updated:  05/12/18 0741    Narrative:       EXAMINATION:  XR CHEST PA AND LATERAL-  5/12/2018 5:55 AM CDT     HISTORY: Chest tube removal.     COMPARISON: 5/11/2018.     TECHNIQUE: 2 views were obtained.     FINDINGS:  There is mild hypoventilation with vascular crowding. There  is no measurable pneumothorax after chest tube removal. There is mild  atelectasis in the lung bases. The heart is normal in size.       Impression:       1. Left chest tube removal. No measurable pneumothorax.  2. Mild hypoventilation with vascular crowding. Minimal atelectasis in  the lung bases.         This report was finalized on 05/12/2018 07:38 by Dr. Rufus Tavares MD.          I have reviewed the patient current medications.     Assessment/Plan     Hospital Problem List     Respiratory distress        Assessment:  1.  Acute on chronic hypoxemic respiratory failure  2.  Severe changes of interstitial fibrosis bilateral lungs  POD #3  left VATS thoracotomy with lung biopsy  3.  Right heart enlargement with mildly reduced RVEF and mild pulmonary HTN on previous echo in February 2018; NORMAL RV size and function noted on Echo performed 5/8/2018  4.  History of DVT on chronic anticoagulation with Coumadin  5.  COPD with acute exacerbation  6.  Bipolar disorder  7.  History of pervious gastric bypass procedure  8.  GERD with h/o esophagitis and gastritis  9.  Transaminitis-improved  10.  Tobacco dependence  11.  Generalized weakness    Plan:  1.  Cefepime and Levaquin day 5  2.  Change steroids to PO  3.  Scheduled nebs  4.  Diuretics on hold  5.  High flow 02 and will wean as tolerated  6.  Incentive spirometry  7.  Restart Coumadin and monitor INR closely (especially as she is on Levaquin)  8.  PT and OT eval  9.  D/C Martel  10.  Nicotine patch; she must stop smoking!  11.  Follow-up path from lung biopsy     Changed pain medication from norco to percocet, pain controlled today.       Alix Kingsley,    05/12/18   12:05 PM

## 2018-05-12 NOTE — PLAN OF CARE
Problem: Fall Risk (Adult)  Goal: Identify Related Risk Factors and Signs and Symptoms  Outcome: Outcome(s) achieved Date Met: 05/12/18    Goal: Absence of Fall  Outcome: Ongoing (interventions implemented as appropriate)   05/12/18 0503   Fall Risk (Adult)   Absence of Fall making progress toward outcome       Problem: Patient Care Overview  Goal: Plan of Care Review  Outcome: Ongoing (interventions implemented as appropriate)   05/12/18 0503   Coping/Psychosocial   Plan of Care Reviewed With patient   Coping/Psychosocial   Patient Agreement with Plan of Care agrees   OTHER   Outcome Summary pt c/o pain in left chest area; pain meds given with relief reported; pt continues to desat quickly with any activity; other VSS; pt rested fair; safety maintained   Plan of Care Review   Progress no change     Goal: Discharge Needs Assessment  Outcome: Ongoing (interventions implemented as appropriate)      Problem: ARDS (Acute Resp Distress Syndrome) (Adult)  Goal: Signs and Symptoms of Listed Potential Problems Will be Absent, Minimized or Managed (ARDS)  Outcome: Ongoing (interventions implemented as appropriate)   05/12/18 0503   Goal/Outcome Evaluation   Problems Assessed (Acute Respiratory Distress Syndrome) all   Problems Present (ARDS) none       Problem: Pneumonia (Adult)  Goal: Signs and Symptoms of Listed Potential Problems Will be Absent, Minimized or Managed (Pneumonia)  Outcome: Ongoing (interventions implemented as appropriate)   05/12/18 0503   Goal/Outcome Evaluation   Problems Assessed (Pneumonia) all   Problems Present (Pneumonia) respiratory compromise       Problem: Skin Injury Risk (Adult)  Goal: Identify Related Risk Factors and Signs and Symptoms  Outcome: Outcome(s) achieved Date Met: 05/12/18    Goal: Skin Health and Integrity  Outcome: Ongoing (interventions implemented as appropriate)   05/12/18 0503   Skin Injury Risk (Adult)   Skin Health and Integrity making progress toward outcome

## 2018-05-12 NOTE — PROGRESS NOTES
PULMONARY AND CRITICAL CARE PROGRESS NOTE - [facility]    Patient: Vianca Spencer  1975   MR# [id number]   Acct# [id number]  05/12/18   6:56 PM  Referring Provider: Alix Kingsley DO    Chief Complaint: Hypoxia and ILD    Interval history: She feels much better.  She remains on high flow oxygen.  Continuous dyspnea in chest is better for the past couple of days.   On abx.  No fevers, chills, sweats No other aggravating, alleviating factors or associated symptoms.   Meds:    albuterol 1.25 mg Nebulization 4x Daily - RT   cefepime 1 g Intravenous Q8H   diazePAM 5 mg Oral TID   FLUoxetine 60 mg Oral Daily   gabapentin 300 mg Oral BID   gabapentin 600 mg Oral Nightly   guaiFENesin 1,200 mg Oral Q12H   levothyroxine 112 mcg Oral Daily   lithium carbonate 300 mg Oral Nightly   nicotine 1 patch Transdermal Q24H   pantoprazole 40 mg Oral QAM   predniSONE 20 mg Oral Daily With Breakfast   warfarin 5 mg Oral Daily       O2 2 L/min     Review of Systems:   Review of Systems   Constitutional: Negative for chills and fever.   HENT: Negative for sore throat.    Respiratory: Positive for cough. Negative for chest tightness.    Cardiovascular: Negative for leg swelling.   Gastrointestinal: Negative for abdominal distention, diarrhea, nausea and vomiting.   Genitourinary: Negative for difficulty urinating.   Psychiatric/Behavioral: Negative for agitation.     Physical Exam:  SpO2 Percentage    05/12/18 1136 05/12/18 1549 05/12/18 1556   SpO2: 100% 100% 100%     Temp:  [96.9 °F (36.1 °C)-97.5 °F (36.4 °C)] 97.5 °F (36.4 °C)  Heart Rate:  [56-80] 63  Resp:  [16-18] 16  BP: (114-128)/(70-81) 124/75  No intake or output data in the 24 hours ending 05/12/18 1856  Physical Exam   Constitutional: She appears well-developed. She is active. She has a sickly appearance. She appears ill. No distress. Nasal cannula in place.   HENT:   Head: Normocephalic and atraumatic.   Nose: Nose normal.   Eyes: EOM are normal.  No scleral icterus.   Neck: Neck supple. No tracheal deviation present.   Cardiovascular: Normal rate and regular rhythm.    Pulmonary/Chest: Effort normal. No accessory muscle usage. No tachypnea. No respiratory distress. She has decreased breath sounds. She has rales in the right lower field and the left lower field.    CT gone   Abdominal: Soft. She exhibits no distension. There is no tenderness.   Obese   Musculoskeletal: She exhibits no edema.   Neurological: She is alert.   Skin: Skin is warm and dry. She is not diaphoretic.   Psychiatric: She has a normal mood and affect. Her behavior is normal.     Laboratory Data:    Results from last 7 days  Lab Units 05/12/18  0457 05/11/18  0541 05/10/18  0248   WBC 10*3/mm3 16.54* 15.69* 14.11*   HEMOGLOBIN g/dL 13.2 12.9 11.9*   PLATELETS 10*3/mm3 263 281 289       Results from last 7 days  Lab Units 05/12/18  0457 05/11/18  0541 05/10/18  0248   SODIUM mmol/L 142 142 141   POTASSIUM mmol/L 4.3 4.1 4.4   BUN mg/dL 17 14 16   CREATININE mg/dL 0.79 0.76 0.79   INR  1.96* 1.62* 1.72*       Results from last 7 days  Lab Units 05/09/18  1510 05/07/18  1538 05/07/18  0436 05/06/18  1823   PH, ARTERIAL pH units 7.431 7.458* 7.433 7.400   PCO2, ARTERIAL mm Hg 44.2 43.7 46.5* 41.4   PO2 ART mm Hg 87.5 57.1* 67.7* 60.0*   FIO2 %  --   --  50 100     Blood Culture   Date Value Ref Range Status   05/06/2018 No growth at 2 days  Preliminary   05/06/2018 No growth at 2 days  Preliminary     Recent films:  Imaging Results (last 24 hours)     Procedure Component Value Units Date/Time    XR Chest PA & Lateral [[id number]] Collected:  05/12/18 0737     Updated:  05/12/18 0741    Narrative:       EXAMINATION:  XR CHEST PA AND LATERAL-  5/12/2018 5:55 AM CDT     HISTORY: Chest tube removal.     COMPARISON: 5/11/2018.     TECHNIQUE: 2 views were obtained.     FINDINGS:  There is mild hypoventilation with vascular crowding. There  is no measurable pneumothorax after chest tube removal. There  is mild  atelectasis in the lung bases. The heart is normal in size.       Impression:       1. Left chest tube removal. No measurable pneumothorax.  2. Mild hypoventilation with vascular crowding. Minimal atelectasis in  the lung bases.         This report was finalized on 05/12/2018 07:38 by Dr. Rufus Tavares MD.        Films reviewed personally by me.  My interpretation: Bilateral interstitial changes.  Large left-sided chest tube.  Lung expanded.    Pulmonary Assessment:  1. Chronic ild.  Does not look like copd. stable, awaiting path from open lung biopsy  2. Acute and chronic rf with hypoxia, decompensated but improving  3. Bipolar stable on medications  4. Nicotine dependency, needs to stop    Recommend:     · Mobilize as tolerated  · Continue oxygen  · Continue RT  · Microbiology reviewed, negative; continue empiric abx for now but taper off starting tomorrow    Electronically signed by Earnest Ba MD, 05/12/18, 6:56 PM     EMR Dragon/Transcription disclaimer: Much of this encounter note is an electronic transcription/translation of spoken language to printed text. The electronic translation of spoken language may permit erroneous, or at times, nonsensical words or phrases to be inadvertently transcribed; although I have reviewed the note for such errors, some may still exist.

## 2018-05-12 NOTE — PROGRESS NOTES
"Bronchoscopy, L VATS, wedge resection  POD #3        Ct removed yesterday.  Walking shafer.      Visit Vitals  /70 (BP Location: Right arm, Patient Position: Sitting)   Pulse 64   Temp 97.3 °F (36.3 °C) (Temporal Artery )   Resp 18   Ht 149.9 cm (59\")   Wt 85.4 kg (188 lb 6 oz)   SpO2 100%   BMI 38.05 kg/m²     5 L/min NC      Intake/Output Summary (Last 24 hours) at 05/12/18 1316  Last data filed at 05/11/18 1845   Gross per 24 hour   Intake                0 ml   Output             1200 ml   Net            -1200 ml     CXR: No ptx.  No pleural effusion,  Hypoventilation    Physical Exam:    General: NAD, In good spirits  Was walking in the shafer way.    Cardiovascular: RRR, No murmur, rubs, or gallops.    Pulmonary: CTAB, No wheezing, rubs, or rales.  Dressings c/d/i  Abdomen: soft, Non-distended, and non-tender  Extremities: warm, DELEON  Neurologic:  No focal deficits, CN II-XII intact grossly.    Dressings C/D/I    Impression:  Acute on chronic hypoxic respiratory failure  Chronic tobacco use  GERD  Bipolar disorder  COPD vs interstitial fibrosis  HO DVT.  On anticoagulation with coumadin  Essentially therapeutic anticoagution     Medical decision-making/recommendations/plan:  High O2 requirements problematic.  From a surgical technical point of view ok for dc.  She still needs further optimization prior to dc from a medical point of view.            "

## 2018-05-12 NOTE — PLAN OF CARE
Problem: Fall Risk (Adult)  Goal: Absence of Fall  Outcome: Ongoing (interventions implemented as appropriate)      Problem: Patient Care Overview  Goal: Plan of Care Review  Outcome: Ongoing (interventions implemented as appropriate)   05/12/18 0503 05/12/18 1615   Coping/Psychosocial   Plan of Care Reviewed With patient --    Coping/Psychosocial   Patient Agreement with Plan of Care agrees --    OTHER   Outcome Summary --  up in the shafer with floor staff and PT. CT site draining serosang. fluid. drsing reapplied       Problem: ARDS (Acute Resp Distress Syndrome) (Adult)  Goal: Signs and Symptoms of Listed Potential Problems Will be Absent, Minimized or Managed (ARDS)  Outcome: Ongoing (interventions implemented as appropriate)      Problem: Pneumonia (Adult)  Goal: Signs and Symptoms of Listed Potential Problems Will be Absent, Minimized or Managed (Pneumonia)  Outcome: Ongoing (interventions implemented as appropriate)      Problem: Skin Injury Risk (Adult)  Goal: Skin Health and Integrity  Outcome: Ongoing (interventions implemented as appropriate)

## 2018-05-13 VITALS
TEMPERATURE: 96.9 F | OXYGEN SATURATION: 90 % | BODY MASS INDEX: 38 KG/M2 | WEIGHT: 188.5 LBS | DIASTOLIC BLOOD PRESSURE: 62 MMHG | RESPIRATION RATE: 16 BRPM | HEIGHT: 59 IN | HEART RATE: 78 BPM | SYSTOLIC BLOOD PRESSURE: 103 MMHG

## 2018-05-13 LAB
ALBUMIN SERPL-MCNC: 3.8 G/DL (ref 3.5–5)
ALBUMIN/GLOB SERPL: 1.3 G/DL (ref 1.1–2.5)
ALP SERPL-CCNC: 106 U/L (ref 24–120)
ALT SERPL W P-5'-P-CCNC: 55 U/L (ref 0–54)
ANION GAP SERPL CALCULATED.3IONS-SCNC: 10 MMOL/L (ref 4–13)
AST SERPL-CCNC: 32 U/L (ref 7–45)
BILIRUB SERPL-MCNC: 0.2 MG/DL (ref 0.1–1)
BUN BLD-MCNC: 17 MG/DL (ref 5–21)
BUN/CREAT SERPL: 21 (ref 7–25)
CALCIUM SPEC-SCNC: 9.4 MG/DL (ref 8.4–10.4)
CHLORIDE SERPL-SCNC: 97 MMOL/L (ref 98–110)
CO2 SERPL-SCNC: 33 MMOL/L (ref 24–31)
CREAT BLD-MCNC: 0.81 MG/DL (ref 0.5–1.4)
DEPRECATED RDW RBC AUTO: 45.4 FL (ref 40–54)
EOSINOPHIL # BLD MANUAL: 0.16 10*3/MM3 (ref 0–0.7)
EOSINOPHIL NFR BLD MANUAL: 1 % (ref 0–4)
ERYTHROCYTE [DISTWIDTH] IN BLOOD BY AUTOMATED COUNT: 13.3 % (ref 12–15)
GFR SERPL CREATININE-BSD FRML MDRD: 77 ML/MIN/1.73
GLOBULIN UR ELPH-MCNC: 2.9 GM/DL
GLUCOSE BLD-MCNC: 140 MG/DL (ref 70–100)
HCT VFR BLD AUTO: 39.5 % (ref 37–47)
HGB BLD-MCNC: 12.8 G/DL (ref 12–16)
INR PPP: 2.26 (ref 0.91–1.09)
LYMPHOCYTES # BLD MANUAL: 1.58 10*3/MM3 (ref 0.72–4.86)
LYMPHOCYTES NFR BLD MANUAL: 10.1 % (ref 15–45)
LYMPHOCYTES NFR BLD MANUAL: 5.1 % (ref 4–12)
MCH RBC QN AUTO: 29.8 PG (ref 28–32)
MCHC RBC AUTO-ENTMCNC: 32.4 G/DL (ref 33–36)
MCV RBC AUTO: 92.1 FL (ref 82–98)
MONOCYTES # BLD AUTO: 0.8 10*3/MM3 (ref 0.19–1.3)
MYELOCYTES NFR BLD MANUAL: 2 % (ref 0–0)
NEUTROPHILS # BLD AUTO: 12.64 10*3/MM3 (ref 1.87–8.4)
NEUTROPHILS NFR BLD MANUAL: 80.8 % (ref 39–78)
PLAT MORPH BLD: NORMAL
PLATELET # BLD AUTO: 225 10*3/MM3 (ref 130–400)
PMV BLD AUTO: 10.1 FL (ref 6–12)
POTASSIUM BLD-SCNC: 3.6 MMOL/L (ref 3.5–5.3)
PROT SERPL-MCNC: 6.7 G/DL (ref 6.3–8.7)
PROTHROMBIN TIME: 25.8 SECONDS (ref 11.9–14.6)
RBC # BLD AUTO: 4.29 10*6/MM3 (ref 4.2–5.4)
RBC MORPH BLD: NORMAL
SODIUM BLD-SCNC: 140 MMOL/L (ref 135–145)
VARIANT LYMPHS NFR BLD MANUAL: 1 % (ref 0–5)
WBC MORPH BLD: NORMAL
WBC NRBC COR # BLD: 15.64 10*3/MM3 (ref 4.8–10.8)

## 2018-05-13 PROCEDURE — 94799 UNLISTED PULMONARY SVC/PX: CPT

## 2018-05-13 PROCEDURE — 85610 PROTHROMBIN TIME: CPT | Performed by: INTERNAL MEDICINE

## 2018-05-13 PROCEDURE — 25010000002 CEFEPIME PER 500 MG: Performed by: INTERNAL MEDICINE

## 2018-05-13 PROCEDURE — 63710000001 PREDNISONE PER 1 MG: Performed by: INTERNAL MEDICINE

## 2018-05-13 PROCEDURE — 80053 COMPREHEN METABOLIC PANEL: CPT | Performed by: INTERNAL MEDICINE

## 2018-05-13 PROCEDURE — 85025 COMPLETE CBC W/AUTO DIFF WBC: CPT | Performed by: INTERNAL MEDICINE

## 2018-05-13 PROCEDURE — 94760 N-INVAS EAR/PLS OXIMETRY 1: CPT

## 2018-05-13 PROCEDURE — 85007 BL SMEAR W/DIFF WBC COUNT: CPT | Performed by: INTERNAL MEDICINE

## 2018-05-13 RX ORDER — OXYCODONE AND ACETAMINOPHEN 7.5; 325 MG/1; MG/1
1 TABLET ORAL EVERY 6 HOURS PRN
Qty: 4 TABLET | Refills: 0 | Status: SHIPPED | OUTPATIENT
Start: 2018-05-13 | End: 2018-05-21

## 2018-05-13 RX ORDER — OXYCODONE AND ACETAMINOPHEN 7.5; 325 MG/1; MG/1
1 TABLET ORAL ONCE
Status: DISCONTINUED | OUTPATIENT
Start: 2018-05-13 | End: 2018-05-13 | Stop reason: HOSPADM

## 2018-05-13 RX ORDER — LEVOFLOXACIN 500 MG/1
500 TABLET, FILM COATED ORAL DAILY
Qty: 3 TABLET | Refills: 0 | Status: ON HOLD | OUTPATIENT
Start: 2018-05-13 | End: 2018-06-04

## 2018-05-13 RX ORDER — PREDNISONE 20 MG/1
20 TABLET ORAL
Qty: 12 TABLET | Refills: 0 | Status: ON HOLD | OUTPATIENT
Start: 2018-05-14 | End: 2018-06-04

## 2018-05-13 RX ORDER — NICOTINE 21 MG/24HR
1 PATCH, TRANSDERMAL 24 HOURS TRANSDERMAL EVERY 24 HOURS
Qty: 5 PATCH | Refills: 0 | Status: SHIPPED | OUTPATIENT
Start: 2018-05-14

## 2018-05-13 RX ORDER — FLUCONAZOLE 200 MG/1
200 TABLET ORAL DAILY
Qty: 1 TABLET | Refills: 0 | Status: ON HOLD | OUTPATIENT
Start: 2018-05-13 | End: 2018-06-04

## 2018-05-13 RX ORDER — WARFARIN SODIUM 5 MG/1
5 TABLET ORAL 3 TIMES WEEKLY
Qty: 10 TABLET | Refills: 0 | Status: ON HOLD | OUTPATIENT
Start: 2018-05-14 | End: 2018-06-11

## 2018-05-13 RX ORDER — LEVOFLOXACIN 500 MG/1
500 TABLET, FILM COATED ORAL ONCE
Status: COMPLETED | OUTPATIENT
Start: 2018-05-13 | End: 2018-05-13

## 2018-05-13 RX ORDER — GUAIFENESIN 600 MG/1
1200 TABLET, EXTENDED RELEASE ORAL EVERY 12 HOURS SCHEDULED
Qty: 20 TABLET | Refills: 0 | Status: SHIPPED | OUTPATIENT
Start: 2018-05-13

## 2018-05-13 RX ADMIN — OXYCODONE AND ACETAMINOPHEN 1 TABLET: 7.5; 325 TABLET ORAL at 10:03

## 2018-05-13 RX ADMIN — PREDNISONE 20 MG: 20 TABLET ORAL at 08:37

## 2018-05-13 RX ADMIN — CEFEPIME HYDROCHLORIDE 1 G: 1 INJECTION, POWDER, FOR SOLUTION INTRAMUSCULAR; INTRAVENOUS at 01:38

## 2018-05-13 RX ADMIN — ALBUTEROL SULFATE 1.25 MG: 1.25 SOLUTION RESPIRATORY (INHALATION) at 07:39

## 2018-05-13 RX ADMIN — GABAPENTIN 300 MG: 300 CAPSULE ORAL at 08:37

## 2018-05-13 RX ADMIN — DIAZEPAM 5 MG: 5 TABLET ORAL at 08:37

## 2018-05-13 RX ADMIN — LEVOTHYROXINE SODIUM 112 MCG: 112 TABLET ORAL at 08:37

## 2018-05-13 RX ADMIN — ALBUTEROL SULFATE 1.25 MG: 1.25 SOLUTION RESPIRATORY (INHALATION) at 11:03

## 2018-05-13 RX ADMIN — PANTOPRAZOLE SODIUM 40 MG: 40 TABLET, DELAYED RELEASE ORAL at 06:01

## 2018-05-13 RX ADMIN — FLUOXETINE HYDROCHLORIDE 60 MG: 20 CAPSULE ORAL at 08:37

## 2018-05-13 RX ADMIN — OXYCODONE AND ACETAMINOPHEN 1 TABLET: 7.5; 325 TABLET ORAL at 06:01

## 2018-05-13 RX ADMIN — GUAIFENESIN 1200 MG: 600 TABLET, EXTENDED RELEASE ORAL at 08:37

## 2018-05-13 RX ADMIN — LEVOFLOXACIN 500 MG: 500 TABLET, FILM COATED ORAL at 13:14

## 2018-05-13 NOTE — PROGRESS NOTES
Continued Stay Note   [place]     Patient Name: Vianca Spencer  MRN: [id number]  Today's Date: 5/13/2018    Admit Date: 5/6/2018          Discharge Plan     Row Name 05/13/18 1154       Plan    Patient/Family in Agreement with Plan yes    Final Discharge Disposition Code 06 - home with home health care    Final Note Referral for hh.  BRADEN provided pt with a list of hh agencies and she chose [facility].  BRADEN has faxed referral and Callie on call is aware.  SIDRA Gilman.              Discharge Codes    No documentation.       Expected Discharge Date and Time     Expected Discharge Date Expected Discharge Time    May 13, 2018             SIDRA Duggan

## 2018-05-13 NOTE — PLAN OF CARE
Problem: Fall Risk (Adult)  Goal: Absence of Fall  Outcome: Ongoing (interventions implemented as appropriate)   05/13/18 0437   Fall Risk (Adult)   Absence of Fall making progress toward outcome       Problem: Patient Care Overview  Goal: Plan of Care Review  Outcome: Ongoing (interventions implemented as appropriate)   05/13/18 0437   Coping/Psychosocial   Plan of Care Reviewed With patient   Coping/Psychosocial   Patient Agreement with Plan of Care agrees   OTHER   Outcome Summary pt c/o pain in left lateral chest; pain med given with relief reported; pt states feeling better; O2 has been turned down to 3L NC; pt is able to walk the halls with walker; pt rested fair; safety maintained   Plan of Care Review   Progress improving     Goal: Discharge Needs Assessment  Outcome: Ongoing (interventions implemented as appropriate)      Problem: ARDS (Acute Resp Distress Syndrome) (Adult)  Goal: Signs and Symptoms of Listed Potential Problems Will be Absent, Minimized or Managed (ARDS)  Outcome: Ongoing (interventions implemented as appropriate)   05/13/18 0437   Goal/Outcome Evaluation   Problems Assessed (Acute Respiratory Distress Syndrome) all   Problems Present (ARDS) none       Problem: Pneumonia (Adult)  Goal: Signs and Symptoms of Listed Potential Problems Will be Absent, Minimized or Managed (Pneumonia)  Outcome: Ongoing (interventions implemented as appropriate)   05/13/18 0437   Goal/Outcome Evaluation   Problems Assessed (Pneumonia) all   Problems Present (Pneumonia) respiratory compromise       Problem: Skin Injury Risk (Adult)  Goal: Skin Health and Integrity  Outcome: Ongoing (interventions implemented as appropriate)   05/13/18 0437   Skin Injury Risk (Adult)   Skin Health and Integrity making progress toward outcome

## 2018-05-13 NOTE — PLAN OF CARE
Problem: Fall Risk (Adult)  Goal: Absence of Fall  Outcome: Ongoing (interventions implemented as appropriate)      Problem: Patient Care Overview  Goal: Plan of Care Review  Outcome: Outcome(s) achieved Date Met: 05/13/18 05/13/18 0437 05/13/18 1016   Coping/Psychosocial   Plan of Care Reviewed With --  patient   Coping/Psychosocial   Patient Agreement with Plan of Care agrees --    OTHER   Outcome Summary --  pt doing well. ambulating in shafer independently. still has some chest pain at former CT site.    Plan of Care Review   Progress --  improving       Problem: ARDS (Acute Resp Distress Syndrome) (Adult)  Goal: Signs and Symptoms of Listed Potential Problems Will be Absent, Minimized or Managed (ARDS)  Outcome: Outcome(s) achieved Date Met: 05/13/18      Problem: Pneumonia (Adult)  Goal: Signs and Symptoms of Listed Potential Problems Will be Absent, Minimized or Managed (Pneumonia)  Outcome: Outcome(s) achieved Date Met: 05/13/18      Problem: Skin Injury Risk (Adult)  Goal: Skin Health and Integrity  Outcome: Ongoing (interventions implemented as appropriate)

## 2018-05-13 NOTE — PROGRESS NOTES
PULMONARY AND CRITICAL CARE PROGRESS NOTE - [facility]    Patient: Vianca Spencer  1975   MR# [id number]   Acct# [id number]  05/13/18   10:52 AM  Referring Provider: Alix Kingsley DO    Chief Complaint: Hypoxia and ILD    Interval history: She feels much better.  She has converted down to conventional flow oxygen.  She has this at home.  She wants to go home she has no new complaints no increased pain or increased cough or sputum production  Meds:    albuterol 1.25 mg Nebulization 4x Daily - RT   diazePAM 5 mg Oral TID   FLUoxetine 60 mg Oral Daily   gabapentin 300 mg Oral BID   gabapentin 600 mg Oral Nightly   guaiFENesin 1,200 mg Oral Q12H   levothyroxine 112 mcg Oral Daily   lithium carbonate 300 mg Oral Nightly   nicotine 1 patch Transdermal Q24H   oxyCODONE-acetaminophen 1 tablet Oral Once   pantoprazole 40 mg Oral QAM   predniSONE 20 mg Oral Daily With Breakfast   warfarin 5 mg Oral Daily       O2 2 L/min     Review of Systems:   Review of Systems   Constitutional: Negative for chills and fever.   Gastrointestinal: Negative for diarrhea.   Psychiatric/Behavioral: Negative for agitation.     Physical Exam:  SpO2 Percentage    05/13/18 0350 05/13/18 0739 05/13/18 0749   SpO2: 93% 97% 100%     Temp:  [96.9 °F (36.1 °C)-98.1 °F (36.7 °C)] 96.9 °F (36.1 °C)  Heart Rate:  [57-88] 66  Resp:  [16-18] 16  BP: (103-152)/(62-88) 103/62  No intake or output data in the 24 hours ending 05/13/18 1052  Physical Exam   Constitutional: She appears well-developed. She is active. She has a sickly appearance. She appears ill. No distress. Nasal cannula in place.   HENT:   Head: Normocephalic and atraumatic.   Nose: Nose normal.   Eyes: EOM are normal. No scleral icterus.   Neck: Neck supple. No tracheal deviation present.   Cardiovascular: Normal rate and regular rhythm.    Pulmonary/Chest: Effort normal. No accessory muscle usage. No tachypnea. No respiratory distress. She has decreased breath  sounds.   Abdominal: Soft. She exhibits no distension.   Obese   Musculoskeletal: She exhibits no edema.   Neurological: She is alert.   Skin: Skin is warm and dry. She is not diaphoretic.     Laboratory Data:    Results from last 7 days  Lab Units 05/13/18  0504 05/12/18  0457 05/11/18  0541   WBC 10*3/mm3 15.64* 16.54* 15.69*   HEMOGLOBIN g/dL 12.8 13.2 12.9   PLATELETS 10*3/mm3 225 263 281       Results from last 7 days  Lab Units 05/13/18  0504 05/12/18  0457 05/11/18  0541   SODIUM mmol/L 140 142 142   POTASSIUM mmol/L 3.6 4.3 4.1   BUN mg/dL 17 17 14   CREATININE mg/dL 0.81 0.79 0.76   INR  2.26* 1.96* 1.62*       Results from last 7 days  Lab Units 05/09/18  1510 05/07/18  1538 05/07/18  0436 05/06/18  1823   PH, ARTERIAL pH units 7.431 7.458* 7.433 7.400   PCO2, ARTERIAL mm Hg 44.2 43.7 46.5* 41.4   PO2 ART mm Hg 87.5 57.1* 67.7* 60.0*   FIO2 %  --   --  50 100     Blood Culture   Date Value Ref Range Status   05/06/2018 No growth at 2 days  Preliminary   05/06/2018 No growth at 2 days  Preliminary       Pulmonary Assessment:  1. Chronic ild.  Not COPD stable, awaiting path from open lung biopsy  2. Acute and chronic rf with hypoxia, improving and back to conventional nasal cannula which she can continue at home  3. Bipolar disorder stable on medications  4. Nicotine dependency, needs to stop    Recommend:     · To home today  · Follow up in office to review pathology  · Discontinue IV antibiotics    Electronically signed by Earnest Ba MD, 05/13/18, 10:52 AM     EMR Dragon/Transcription disclaimer: Much of this encounter note is an electronic transcription/translation of spoken language to printed text. The electronic translation of spoken language may permit erroneous, or at times, nonsensical words or phrases to be inadvertently transcribed; although I have reviewed the note for such errors, some may still exist.

## 2018-05-13 NOTE — PAYOR COMM NOTE
"HI HOME 5-13-18  [id number]    Samantha Solomon (42 y.o. Female)     Date of Birth Social Security Number Address Home Phone MRN    [id number]  [address]  [facility] [address] [phone] [id number]    Restorationist Marital Status          None        Admission Date Admission Type Admitting Provider Attending Provider Department, Room/Bed    5/6/18 Emergency Alix Kingsley DO  [facility] 4C, 478/1    Discharge Date Discharge Disposition Discharge Destination        5/13/2018 Home or Self Care              Attending Provider:  (none)   Allergies:  Morphine, Aspirin, Morphine And Related, Nsaids, Quetiapine, Quetiapine Fumarate, Salicylates, Codeine    Isolation:  None   Infection:  None   Code Status:  Prior    Ht:  149.9 cm (59\")   Wt:  85.5 kg (188 lb 8 oz)    Admission Cmt:  None   Principal Problem:  None                Active Insurance as of 5/6/2018     Primary Coverage     Payor Plan Insurance Group Employer/Plan Group    MEDICARE MEDICARE A & B      Payor Plan Address Payor Plan Phone Number Effective From Effective To    [address] [phone] 4/1/2013     [place] [address]       Subscriber Name Subscriber Birth Date Member ID       SAMANTHA SOLOMON 1975 [id number]           Secondary Coverage     Payor Plan Insurance Group Employer/Plan Group    [employer] [place]ICA[place]      Payor Plan Address Payor Plan Phone Number Effective From Effective To    [address] [address] [phone] 9/19/2016     [place] [address]       Subscriber Name Subscriber Birth Date Member ID       SAMANTHA SOLOMON 1975 [id number]                 Emergency Contacts      (Rel.) Home Phone Work Phone Mobile Phone    Delmy Del Rio (Daughter) [phone] -- --               Discharge Summary      Alix Kingsley DO at 5/13/2018 10:42 AM              [facility] Medicine Services  DISCHARGE SUMMARY       Date of Admission: 5/6/2018  Date of " Discharge:  5/13/2018  Primary Care Physician: Neel Valencia Jr., MD    Presenting Problem/History of Present Illness:  Respiratory distress [R06.00]     Final Discharge Diagnoses:  Hospital Problem List     Respiratory distress      Assessment:  1.  Acute on chronic hypoxemic respiratory failure  2.  Severe changes of interstitial fibrosis bilateral lungs POD #3  left VATS thoracotomy with lung biopsy  3.  Right heart enlargement with mildly reduced RVEF and mild pulmonary HTN on previous echo in February 2018; NORMAL RV size and function noted on Echo performed 5/8/2018  4.  History of DVT on chronic anticoagulation with Coumadin  5.  COPD with acute exacerbation  6.  Bipolar disorder  7.  History of pervious gastric bypass procedure  8.  GERD with h/o esophagitis and gastritis  9.  Transaminitis-improved  10.  Tobacco dependence  11.  Generalized weakness    Consults:   Pulmonology  CT surgery      Procedures Performed:   VATS per CT surgery    Pertinent Test Results:   Lab Results (last 48 hours)     Procedure Component Value Units Date/Time    Tissue / Bone Culture - Tissue, Lung, Left Lower Lobe [[id number]]  (Normal) Collected:  05/09/18 1631    Specimen:  Tissue from Lung, Left Lower Lobe Updated:  05/13/18 0718     Tissue Culture No growth at 4 days     Gram Stain Result Moderate (3+) WBCs seen      No organisms seen    CBC & Differential [[id number]] Collected:  05/13/18 0504    Specimen:  Blood Updated:  05/13/18 0621    Narrative:       The following orders were created for panel order CBC & Differential.  Procedure                               Abnormality         Status                     ---------                               -----------         ------                     Manual Differential[[id number]]          Abnormal            Final result               CBC Auto Differential[[id number]]        Abnormal            Final result                 Please view results for these tests on the individual  orders.    CBC Auto Differential [[id number]]  (Abnormal) Collected:  05/13/18 0504    Specimen:  Blood Updated:  05/13/18 0621     WBC 15.64 (H) 10*3/mm3      RBC 4.29 10*6/mm3      Hemoglobin 12.8 g/dL      Hematocrit 39.5 %      MCV 92.1 fL      MCH 29.8 pg      MCHC 32.4 (L) g/dL      RDW 13.3 %      RDW-SD 45.4 fl      MPV 10.1 fL      Platelets 225 10*3/mm3     Manual Differential [[id number]]  (Abnormal) Collected:  05/13/18 0504    Specimen:  Blood Updated:  05/13/18 0621     Neutrophil % 80.8 (H) %      Lymphocyte % 10.1 (L) %      Monocyte % 5.1 %      Eosinophil % 1.0 %      Myelocyte % 2.0 (H) %      Atypical Lymphocyte % 1.0 %      Neutrophils Absolute 12.64 (H) 10*3/mm3      Lymphocytes Absolute 1.58 10*3/mm3      Monocytes Absolute 0.80 10*3/mm3      Eosinophils Absolute 0.16 10*3/mm3      RBC Morphology Normal     WBC Morphology Normal     Platelet Morphology Normal    Comprehensive Metabolic Panel [[id number]]  (Abnormal) Collected:  05/13/18 0504    Specimen:  Blood Updated:  05/13/18 0555     Glucose 140 (H) mg/dL      BUN 17 mg/dL      Creatinine 0.81 mg/dL      Sodium 140 mmol/L      Potassium 3.6 mmol/L      Chloride 97 (L) mmol/L      CO2 33.0 (H) mmol/L      Calcium 9.4 mg/dL      Total Protein 6.7 g/dL      Albumin 3.80 g/dL      ALT (SGPT) 55 (H) U/L      AST (SGOT) 32 U/L      Alkaline Phosphatase 106 U/L      Total Bilirubin 0.2 mg/dL      eGFR Non African Amer 77 mL/min/1.73      Globulin 2.9 gm/dL      A/G Ratio 1.3 g/dL      BUN/Creatinine Ratio 21.0     Anion Gap 10.0 mmol/L     Protime-INR [[id number]]  (Abnormal) Collected:  05/13/18 0504    Specimen:  Blood Updated:  05/13/18 0548     Protime 25.8 (H) Seconds      INR 2.26 (H)    Anaerobic Culture - Tissue, Lung, Left Lower Lobe [[id number]]  (Normal) Collected:  05/09/18 1631    Specimen:  Tissue from Lung, Left Lower Lobe Updated:  05/12/18 1252     Culture No anaerobes isolated at 3 days    CBC & Differential [[id number]] Collected:   05/12/18 0457    Specimen:  Blood Updated:  05/12/18 0610    Narrative:       The following orders were created for panel order CBC & Differential.  Procedure                               Abnormality         Status                     ---------                               -----------         ------                     Manual Differential[[id number]]          Abnormal            Final result               CBC Auto Differential[[id number]]        Abnormal            Final result                 Please view results for these tests on the individual orders.    CBC Auto Differential [[id number]]  (Abnormal) Collected:  05/12/18 0457    Specimen:  Blood Updated:  05/12/18 0610     WBC 16.54 (H) 10*3/mm3      RBC 4.47 10*6/mm3      Hemoglobin 13.2 g/dL      Hematocrit 41.7 %      MCV 93.3 fL      MCH 29.5 pg      MCHC 31.7 (L) g/dL      RDW 13.3 %      RDW-SD 45.3 fl      MPV 9.9 fL      Platelets 263 10*3/mm3     Narrative:       The previously reported component NRBC is no longer being reported.    Manual Differential [[id number]]  (Abnormal) Collected:  05/12/18 0457    Specimen:  Blood Updated:  05/12/18 0610     Neutrophil % 81.0 (H) %      Lymphocyte % 5.0 (L) %      Monocyte % 2.0 (L) %      Bands %  1.0 %      Myelocyte % 10.0 (H) %      Atypical Lymphocyte % 1.0 %      Neutrophils Absolute 13.56 (H) 10*3/mm3      Lymphocytes Absolute 0.83 10*3/mm3      Monocytes Absolute 0.33 10*3/mm3      RBC Morphology Normal     WBC Morphology Normal     Platelet Morphology Normal    Basic Metabolic Panel [[id number]]  (Abnormal) Collected:  05/12/18 0457    Specimen:  Blood Updated:  05/12/18 0538     Glucose 149 (H) mg/dL      BUN 17 mg/dL      Creatinine 0.79 mg/dL      Sodium 142 mmol/L      Potassium 4.3 mmol/L      Chloride 97 (L) mmol/L      CO2 34.0 (H) mmol/L      Calcium 9.3 mg/dL      eGFR Non African Amer 79 mL/min/1.73      BUN/Creatinine Ratio 21.5     Anion Gap 11.0 mmol/L     Narrative:       GFR Normal  ">60  Chronic Kidney Disease <60  Kidney Failure <15    Protime-INR [[id number]]  (Abnormal) Collected:  05/12/18 0457    Specimen:  Blood Updated:  05/12/18 0536     Protime 23.0 (H) Seconds      INR 1.96 (H)    Blood Culture - Blood, [[id number]]  (Normal) Collected:  05/06/18 1910    Specimen:  Blood from Arm, Right Updated:  05/11/18 1930     Blood Culture No growth at 5 days    Blood Culture - Blood, [[id number]]  (Normal) Collected:  05/06/18 1828    Specimen:  Blood from Arm, Left Updated:  05/11/18 1915     Blood Culture No growth at 5 days            Chief Complaint on Day of Discharge:   None    History of Present Illness on Day of Discharge: Patient states that she is ready to go home.     Hospital Course:  The patient is a 42 y.o. female who presented to [facility] with acute on chronic respiratory failure due to COPD and CHF.  Patient was placed in the ICE on the BiPAP and given steroids and IV lasix. Patient was placed on Cefepime and Levaquin. Martel was DC, and patient was moved from the unit. High flow 02 was weaned as well as steroids. Liver enzyme were slightly elevated but improved. INR was followed and patient was therapeutic at discharge.     Condition on Discharge:  Stable    Physical Exam on Discharge:  /62 (BP Location: Right arm, Patient Position: Lying)   Pulse 66   Temp 96.9 °F (36.1 °C) (Temporal Artery )   Resp 16   Ht 149.9 cm (59\")   Wt 85.5 kg (188 lb 8 oz)   SpO2 100%   BMI 38.07 kg/m²    Physical Exam   HENT:   Head: Normocephalic and atraumatic.   Nose: Nose normal.   Mouth/Throat: Oropharynx is clear and moist.   Eyes: Conjunctivae and EOM are normal.   Neck: Normal range of motion. Neck supple.   Cardiovascular: Normal rate, regular rhythm and normal heart sounds.    Pulmonary/Chest: Effort normal and breath sounds normal.   Abdominal: Soft. Bowel sounds are normal.   Musculoskeletal: She exhibits no edema or tenderness.   Neurological: She is alert. No " cranial nerve deficit.   Skin: Skin is warm and dry.   Psychiatric: She has a normal mood and affect.   Vitals reviewed.        Discharge Disposition:  Home or Self Care    Discharge Medications:   Vianca Spencer LIDIA   Home Medication Instructions KIERRA:[id number]    Printed on:05/13/18 1049   Medication Information                      diazePAM (VALIUM) 5 MG tablet  Take 5 mg by mouth 3 (Three) Times a Day.             FLUoxetine (PROzac) 20 MG capsule  Take 60 mg by mouth Daily.             gabapentin (NEURONTIN) 300 MG capsule  Take 300 mg by mouth 2 (Two) Times a Day.             gabapentin (NEURONTIN) 300 MG capsule  Take 600 mg by mouth Every Night.             guaiFENesin (MUCINEX) 600 MG 12 hr tablet  Take 2 tablets by mouth Every 12 (Twelve) Hours.             ipratropium-albuterol (DUO-NEB) 0.5-2.5 mg/mL nebulizer  Take 3 mL by nebulization 4 (Four) Times a Day.             levoFLOXacin (LEVAQUIN) 500 MG tablet  Take 1 tablet by mouth Daily.             levothyroxine (SYNTHROID, LEVOTHROID) 112 MCG tablet  Take 112 mcg by mouth Daily.             lithium carbonate 300 MG capsule  Take 300 mg by mouth Every Night.             nicotine (NICODERM CQ) 21 MG/24HR patch  Place 1 patch on the skin Daily.             omeprazole (priLOSEC) 20 MG capsule  Take 20 mg by mouth Daily.             oxyCODONE-acetaminophen (PERCOCET) 7.5-325 MG per tablet  Take 1 tablet by mouth Every 6 (Six) Hours As Needed for Moderate Pain  for up to 8 days.             predniSONE (DELTASONE) 20 MG tablet  Take 1 tablet by mouth Daily With Breakfast. 20mg daily x2 days,10mg Daily 3 days, 5mg daily x 3days             PROAIR  (90 BASE) MCG/ACT inhaler  Inhale 2 puffs Every 4 (Four) Hours As Needed for Wheezing or Shortness of Air.             promethazine (PHENERGAN) 25 MG tablet  Take 25 mg by mouth 2 (Two) Times a Day As Needed for Nausea or Vomiting.             warfarin (COUMADIN) 5 MG tablet  Take 1 tablet by mouth 3 (Three)  Times a Week. Daily             zolpidem (AMBIEN) 10 MG tablet  Take 10 mg by mouth Every Night.                 Discharge Diet:   Diet Instructions     Diet: Consistent Carbohydrate, Cardiac; Thin       Discharge Diet:   Consistent Carbohydrate  Cardiac       Fluid Consistency:  Thin          Activity at Discharge:   Activity Instructions     Activity as Tolerated             Discharge Care Plan/Instructions:   Return if worsens  CBC/CMP 2-3 days following DC  INR tomorrow  Home health and home PT  02 @ 2L NC continuous.     Follow-up Appointments:   No future appointments.    Test Results Pending at Discharge: Tissue pathology will need OP as it is still pending at [place].     Alix Kingsley DO  05/13/18  10:42 AM    Time: 36 minutes          Electronically signed by Alix Kingsley DO at 5/13/2018 10:54 AM

## 2018-05-13 NOTE — DISCHARGE SUMMARY
[facility] Medicine Services  DISCHARGE SUMMARY       Date of Admission: 5/6/2018  Date of Discharge:  5/13/2018  Primary Care Physician: Neel Valencia Jr., MD    Presenting Problem/History of Present Illness:  Respiratory distress [R06.00]     Final Discharge Diagnoses:  Hospital Problem List     Respiratory distress      Assessment:  1.  Acute on chronic hypoxemic respiratory failure  2.  Severe changes of interstitial fibrosis bilateral lungs POD #3  left VATS thoracotomy with lung biopsy  3.  Right heart enlargement with mildly reduced RVEF and mild pulmonary HTN on previous echo in February 2018; NORMAL RV size and function noted on Echo performed 5/8/2018  4.  History of DVT on chronic anticoagulation with Coumadin  5.  COPD with acute exacerbation  6.  Bipolar disorder  7.  History of pervious gastric bypass procedure  8.  GERD with h/o esophagitis and gastritis  9.  Transaminitis-improved  10.  Tobacco dependence  11.  Generalized weakness    Consults:   Pulmonology  CT surgery      Procedures Performed:   VATS per CT surgery    Pertinent Test Results:   Lab Results (last 48 hours)     Procedure Component Value Units Date/Time    Tissue / Bone Culture - Tissue, Lung, Left Lower Lobe [[id number]]  (Normal) Collected:  05/09/18 1631    Specimen:  Tissue from Lung, Left Lower Lobe Updated:  05/13/18 0718     Tissue Culture No growth at 4 days     Gram Stain Result Moderate (3+) WBCs seen      No organisms seen    CBC & Differential [[id number]] Collected:  05/13/18 0504    Specimen:  Blood Updated:  05/13/18 0621    Narrative:       The following orders were created for panel order CBC & Differential.  Procedure                               Abnormality         Status                     ---------                               -----------         ------                     Manual Differential[[id number]]          Abnormal            Final result               CBC Auto  Differential[[id number]]        Abnormal            Final result                 Please view results for these tests on the individual orders.    CBC Auto Differential [[id number]]  (Abnormal) Collected:  05/13/18 0504    Specimen:  Blood Updated:  05/13/18 0621     WBC 15.64 (H) 10*3/mm3      RBC 4.29 10*6/mm3      Hemoglobin 12.8 g/dL      Hematocrit 39.5 %      MCV 92.1 fL      MCH 29.8 pg      MCHC 32.4 (L) g/dL      RDW 13.3 %      RDW-SD 45.4 fl      MPV 10.1 fL      Platelets 225 10*3/mm3     Manual Differential [[id number]]  (Abnormal) Collected:  05/13/18 0504    Specimen:  Blood Updated:  05/13/18 0621     Neutrophil % 80.8 (H) %      Lymphocyte % 10.1 (L) %      Monocyte % 5.1 %      Eosinophil % 1.0 %      Myelocyte % 2.0 (H) %      Atypical Lymphocyte % 1.0 %      Neutrophils Absolute 12.64 (H) 10*3/mm3      Lymphocytes Absolute 1.58 10*3/mm3      Monocytes Absolute 0.80 10*3/mm3      Eosinophils Absolute 0.16 10*3/mm3      RBC Morphology Normal     WBC Morphology Normal     Platelet Morphology Normal    Comprehensive Metabolic Panel [[id number]]  (Abnormal) Collected:  05/13/18 0504    Specimen:  Blood Updated:  05/13/18 0555     Glucose 140 (H) mg/dL      BUN 17 mg/dL      Creatinine 0.81 mg/dL      Sodium 140 mmol/L      Potassium 3.6 mmol/L      Chloride 97 (L) mmol/L      CO2 33.0 (H) mmol/L      Calcium 9.4 mg/dL      Total Protein 6.7 g/dL      Albumin 3.80 g/dL      ALT (SGPT) 55 (H) U/L      AST (SGOT) 32 U/L      Alkaline Phosphatase 106 U/L      Total Bilirubin 0.2 mg/dL      eGFR Non African Amer 77 mL/min/1.73      Globulin 2.9 gm/dL      A/G Ratio 1.3 g/dL      BUN/Creatinine Ratio 21.0     Anion Gap 10.0 mmol/L     Protime-INR [[id number]]  (Abnormal) Collected:  05/13/18 0504    Specimen:  Blood Updated:  05/13/18 0548     Protime 25.8 (H) Seconds      INR 2.26 (H)    Anaerobic Culture - Tissue, Lung, Left Lower Lobe [[id number]]  (Normal) Collected:  05/09/18 1635    Specimen:  Tissue from  Lung, Left Lower Lobe Updated:  05/12/18 1252     Culture No anaerobes isolated at 3 days    CBC & Differential [[id number]] Collected:  05/12/18 0457    Specimen:  Blood Updated:  05/12/18 0610    Narrative:       The following orders were created for panel order CBC & Differential.  Procedure                               Abnormality         Status                     ---------                               -----------         ------                     Manual Differential[[id number]]          Abnormal            Final result               CBC Auto Differential[[id number]]        Abnormal            Final result                 Please view results for these tests on the individual orders.    CBC Auto Differential [[id number]]  (Abnormal) Collected:  05/12/18 0457    Specimen:  Blood Updated:  05/12/18 0610     WBC 16.54 (H) 10*3/mm3      RBC 4.47 10*6/mm3      Hemoglobin 13.2 g/dL      Hematocrit 41.7 %      MCV 93.3 fL      MCH 29.5 pg      MCHC 31.7 (L) g/dL      RDW 13.3 %      RDW-SD 45.3 fl      MPV 9.9 fL      Platelets 263 10*3/mm3     Narrative:       The previously reported component NRBC is no longer being reported.    Manual Differential [[id number]]  (Abnormal) Collected:  05/12/18 0457    Specimen:  Blood Updated:  05/12/18 0610     Neutrophil % 81.0 (H) %      Lymphocyte % 5.0 (L) %      Monocyte % 2.0 (L) %      Bands %  1.0 %      Myelocyte % 10.0 (H) %      Atypical Lymphocyte % 1.0 %      Neutrophils Absolute 13.56 (H) 10*3/mm3      Lymphocytes Absolute 0.83 10*3/mm3      Monocytes Absolute 0.33 10*3/mm3      RBC Morphology Normal     WBC Morphology Normal     Platelet Morphology Normal    Basic Metabolic Panel [[id number]]  (Abnormal) Collected:  05/12/18 0457    Specimen:  Blood Updated:  05/12/18 0538     Glucose 149 (H) mg/dL      BUN 17 mg/dL      Creatinine 0.79 mg/dL      Sodium 142 mmol/L      Potassium 4.3 mmol/L      Chloride 97 (L) mmol/L      CO2 34.0 (H) mmol/L      Calcium 9.3 mg/dL       "eGFR Non African Amer 79 mL/min/1.73      BUN/Creatinine Ratio 21.5     Anion Gap 11.0 mmol/L     Narrative:       GFR Normal >60  Chronic Kidney Disease <60  Kidney Failure <15    Protime-INR [[id number]]  (Abnormal) Collected:  05/12/18 0457    Specimen:  Blood Updated:  05/12/18 0536     Protime 23.0 (H) Seconds      INR 1.96 (H)    Blood Culture - Blood, [[id number]]  (Normal) Collected:  05/06/18 1910    Specimen:  Blood from Arm, Right Updated:  05/11/18 1930     Blood Culture No growth at 5 days    Blood Culture - Blood, [[id number]]  (Normal) Collected:  05/06/18 1828    Specimen:  Blood from Arm, Left Updated:  05/11/18 1915     Blood Culture No growth at 5 days            Chief Complaint on Day of Discharge:   None    History of Present Illness on Day of Discharge: Patient states that she is ready to go home.     Hospital Course:  The patient is a 42 y.o. female who presented to  with acute on chronic respiratory failure due to COPD and CHF.  Patient was placed in the ICE on the BiPAP and given steroids and IV lasix. Patient was placed on Cefepime and Levaquin. Martel was DC, and patient was moved from the unit. High flow 02 was weaned as well as steroids. Liver enzyme were slightly elevated but improved. INR was followed and patient was therapeutic at discharge.     Condition on Discharge:  Stable    Physical Exam on Discharge:  /62 (BP Location: Right arm, Patient Position: Lying)   Pulse 66   Temp 96.9 °F (36.1 °C) (Temporal Artery )   Resp 16   Ht 149.9 cm (59\")   Wt 85.5 kg (188 lb 8 oz)   SpO2 100%   BMI 38.07 kg/m²   Physical Exam   HENT:   Head: Normocephalic and atraumatic.   Nose: Nose normal.   Mouth/Throat: Oropharynx is clear and moist.   Eyes: Conjunctivae and EOM are normal.   Neck: Normal range of motion. Neck supple.   Cardiovascular: Normal rate, regular rhythm and normal heart sounds.    Pulmonary/Chest: Effort normal and breath sounds normal.   Abdominal: " Soft. Bowel sounds are normal.   Musculoskeletal: She exhibits no edema or tenderness.   Neurological: She is alert. No cranial nerve deficit.   Skin: Skin is warm and dry.   Psychiatric: She has a normal mood and affect.   Vitals reviewed.        Discharge Disposition:  Home or Self Care    Discharge Medications:   Vianca Spencer   Home Medication Instructions KIERRA:[id number]    Printed on:05/13/18 1049   Medication Information                      diazePAM (VALIUM) 5 MG tablet  Take 5 mg by mouth 3 (Three) Times a Day.             FLUoxetine (PROzac) 20 MG capsule  Take 60 mg by mouth Daily.             gabapentin (NEURONTIN) 300 MG capsule  Take 300 mg by mouth 2 (Two) Times a Day.             gabapentin (NEURONTIN) 300 MG capsule  Take 600 mg by mouth Every Night.             guaiFENesin (MUCINEX) 600 MG 12 hr tablet  Take 2 tablets by mouth Every 12 (Twelve) Hours.             ipratropium-albuterol (DUO-NEB) 0.5-2.5 mg/mL nebulizer  Take 3 mL by nebulization 4 (Four) Times a Day.             levoFLOXacin (LEVAQUIN) 500 MG tablet  Take 1 tablet by mouth Daily.             levothyroxine (SYNTHROID, LEVOTHROID) 112 MCG tablet  Take 112 mcg by mouth Daily.             lithium carbonate 300 MG capsule  Take 300 mg by mouth Every Night.             nicotine (NICODERM CQ) 21 MG/24HR patch  Place 1 patch on the skin Daily.             omeprazole (priLOSEC) 20 MG capsule  Take 20 mg by mouth Daily.             oxyCODONE-acetaminophen (PERCOCET) 7.5-325 MG per tablet  Take 1 tablet by mouth Every 6 (Six) Hours As Needed for Moderate Pain  for up to 8 days.             predniSONE (DELTASONE) 20 MG tablet  Take 1 tablet by mouth Daily With Breakfast. 20mg daily x2 days,10mg Daily 3 days, 5mg daily x 3days             PROAIR  (90 BASE) MCG/ACT inhaler  Inhale 2 puffs Every 4 (Four) Hours As Needed for Wheezing or Shortness of Air.             promethazine (PHENERGAN) 25 MG tablet  Take 25 mg by mouth 2 (Two) Times  a Day As Needed for Nausea or Vomiting.             warfarin (COUMADIN) 5 MG tablet  Take 1 tablet by mouth 3 (Three) Times a Week. Daily             zolpidem (AMBIEN) 10 MG tablet  Take 10 mg by mouth Every Night.                 Discharge Diet:   Diet Instructions     Diet: Consistent Carbohydrate, Cardiac; Thin       Discharge Diet:   Consistent Carbohydrate  Cardiac       Fluid Consistency:  Thin          Activity at Discharge:   Activity Instructions     Activity as Tolerated             Discharge Care Plan/Instructions:   Return if worsens  CBC/CMP 2-3 days following DC  INR tomorrow  Home health and home PT  02 @ 2L NC continuous.     Follow-up Appointments:   No future appointments.    Test Results Pending at Discharge: Tissue pathology will need OP as it is still pending at [place].     Alix Kingsley DO  05/13/18  10:42 AM    Time: 36 minutes

## 2018-05-13 NOTE — DISCHARGE PLACEMENT REQUEST
"To:  [facility]  From:  Alexsandra Pruett, [facility]  [phone]    Samantha Solomon (42 y.o. Female)     Date of Birth Social Security Number Address Home Phone MRN    1975  [address]  [facility] [address] [phone] [id number]    Rastafarian Marital Status          None        Admission Date Admission Type Admitting Provider Attending Provider Department, Room/Bed    5/6/18 Alix Matthews DO [place], Ali Janell, DO [address], 478/1    Discharge Date Discharge Disposition Discharge Destination         Home or Self Care              Attending Provider:  Alix Kingsley DO    Allergies:  Morphine, Aspirin, Morphine And Related, Nsaids, Quetiapine, Quetiapine Fumarate, Salicylates, Codeine    Isolation:  None   Infection:  None   Code Status:  Conditional    Ht:  149.9 cm (59\")   Wt:  85.5 kg (188 lb 8 oz)    Admission Cmt:  None   Principal Problem:  None                Active Insurance as of 5/6/2018     Primary Coverage     Payor Plan Insurance Group Employer/Plan Group    MEDICARE MEDICARE A & B      Payor Plan Address Payor Plan Phone Number Effective From Effective To    [address] [phone] 4/1/2013     [place] [address]       Subscriber Name Subscriber Birth Date Member ID       SAMANTHA SOLOMON 1975 [id number]           Secondary Coverage     Payor Plan Insurance Group Employer/Plan Group    [employer] [place]ICA[place]      Payor Plan Address Payor Plan Phone Number Effective From Effective To    [address] [address] [phone] 9/19/2016     [place] [address]       Subscriber Name Subscriber Birth Date Member ID       SAMANTHA SOLOMON 1975 [id number]                 Emergency Contacts      (Rel.) Home Phone Work Phone Mobile Phone    Delmy Del Rio (Daughter) [phone] -- --        Referral to Home Health [[id number]] (Order [id number])   Order   Date: 5/13/2018 Department: [address] Ordering/Authorizing: Alix Kingsley, " DO   Order History   Outpatient   Date/Time Action Taken User Additional Information   05/13/18 1040 Sign Alix Kingsley DO    Order Details     Frequency Duration Priority Order Class   None None Routine Internal Referral   Start Date/Time     Start Date   05/13/18   Order Questions     Question Answer Comment   Face to Face Visit Date 5/13/2018    Follow-up Provider for Plan of Care? I treated the patient in an acute care facility and will not continue treatment after discharge.    Follow-up Provider PHIL ARSHAD    Reason/Clinical Findings Hypoxia    Describe mobility limitations that make leaving home difficult Hypoxia    Nursing/Therapeutic Services Requested Skilled Nursing     Physical Therapy    Skilled nursing orders: Pain management     O2 instruction    PT orders: Therapeutic exercise     Gait Training     Strengthening    Weight Bearing Status As Tolerated    Frequency: 1 Week 1           Source Order Set / Preference List     Order Set   [facility] DISCHARGE [[id number]]   Clinical Indications      ICD-10-CM ICD-9-CM   Respiratory distress  - Primary     R06.00 786.09   Reprint Order Requisition     AMB REFERRAL TO HOME HEALTH (Order #[id number]) on 5/13/18   Encounter-Level Cardiology Documents:     There are no encounter-level cardiology documents.   Encounter     View Encounter       Order Provider Info         Office phone Pager E-mail   Ordering User Alix Kingsley [phone] -- --   Authorizing Provider Alix Kingsley [phone] -- --   Attending Provider Alix Kingsley [phone] -- --   Linked Charges     No charges linked to this procedure   Order Transmittal Tracking     AMB REFERRAL TO HOME HEALTH (Order #[id number]) on 5/13/18   Authorized by:  Alix Kingsley DO  (NPI: [id number])       Lab Component SmartPhrase Guide     AMB REFERRAL TO HOME HEALTH (Order #[id number]) on 5/13/18            History & Physical      Loy Charles MD at 5/7/2018  5:00  DEVENDRA              [facility] Medicine Services  HISTORY AND PHYSICAL    Date of Admission: 5/6/2018  Primary Care Physician: Neel Valencia Jr., MD    Subjective     Chief Complaint: Shortness of breath    History of Present Illness  Patient is a 42-year-old female past medical history of recent COPD exacerbation with ARDS in February requiring intubation because of respiratory failure.  She presents with increasing shortness of breath today having trouble speaking full sentences she has had a cough she denies any swelling no hemoptysis or fevers or chills she also had some chest pain earlier today she presented to the emergency room in moderate respiratory distress and subsequently had to be placed on BiPAP stabilize her.  She is being admitted for further evaluation and management treatment of COPD exacerbation and CHF.  She is resting stable on BiPAP in the ICU upon my evaluation with normal oxygen saturation.        Review of Systems   14 point review of systems negative except for as per HPI  Otherwise complete ROS reviewed and negative except as mentioned in the HPI.    Past Medical History:   Past Medical History:   Diagnosis Date   • Acute retention of urine    • Anxiety and depression    • Bipolar 1 disorder    • COPD (chronic obstructive pulmonary disease)    • DVT (deep venous thrombosis)    • GERD (gastroesophageal reflux disease)    • Hypoglycemia    • Insomnia    • PTSD (post-traumatic stress disorder)      Past Surgical History:  Past Surgical History:   Procedure Laterality Date   • ABDOMINOPLASTY     • APPENDECTOMY     • BRONCHOSCOPY N/A 2/2/2018    Procedure: BRONCHOSCOPY AT BEDSIDE;  Surgeon: Eranest Ba MD;  Location: [facility] ENDOSCOPY;  Service:    • CHOLECYSTECTOMY     • ENDOSCOPY N/A 1/29/2018    Procedure: ESOPHAGOGASTRODUODENOSCOPY WITH ANESTHESIA;  Surgeon: Aime Reyna MD;  Location: [facility] ENDOSCOPY;  Service:    • GASTRIC BYPASS     •  HYSTERECTOMY       Social History:  reports that she has been smoking Cigarettes.  She has been smoking about 0.25 packs per day. She has never used smokeless tobacco. She reports that she does not drink alcohol or use drugs.    Family History: family history includes Cancer in her father and mother; HIV in her brother.       Allergies:  Allergies   Allergen Reactions   • Morphine Anaphylaxis   • Aspirin Other (See Comments)     Child allergy, unsure what reaction  Child allergy, unsure what reaction   • Morphine And Related    • Nsaids    • Quetiapine Other (See Comments)     Muscle spasms  Muscle spasms   • Quetiapine Fumarate    • Salicylates    • Codeine Rash     Medications:  Prior to Admission medications    Medication Sig Start Date End Date Taking? Authorizing Provider   FLUoxetine (PROzac) 20 MG capsule Take 60 mg by mouth Daily.   Yes Historical Provider, MD   gabapentin (NEURONTIN) 300 MG capsule Take 600 mg by mouth Every Night.   Yes Historical Provider, MD   HYDROcodone-acetaminophen (NORCO)  MG per tablet Take 1 tablet by mouth 3 (Three) Times a Day.   Yes Historical Provider, MD   ipratropium-albuterol (DUO-NEB) 0.5-2.5 mg/mL nebulizer Take 3 mL by nebulization 4 (Four) Times a Day. 2/10/18  Yes Emelina Ogden,    levothyroxine (SYNTHROID, LEVOTHROID) 100 MCG tablet Take 100 mcg by mouth Daily. 9/24/16  Yes Historical Provider, MD   lithium carbonate 300 MG capsule Take 300 mg by mouth Every Night.   Yes Historical Provider, MD   O2 (OXYGEN) Inhale 2 L/min Continuous. At bedtime only (per patient.)   Yes Historical Provider, MD   omeprazole (priLOSEC) 20 MG capsule Take 20 mg by mouth Daily.   Yes Historical Provider, MD   pantoprazole (PROTONIX) 20 MG EC tablet Take 20 mg by mouth Daily.   Yes Historical Provider, MD   PROAIR  (90 BASE) MCG/ACT inhaler Inhale 2 puffs Every 4 (Four) Hours As Needed for Wheezing or Shortness of Air. 6/3/17  Yes Historical Provider, MD   warfarin  "(COUMADIN) 5 MG tablet Take 7.5 mg by mouth Daily. Tuesday, Thursday, Saturday and Sunday.   Yes Historical Provider, MD   warfarin (COUMADIN) 5 MG tablet Take 5 mg by mouth 3 (Three) Times a Week. Monday, Wednesday and Friday.   Yes Historical Provider, MD   zolpidem (AMBIEN) 10 MG tablet Take 10 mg by mouth Every Night.   Yes Historical Provider, MD   diazePAM (VALIUM) 10 MG tablet Take 10 mg by mouth 3 (Three) Times a Day. 2/14/17   Historical Provider, MD   gabapentin (NEURONTIN) 300 MG capsule Take 300 mg by mouth 2 (Two) Times a Day.    Historical Provider, MD   nicotine (NICODERM CQ) 21 MG/24HR patch Place 1 patch on the skin Daily. 1/30/18   PARIS Junior   promethazine (PHENERGAN) 25 MG tablet Take 25 mg by mouth 2 (Two) Times a Day As Needed for Nausea or Vomiting. 9/2/16   Historical Provider, MD   sucralfate (CARAFATE) 1 GM/10ML suspension Take 10 mL by mouth Every 6 (Six) Hours. 1/30/18   PARIS Junior     Objective     Vital Signs: /72   Pulse 73   Temp 98.1 °F (36.7 °C) (Axillary)   Resp 21   Ht 149.9 cm (59\")   Wt 87.5 kg (192 lb 14.4 oz)   SpO2 93%   BMI 38.96 kg/m²    Physical Exam  Constitutional: She is oriented to person, place, and time. She appears well-nourished. She appears ill. She appears distressed.   HENT: Bipap in place  Head: Atraumatic.   Mouth/Throat: Oropharynx is clear and moist and mucous membranes are normal.   Eyes: EOM are normal. Pupils are equal, round, and reactive to light.   Neck: Normal range of motion. Neck supple. No JVD present.   Cardiovascular: Normal rate, regular rhythm and normal heart sounds.  Exam reveals no gallop and no friction rub.    No murmur heard.  Pulmonary/Chest: Accessory muscle usage present. No stridor. Tachypnea noted. She is in . She has no decreased breath sounds. She has no wheezes. She has rhonchi. She has no rales.   Abdominal: Soft. There is no tenderness.   Musculoskeletal: She exhibits no edema. "   Neurological: She is alert and oriented to person, place, and time.   Skin: Skin is warm and dry. Capillary refill takes less than 2 seconds.   Psychiatric: She has a normal mood and affect.   Nursing note and vitals reviewed.      Results Reviewed:  Lab Results (last 24 hours)     Procedure Component Value Units Date/Time    Blood Gas, Arterial [[id number]]  (Abnormal) Collected:  05/07/18 0436    Specimen:  Arterial Blood Updated:  05/07/18 0448     Site Right Radial     Luis's Test Positive     pH, Arterial 7.433 pH units      pCO2, Arterial 46.5 (H) mm Hg      pO2, Arterial 67.7 (L) mm Hg      HCO3, Arterial 31.1 (H) mmol/L      Base Excess, Arterial 5.8 (H) mmol/L      O2 Saturation, Arterial 94.7 %      Temperature 37.0 C      Barometric Pressure for Blood Gas 752 mmHg      Modality BiPap     FIO2 50 %      Ventilator Mode AVAP     Set Tidal Volume 340     Set Mech Resp Rate 8.0     EPAP 6     Collected by [id number]    TSH [[id number]]  (Normal) Collected:  05/07/18 0257    Specimen:  Blood Updated:  05/07/18 0443     TSH 0.826 mIU/mL     BNP [[id number]]  (Abnormal) Collected:  05/07/18 0257    Specimen:  Blood Updated:  05/07/18 0422     proBNP 2,440.0 (H) pg/mL     Comprehensive Metabolic Panel [[id number]]  (Abnormal) Collected:  05/07/18 0257    Specimen:  Blood Updated:  05/07/18 0417     Glucose 160 (H) mg/dL      BUN 11 mg/dL      Creatinine 0.66 mg/dL      Sodium 142 mmol/L      Potassium 3.9 mmol/L      Chloride 100 mmol/L      CO2 32.0 (H) mmol/L      Calcium 9.4 mg/dL      Total Protein 7.2 g/dL      Albumin 3.80 g/dL      ALT (SGPT) 62 (H) U/L      AST (SGOT) 60 (H) U/L      Alkaline Phosphatase 164 (H) U/L      Total Bilirubin 0.8 mg/dL      eGFR Non African Amer 97 mL/min/1.73      Globulin 3.4 gm/dL      A/G Ratio 1.1 g/dL      BUN/Creatinine Ratio 16.7     Anion Gap 10.0 mmol/L     Magnesium [[id number]]  (Normal) Collected:  05/07/18 0257    Specimen:  Blood Updated:  05/07/18 0417     Magnesium  2.0 mg/dL     Phosphorus [[id number]]  (Normal) Collected:  05/07/18 0257    Specimen:  Blood Updated:  05/07/18 0417     Phosphorus 3.4 mg/dL     Protime-INR [[id number]]  (Abnormal) Collected:  05/07/18 0257    Specimen:  Blood Updated:  05/07/18 0410     Protime 19.9 (H) Seconds      INR 1.63 (H)    CBC & Differential [[id number]] Collected:  05/07/18 0257    Specimen:  Blood Updated:  05/07/18 0404    Narrative:       The following orders were created for panel order CBC & Differential.  Procedure                               Abnormality         Status                     ---------                               -----------         ------                     CBC Auto Differential[[id number]]        Abnormal            Final result                 Please view results for these tests on the individual orders.    CBC Auto Differential [[id number]]  (Abnormal) Collected:  05/07/18 0257    Specimen:  Blood Updated:  05/07/18 0404     WBC 10.30 10*3/mm3      RBC 4.46 10*6/mm3      Hemoglobin 12.9 g/dL      Hematocrit 39.7 %      MCV 89.0 fL      MCH 28.9 pg      MCHC 32.5 (L) g/dL      RDW 13.5 %      RDW-SD 44.0 fl      MPV 10.9 fL      Platelets 245 10*3/mm3      Neutrophil % 92.3 (H) %      Lymphocyte % 4.9 (L) %      Monocyte % 1.3 (L) %      Eosinophil % 0.0 %      Basophil % 0.2 %      Immature Grans % 1.3 %      Neutrophils, Absolute 9.52 (H) 10*3/mm3      Lymphocytes, Absolute 0.50 (L) 10*3/mm3      Monocytes, Absolute 0.13 (L) 10*3/mm3      Eosinophils, Absolute 0.00 10*3/mm3      Basophils, Absolute 0.02 10*3/mm3      Immature Grans, Absolute 0.13 (H) 10*3/mm3      nRBC 0.0 /100 WBC     Lactic Acid, Reflex [[id number]]  (Normal) Collected:  05/06/18 2308    Specimen:  Blood Updated:  05/06/18 2326     Lactate 1.9 mmol/L     Lactic Acid, Reflex Timer (This will reflex a repeat order 3-3:15 hours after ordered.) [[id number]] Collected:  05/06/18 1828    Specimen:  Blood Updated:  05/06/18 2200     Extra Tube Hold for  add-ons.     Comment: Auto resulted.       [place] Draw [[id number]] Collected:  05/06/18 1828    Specimen:  Blood Updated:  05/06/18 1930    Narrative:       The following orders were created for panel order [place] Draw.  Procedure                               Abnormality         Status                     ---------                               -----------         ------                     Light Blue Top[[id number]]                                   Final result               Green Top (Gel)[[id number]]                                  Final result               Lavender Top[[id number]]                                     Final result               Red Top[[id number]]                                          Final result                 Please view results for these tests on the individual orders.    Light Blue Top [[id number]] Collected:  05/06/18 1828    Specimen:  Blood Updated:  05/06/18 1930     Extra Tube hold for add-on     Comment: Auto resulted       Green Top (Gel) [[id number]] Collected:  05/06/18 1828    Specimen:  Blood Updated:  05/06/18 1930     Extra Tube Hold for add-ons.     Comment: Auto resulted.       Lavender Top [[id number]] Collected:  05/06/18 1828    Specimen:  Blood Updated:  05/06/18 1930     Extra Tube hold for add-on     Comment: Auto resulted       Red Top [[id number]] Collected:  05/06/18 1828    Specimen:  Blood Updated:  05/06/18 1930     Extra Tube Hold for add-ons.     Comment: Auto resulted.       Blood Culture - Blood, [[id number]] Collected:  05/06/18 1910    Specimen:  Blood from Arm, Right Updated:  05/06/18 1919    Blood Culture - Blood, [[id number]] Collected:  05/06/18 1828    Specimen:  Blood from Arm, Left Updated:  05/06/18 1909    BNP [[id number]]  (Abnormal) Collected:  05/06/18 1828    Specimen:  Blood Updated:  05/06/18 1905     proBNP 2,160.0 (H) pg/mL     Troponin [[id number]]  (Normal) Collected:  05/06/18 1828    Specimen:  Blood Updated:  05/06/18 1905     Troponin I  <0.012 ng/mL     Protime-INR [[id number]]  (Abnormal) Collected:  05/06/18 1828    Specimen:  Blood Updated:  05/06/18 1903     Protime 24.9 (H) Seconds      INR 2.16 (H)    D-dimer, Quantitative [[id number]]  (Normal) Collected:  05/06/18 1828    Specimen:  Blood Updated:  05/06/18 1903     D-Dimer, Quantitative <0.22 mg/L (FEU)     Narrative:       Reference Range is 0-0.50 mg/L FEU. However, results <0.50 mg/L FEU tends to rule out DVT or PE. Results >0.50 mg/L FEU are not useful in predicting absence or presence of DVT or PE.    Lactic Acid, Plasma [[id number]]  (Abnormal) Collected:  05/06/18 1828    Specimen:  Blood Updated:  05/06/18 1856     Lactate 2.5 (C) mmol/L     Comprehensive Metabolic Panel [[id number]]  (Abnormal) Collected:  05/06/18 1828    Specimen:  Blood Updated:  05/06/18 1854     Glucose 126 (H) mg/dL      BUN 9 mg/dL      Creatinine 0.65 mg/dL      Sodium 142 mmol/L      Potassium 4.2 mmol/L      Chloride 103 mmol/L      CO2 29.0 mmol/L      Calcium 9.2 mg/dL      Total Protein 7.0 g/dL      Albumin 3.70 g/dL      ALT (SGPT) 64 (H) U/L      AST (SGOT) 80 (H) U/L      Alkaline Phosphatase 146 (H) U/L      Total Bilirubin 0.7 mg/dL      eGFR Non African Amer 99 mL/min/1.73      Globulin 3.3 gm/dL      A/G Ratio 1.1 g/dL      BUN/Creatinine Ratio 13.8     Anion Gap 10.0 mmol/L     CBC & Differential [[id number]] Collected:  05/06/18 1828    Specimen:  Blood Updated:  05/06/18 1838    Narrative:       The following orders were created for panel order CBC & Differential.  Procedure                               Abnormality         Status                     ---------                               -----------         ------                     CBC Auto Differential[[id number]]        Abnormal            Final result                 Please view results for these tests on the individual orders.    CBC Auto Differential [[id number]]  (Abnormal) Collected:  05/06/18 1828    Specimen:  Blood Updated:  05/06/18  1838     WBC 13.30 (H) 10*3/mm3      RBC 4.20 10*6/mm3      Hemoglobin 12.3 g/dL      Hematocrit 37.5 %      MCV 89.3 fL      MCH 29.3 pg      MCHC 32.8 (L) g/dL      RDW 13.6 %      RDW-SD 44.5 fl      MPV 10.2 fL      Platelets 223 10*3/mm3      Neutrophil % 91.2 (H) %      Lymphocyte % 4.5 (L) %      Monocyte % 2.6 (L) %      Eosinophil % 0.2 %      Basophil % 0.2 %      Immature Grans % 1.3 %      Neutrophils, Absolute 12.13 (H) 10*3/mm3      Lymphocytes, Absolute 0.60 (L) 10*3/mm3      Monocytes, Absolute 0.35 10*3/mm3      Eosinophils, Absolute 0.02 10*3/mm3      Basophils, Absolute 0.03 10*3/mm3      Immature Grans, Absolute 0.17 (H) 10*3/mm3      nRBC 0.0 /100 WBC     Blood Gas, Arterial [[id number]]  (Abnormal) Collected:  05/06/18 1823    Specimen:  Arterial Blood Updated:  05/06/18 1831     Site Right Brachial     Luis's Test N/A     pH, Arterial 7.400 pH units      pCO2, Arterial 41.4 mm Hg      pO2, Arterial 60.0 (L) mm Hg      HCO3, Arterial 25.6 mmol/L      Base Excess, Arterial 0.7 mmol/L      O2 Saturation, Arterial 92.6 (L) %      Temperature 37.0 C      Barometric Pressure for Blood Gas 750 mmHg      Modality NRB     FIO2 100 %      Ventilator Mode NA     Collected by [id number]        Imaging Results (last 24 hours)     Procedure Component Value Units Date/Time    XR Chest 1 View [[id number]] Updated:  05/07/18 0425    XR Chest 1 View [[id number]] Collected:  05/06/18 1907     Updated:  05/06/18 1911    Narrative:       EXAMINATION:   XR CHEST 1 VW-  5/6/2018 7:07 PM CDT     HISTORY: Short of air     Frontal upright radiograph of the chest 5/6/2018 6:25 PM CDT     COMPARISON: February 15, 2018.     FINDINGS:   Bilateral interstitial infiltrates are present.. The cardiac silhouette  is normal. These infiltrates may be due to either pulmonary edema or  possibly pneumonia..      The osseous structures and surrounding soft tissues demonstrate no acute  abnormality.       Impression:       1. Bilateral  interstitial infiltrates. Differential considerations  include pneumonia versus pulmonary edema.        This report was finalized on 05/06/2018 19:07 by Dr. Zach Pham MD.        I have personally reviewed and interpreted the radiology studies and ECG obtained at time of admission.     Assessment / Plan     Assessment:   Hospital Problem List     Respiratory distress      1.  Acute on chronic respiratory failure due to COPD and CHF plan is to admit patient diurese with IV Lasix continue BiPAP will start antibiotics empirically as well as Solu-Medrol as a believe she has a component of both COPD and CHF.  We will check 2-D echo monitor patient ICU on BiPAP and try to wean her off of it back to oxygen.  2.  History of DVT patient is on Coumadin and continue warfarin monitor pro time and INR      Patient seen after midnight         Code Status: Full     I discussed the patients findings and my recommendations with the patient    Estimated length of stay 3-4 days    Loy Charles MD   05/07/18   5:00 AM              Electronically signed by Loy Charles MD at 5/7/2018  5:08 AM       Prior to Admission Medications     Prescriptions Last Dose Informant Patient Reported? Taking?    diazePAM (VALIUM) 5 MG tablet Past Week Pharmacy Yes Yes    Take 5 mg by mouth 3 (Three) Times a Day.    FLUoxetine (PROzac) 20 MG capsule Past Week Pharmacy Yes Yes    Take 60 mg by mouth Daily.    gabapentin (NEURONTIN) 300 MG capsule Past Week Pharmacy Yes Yes    Take 300 mg by mouth 2 (Two) Times a Day.    gabapentin (NEURONTIN) 300 MG capsule Past Week Pharmacy Yes Yes    Take 600 mg by mouth Every Night.    HYDROcodone-acetaminophen (NORCO)  MG per tablet Past Week Pharmacy Yes Yes    Take 1 tablet by mouth 3 (Three) Times a Day.    levothyroxine (SYNTHROID, LEVOTHROID) 112 MCG tablet Past Week Pharmacy Yes Yes    Take 112 mcg by mouth Daily.    lithium carbonate 300 MG capsule Past Week Self Yes Yes    Take 300 mg by  "mouth Every Night.    omeprazole (priLOSEC) 20 MG capsule Past Week Pharmacy Yes Yes    Take 20 mg by mouth Daily.    pantoprazole (PROTONIX) 40 MG EC tablet Past Week Pharmacy Yes Yes    Take 40 mg by mouth Daily.    PROAIR  (90 BASE) MCG/ACT inhaler Past Month Pharmacy Yes Yes    Inhale 2 puffs Every 4 (Four) Hours As Needed for Wheezing or Shortness of Air.    warfarin (COUMADIN) 7.5 MG tablet Past Week  Yes Yes    Take 7.5 mg by mouth 4 (Four) Times a Week. Tuesday; Thursday; Saturday; Sunday    zolpidem (AMBIEN) 10 MG tablet Past Week Pharmacy Yes Yes    Take 10 mg by mouth Every Night.    ipratropium-albuterol (DUO-NEB) 0.5-2.5 mg/mL nebulizer Unknown  No No    Take 3 mL by nebulization 4 (Four) Times a Day.    promethazine (PHENERGAN) 25 MG tablet Unknown Pharmacy Yes No    Take 25 mg by mouth 2 (Two) Times a Day As Needed for Nausea or Vomiting.          Hospital Medications (active)       Dose Frequency Start End    acetaminophen (TYLENOL) tablet 650 mg 650 mg Every 4 Hours PRN 5/7/2018     Sig - Route: Take 2 tablets by mouth Every 4 (Four) Hours As Needed for Headache or Fever (fever greater than 101.5 F). - Oral    Linked Group 1:  \"Or\" Linked Group Details        albuterol (PROVENTIL) nebulizer solution 0.042% 1.25 mg/3mL 1.25 mg 4 Times Daily - RT 5/9/2018     Sig - Route: Take 3 mL by nebulization 4 (Four) Times a Day. - Nebulization    cefepime (MAXIPIME) 1g/11.7ml IV PUSH syringe 1 g Every 8 Hours 5/8/2018 5/13/2018    Sig - Route: Infuse 11.7 mL into a venous catheter Every 8 (Eight) Hours. - Intravenous    diazePAM (VALIUM) tablet 5 mg 5 mg 3 Times Daily 5/7/2018     Sig - Route: Take 1 tablet by mouth 3 (Three) Times a Day. - Oral    FLUoxetine (PROzac) capsule 60 mg 60 mg Daily 5/7/2018     Sig - Route: Take 3 capsules by mouth Daily. - Oral    gabapentin (NEURONTIN) capsule 300 mg 300 mg 2 Times Daily 5/7/2018     Sig - Route: Take 1 capsule by mouth 2 (Two) Times a Day. - Oral    " gabapentin (NEURONTIN) capsule 600 mg 600 mg Nightly 5/7/2018     Sig - Route: Take 2 capsules by mouth Every Night. - Oral    guaiFENesin (MUCINEX) 12 hr tablet 1,200 mg 1,200 mg Every 12 Hours Scheduled 5/7/2018     Sig - Route: Take 2 tablets by mouth Every 12 (Twelve) Hours. - Oral    ipratropium-albuterol (DUO-NEB) nebulizer solution 3 mL 3 mL Every 4 Hours PRN 5/7/2018     Sig - Route: Take 3 mL by nebulization Every 4 (Four) Hours As Needed for Shortness of Air. - Nebulization    levothyroxine (SYNTHROID, LEVOTHROID) tablet 112 mcg 112 mcg Daily 5/8/2018     Sig - Route: Take 1 tablet by mouth Daily. - Oral    lithium carbonate capsule 300 mg 300 mg Nightly 5/7/2018     Sig - Route: Take 1 capsule by mouth Every Night. - Oral    nicotine (NICODERM CQ) 21 MG/24HR patch 1 patch 1 patch Every 24 Hours 5/7/2018     Sig - Route: Place 1 patch on the skin Daily. - Transdermal    O2 (OXYGEN) 2 L/min Continuous 5/7/2018     Sig - Route: Inhale 2 L/min Continuous. - Inhalation    ondansetron (ZOFRAN) injection 4 mg 4 mg Every 6 Hours PRN 5/7/2018     Sig - Route: Infuse 2 mL into a venous catheter Every 6 (Six) Hours As Needed for Nausea or Vomiting. - Intravenous    oxyCODONE-acetaminophen (PERCOCET) 7.5-325 MG per tablet 1 tablet 1 tablet Every 6 Hours PRN 5/11/2018 5/21/2018    Sig - Route: Take 1 tablet by mouth Every 6 (Six) Hours As Needed for Moderate Pain . - Oral    oxyCODONE-acetaminophen (PERCOCET) 7.5-325 MG per tablet 1 tablet 1 tablet Once 5/13/2018     Sig - Route: Take 1 tablet by mouth 1 (One) Time. - Oral    pantoprazole (PROTONIX) EC tablet 40 mg 40 mg Every Morning 5/7/2018     Sig - Route: Take 1 tablet by mouth Every Morning. - Oral    predniSONE (DELTASONE) tablet 20 mg 20 mg Daily With Breakfast 5/12/2018     Sig - Route: Take 1 tablet by mouth Daily With Breakfast. - Oral    promethazine (PHENERGAN) tablet 12.5 mg 12.5 mg 2 Times Daily PRN 5/9/2018     Sig - Route: Take 0.5 tablets by mouth 2  (Two) Times a Day As Needed for Nausea or Vomiting. - Oral    sodium chloride 0.9 % flush 1-10 mL 1-10 mL As Needed 5/7/2018     Sig - Route: Infuse 1-10 mL into a venous catheter As Needed for Line Care. - Intravenous    sodium chloride 0.9 % flush 10 mL 10 mL As Needed 5/6/2018     Sig - Route: Infuse 10 mL into a venous catheter As Needed for Line Care. - Intravenous    Cosign for Ordering: Accepted by Yung Mancini MD on 5/6/2018  7:13 PM    warfarin (COUMADIN) tablet 5 mg 5 mg Daily Warfarin 5/10/2018     Sig - Route: Take 1 tablet by mouth Daily. - Oral    zolpidem (AMBIEN) tablet 10 mg 10 mg Nightly PRN 5/8/2018     Sig - Route: Take 2 tablets by mouth At Night As Needed for Sleep. - Oral    methylPREDNISolone sodium succinate (SOLU-Medrol) injection 20 mg (Discontinued) 20 mg Every 12 Hours 5/11/2018 5/12/2018    Sig - Route: Infuse 0.5 mL into a venous catheter Every 12 (Twelve) Hours. - Intravenous             Operative/Procedure Notes (most recent note)      Case Moctezuma MD at 5/9/2018  4:46 PM        Cardiothoracic Surgery Operative Note    Reoperative Diagnosis: Respiratory failure  Postoperative Diagnosis: Same    Operation: Left VATS thoracotomy and biopsy of left lower lobe    Surgeon: Dr. Case Moctezuma    Anesthesia: Gen. double lumen    Procedure: Mrs. Vianca Spencer was taken to the operating room and after appropriate timeout was performed to lumen endotracheal intubation and general anesthesia was performed by anesthesia.  The patient was then placed in a semi-right lateral decubitus position.  She was then prepped and draped in a sterile fashion.  A 1 inch incision was made at approximately the anterior axillary line at approximately the sixth interspace.  The scope was then introduced.  The patient had somewhat hyperemic lungs but no visible vessel or visceral pleural lesions.  Other port site was placed more anteriorly.  We then utilized the Ethicon endostapler with the green loads for  obtaining a wedge biopsy of the left lower lobe.  Part was sent for culture the rest for pathology.  I talked with the pathologist and 90 understand why this biopsy was undertaken and what we are looking- probable interstitial lung disease.  Through the anterior port site I placed a #28 chest tube posteriorly superiorly.  This was sutured in position.  The posterior port site was closed utilizing 2-0 Vicry sutures for the muscle and subcutaneous tissue and 4-0 Vicryl subcutaneous tissue for the skin was closed.  The Pleur-evac was connected to suction.  The patient's lung had expanded well with resumption of the ventilation of the left side.  After sterile dressings were applied patient was extubated and taken directly to the postanesthesia care unit    Electronically signed by Case Moctezuma MD at 5/9/2018  4:53 PM          Physician Progress Notes (most recent note)      Earnest Ba MD at 5/13/2018 10:52 AM              PULMONARY AND CRITICAL CARE PROGRESS NOTE - [facility]    Patient: Vianca Spencer  1975   MR# [id number]   Acct# [id number]  05/13/18   10:52 AM  Referring Provider: Alix Kingsley DO    Chief Complaint: Hypoxia and ILD    Interval history: She feels much better.  She has converted down to conventional flow oxygen.  She has this at home.  She wants to go home she has no new complaints no increased pain or increased cough or sputum production  Meds:    albuterol 1.25 mg Nebulization 4x Daily - RT   diazePAM 5 mg Oral TID   FLUoxetine 60 mg Oral Daily   gabapentin 300 mg Oral BID   gabapentin 600 mg Oral Nightly   guaiFENesin 1,200 mg Oral Q12H   levothyroxine 112 mcg Oral Daily   lithium carbonate 300 mg Oral Nightly   nicotine 1 patch Transdermal Q24H   oxyCODONE-acetaminophen 1 tablet Oral Once   pantoprazole 40 mg Oral QAM   predniSONE 20 mg Oral Daily With Breakfast   warfarin 5 mg Oral Daily       O2 2 L/min     Review of Systems:   Review of Systems    Constitutional: Negative for chills and fever.   Gastrointestinal: Negative for diarrhea.   Psychiatric/Behavioral: Negative for agitation.     Physical Exam:  SpO2 Percentage    05/13/18 0350 05/13/18 0739 05/13/18 0749   SpO2: 93% 97% 100%     Temp:  [96.9 °F (36.1 °C)-98.1 °F (36.7 °C)] 96.9 °F (36.1 °C)  Heart Rate:  [57-88] 66  Resp:  [16-18] 16  BP: (103-152)/(62-88) 103/62  No intake or output data in the 24 hours ending 05/13/18 1052  Physical Exam   Constitutional: She appears well-developed. She is active. She has a sickly appearance. She appears ill. No distress. Nasal cannula in place.   HENT:   Head: Normocephalic and atraumatic.   Nose: Nose normal.   Eyes: EOM are normal. No scleral icterus.   Neck: Neck supple. No tracheal deviation present.   Cardiovascular: Normal rate and regular rhythm.    Pulmonary/Chest: Effort normal. No accessory muscle usage. No tachypnea. No respiratory distress. She has decreased breath sounds.   Abdominal: Soft. She exhibits no distension.   Obese   Musculoskeletal: She exhibits no edema.   Neurological: She is alert.   Skin: Skin is warm and dry. She is not diaphoretic.     Laboratory Data:    Results from last 7 days  Lab Units 05/13/18  0504 05/12/18  0457 05/11/18  0541   WBC 10*3/mm3 15.64* 16.54* 15.69*   HEMOGLOBIN g/dL 12.8 13.2 12.9   PLATELETS 10*3/mm3 225 263 281       Results from last 7 days  Lab Units 05/13/18  0504 05/12/18  0457 05/11/18  0541   SODIUM mmol/L 140 142 142   POTASSIUM mmol/L 3.6 4.3 4.1   BUN mg/dL 17 17 14   CREATININE mg/dL 0.81 0.79 0.76   INR  2.26* 1.96* 1.62*       Results from last 7 days  Lab Units 05/09/18  1510 05/07/18  1538 05/07/18  0436 05/06/18  1823   PH, ARTERIAL pH units 7.431 7.458* 7.433 7.400   PCO2, ARTERIAL mm Hg 44.2 43.7 46.5* 41.4   PO2 ART mm Hg 87.5 57.1* 67.7* 60.0*   FIO2 %  --   --  50 100     Blood Culture   Date Value Ref Range Status   05/06/2018 No growth at 2 days  Preliminary   05/06/2018 No growth at 2  days  Preliminary       Pulmonary Assessment:  1. Chronic ild.  Not COPD stable, awaiting path from open lung biopsy  2. Acute and chronic rf with hypoxia, improving and back to conventional nasal cannula which she can continue at home  3. Bipolar disorder stable on medications  4. Nicotine dependency, needs to stop    Recommend:     · To home today  · Follow up in office to review pathology  · Discontinue IV antibiotics    Electronically signed by Earnest Ba MD, 05/13/18, 10:52 AM     EMR Dragon/Transcription disclaimer: Much of this encounter note is an electronic transcription/translation of spoken language to printed text. The electronic translation of spoken language may permit erroneous, or at times, nonsensical words or phrases to be inadvertently transcribed; although I have reviewed the note for such errors, some may still exist.        Electronically signed by Earnest Ba MD at 5/13/2018 10:55 AM          Consult Notes (most recent note)      Case Moctezuma MD at 5/9/2018  8:51 AM      Consult Orders:    1. Inpatient Cardiothoracic Surgery Consult [[id number]] ordered by David Hernandez MD at 05/09/18 0744                Referring Provider: Dr. Alan Hernandez hospitalist service/Dr. Earnest Ba pulmonology service  Reason for Consultation: Request for open lung biopsy    Patient Care Team:  Neel Valencia Jr., MD as PCP - General (Family Medicine)  Devonte Amaya MD as Consulting Physician (Urology)    Chief Complaint:  Shortness of breath    Subjective .     History of Present illness:  Mrs. Vianca Spencer is a 42-year-old  female with a long history of lung disease.  She was diagnosed with emphysema approximately 3 years ago and has had hospitalizations for pneumonia in the recent past.  She has had a bronchoscopy by Dr. Ba in the past.  She is intubated on her last admission here and went home on home oxygen.  He now has a complaint of increasing dyspnea for the  "last 3 days.  She presented to the [facility] emergency department where she was hospitalized for respiratory failure and was taken to the intensive care unit  and treated with high flow oxygen.  Ulnar consult was obtained and the assessment was COPD, pulmonary hypertension, pulmonary infiltrate with probable interstitial lung disease.  Dr. Ba thought the best way to a tentative diagnosis was open lung biopsy and the cardiothoracic surgical service was consulted for this.    Despite her lung problems she has been a long-term smoker until approximately 2 months ago at her last hospitalization when she quit.  However she still states that she still \"holds a cigarette or 2 a day\".  She has bipolar disorder and multiple medical problems including deep venous thrombosis history and Coumadin treatment.  She has a history of morbid obesity status post gastric bypass surgery.  She states that this procedure had complications and she has not been \"right\" with her GI tract since that time.  She denies heart disease but she has been diagnosed with pulmonary hypertension.  She is still obese with difficulty with ambulation and has generalized weakness throughout.    History:  Past Medical History:   Diagnosis Date   • Acute retention of urine    • Anxiety and depression    • Bipolar 1 disorder    • COPD (chronic obstructive pulmonary disease)    • DVT (deep venous thrombosis)    • GERD (gastroesophageal reflux disease)    • Hypoglycemia    • Insomnia    • PTSD (post-traumatic stress disorder)    ,   Past Surgical History:   Procedure Laterality Date   • ABDOMINOPLASTY     • APPENDECTOMY     • BRONCHOSCOPY N/A 2/2/2018    Procedure: BRONCHOSCOPY AT BEDSIDE;  Surgeon: Earnest Ba MD;  Location: [facility] ENDOSCOPY;  Service:    • CHOLECYSTECTOMY     • ENDOSCOPY N/A 1/29/2018    Procedure: ESOPHAGOGASTRODUODENOSCOPY WITH ANESTHESIA;  Surgeon: Aime Reyna MD;  Location: [facility] ENDOSCOPY;  Service:    • " GASTRIC BYPASS     • HYSTERECTOMY     ,   Family History   Problem Relation Age of Onset   • Cancer Mother    • Cancer Father    • HIV Brother    ,  Social History   Substance Use Topics   • Smoking status: Current Every Day Smoker     Packs/day: 0.25     Types: Cigarettes   • Smokeless tobacco: Never Used   • Alcohol use No   ,  Prescriptions Prior to Admission   Medication Sig Dispense Refill Last Dose   • diazePAM (VALIUM) 5 MG tablet Take 5 mg by mouth 3 (Three) Times a Day.   Past Week at Unknown time   • FLUoxetine (PROzac) 20 MG capsule Take 60 mg by mouth Daily.   Past Week at Unknown time   • gabapentin (NEURONTIN) 300 MG capsule Take 300 mg by mouth 2 (Two) Times a Day.   Past Week at Unknown time   • gabapentin (NEURONTIN) 300 MG capsule Take 600 mg by mouth Every Night.   Past Week at Unknown time   • HYDROcodone-acetaminophen (NORCO)  MG per tablet Take 1 tablet by mouth 3 (Three) Times a Day.   Past Week at Unknown time   • levothyroxine (SYNTHROID, LEVOTHROID) 112 MCG tablet Take 112 mcg by mouth Daily.   Past Week at Unknown time   • lithium carbonate 300 MG capsule Take 300 mg by mouth Every Night.   Past Week at Unknown time   • omeprazole (priLOSEC) 20 MG capsule Take 20 mg by mouth Daily.   Past Week at Unknown time   • pantoprazole (PROTONIX) 40 MG EC tablet Take 40 mg by mouth Daily.   Past Week at Unknown time   • PROAIR  (90 BASE) MCG/ACT inhaler Inhale 2 puffs Every 4 (Four) Hours As Needed for Wheezing or Shortness of Air.  2 Past Month at Unknown time   • warfarin (COUMADIN) 5 MG tablet Take 5 mg by mouth 3 (Three) Times a Week. Monday, Wednesday and Friday.   Past Week at Unknown time   • warfarin (COUMADIN) 7.5 MG tablet Take 7.5 mg by mouth 4 (Four) Times a Week. Tuesday; Thursday; Saturday; Sunday   Past Week at Unknown time   • zolpidem (AMBIEN) 10 MG tablet Take 10 mg by mouth Every Night.   Past Week at Unknown time   • ipratropium-albuterol (DUO-NEB) 0.5-2.5 mg/mL  nebulizer Take 3 mL by nebulization 4 (Four) Times a Day. 360 mL 0 Unknown at Unknown time   • promethazine (PHENERGAN) 25 MG tablet Take 25 mg by mouth 2 (Two) Times a Day As Needed for Nausea or Vomiting.   Unknown at Unknown time   ,  Allergies: Morphine; Aspirin; Morphine and related; Nsaids; Quetiapine; Quetiapine fumarate; Salicylates; and Codeine      Review of Systems  negative except for that listed in present illness         Objective     Vital Signs:   Temp:  [98.2 °F (36.8 °C)] 98.2 °F (36.8 °C)  Heart Rate:  [63-83] 70  Resp:  [14-22] 22  BP: ()/(55-98) 129/91    Physical Exam:  General:chronically ill-appearing 42-year-old  female with nasal cannula O2 but no acute respiratory distress at present time  Pupils equal round and react to light. EOMs are intact.  ENT: Nose, mouth,throat are benign. Neck is supple without thyromegaly, mass or bruit.  Chdecreased breath sounds bilaterally with some wheezing bilaterally.   Heart:  A regular rate and rhythm without murmur, gallop or rub. (normal sinus rhythm on monitor)   Abdomen:   Beese Soft, nontender without rebound guarding or mass  Extremities:  Without clubbing cyanosis or edema.  Neurologic: Alert and oriented.Motor and sensation are grossly intact    LAB:   CBC:  Results from last 7 days  Lab Units 05/09/18  0703 05/07/18  0257 05/06/18  1828   WBC 10*3/mm3 13.42* 10.30 13.30*   HEMATOCRIT % 39.9 39.7 37.5   PLATELETS 10*3/mm3 283 245 223        BMP:  Results from last 7 days  Lab Units 05/09/18  0703 05/07/18  0257 05/06/18  1828   SODIUM mmol/L 142 142 142   POTASSIUM mmol/L 4.2 3.9 4.2   CHLORIDE mmol/L 101 100 103   CO2 mmol/L 27.0 32.0* 29.0   GLUCOSE mg/dL 222* 160* 126*   BUN mg/dL 14 11 9   CREATININE mg/dL 0.68 0.66 0.65        COAG:  Results from last 7 days  Lab Units 05/09/18  0703   INR  1.62*           IMAGES:      Imaging Results (last 24 hours)     Procedure Component Value Units Date/Time    CT Chest Hi Resolution  [[id number]] Collected:  05/08/18 1739     Updated:  05/08/18 1744    Narrative:       CT CHEST HI RESOLUTION- 5/8/2018 5:16 PM CDT     HISTORY: Respiratory failure, not elsewhere classified; R06.00-Dyspnea,  unspecified; R91.8-Other nonspecific abnormal finding of lung field;  D72.829-Elevated white blood cell count, unspecified; R06.82-Tachypnea,  not elsewhere classified     COMPARISON: January 1, 2016     DOSE LENGTH PRODUCT: 562 mGy cm. Automated exposure control was also  utilized to decrease patient radiation dose.     TECHNIQUE: High-resolution CT of the chest was performed without  contrast.     FINDINGS:  Neck base: The imaged portion of the neck and thyroid gland is  unremarkable.      Lungs: Severe interstitial thickening and interstitial fibrotic changes  noted diffusely throughout the right and left lung.. No nodules are  seen. There is no focal consolidation or pleural effusion. The trachea  and bronchial tree are patent. No bronchiectasis.     Heart and mediastinum: The heart, great vessels, and pulmonary vessels  are normal in appearance within limits of a noncontrast study. There is  no pericardial effusion. No enlarged axillary or mediastinal lymph nodes  are present.      Bones and soft tissues: No acute findings are seen in the bones or  surrounding soft tissues.     Upper abdomen: Postsurgical change from gastric surgery is noted.       Impression:       1. Severe changes of interstitial fibrosis are present throughout the  right and left lung. This may have progressed when compared to prior  study January 1, 2016.        This report was finalized on 05/08/2018 17:40 by Dr. Zach Pham MD.          ASSESSMENT: 42-year-old  female with multiple medical problems including acute respiratory failure.  CXR and CT scan show emphysematous changes and changes of pulmonary fibrosis.  Dr. Ba favors cystoscopy lung disease and wishes for a open lung biopsy.    I have reviewed her films and  hospital records.  I examined her and talked with her at length.  We discussed open lung biopsy (we will try VATS if she can stand one lung anesthesia).  We discussed the procedure, its indications, options and possible complications.  She understands and wishes to proceed as soon as possible.  Although she did eat some breakfast this morning I discussed with anesthesia and they would be willing to place her under general anesthesia approximately 3 PM this afternoon.  Her INR is 1.62 and I will transfer a unit of fresh frozen plasma.  Plan on as a VATS thoracotomy and open lung biopsy today.        Plan: As above      Case Moctezuma MD  05/09/18  8:52 AM        Electronically signed by Case Moctezuma MD at 5/9/2018  9:07 AM          Discharge Summary      Alix Kingsley DO at 5/13/2018 10:42 AM              [facility] Medicine Services  DISCHARGE SUMMARY       Date of Admission: 5/6/2018  Date of Discharge:  5/13/2018  Primary Care Physician: Neel Valencia Jr., MD    Presenting Problem/History of Present Illness:  Respiratory distress [R06.00]     Final Discharge Diagnoses:  Hospital Problem List     Respiratory distress      Assessment:  1.  Acute on chronic hypoxemic respiratory failure  2.  Severe changes of interstitial fibrosis bilateral lungs POD #3  left VATS thoracotomy with lung biopsy  3.  Right heart enlargement with mildly reduced RVEF and mild pulmonary HTN on previous echo in February 2018; NORMAL RV size and function noted on Echo performed 5/8/2018  4.  History of DVT on chronic anticoagulation with Coumadin  5.  COPD with acute exacerbation  6.  Bipolar disorder  7.  History of pervious gastric bypass procedure  8.  GERD with h/o esophagitis and gastritis  9.  Transaminitis-improved  10.  Tobacco dependence  11.  Generalized weakness    Consults:   Pulmonology  CT surgery      Procedures Performed:   VATS per CT surgery    Pertinent Test Results:   Lab  Results (last 48 hours)     Procedure Component Value Units Date/Time    Tissue / Bone Culture - Tissue, Lung, Left Lower Lobe [[id number]]  (Normal) Collected:  05/09/18 1631    Specimen:  Tissue from Lung, Left Lower Lobe Updated:  05/13/18 0718     Tissue Culture No growth at 4 days     Gram Stain Result Moderate (3+) WBCs seen      No organisms seen    CBC & Differential [[id number]] Collected:  05/13/18 0504    Specimen:  Blood Updated:  05/13/18 0621    Narrative:       The following orders were created for panel order CBC & Differential.  Procedure                               Abnormality         Status                     ---------                               -----------         ------                     Manual Differential[[id number]]          Abnormal            Final result               CBC Auto Differential[[id number]]        Abnormal            Final result                 Please view results for these tests on the individual orders.    CBC Auto Differential [[id number]]  (Abnormal) Collected:  05/13/18 0504    Specimen:  Blood Updated:  05/13/18 0621     WBC 15.64 (H) 10*3/mm3      RBC 4.29 10*6/mm3      Hemoglobin 12.8 g/dL      Hematocrit 39.5 %      MCV 92.1 fL      MCH 29.8 pg      MCHC 32.4 (L) g/dL      RDW 13.3 %      RDW-SD 45.4 fl      MPV 10.1 fL      Platelets 225 10*3/mm3     Manual Differential [[id number]]  (Abnormal) Collected:  05/13/18 0504    Specimen:  Blood Updated:  05/13/18 0621     Neutrophil % 80.8 (H) %      Lymphocyte % 10.1 (L) %      Monocyte % 5.1 %      Eosinophil % 1.0 %      Myelocyte % 2.0 (H) %      Atypical Lymphocyte % 1.0 %      Neutrophils Absolute 12.64 (H) 10*3/mm3      Lymphocytes Absolute 1.58 10*3/mm3      Monocytes Absolute 0.80 10*3/mm3      Eosinophils Absolute 0.16 10*3/mm3      RBC Morphology Normal     WBC Morphology Normal     Platelet Morphology Normal    Comprehensive Metabolic Panel [[id number]]  (Abnormal) Collected:  05/13/18 0504    Specimen:  Blood  Updated:  05/13/18 0555     Glucose 140 (H) mg/dL      BUN 17 mg/dL      Creatinine 0.81 mg/dL      Sodium 140 mmol/L      Potassium 3.6 mmol/L      Chloride 97 (L) mmol/L      CO2 33.0 (H) mmol/L      Calcium 9.4 mg/dL      Total Protein 6.7 g/dL      Albumin 3.80 g/dL      ALT (SGPT) 55 (H) U/L      AST (SGOT) 32 U/L      Alkaline Phosphatase 106 U/L      Total Bilirubin 0.2 mg/dL      eGFR Non African Amer 77 mL/min/1.73      Globulin 2.9 gm/dL      A/G Ratio 1.3 g/dL      BUN/Creatinine Ratio 21.0     Anion Gap 10.0 mmol/L     Protime-INR [[id number]]  (Abnormal) Collected:  05/13/18 0504    Specimen:  Blood Updated:  05/13/18 0548     Protime 25.8 (H) Seconds      INR 2.26 (H)    Anaerobic Culture - Tissue, Lung, Left Lower Lobe [[id number]]  (Normal) Collected:  05/09/18 1631    Specimen:  Tissue from Lung, Left Lower Lobe Updated:  05/12/18 1252     Culture No anaerobes isolated at 3 days    CBC & Differential [[id number]] Collected:  05/12/18 0457    Specimen:  Blood Updated:  05/12/18 0610    Narrative:       The following orders were created for panel order CBC & Differential.  Procedure                               Abnormality         Status                     ---------                               -----------         ------                     Manual Differential[[id number]]          Abnormal            Final result               CBC Auto Differential[[id number]]        Abnormal            Final result                 Please view results for these tests on the individual orders.    CBC Auto Differential [[id number]]  (Abnormal) Collected:  05/12/18 0457    Specimen:  Blood Updated:  05/12/18 0610     WBC 16.54 (H) 10*3/mm3      RBC 4.47 10*6/mm3      Hemoglobin 13.2 g/dL      Hematocrit 41.7 %      MCV 93.3 fL      MCH 29.5 pg      MCHC 31.7 (L) g/dL      RDW 13.3 %      RDW-SD 45.3 fl      MPV 9.9 fL      Platelets 263 10*3/mm3     Narrative:       The previously reported component NRBC is no longer being  reported.    Manual Differential [[id number]]  (Abnormal) Collected:  05/12/18 0457    Specimen:  Blood Updated:  05/12/18 0610     Neutrophil % 81.0 (H) %      Lymphocyte % 5.0 (L) %      Monocyte % 2.0 (L) %      Bands %  1.0 %      Myelocyte % 10.0 (H) %      Atypical Lymphocyte % 1.0 %      Neutrophils Absolute 13.56 (H) 10*3/mm3      Lymphocytes Absolute 0.83 10*3/mm3      Monocytes Absolute 0.33 10*3/mm3      RBC Morphology Normal     WBC Morphology Normal     Platelet Morphology Normal    Basic Metabolic Panel [[id number]]  (Abnormal) Collected:  05/12/18 0457    Specimen:  Blood Updated:  05/12/18 0538     Glucose 149 (H) mg/dL      BUN 17 mg/dL      Creatinine 0.79 mg/dL      Sodium 142 mmol/L      Potassium 4.3 mmol/L      Chloride 97 (L) mmol/L      CO2 34.0 (H) mmol/L      Calcium 9.3 mg/dL      eGFR Non African Amer 79 mL/min/1.73      BUN/Creatinine Ratio 21.5     Anion Gap 11.0 mmol/L     Narrative:       GFR Normal >60  Chronic Kidney Disease <60  Kidney Failure <15    Protime-INR [[id number]]  (Abnormal) Collected:  05/12/18 0457    Specimen:  Blood Updated:  05/12/18 0536     Protime 23.0 (H) Seconds      INR 1.96 (H)    Blood Culture - Blood, [[id number]]  (Normal) Collected:  05/06/18 1910    Specimen:  Blood from Arm, Right Updated:  05/11/18 1930     Blood Culture No growth at 5 days    Blood Culture - Blood, [[id number]]  (Normal) Collected:  05/06/18 1828    Specimen:  Blood from Arm, Left Updated:  05/11/18 1915     Blood Culture No growth at 5 days            Chief Complaint on Day of Discharge:   None    History of Present Illness on Day of Discharge: Patient states that she is ready to go home.     Hospital Course:  The patient is a 42 y.o. female who presented to [facility] with acute on chronic respiratory failure due to COPD and CHF.  Patient was placed in the ICE on the BiPAP and given steroids and IV lasix. Patient was placed on Cefepime and Levaquin. [place] was DC, and patient  "was moved from the unit. High flow 02 was weaned as well as steroids. Liver enzyme were slightly elevated but improved. INR was followed and patient was therapeutic at discharge.     Condition on Discharge:  Stable    Physical Exam on Discharge:  /62 (BP Location: Right arm, Patient Position: Lying)   Pulse 66   Temp 96.9 °F (36.1 °C) (Temporal Artery )   Resp 16   Ht 149.9 cm (59\")   Wt 85.5 kg (188 lb 8 oz)   SpO2 100%   BMI 38.07 kg/m²    Physical Exam   HENT:   Head: Normocephalic and atraumatic.   Nose: Nose normal.   Mouth/Throat: Oropharynx is clear and moist.   Eyes: Conjunctivae and EOM are normal.   Neck: Normal range of motion. Neck supple.   Cardiovascular: Normal rate, regular rhythm and normal heart sounds.    Pulmonary/Chest: Effort normal and breath sounds normal.   Abdominal: Soft. Bowel sounds are normal.   Musculoskeletal: She exhibits no edema or tenderness.   Neurological: She is alert. No cranial nerve deficit.   Skin: Skin is warm and dry.   Psychiatric: She has a normal mood and affect.   Vitals reviewed.        Discharge Disposition:  Home or Self Care    Discharge Medications:   Vianca Spencer   Home Medication Instructions KIERRA:[id number]    Printed on:05/13/18 1042   Medication Information                      diazePAM (VALIUM) 5 MG tablet  Take 5 mg by mouth 3 (Three) Times a Day.             FLUoxetine (PROzac) 20 MG capsule  Take 60 mg by mouth Daily.             gabapentin (NEURONTIN) 300 MG capsule  Take 300 mg by mouth 2 (Two) Times a Day.             gabapentin (NEURONTIN) 300 MG capsule  Take 600 mg by mouth Every Night.             guaiFENesin (MUCINEX) 600 MG 12 hr tablet  Take 2 tablets by mouth Every 12 (Twelve) Hours.             ipratropium-albuterol (DUO-NEB) 0.5-2.5 mg/mL nebulizer  Take 3 mL by nebulization 4 (Four) Times a Day.             levoFLOXacin (LEVAQUIN) 500 MG tablet  Take 1 tablet by mouth Daily.             levothyroxine (SYNTHROID, " LEVOTHROID) 112 MCG tablet  Take 112 mcg by mouth Daily.             lithium carbonate 300 MG capsule  Take 300 mg by mouth Every Night.             nicotine (NICODERM CQ) 21 MG/24HR patch  Place 1 patch on the skin Daily.             omeprazole (priLOSEC) 20 MG capsule  Take 20 mg by mouth Daily.             oxyCODONE-acetaminophen (PERCOCET) 7.5-325 MG per tablet  Take 1 tablet by mouth Every 6 (Six) Hours As Needed for Moderate Pain  for up to 8 days.             predniSONE (DELTASONE) 20 MG tablet  Take 1 tablet by mouth Daily With Breakfast. 20mg daily x2 days,10mg Daily 3 days, 5mg daily x 3days             PROAIR  (90 BASE) MCG/ACT inhaler  Inhale 2 puffs Every 4 (Four) Hours As Needed for Wheezing or Shortness of Air.             promethazine (PHENERGAN) 25 MG tablet  Take 25 mg by mouth 2 (Two) Times a Day As Needed for Nausea or Vomiting.             warfarin (COUMADIN) 5 MG tablet  Take 1 tablet by mouth 3 (Three) Times a Week. Daily             zolpidem (AMBIEN) 10 MG tablet  Take 10 mg by mouth Every Night.                 Discharge Diet:   Diet Instructions     Diet: Consistent Carbohydrate, Cardiac; Thin       Discharge Diet:   Consistent Carbohydrate  Cardiac       Fluid Consistency:  Thin          Activity at Discharge:   Activity Instructions     Activity as Tolerated             Discharge Care Plan/Instructions:   Return if worsens  CBC/CMP 2-3 days following DC  INR tomorrow  Home health and home PT  02 @ 2L NC continuous.     Follow-up Appointments:   No future appointments.    Test Results Pending at Discharge: Tissue pathology will need OP as it is still pending at [place].     Alix Kingsley DO  05/13/18  10:42 AM    Time: 36 minutes          Electronically signed by Alix Kingsley DO at 5/13/2018 10:54 AM

## 2018-05-14 ENCOUNTER — LAB REQUISITION (OUTPATIENT)
Dept: LAB | Facility: HOSPITAL | Age: 43
End: 2018-05-14

## 2018-05-14 ENCOUNTER — TELEPHONE (OUTPATIENT)
Dept: CARDIAC SURGERY | Facility: CLINIC | Age: 43
End: 2018-05-14

## 2018-05-14 DIAGNOSIS — Z00.00 ENCOUNTER FOR GENERAL ADULT MEDICAL EXAMINATION WITHOUT ABNORMAL FINDINGS: ICD-10-CM

## 2018-05-14 LAB
ALBUMIN SERPL-MCNC: 3.9 G/DL (ref 3.5–5)
ALBUMIN/GLOB SERPL: 1.3 G/DL (ref 1.1–2.5)
ALP SERPL-CCNC: 133 U/L (ref 24–120)
ALT SERPL W P-5'-P-CCNC: 51 U/L (ref 0–54)
ANION GAP SERPL CALCULATED.3IONS-SCNC: 10 MMOL/L (ref 4–13)
AST SERPL-CCNC: 32 U/L (ref 7–45)
BACTERIA SPEC AEROBE CULT: NORMAL
BACTERIA SPEC ANAEROBE CULT: NORMAL
BASOPHILS # BLD AUTO: 0.14 10*3/MM3 (ref 0–0.2)
BASOPHILS NFR BLD AUTO: 0.6 % (ref 0–2)
BILIRUB SERPL-MCNC: 0.3 MG/DL (ref 0.1–1)
BUN BLD-MCNC: 15 MG/DL (ref 5–21)
BUN/CREAT SERPL: 22.4 (ref 7–25)
CALCIUM SPEC-SCNC: 9 MG/DL (ref 8.4–10.4)
CHLORIDE SERPL-SCNC: 93 MMOL/L (ref 98–110)
CO2 SERPL-SCNC: 34 MMOL/L (ref 24–31)
CREAT BLD-MCNC: 0.67 MG/DL (ref 0.5–1.4)
DEPRECATED RDW RBC AUTO: 45.2 FL (ref 40–54)
EOSINOPHIL # BLD AUTO: 0.1 10*3/MM3 (ref 0–0.7)
EOSINOPHIL NFR BLD AUTO: 0.4 % (ref 0–4)
ERYTHROCYTE [DISTWIDTH] IN BLOOD BY AUTOMATED COUNT: 13.6 % (ref 12–15)
GFR SERPL CREATININE-BSD FRML MDRD: 96 ML/MIN/1.73
GLOBULIN UR ELPH-MCNC: 2.9 GM/DL
GLUCOSE BLD-MCNC: 118 MG/DL (ref 70–100)
GRAM STN SPEC: NORMAL
GRAM STN SPEC: NORMAL
HCT VFR BLD AUTO: 39.3 % (ref 37–47)
HGB BLD-MCNC: 12.6 G/DL (ref 12–16)
IMM GRANULOCYTES # BLD: 1.16 10*3/MM3 (ref 0–0.03)
IMM GRANULOCYTES NFR BLD: 4.8 % (ref 0–5)
INR PPP: 1.99 (ref 0.91–1.09)
LYMPHOCYTES # BLD AUTO: 1.43 10*3/MM3 (ref 0.72–4.86)
LYMPHOCYTES NFR BLD AUTO: 6 % (ref 15–45)
MCH RBC QN AUTO: 29 PG (ref 28–32)
MCHC RBC AUTO-ENTMCNC: 32.1 G/DL (ref 33–36)
MCV RBC AUTO: 90.6 FL (ref 82–98)
MONOCYTES # BLD AUTO: 0.97 10*3/MM3 (ref 0.19–1.3)
MONOCYTES NFR BLD AUTO: 4 % (ref 4–12)
NEUTROPHILS # BLD AUTO: 20.23 10*3/MM3 (ref 1.87–8.4)
NEUTROPHILS NFR BLD AUTO: 84.2 % (ref 39–78)
NRBC BLD MANUAL-RTO: 0 /100 WBC (ref 0–0)
PLATELET # BLD AUTO: 306 10*3/MM3 (ref 130–400)
PMV BLD AUTO: 9.6 FL (ref 6–12)
POTASSIUM BLD-SCNC: 4.2 MMOL/L (ref 3.5–5.3)
PROT SERPL-MCNC: 6.8 G/DL (ref 6.3–8.7)
PROTHROMBIN TIME: 23.3 SECONDS (ref 11.9–14.6)
RBC # BLD AUTO: 4.34 10*6/MM3 (ref 4.2–5.4)
SODIUM BLD-SCNC: 137 MMOL/L (ref 135–145)
WBC NRBC COR # BLD: 24.03 10*3/MM3 (ref 4.8–10.8)

## 2018-05-14 PROCEDURE — 85610 PROTHROMBIN TIME: CPT | Performed by: FAMILY MEDICINE

## 2018-05-14 PROCEDURE — 85025 COMPLETE CBC W/AUTO DIFF WBC: CPT | Performed by: FAMILY MEDICINE

## 2018-05-14 PROCEDURE — 80053 COMPREHEN METABOLIC PANEL: CPT | Performed by: FAMILY MEDICINE

## 2018-05-14 NOTE — TELEPHONE ENCOUNTER
Patient called wanting to change 1 wk post op appointment that had been scheduled for 5/21. Scheduled her for this Friday 5/18 @ 2:30 per Dr. Moctezuma. Stated she would call us back if for some reason she was unable to make it.

## 2018-05-15 ENCOUNTER — TELEPHONE (OUTPATIENT)
Dept: CARDIAC SURGERY | Facility: CLINIC | Age: 43
End: 2018-05-15

## 2018-05-15 NOTE — TELEPHONE ENCOUNTER
Spoke with Dr. Moctezuma. Please place patient on my schedule tomorrow and I will remove sutures in office.

## 2018-05-15 NOTE — TELEPHONE ENCOUNTER
Patient called back to cancel appointment for Friday, states she will not have a ride her daughter is working. Said that her daughter is off Tuesdays and Wednesdays so those are really the only days she can make it. She also stated that she would just take her stitches out herself and that she would call to make another appointment. I told her that I would send a message.

## 2018-05-16 ENCOUNTER — OFFICE VISIT (OUTPATIENT)
Dept: CARDIAC SURGERY | Facility: CLINIC | Age: 43
End: 2018-05-16

## 2018-05-16 VITALS
BODY MASS INDEX: 37.9 KG/M2 | HEIGHT: 59 IN | WEIGHT: 188 LBS | DIASTOLIC BLOOD PRESSURE: 60 MMHG | SYSTOLIC BLOOD PRESSURE: 116 MMHG | OXYGEN SATURATION: 94 % | HEART RATE: 125 BPM

## 2018-05-16 DIAGNOSIS — E66.01 MORBID OBESITY DUE TO EXCESS CALORIES (HCC): ICD-10-CM

## 2018-05-16 DIAGNOSIS — J96.11 CHRONIC RESPIRATORY FAILURE WITH HYPOXIA (HCC): ICD-10-CM

## 2018-05-16 DIAGNOSIS — J84.9 LUNG DISEASE, INTERSTITIAL (HCC): Primary | ICD-10-CM

## 2018-05-16 PROCEDURE — 99024 POSTOP FOLLOW-UP VISIT: CPT | Performed by: NURSE PRACTITIONER

## 2018-05-17 PROBLEM — R09.02 HYPOXIA: Status: ACTIVE | Noted: 2018-05-17

## 2018-05-17 PROBLEM — J96.10 CHRONIC RESPIRATORY FAILURE (HCC): Status: ACTIVE | Noted: 2018-05-17

## 2018-05-17 PROBLEM — J84.9 LUNG DISEASE, INTERSTITIAL (HCC): Status: ACTIVE | Noted: 2018-05-17

## 2018-05-17 NOTE — PROGRESS NOTES
"Subjective   Patient ID: Vianca Spencer is a 42 y.o. female who is here for follow-up having had Left VATS thoracotomy and biopsy of left lower lobe by Dr. Moctezuma performed on 5/09/2018.       History of Present Illness  She returns today for wound check and suture removal. Post operative recovery was uneventful without any major complications. She reports she never received portable oxygen equipment from [facility] and home health. She is on room air presently. She has [facility] oxygen through [facility], just not portable equipment. Sleep habits are good. Pain control has been good. No fevers/sweats/chills. No drainage from incisions. No chest pain. Shortness of breath is at baseline. Appetite is fair, she states \"costs too much to eat healthy.\" Continues to smoke but down to 1/4 ppd. Has upcoming appointment with Dr. Ba. She is accompanied by her daughter and daughter's boyfriend.     The following portions of the patient's history were reviewed and updated as appropriate: allergies, current medications, past family history, past medical history, past social history, past surgical history and problem list.    Review of Systems   Constitutional: Negative for chills, diaphoresis and fever.   Respiratory: Positive for shortness of breath.    Cardiovascular: Negative for chest pain and palpitations.       Objective   Visit Vitals  /60 (BP Location: Right arm, Patient Position: Sitting, Cuff Size: Adult)   Pulse (!) 125   Ht 149.9 cm (59\")   Wt 85.3 kg (188 lb)   SpO2 94%   BMI 37.97 kg/m²       Physical Exam   Constitutional: She is oriented to person, place, and time. She appears well-developed and well-nourished. No distress.   HENT:   Head: Normocephalic.   Eyes: Pupils are equal, round, and reactive to light.   Neck: Normal range of motion. Neck supple. No JVD present.   Cardiovascular: Regular rhythm, normal heart sounds and intact distal pulses.  Tachycardia present.  Exam reveals no friction rub.    No murmur " heard.  Pulmonary/Chest: Effort normal. No stridor. No respiratory distress. She has decreased breath sounds. She has no wheezes. She has no rales.   Abdominal: Soft. She exhibits no distension. There is no tenderness.   obese   Musculoskeletal: She exhibits no edema.   Neurological: She is alert and oriented to person, place, and time.   Skin: Skin is warm and dry. No rash noted. She is not diaphoretic. No erythema. No pallor.   Left sided thoracic incision clean, dry, and intact. Removed left sided sutures (2) and applied gauze dressing. No signs of infection. Previous chest tube site open and still healing, serous drainage. Prior tape residue remaining on skin with irritation.   Vitals reviewed.      Assessment/Plan       Vianca was seen today for post-op follow-up.    Diagnoses and all orders for this visit:    Lung disease, interstitial    Morbid obesity due to excess calories    Chronic respiratory failure with hypoxia         Overall, Vianca Spencer is doing well.  Removed sutures without remark.  Applied gauze dressing.  Discussed keeping incision clean and dry with soap and water.  Instructed to use less tape as it appears to be irritating her skin.  Advised patient to contact [facility] in regards to portable oxygen equipment.  Discussed that her oxygen level is good today on room air.  Lung biopsy shows it is still in process.  Discussed this with Dr. Moctezuma and he advises for patient to follow-up with Dr. Ba regarding these results.  Patient states she has an upcoming appointment with Dr. Ba. We discussed her heart rate is elevated today, she states this is normal for her.  Advised her to follow-up with primary care provider regarding this.  She denies headache, dizziness, and chest pain presently. Shortness of breath is at baseline. Encouraged healthy nutritional options. All questions have been answered to the best of my ability.     Patient's Body mass index is 37.97 kg/m². BMI is above normal  parameters. Recommendations include: Follow-up with primary care provider to ensure durable lifestyle changes are made to accomplish an acceptable weight since her weight is greater than acceptable for age and height. I advised the patient of the risks in continuing to use tobacco, and I provided this patient with smoking cessation educational materials. During this visit, I spent 3 minutes counseling the patient regarding smoking cessation.  Although I have not provided a specific follow up appointment, should I be of further assistance, please do not hesitate to contact us.

## 2018-05-29 ENCOUNTER — LAB REQUISITION (OUTPATIENT)
Dept: LAB | Facility: HOSPITAL | Age: 43
End: 2018-05-29

## 2018-05-29 DIAGNOSIS — Z00.00 ENCOUNTER FOR GENERAL ADULT MEDICAL EXAMINATION WITHOUT ABNORMAL FINDINGS: ICD-10-CM

## 2018-05-29 LAB
INR PPP: 6.21 (ref 0.91–1.09)
PROTHROMBIN TIME: 57.5 SECONDS (ref 11.9–14.6)

## 2018-05-29 PROCEDURE — 85610 PROTHROMBIN TIME: CPT | Performed by: FAMILY MEDICINE

## 2018-05-31 ENCOUNTER — LAB REQUISITION (OUTPATIENT)
Dept: LAB | Facility: HOSPITAL | Age: 43
End: 2018-05-31

## 2018-05-31 DIAGNOSIS — Z00.00 ENCOUNTER FOR GENERAL ADULT MEDICAL EXAMINATION WITHOUT ABNORMAL FINDINGS: ICD-10-CM

## 2018-05-31 LAB
INR PPP: 5.13 (ref 0.91–1.09)
PROTHROMBIN TIME: 49.4 SECONDS (ref 11.9–14.6)

## 2018-05-31 PROCEDURE — 85610 PROTHROMBIN TIME: CPT | Performed by: FAMILY MEDICINE

## 2018-06-04 ENCOUNTER — HOSPITAL ENCOUNTER (INPATIENT)
Facility: HOSPITAL | Age: 43
LOS: 7 days | Discharge: HOME | End: 2018-06-11
Attending: FAMILY MEDICINE | Admitting: INTERNAL MEDICINE

## 2018-06-04 ENCOUNTER — APPOINTMENT (OUTPATIENT)
Dept: GENERAL RADIOLOGY | Facility: HOSPITAL | Age: 43
End: 2018-06-04

## 2018-06-04 ENCOUNTER — APPOINTMENT (OUTPATIENT)
Dept: CT IMAGING | Facility: HOSPITAL | Age: 43
End: 2018-06-04

## 2018-06-04 DIAGNOSIS — Z99.81 OXYGEN DEPENDENT: Primary | ICD-10-CM

## 2018-06-04 DIAGNOSIS — R09.02 HYPOXIC: ICD-10-CM

## 2018-06-04 DIAGNOSIS — I50.21 ACUTE SYSTOLIC CONGESTIVE HEART FAILURE (HCC): ICD-10-CM

## 2018-06-04 DIAGNOSIS — J41.1 MUCOPURULENT CHRONIC BRONCHITIS (HCC): ICD-10-CM

## 2018-06-04 DIAGNOSIS — R59.0 LYMPHADENOPATHY, THORACIC: ICD-10-CM

## 2018-06-04 LAB
ANION GAP SERPL CALCULATED.3IONS-SCNC: 9 MMOL/L (ref 4–13)
ANISOCYTOSIS BLD QL: ABNORMAL
ARTERIAL PATENCY WRIST A: ABNORMAL
ARTERIAL PATENCY WRIST A: POSITIVE
ATMOSPHERIC PRESS: 751 MMHG
ATMOSPHERIC PRESS: 752 MMHG
BASE EXCESS BLDA CALC-SCNC: -0.8 MMOL/L (ref 0–2)
BASE EXCESS BLDA CALC-SCNC: -0.8 MMOL/L (ref 0–2)
BDY SITE: ABNORMAL
BDY SITE: ABNORMAL
BODY TEMPERATURE: 37 C
BODY TEMPERATURE: 37 C
BUN BLD-MCNC: 14 MG/DL (ref 5–21)
BUN/CREAT SERPL: 15.2 (ref 7–25)
CALCIUM SPEC-SCNC: 9.1 MG/DL (ref 8.4–10.4)
CHLORIDE SERPL-SCNC: 101 MMOL/L (ref 98–110)
CHROMATIN AB SERPL-ACNC: 15.1 IU/ML (ref 0–11.9)
CO2 SERPL-SCNC: 28 MMOL/L (ref 24–31)
CREAT BLD-MCNC: 0.92 MG/DL (ref 0.5–1.4)
D DIMER PPP FEU-MCNC: <0.22 MG/L (FEU) (ref 0–0.5)
D-LACTATE SERPL-SCNC: 1.1 MMOL/L (ref 0.5–2)
DEPRECATED RDW RBC AUTO: 46.5 FL (ref 40–54)
EPAP: 6
ERYTHROCYTE [DISTWIDTH] IN BLOOD BY AUTOMATED COUNT: 14.3 % (ref 12–15)
ERYTHROCYTE [SEDIMENTATION RATE] IN BLOOD: 19 MM/HR (ref 0–20)
GAS FLOW AIRWAY: 15 LPM
GFR SERPL CREATININE-BSD FRML MDRD: 66 ML/MIN/1.73
GIANT PLATELETS: ABNORMAL
GLUCOSE BLD-MCNC: 145 MG/DL (ref 70–100)
HCO3 BLDA-SCNC: 25 MMOL/L (ref 20–26)
HCO3 BLDA-SCNC: 25.3 MMOL/L (ref 20–26)
HCT VFR BLD AUTO: 35.9 % (ref 37–47)
HGB BLD-MCNC: 11.5 G/DL (ref 12–16)
HOLD SPECIMEN: NORMAL
HOLD SPECIMEN: NORMAL
HOROWITZ INDEX BLD+IHG-RTO: 65 %
INR PPP: 1.23 (ref 0.91–1.09)
IPAP: 14
LYMPHOCYTES # BLD MANUAL: 0.96 10*3/MM3 (ref 0.72–4.86)
LYMPHOCYTES NFR BLD MANUAL: 2.4 % (ref 4–12)
LYMPHOCYTES NFR BLD MANUAL: 6.4 % (ref 15–45)
Lab: ABNORMAL
Lab: ABNORMAL
MCH RBC QN AUTO: 28.8 PG (ref 28–32)
MCHC RBC AUTO-ENTMCNC: 32 G/DL (ref 33–36)
MCV RBC AUTO: 90 FL (ref 82–98)
MODALITY: ABNORMAL
MODALITY: ABNORMAL
MONOCYTES # BLD AUTO: 0.36 10*3/MM3 (ref 0.19–1.3)
NEUTROPHILS # BLD AUTO: 13.68 10*3/MM3 (ref 1.87–8.4)
NEUTROPHILS NFR BLD MANUAL: 81.6 % (ref 39–78)
NEUTS BAND NFR BLD MANUAL: 9.6 % (ref 0–10)
NT-PROBNP SERPL-MCNC: 1880 PG/ML (ref 0–450)
PCO2 BLDA: 45.2 MM HG (ref 35–45)
PCO2 BLDA: 46.8 MM HG (ref 35–45)
PH BLDA: 7.34 PH UNITS (ref 7.35–7.45)
PH BLDA: 7.35 PH UNITS (ref 7.35–7.45)
PLATELET # BLD AUTO: 268 10*3/MM3 (ref 130–400)
PMV BLD AUTO: 9.8 FL (ref 6–12)
PO2 BLDA: 109 MM HG (ref 83–108)
PO2 BLDA: 172 MM HG (ref 83–108)
POTASSIUM BLD-SCNC: 4.4 MMOL/L (ref 3.5–5.3)
PROCALCITONIN SERPL-MCNC: 0.33 NG/ML
PROTHROMBIN TIME: 15.9 SECONDS (ref 11.9–14.6)
RBC # BLD AUTO: 3.99 10*6/MM3 (ref 4.2–5.4)
SAO2 % BLDCOA: 100.1 % (ref 94–99)
SAO2 % BLDCOA: 99 % (ref 94–99)
SET MECH RESP RATE: 12
SODIUM BLD-SCNC: 138 MMOL/L (ref 135–145)
TROPONIN I SERPL-MCNC: <0.012 NG/ML (ref 0–0.03)
VENTILATOR MODE: ABNORMAL
VENTILATOR MODE: ABNORMAL
WBC MORPH BLD: NORMAL
WBC NRBC COR # BLD: 15 10*3/MM3 (ref 4.8–10.8)
WHOLE BLOOD HOLD SPECIMEN: NORMAL
WHOLE BLOOD HOLD SPECIMEN: NORMAL

## 2018-06-04 PROCEDURE — 99285 EMERGENCY DEPT VISIT HI MDM: CPT

## 2018-06-04 PROCEDURE — 94799 UNLISTED PULMONARY SVC/PX: CPT

## 2018-06-04 PROCEDURE — 94760 N-INVAS EAR/PLS OXIMETRY 1: CPT

## 2018-06-04 PROCEDURE — 86235 NUCLEAR ANTIGEN ANTIBODY: CPT | Performed by: NURSE PRACTITIONER

## 2018-06-04 PROCEDURE — 83605 ASSAY OF LACTIC ACID: CPT | Performed by: FAMILY MEDICINE

## 2018-06-04 PROCEDURE — 83520 IMMUNOASSAY QUANT NOS NONAB: CPT | Performed by: NURSE PRACTITIONER

## 2018-06-04 PROCEDURE — 25010000002 METHYLPREDNISOLONE PER 125 MG: Performed by: FAMILY MEDICINE

## 2018-06-04 PROCEDURE — 0 IOPAMIDOL PER 1 ML: Performed by: FAMILY MEDICINE

## 2018-06-04 PROCEDURE — 93005 ELECTROCARDIOGRAM TRACING: CPT | Performed by: FAMILY MEDICINE

## 2018-06-04 PROCEDURE — 25010000002 FUROSEMIDE PER 20 MG: Performed by: INTERNAL MEDICINE

## 2018-06-04 PROCEDURE — 85007 BL SMEAR W/DIFF WBC COUNT: CPT | Performed by: FAMILY MEDICINE

## 2018-06-04 PROCEDURE — 86256 FLUORESCENT ANTIBODY TITER: CPT | Performed by: NURSE PRACTITIONER

## 2018-06-04 PROCEDURE — 25010000002 METHYLPREDNISOLONE PER 40 MG: Performed by: INTERNAL MEDICINE

## 2018-06-04 PROCEDURE — 5A09457 ASSISTANCE WITH RESPIRATORY VENTILATION, 24-96 CONSECUTIVE HOURS, CONTINUOUS POSITIVE AIRWAY PRESSURE: ICD-10-PCS | Performed by: FAMILY MEDICINE

## 2018-06-04 PROCEDURE — 80048 BASIC METABOLIC PNL TOTAL CA: CPT | Performed by: FAMILY MEDICINE

## 2018-06-04 PROCEDURE — 84145 PROCALCITONIN (PCT): CPT | Performed by: FAMILY MEDICINE

## 2018-06-04 PROCEDURE — 94660 CPAP INITIATION&MGMT: CPT

## 2018-06-04 PROCEDURE — 87040 BLOOD CULTURE FOR BACTERIA: CPT | Performed by: FAMILY MEDICINE

## 2018-06-04 PROCEDURE — 86431 RHEUMATOID FACTOR QUANT: CPT | Performed by: NURSE PRACTITIONER

## 2018-06-04 PROCEDURE — 25010000002 PIPERACILLIN SOD-TAZOBACTAM PER 1 G: Performed by: FAMILY MEDICINE

## 2018-06-04 PROCEDURE — 84484 ASSAY OF TROPONIN QUANT: CPT | Performed by: FAMILY MEDICINE

## 2018-06-04 PROCEDURE — 85610 PROTHROMBIN TIME: CPT | Performed by: INTERNAL MEDICINE

## 2018-06-04 PROCEDURE — 82803 BLOOD GASES ANY COMBINATION: CPT

## 2018-06-04 PROCEDURE — 71045 X-RAY EXAM CHEST 1 VIEW: CPT

## 2018-06-04 PROCEDURE — 85025 COMPLETE CBC W/AUTO DIFF WBC: CPT | Performed by: FAMILY MEDICINE

## 2018-06-04 PROCEDURE — 85379 FIBRIN DEGRADATION QUANT: CPT | Performed by: FAMILY MEDICINE

## 2018-06-04 PROCEDURE — 94640 AIRWAY INHALATION TREATMENT: CPT

## 2018-06-04 PROCEDURE — 83880 ASSAY OF NATRIURETIC PEPTIDE: CPT | Performed by: FAMILY MEDICINE

## 2018-06-04 PROCEDURE — 36600 WITHDRAWAL OF ARTERIAL BLOOD: CPT

## 2018-06-04 PROCEDURE — 71275 CT ANGIOGRAPHY CHEST: CPT

## 2018-06-04 PROCEDURE — 86225 DNA ANTIBODY NATIVE: CPT | Performed by: NURSE PRACTITIONER

## 2018-06-04 PROCEDURE — 85651 RBC SED RATE NONAUTOMATED: CPT | Performed by: NURSE PRACTITIONER

## 2018-06-04 PROCEDURE — 93010 ELECTROCARDIOGRAM REPORT: CPT | Performed by: INTERNAL MEDICINE

## 2018-06-04 PROCEDURE — 25010000002 LEVOFLOXACIN PER 250 MG: Performed by: INTERNAL MEDICINE

## 2018-06-04 RX ORDER — OXYCODONE AND ACETAMINOPHEN 10; 325 MG/1; MG/1
1 TABLET ORAL EVERY 6 HOURS PRN
Status: DISCONTINUED | OUTPATIENT
Start: 2018-06-04 | End: 2018-06-11 | Stop reason: HOSPADM

## 2018-06-04 RX ORDER — FLUOXETINE HYDROCHLORIDE 20 MG/1
60 CAPSULE ORAL DAILY
Status: DISCONTINUED | OUTPATIENT
Start: 2018-06-04 | End: 2018-06-11 | Stop reason: HOSPADM

## 2018-06-04 RX ORDER — FUROSEMIDE 10 MG/ML
20 INJECTION INTRAMUSCULAR; INTRAVENOUS DAILY
Status: DISCONTINUED | OUTPATIENT
Start: 2018-06-04 | End: 2018-06-06

## 2018-06-04 RX ORDER — METHYLPREDNISOLONE SODIUM SUCCINATE 40 MG/ML
40 INJECTION, POWDER, LYOPHILIZED, FOR SOLUTION INTRAMUSCULAR; INTRAVENOUS EVERY 12 HOURS
Status: DISCONTINUED | OUTPATIENT
Start: 2018-06-04 | End: 2018-06-09

## 2018-06-04 RX ORDER — DIAZEPAM 5 MG/1
5 TABLET ORAL 3 TIMES DAILY
Status: DISCONTINUED | OUTPATIENT
Start: 2018-06-04 | End: 2018-06-11 | Stop reason: HOSPADM

## 2018-06-04 RX ORDER — WARFARIN SODIUM 5 MG/1
5 TABLET ORAL 3 TIMES WEEKLY
Status: DISCONTINUED | OUTPATIENT
Start: 2018-06-06 | End: 2018-06-05

## 2018-06-04 RX ORDER — GABAPENTIN 300 MG/1
300 CAPSULE ORAL 2 TIMES DAILY
Status: DISCONTINUED | OUTPATIENT
Start: 2018-06-04 | End: 2018-06-11 | Stop reason: HOSPADM

## 2018-06-04 RX ORDER — PANTOPRAZOLE SODIUM 40 MG/1
40 TABLET, DELAYED RELEASE ORAL EVERY MORNING
Status: DISCONTINUED | OUTPATIENT
Start: 2018-06-04 | End: 2018-06-11 | Stop reason: HOSPADM

## 2018-06-04 RX ORDER — OXYCODONE AND ACETAMINOPHEN 10; 325 MG/1; MG/1
1 TABLET ORAL 3 TIMES DAILY PRN
COMMUNITY

## 2018-06-04 RX ORDER — ACETAMINOPHEN 325 MG/1
650 TABLET ORAL EVERY 4 HOURS PRN
Status: DISCONTINUED | OUTPATIENT
Start: 2018-06-04 | End: 2018-06-11 | Stop reason: HOSPADM

## 2018-06-04 RX ORDER — IPRATROPIUM BROMIDE AND ALBUTEROL SULFATE 2.5; .5 MG/3ML; MG/3ML
3 SOLUTION RESPIRATORY (INHALATION) ONCE
Status: COMPLETED | OUTPATIENT
Start: 2018-06-04 | End: 2018-06-04

## 2018-06-04 RX ORDER — LEVOTHYROXINE SODIUM 112 UG/1
112 TABLET ORAL
Status: DISCONTINUED | OUTPATIENT
Start: 2018-06-04 | End: 2018-06-11 | Stop reason: HOSPADM

## 2018-06-04 RX ORDER — LITHIUM CARBONATE 300 MG/1
300 CAPSULE ORAL NIGHTLY
Status: DISCONTINUED | OUTPATIENT
Start: 2018-06-04 | End: 2018-06-11 | Stop reason: HOSPADM

## 2018-06-04 RX ORDER — WARFARIN SODIUM 5 MG/1
5 TABLET ORAL 3 TIMES WEEKLY
Status: DISCONTINUED | OUTPATIENT
Start: 2018-06-04 | End: 2018-06-04

## 2018-06-04 RX ORDER — METHYLPREDNISOLONE SODIUM SUCCINATE 125 MG/2ML
125 INJECTION, POWDER, LYOPHILIZED, FOR SOLUTION INTRAMUSCULAR; INTRAVENOUS ONCE
Status: COMPLETED | OUTPATIENT
Start: 2018-06-04 | End: 2018-06-04

## 2018-06-04 RX ORDER — LEVOFLOXACIN 5 MG/ML
500 INJECTION, SOLUTION INTRAVENOUS EVERY 24 HOURS
Status: DISCONTINUED | OUTPATIENT
Start: 2018-06-04 | End: 2018-06-08

## 2018-06-04 RX ORDER — PROMETHAZINE HYDROCHLORIDE 25 MG/1
25 TABLET ORAL 2 TIMES DAILY PRN
Status: DISCONTINUED | OUTPATIENT
Start: 2018-06-04 | End: 2018-06-11 | Stop reason: HOSPADM

## 2018-06-04 RX ORDER — NICOTINE 21 MG/24HR
1 PATCH, TRANSDERMAL 24 HOURS TRANSDERMAL EVERY 24 HOURS
Status: DISCONTINUED | OUTPATIENT
Start: 2018-06-04 | End: 2018-06-11 | Stop reason: HOSPADM

## 2018-06-04 RX ORDER — SODIUM CHLORIDE 0.9 % (FLUSH) 0.9 %
1-10 SYRINGE (ML) INJECTION AS NEEDED
Status: DISCONTINUED | OUTPATIENT
Start: 2018-06-04 | End: 2018-06-11 | Stop reason: HOSPADM

## 2018-06-04 RX ORDER — GABAPENTIN 300 MG/1
600 CAPSULE ORAL NIGHTLY
Status: DISCONTINUED | OUTPATIENT
Start: 2018-06-04 | End: 2018-06-11 | Stop reason: HOSPADM

## 2018-06-04 RX ORDER — GABAPENTIN 300 MG/1
300 CAPSULE ORAL EVERY 12 HOURS SCHEDULED
Status: DISCONTINUED | OUTPATIENT
Start: 2018-06-04 | End: 2018-06-04

## 2018-06-04 RX ORDER — GUAIFENESIN 600 MG/1
1200 TABLET, EXTENDED RELEASE ORAL EVERY 12 HOURS SCHEDULED
Status: DISCONTINUED | OUTPATIENT
Start: 2018-06-04 | End: 2018-06-11 | Stop reason: HOSPADM

## 2018-06-04 RX ORDER — ONDANSETRON 2 MG/ML
4 INJECTION INTRAMUSCULAR; INTRAVENOUS EVERY 6 HOURS PRN
Status: DISCONTINUED | OUTPATIENT
Start: 2018-06-04 | End: 2018-06-11 | Stop reason: HOSPADM

## 2018-06-04 RX ORDER — IPRATROPIUM BROMIDE AND ALBUTEROL SULFATE 2.5; .5 MG/3ML; MG/3ML
3 SOLUTION RESPIRATORY (INHALATION)
Status: DISCONTINUED | OUTPATIENT
Start: 2018-06-04 | End: 2018-06-11 | Stop reason: HOSPADM

## 2018-06-04 RX ADMIN — IPRATROPIUM BROMIDE AND ALBUTEROL SULFATE 3 ML: 2.5; .5 SOLUTION RESPIRATORY (INHALATION) at 11:55

## 2018-06-04 RX ADMIN — OXYCODONE HYDROCHLORIDE AND ACETAMINOPHEN 1 TABLET: 10; 325 TABLET ORAL at 10:40

## 2018-06-04 RX ADMIN — DIAZEPAM 5 MG: 5 TABLET ORAL at 17:03

## 2018-06-04 RX ADMIN — DIAZEPAM 5 MG: 5 TABLET ORAL at 21:04

## 2018-06-04 RX ADMIN — IPRATROPIUM BROMIDE AND ALBUTEROL SULFATE 3 ML: 2.5; .5 SOLUTION RESPIRATORY (INHALATION) at 04:40

## 2018-06-04 RX ADMIN — FUROSEMIDE 20 MG: 10 INJECTION, SOLUTION INTRAMUSCULAR; INTRAVENOUS at 10:41

## 2018-06-04 RX ADMIN — PANTOPRAZOLE SODIUM 40 MG: 40 TABLET, DELAYED RELEASE ORAL at 10:40

## 2018-06-04 RX ADMIN — LEVOTHYROXINE SODIUM 112 MCG: 112 TABLET ORAL at 10:41

## 2018-06-04 RX ADMIN — METHYLPREDNISOLONE SODIUM SUCCINATE 40 MG: 40 INJECTION, POWDER, FOR SOLUTION INTRAMUSCULAR; INTRAVENOUS at 17:07

## 2018-06-04 RX ADMIN — DIAZEPAM 5 MG: 5 TABLET ORAL at 10:41

## 2018-06-04 RX ADMIN — NICOTINE 1 PATCH: 21 PATCH, EXTENDED RELEASE TRANSDERMAL at 10:42

## 2018-06-04 RX ADMIN — GABAPENTIN 600 MG: 300 CAPSULE ORAL at 21:05

## 2018-06-04 RX ADMIN — LEVOFLOXACIN 500 MG: 5 INJECTION, SOLUTION INTRAVENOUS at 10:41

## 2018-06-04 RX ADMIN — OXYCODONE HYDROCHLORIDE AND ACETAMINOPHEN 1 TABLET: 10; 325 TABLET ORAL at 17:03

## 2018-06-04 RX ADMIN — IPRATROPIUM BROMIDE AND ALBUTEROL SULFATE 3 ML: 2.5; .5 SOLUTION RESPIRATORY (INHALATION) at 20:38

## 2018-06-04 RX ADMIN — LITHIUM CARBONATE 300 MG: 300 CAPSULE, GELATIN COATED ORAL at 21:04

## 2018-06-04 RX ADMIN — IOPAMIDOL 150 ML: 755 INJECTION, SOLUTION INTRAVENOUS at 07:43

## 2018-06-04 RX ADMIN — GUAIFENESIN 1200 MG: 600 TABLET, EXTENDED RELEASE ORAL at 10:41

## 2018-06-04 RX ADMIN — GABAPENTIN 300 MG: 300 CAPSULE ORAL at 17:03

## 2018-06-04 RX ADMIN — FLUOXETINE HYDROCHLORIDE 60 MG: 20 CAPSULE ORAL at 10:41

## 2018-06-04 RX ADMIN — IPRATROPIUM BROMIDE AND ALBUTEROL SULFATE 3 ML: 2.5; .5 SOLUTION RESPIRATORY (INHALATION) at 16:26

## 2018-06-04 RX ADMIN — GABAPENTIN 300 MG: 300 CAPSULE ORAL at 10:41

## 2018-06-04 RX ADMIN — METHYLPREDNISOLONE SODIUM SUCCINATE 125 MG: 125 INJECTION, POWDER, FOR SOLUTION INTRAMUSCULAR; INTRAVENOUS at 04:36

## 2018-06-04 RX ADMIN — TAZOBACTAM SODIUM AND PIPERACILLIN SODIUM 4.5 G: 500; 4 INJECTION, SOLUTION INTRAVENOUS at 05:19

## 2018-06-04 RX ADMIN — GUAIFENESIN 1200 MG: 600 TABLET, EXTENDED RELEASE ORAL at 21:04

## 2018-06-04 NOTE — ED NOTES
PT HAD EPISODE LARGE AMT OF DIARRHEA, PT CLEANED ASSISTED BACK TO BED BIPAP BACK IN PLACE     Lupis Valencia RN  06/04/18 0635       Luips Valencia RN  06/04/18 0652

## 2018-06-04 NOTE — PROGRESS NOTES
Patient followed by [facility] for service of SN. Voicemail to BRADEN Bernard. Will follow. Sarah Kendrick RN, [facility].

## 2018-06-04 NOTE — ED PROVIDER NOTES
[facility]  eMERGENCY dEPARTMENT eNCOUnter      Pt Name: Vianca Spencer  MRN: [id number]  Birthdate 1975  Date of evaluation: 6/4/2018      CHIEF COMPLAINT       Chief Complaint   Patient presents with   • Shortness of Breath       Nurses Notes reviewed and I agree except as noted in the HPI.      HISTORY OF PRESENT ILLNESS    Vianca Spencer is a 42 y.o. female who presents   Location/Symptom: Patient presents for shortness of breath.  Patient called EMS secondary to being extremely short of breath.  When EMS arrived at the patient's home they found her in acute distress tripoding noted that her oxygen was not hooked up.  They placed her on oxygen she immediately went up to high 80s.  On arrival she is still in high 80s on her normal 2 L.  Patient much more relaxed.  Asking about home.  However patient did not improve on her normal oxygen oxygen was increased however patient continued to be hypoxic.  Patient given DuoNeb this did not improve her oxygen so patient was placed on BiPAP.         REVIEW OF SYSTEMS     Review of Systems  CONSTITUTION: No Fever, No chills, No activity change, No diaphoresis, No unexpected wt change.    HENT: No congestion, no dental problems, no ear pain or discharge, , no nuchal rigidity, no meningeal signs.  EYES: No drainage, no itching, no photophobia, no visual disturbance  RESPIRATORY: As per the HPI otherwise negative.  CARDIOVASCULAR: No chest pain, no palpitations, no leg swelling  GI: No abdominal distention, no abdominal pain, no rectal bleeding, no melena, no hematochezia, no diarrhea, no nausea, no vomiting  ENDOCRINE: No polydipsia, no polyphagia, no polyuria, no cold or heat intolerance  : No difficulty urinating, no dyspareunia, no dysuria, no flank pain, no frequency, no genital sore, no hematuria, no menstrual problem if female, no decreased urination, no hesitation of urination, no vaginal discharge if female, no vaginal pain if female no penile  pain if male  ALLERG/IMMUNO:  No env or food allergies, not immunocompromised  NEUROLOGICAL: No dizziness, no facial asymmetry, no Headaches, no Light-headedness, no numbness nor paresthesias, no seizures, no speech difficulty, No syncope, no tremors, no weakness  HEMATOLOGIC: No adenopathy, no unusual bleeding or bruising  MUSCULOSKELETAL: No arthralgia, no back pain, no joint swelling, no myalgias, no neck pain, no neck stiffness, Pulses 2/4 in all extremities.  SKIN: No rash, no color change, no pallor, no wound  PSYCH: No agitation, no behavior problem, no confusion, no decreased concentration, no hallucinations, no suicidal ideation, no homicidal ideation, no self injury, no sleep disturbance  All other systems negative.    PAST MEDICAL HISTORY     Past Medical History:   Diagnosis Date   • Acute retention of urine    • Anxiety and depression    • Bipolar 1 disorder    • COPD (chronic obstructive pulmonary disease)    • DVT (deep venous thrombosis)    • GERD (gastroesophageal reflux disease)    • Hypoglycemia    • Insomnia    • PTSD (post-traumatic stress disorder)        SURGICAL HISTORY      has a past surgical history that includes Hysterectomy; Abdominoplasty; Cholecystectomy; Gastric bypass; Appendectomy; Esophagogastroduodenoscopy (N/A, 1/29/2018); Bronchoscopy (N/A, 2/2/2018); and Thoracoscopy (Left, 5/9/2018).    CURRENT MEDICATIONS        Medication List      CONTINUE taking these medications    diazePAM 5 MG tablet  Commonly known as:  VALIUM     fluconazole 200 MG tablet  Commonly known as:  DIFLUCAN  Take 1 tablet by mouth Daily.     FLUoxetine 20 MG capsule  Commonly known as:  PROzac     * gabapentin 300 MG capsule  Commonly known as:  NEURONTIN     * gabapentin 300 MG capsule  Commonly known as:  NEURONTIN     guaiFENesin 600 MG 12 hr tablet  Commonly known as:  MUCINEX  Take 2 tablets by mouth Every 12 (Twelve) Hours.     ipratropium-albuterol 0.5-2.5 mg/3 ml nebulizer  Commonly known as:   DUO-NEB  Take 3 mL by nebulization 4 (Four) Times a Day.     levoFLOXacin 500 MG tablet  Commonly known as:  LEVAQUIN  Take 1 tablet by mouth Daily.     levothyroxine 112 MCG tablet  Commonly known as:  SYNTHROID, LEVOTHROID     lithium carbonate 300 MG capsule     nicotine 21 MG/24HR patch  Commonly known as:  NICODERM CQ  Place 1 patch on the skin Daily.     omeprazole 20 MG capsule  Commonly known as:  priLOSEC     oxyCODONE-acetaminophen  MG per tablet  Commonly known as:  PERCOCET     predniSONE 20 MG tablet  Commonly known as:  DELTASONE  Take 1 tablet by mouth Daily With Breakfast. 20mg daily x2 days,10mg Daily   3 days, 5mg daily x 3days     PROAIR  (90 Base) MCG/ACT inhaler  Generic drug:  albuterol     promethazine 25 MG tablet  Commonly known as:  PHENERGAN     warfarin 5 MG tablet  Commonly known as:  COUMADIN  Take 1 tablet by mouth 3 (Three) Times a Week. Daily     zolpidem 10 MG tablet  Commonly known as:  AMBIEN        * This list has 2 medication(s) that are the same as other medications   prescribed for you. Read the directions carefully, and ask your doctor or   other care provider to review them with you.              ALLERGIES     is allergic to morphine; aspirin; morphine and related; nsaids; quetiapine; quetiapine fumarate; salicylates; and codeine.    FAMILY HISTORY     indicated that her mother is . She indicated that her father is . She indicated that both of her sisters are alive. She indicated that all of her three brothers are alive. She indicated that her daughter is alive. She indicated that both of her sons are alive.    family history includes Cancer in her father and mother; HIV in her brother.    SOCIAL HISTORY      reports that she has been smoking Cigarettes.  She has been smoking about 0.25 packs per day. She has never used smokeless tobacco. She reports that she does not drink alcohol or use drugs.    PHYSICAL EXAM     INITIAL VITALS:  height is  "149.9 cm (59\") and weight is 85.7 kg (189 lb). Her temporal artery  temperature is 98.7 °F (37.1 °C). Her blood pressure is 113/86 and her pulse is 118. Her respiration is 22 and oxygen saturation is 99%.    Physical Exam    CONSTITUTIONAL: Well developed, well nourished, not diaphoretic nor distressed  HENT: Normocephalic, atraumatic, oropharynx clear and moist  EYES: PERRL, EOM normal, no discharge, no scleral icterus  NECK: ROM normal, supple, no tracheal deviation nor JVD, no stridor  CARDIOVASCULAR: Normal rate and rhythm, heart sounds normal, no rub no gallop, intact distal pulses, normal cap refill  PULMONARY: Increased work of breathing, wheezing throughout  ABDOMINAL: Soft, nontender, no guarding, no mass, no rebound, no hernia  GENITOURINARY/ANORECTAL: deferred  MUSCULOSKELETAL: ROM normal, no tenderness nor deformity, no edema  NEUROLOGICAL: Alert, oriented x 3, DTRs normal, CN x 10 normal, normal tone  SKIN: Warm, dry, no erythema, no rash, normal color  PSYCH: Patient very anxious, behavior normal, thought content and judgement normal.      DIFFERENTIAL DIAGNOSIS:       DIAGNOSTIC RESULTS     EKG: All EKG's are interpreted by the Emergency Department Physician who either signs or Co-signs this chart in the absence of a cardiologist.  EKG sinus tachycardia 113 bpm normal axis normal intervals no acute ST wave changes    RADIOLOGY: non-plain film images(s) such as CT, Ultrasound and MRI are read by the radiologist.  Plain radiographic images are visualized and preliminarily interpreted by the emergency physician unless otherwise stated below.  Radiology report         LABS:   Lab Results (last 24 hours)     Procedure Component Value Units Date/Time    Blood Gas, Arterial [[id number]]  (Abnormal) Collected:  06/04/18 0417    Specimen:  Arterial Blood Updated:  06/04/18 0426     Site Right Radial     Luis's Test Positive     pH, Arterial 7.351 pH units      pCO2, Arterial 45.2 (H) mm Hg      pO2, Arterial " "109.0 (H) mm Hg      HCO3, Arterial 25.0 mmol/L      Base Excess, Arterial -0.8 (L) mmol/L      O2 Saturation, Arterial 99.0 %      Temperature 37.0 C      Barometric Pressure for Blood Gas 751 mmHg      Modality NRB     Flow Rate 15.0 lpm      Ventilator Mode NA     Collected by DR. CALHOUN          EMERGENCY DEPARTMENT COURSE:   Vitals:    Vitals:    06/04/18 0408 06/04/18 0418   BP:  113/86   BP Location:  Right arm   Patient Position:  Lying   Pulse: 118    Resp: 22    Temp: 98.7 °F (37.1 °C)    TempSrc: Temporal Artery     SpO2: (!) 78% 99%   Weight: 85.7 kg (189 lb)    Height: 149.9 cm (59\")        The patient was given the following medications:  Medications   ipratropium-albuterol (DUO-NEB) nebulizer solution 3 mL (not administered)   methylPREDNISolone sodium succinate (SOLU-Medrol) injection 125 mg (not administered)       ED Course as of Jun 14 0601   Mon Jun 04, 2018   0808 Motion artifact from respiration limits the study, no evidence of  pulmonary embolism is identified.  2. Diffuse coarse groundglass infiltrates throughout both lungs probably  represents pulmonary edema, though nonconsolidating pneumonia is  certainly possible.  3. Mild right hilar lymphadenopathy.  This report was finalized on 06/04/2018 07:59 by Dr. Matt Herndon MD.  This was discussed with the pt and admitting physician for follow up especially the lymphadenopathy  [TS]   0819 Patient is not given any IV fluid boluses her clinical condition is consistent with CHF without evidence of elevated BNP and pulmonary findings of pulmonary edema on her chest x-ray getting fluid boluses to this patient may result in worsening of her symptoms she will have to get a stat echo and then decide about her treatment process  [TS]      ED Course User Index  [TS] Chandu Lopez MD       CRITICAL CARE:  none    CONSULTS:  none    PROCEDURES:  None    MDM    FINAL IMPRESSION      1. Oxygen dependent          DISPOSITION/PLAN   Admitted to the " hospitalist.  We are awaiting CAT scan of the chest rule out PE      PATIENT REFERRED TO:  Neel Valencia Jr., MD  [address] [facility] [address]  [phone]    In 2 days        DISCHARGE MEDICATIONS:     Medication List      CONTINUE taking these medications    diazePAM 5 MG tablet  Commonly known as:  VALIUM     fluconazole 200 MG tablet  Commonly known as:  DIFLUCAN  Take 1 tablet by mouth Daily.     FLUoxetine 20 MG capsule  Commonly known as:  PROzac     * gabapentin 300 MG capsule  Commonly known as:  NEURONTIN     * gabapentin 300 MG capsule  Commonly known as:  NEURONTIN     guaiFENesin 600 MG 12 hr tablet  Commonly known as:  MUCINEX  Take 2 tablets by mouth Every 12 (Twelve) Hours.     ipratropium-albuterol 0.5-2.5 mg/3 ml nebulizer  Commonly known as:  DUO-NEB  Take 3 mL by nebulization 4 (Four) Times a Day.     levoFLOXacin 500 MG tablet  Commonly known as:  LEVAQUIN  Take 1 tablet by mouth Daily.     levothyroxine 112 MCG tablet  Commonly known as:  SYNTHROID, LEVOTHROID     lithium carbonate 300 MG capsule     nicotine 21 MG/24HR patch  Commonly known as:  NICODERM CQ  Place 1 patch on the skin Daily.     omeprazole 20 MG capsule  Commonly known as:  priLOSEC     oxyCODONE-acetaminophen  MG per tablet  Commonly known as:  PERCOCET     predniSONE 20 MG tablet  Commonly known as:  DELTASONE  Take 1 tablet by mouth Daily With Breakfast. 20mg daily x2 days,10mg Daily   3 days, 5mg daily x 3days     PROAIR  (90 Base) MCG/ACT inhaler  Generic drug:  albuterol     promethazine 25 MG tablet  Commonly known as:  PHENERGAN     warfarin 5 MG tablet  Commonly known as:  COUMADIN  Take 1 tablet by mouth 3 (Three) Times a Week. Daily     zolpidem 10 MG tablet  Commonly known as:  AMBIEN        * This list has 2 medication(s) that are the same as other medications   prescribed for you. Read the directions carefully, and ask your doctor or   other care provider to review them with you.               (Please note that portions of this note were completed with a voice recognition program.)    Makayla Reynolds, DO Makayla Reynolds DO  06/14/18 0602

## 2018-06-04 NOTE — ED PROVIDER NOTES
Subjective   History of Present Illness    Review of Systems    Past Medical History:   Diagnosis Date   • Acute retention of urine    • Anxiety and depression    • Bipolar 1 disorder    • COPD (chronic obstructive pulmonary disease)    • DVT (deep venous thrombosis)    • GERD (gastroesophageal reflux disease)    • Hypoglycemia    • Insomnia    • PTSD (post-traumatic stress disorder)        Allergies   Allergen Reactions   • Morphine Anaphylaxis   • Aspirin Other (See Comments)     Child allergy, unsure what reaction  Child allergy, unsure what reaction   • Morphine And Related    • Nsaids    • Quetiapine Other (See Comments)     Muscle spasms  Muscle spasms   • Quetiapine Fumarate    • Salicylates    • Codeine Rash       Past Surgical History:   Procedure Laterality Date   • ABDOMINOPLASTY     • APPENDECTOMY     • BRONCHOSCOPY N/A 2/2/2018    Procedure: BRONCHOSCOPY AT BEDSIDE;  Surgeon: Earnest Ba MD;  Location: [facility] ENDOSCOPY;  Service:    • CHOLECYSTECTOMY     • ENDOSCOPY N/A 1/29/2018    Procedure: ESOPHAGOGASTRODUODENOSCOPY WITH ANESTHESIA;  Surgeon: Aime Reyna MD;  Location: [facility] ENDOSCOPY;  Service:    • GASTRIC BYPASS     • HYSTERECTOMY     • THORACOSCOPY Left 5/9/2018    Procedure: BRONCHOSCOPY, LEFT VIDEO ASSISTED THORACOSCOPY, LEFT LOWER LOBE LUNG BIOPSY;  Surgeon: Case Moctezuma MD;  Location: [facility] OR;  Service: Cardiothoracic       Family History   Problem Relation Age of Onset   • Cancer Mother    • Cancer Father    • HIV Brother        Social History     Social History   • Marital status:      Social History Main Topics   • Smoking status: Current Every Day Smoker     Packs/day: 0.25     Types: Cigarettes   • Smokeless tobacco: Never Used   • Alcohol use No   • Drug use: No   • Sexual activity: No     Other Topics Concern   • Not on file           Objective   Physical Exam    Procedures           ED Course  ED Course as of Jun 04 0826   Mon Jun 04, 2018   0808 Motion  artifact from respiration limits the study, no evidence of  pulmonary embolism is identified.  2. Diffuse coarse groundglass infiltrates throughout both lungs probably  represents pulmonary edema, though nonconsolidating pneumonia is  certainly possible.  3. Mild right hilar lymphadenopathy.  This report was finalized on 06/04/2018 07:59 by Dr. Matt Herndon MD.  This was discussed with the pt and admitting physician for follow up especially the lymphadenopathy  [TS]   0819 Patient is not given any IV fluid boluses her clinical condition is consistent with CHF without evidence of elevated BNP and pulmonary findings of pulmonary edema on her chest x-ray getting fluid boluses to this patient may result in worsening of her symptoms she will have to get a stat echo and then decide about her treatment process  [TS]      ED Course User Index  [TS] Chandu Lopez MD                  [facility]      Final diagnoses:   Oxygen dependent   Acute systolic congestive heart failure   Hypoxic   Lymphadenopathy, thoracic            Chandu Lopez MD  06/04/18 5973

## 2018-06-04 NOTE — H&P
[facility] Medicine Services  HISTORY AND PHYSICAL    Date of Admission: 6/4/2018  Primary Care Physician: Neel Valencia Jr., MD    Subjective     Chief Complaint: SOA    History of Present Illness  41 YO CF admitted on 6/4/18. Patient states that she started feeling bad yesterday. Cough and fever. Currently wearing BiPAP on exam. Doesn't feel like she has fluid.     Review of Systems   Cough  Fever    Otherwise complete ROS reviewed and negative except as mentioned in the HPI.    Past Medical History:   Past Medical History:   Diagnosis Date   • Acute retention of urine    • Anxiety and depression    • Bipolar 1 disorder    • COPD (chronic obstructive pulmonary disease)    • DVT (deep venous thrombosis)    • GERD (gastroesophageal reflux disease)    • Hypoglycemia    • Insomnia    • PTSD (post-traumatic stress disorder)      Past Surgical History:  Past Surgical History:   Procedure Laterality Date   • ABDOMINOPLASTY     • APPENDECTOMY     • BRONCHOSCOPY N/A 2/2/2018    Procedure: BRONCHOSCOPY AT BEDSIDE;  Surgeon: Earnest Ba MD;  Location: [facility] ENDOSCOPY;  Service:    • CHOLECYSTECTOMY     • ENDOSCOPY N/A 1/29/2018    Procedure: ESOPHAGOGASTRODUODENOSCOPY WITH ANESTHESIA;  Surgeon: Aime Reyna MD;  Location: [facility] ENDOSCOPY;  Service:    • GASTRIC BYPASS     • HYSTERECTOMY     • THORACOSCOPY Left 5/9/2018    Procedure: BRONCHOSCOPY, LEFT VIDEO ASSISTED THORACOSCOPY, LEFT LOWER LOBE LUNG BIOPSY;  Surgeon: Case Moctezuma MD;  Location: [facility] OR;  Service: Cardiothoracic     Social History:  reports that she has been smoking Cigarettes.  She has been smoking about 0.25 packs per day. She has never used smokeless tobacco. She reports that she does not drink alcohol or use drugs.    Family History: family history includes Cancer in her father and mother; HIV in her brother.       Allergies:  Allergies   Allergen Reactions   • Morphine Anaphylaxis   • Aspirin  Other (See Comments)     Child allergy, unsure what reaction  Child allergy, unsure what reaction   • Morphine And Related    • Nsaids    • Quetiapine Other (See Comments)     Muscle spasms  Muscle spasms   • Quetiapine Fumarate    • Salicylates    • Codeine Rash     Medications:  Prior to Admission medications    Medication Sig Start Date End Date Taking? Authorizing Provider   oxyCODONE-acetaminophen (PERCOCET)  MG per tablet Take 1 tablet by mouth Every 6 (Six) Hours As Needed for Moderate Pain .   Yes Historical Provider, MD   diazePAM (VALIUM) 5 MG tablet Take 5 mg by mouth 3 (Three) Times a Day. 2/14/17   Historical Provider, MD   fluconazole (DIFLUCAN) 200 MG tablet Take 1 tablet by mouth Daily. 5/13/18   Alix Kingsley, DO   FLUoxetine (PROzac) 20 MG capsule Take 60 mg by mouth Daily.    Historical Provider, MD   gabapentin (NEURONTIN) 300 MG capsule Take 300 mg by mouth 2 (Two) Times a Day.    Historical Provider, MD   gabapentin (NEURONTIN) 300 MG capsule Take 600 mg by mouth Every Night.    Historical Provider, MD   guaiFENesin (MUCINEX) 600 MG 12 hr tablet Take 2 tablets by mouth Every 12 (Twelve) Hours. 5/13/18   Alix Kingsley, DO   ipratropium-albuterol (DUO-NEB) 0.5-2.5 mg/mL nebulizer Take 3 mL by nebulization 4 (Four) Times a Day. 2/10/18   Emelina Ogden, DO   levoFLOXacin (LEVAQUIN) 500 MG tablet Take 1 tablet by mouth Daily. 5/13/18   Alix Kingsley, DO   levothyroxine (SYNTHROID, LEVOTHROID) 112 MCG tablet Take 112 mcg by mouth Daily. 9/24/16   Historical Provider, MD   lithium carbonate 300 MG capsule Take 300 mg by mouth Every Night.    Historical Provider, MD   nicotine (NICODERM CQ) 21 MG/24HR patch Place 1 patch on the skin Daily. 5/14/18   Alix Kingsley DO   omeprazole (priLOSEC) 20 MG capsule Take 20 mg by mouth Daily.    Historical Provider, MD   predniSONE (DELTASONE) 20 MG tablet Take 1 tablet by mouth Daily With Breakfast. 20mg daily x2 days,10mg Daily 3 days,  "5mg daily x 3days 5/14/18   Alix Kingsley DO   PROAIR  (90 BASE) MCG/ACT inhaler Inhale 2 puffs Every 4 (Four) Hours As Needed for Wheezing or Shortness of Air. 6/3/17   Historical Provider, MD   promethazine (PHENERGAN) 25 MG tablet Take 25 mg by mouth 2 (Two) Times a Day As Needed for Nausea or Vomiting. 9/2/16   Historical Provider, MD   warfarin (COUMADIN) 5 MG tablet Take 1 tablet by mouth 3 (Three) Times a Week. Daily 5/14/18   Alix Kingsley DO   zolpidem (AMBIEN) 10 MG tablet Take 10 mg by mouth Every Night.    Historical Provider, MD     Objective     Vital Signs: /75 (BP Location: Right arm, Patient Position: Lying)   Pulse 93   Temp 98 °F (36.7 °C) (Temporal Artery )   Resp 18   Ht 149.9 cm (59\")   Wt 85.7 kg (189 lb)   SpO2 100%   BMI 38.17 kg/m²   Physical Exam   HENT:   Head: Normocephalic and atraumatic.   Nose: Nose normal.   Mouth/Throat: Oropharynx is clear and moist.   Eyes: Conjunctivae and EOM are normal.   Neck: Normal range of motion. Neck supple.   Cardiovascular: Normal rate, regular rhythm and normal heart sounds.    Pulmonary/Chest: Effort normal. She has rales.   Abdominal: Soft. Bowel sounds are normal.   Musculoskeletal: She exhibits no edema or tenderness.   Neurological: She is alert. No cranial nerve deficit.   Skin: Skin is warm and dry.   Multiple tattoos   Psychiatric: She has a normal mood and affect.   Vitals reviewed.          Results Reviewed:  Lab Results (last 24 hours)     Procedure Component Value Units Date/Time    Blood Gas, Arterial [[id number]]  (Abnormal) Collected:  06/04/18 0857    Specimen:  Arterial Blood Updated:  06/04/18 0903     Site Right Brachial     Luis's Test N/A     pH, Arterial 7.342 (L) pH units      pCO2, Arterial 46.8 (H) mm Hg      pO2, Arterial 172.0 (H) mm Hg      HCO3, Arterial 25.3 mmol/L      Base Excess, Arterial -0.8 (L) mmol/L      O2 Saturation, Arterial 100.1 (H) %      Temperature 37.0 C      Barometric " Pressure for Blood Gas 752 mmHg      Modality BiPap     FIO2 65 %      Ventilator Mode BiPAP     Set Mec Resp Rate 12.0     IPAP 14     EPAP 6     Collected by [id number]    Blood Culture - Blood, [[id number]] Collected:  06/04/18 0515    Specimen:  Blood from Arm, Right Updated:  06/04/18 0546    Blood Culture - Blood, [[id number]] Collected:  06/04/18 0500    Specimen:  Blood from Arm, Right Updated:  06/04/18 0546    Lactic Acid, Plasma [[id number]]  (Normal) Collected:  06/04/18 0516    Specimen:  Blood Updated:  06/04/18 0537     Lactate 1.1 mmol/L     Procalcitonin [[id number]]  (Abnormal) Collected:  06/04/18 0415    Specimen:  Blood Updated:  06/04/18 0536     Procalcitonin 0.33 (H) ng/mL     Narrative:       SIRS, sepsis, severe sepsis, and septic shock are categorized according to the criteria of the consensus conference of the American College of Chest Physicians/Society of Critical Care Medicine.    PCT < 0.5 ng/mL     Systemic infection (sepsis) is not likely.    PCT >0.5 and < 2.0 ng/mL Systemic infection (sepsis) is possible, but other conditions are known to elevate PCT as well.    PCT > 2.0 ng/mL     Systemic infection (sepsis) is likely, unless other causes are known.      PCT > 10.0 ng/mL    Important systemic inflammatory response, almost exclusively due to severe bacterial sepsis or septic shock.    PCT values of < 0.5 ng/mL do not exclude an infection, because localized infections (without systemic signs) may be associated with such low concentrations, or a systemic infection in its initial stages (<6 hours).  Increased PCT can occur without infection.  PCT concentrations between 0.5 and 2.0 ng/mL should be interpreted taking into account the patients history.  It is recommended to retest PCT within 6-24 hours if any concentrations < 2.0 ng/mL are obtained.    D-dimer, Quantitative [[id number]]  (Normal) Collected:  06/04/18 0415    Specimen:  Blood Updated:  06/04/18 0531     D-Dimer, Quantitative  <0.22 mg/L (FEU)     Narrative:       Reference Range is 0-0.50 mg/L FEU. However, results <0.50 mg/L FEU tends to rule out DVT or PE. Results >0.50 mg/L FEU are not useful in predicting absence or presence of DVT or PE.    Ceres Draw [[id number]] Collected:  06/04/18 0415    Specimen:  Blood Updated:  06/04/18 0530    Narrative:       The following orders were created for panel order Ceres Draw.  Procedure                               Abnormality         Status                     ---------                               -----------         ------                     Light Blue Top[[id number]]                                   Final result               Green Top (Gel)[[id number]]                                  Final result               Lavender Top[[id number]]                                     Final result               Red Top[[id number]]                                          Final result                 Please view results for these tests on the individual orders.    Light Blue Top [[id number]] Collected:  06/04/18 0415    Specimen:  Blood Updated:  06/04/18 0530     Extra Tube hold for add-on     Comment: Auto resulted       Green Top (Gel) [[id number]] Collected:  06/04/18 0415    Specimen:  Blood Updated:  06/04/18 0530     Extra Tube Hold for add-ons.     Comment: Auto resulted.       Lavender Top [[id number]] Collected:  06/04/18 0415    Specimen:  Blood Updated:  06/04/18 0530     Extra Tube hold for add-on     Comment: Auto resulted       Red Top [[id number]] Collected:  06/04/18 0415    Specimen:  Blood Updated:  06/04/18 0530     Extra Tube Hold for add-ons.     Comment: Auto resulted.       Troponin [[id number]]  (Normal) Collected:  06/04/18 0415    Specimen:  Blood Updated:  06/04/18 0528     Troponin I <0.012 ng/mL     BNP [[id number]]  (Abnormal) Collected:  06/04/18 0415    Specimen:  Blood Updated:  06/04/18 0522     proBNP 1,880.0 (H) pg/mL     CBC & Differential [[id number]] Collected:   06/04/18 0415    Specimen:  Blood Updated:  06/04/18 0502    Narrative:       The following orders were created for panel order CBC & Differential.  Procedure                               Abnormality         Status                     ---------                               -----------         ------                     Manual Differential[[id number]]          Abnormal            Final result               CBC Auto Differential[[id number]]        Abnormal            Final result                 Please view results for these tests on the individual orders.    Manual Differential [[id number]]  (Abnormal) Collected:  06/04/18 0415    Specimen:  Blood Updated:  06/04/18 0502     Neutrophil % 81.6 (H) %      Lymphocyte % 6.4 (L) %      Monocyte % 2.4 (L) %      Bands %  9.6 %      Neutrophils Absolute 13.68 (H) 10*3/mm3      Lymphocytes Absolute 0.96 10*3/mm3      Monocytes Absolute 0.36 10*3/mm3      Anisocytosis Slight/1+     WBC Morphology Normal     Giant Platelets Slight/1+    CBC Auto Differential [[id number]]  (Abnormal) Collected:  06/04/18 0415    Specimen:  Blood Updated:  06/04/18 0502     WBC 15.00 (H) 10*3/mm3      RBC 3.99 (L) 10*6/mm3      Hemoglobin 11.5 (L) g/dL      Hematocrit 35.9 (L) %      MCV 90.0 fL      MCH 28.8 pg      MCHC 32.0 (L) g/dL      RDW 14.3 %      RDW-SD 46.5 fl      MPV 9.8 fL      Platelets 268 10*3/mm3     Narrative:       The previously reported component NRBC is no longer being reported.    Basic Metabolic Panel [[id number]]  (Abnormal) Collected:  06/04/18 0415    Specimen:  Blood Updated:  06/04/18 0459     Glucose 145 (H) mg/dL      BUN 14 mg/dL      Creatinine 0.92 mg/dL      Sodium 138 mmol/L      Potassium 4.4 mmol/L      Chloride 101 mmol/L      CO2 28.0 mmol/L      Calcium 9.1 mg/dL      eGFR Non African Amer 66 mL/min/1.73      BUN/Creatinine Ratio 15.2     Anion Gap 9.0 mmol/L     Narrative:       GFR Normal >60  Chronic Kidney Disease <60  Kidney Failure <15    Blood Gas,  Arterial [[id number]]  (Abnormal) Collected:  06/04/18 0417    Specimen:  Arterial Blood Updated:  06/04/18 0426     Site Right Radial     Luis's Test Positive     pH, Arterial 7.351 pH units      pCO2, Arterial 45.2 (H) mm Hg      pO2, Arterial 109.0 (H) mm Hg      HCO3, Arterial 25.0 mmol/L      Base Excess, Arterial -0.8 (L) mmol/L      O2 Saturation, Arterial 99.0 %      Temperature 37.0 C      Barometric Pressure for Blood Gas 751 mmHg      Modality NRB     Flow Rate 15.0 lpm      Ventilator Mode NA     Collected by DR. CALHOUN        Imaging Results (last 24 hours)     Procedure Component Value Units Date/Time    CT Angiogram Chest With Contrast [[id number]] Collected:  06/04/18 0751     Updated:  06/04/18 0802    Narrative:       EXAMINATION: CT ANGIOGRAM CHEST W CONTRAST- 6/4/2018 7:51 AM CDT     HISTORY: Shortness of breath; Z99.81-Dependence on supplemental oxygen     DOSE: 495 mGycm (Automatic exposure control technique was implemented in  an effort to keep the radiation dose as low as possible without  compromising image quality)     REPORT: Spiral CT of the chest was performed after administration of  intravenous contrast using CTA protocol, which includes reconstructed  coronal and sagittal images.     COMPARISON: High-resolution chest CT 5/8/2018, previous CT angiogram of  the chest 1/1/2016.     The contrast bolus is satisfactory. There is mild respiratory motion  artifact. No filling defects are seen in the pulmonary arteries. The  thoracic aorta is normal in caliber and there is no evidence of aortic  dissection. There is mild right hilar lymphadenopathy measuring 1.6 cm  in short axis diameter. Shotty left hilar lymph nodes are present. No  measurable mediastinal lymphadenopathy is seen. Lung windows demonstrate  diffuse coarse airspace infiltrates without consolidation. No pleural  effusion or pneumothorax is identified. There is a collapsed breast  implant on the right. This is stable. In the upper  abdomen, no acute  abnormalities identified. Previous cholecystectomy is noted. There is  also evidence of previous gastric bypass surgery. Review of bone windows  is unremarkable.       Impression:       1. Motion artifact from respiration limits the study, no evidence of  pulmonary embolism is identified.  2. Diffuse coarse groundglass infiltrates throughout both lungs probably  represents pulmonary edema, though nonconsolidating pneumonia is  certainly possible.  3. Mild right hilar lymphadenopathy.  This report was finalized on 06/04/2018 07:59 by Dr. Matt Herndon MD.    XR Chest 1 View [[id number]] Collected:  06/04/18 0642     Updated:  06/04/18 0647    Narrative:       EXAMINATION:   XR CHEST 1 VW-  6/4/2018 6:42 AM CDT     HISTORY: Hypoxia     Frontal upright radiograph of the chest 6/4/2018 4:52 AM CDT     COMPARISON: May 12, 2018.     FINDINGS:   Interstitial air space filling infiltrates present in the right lung.  Interstitial infiltrates present left lung. This may be pulmonary edema.  However pneumonia cannot be excluded. Cardiac silhouettes upper limits  of normal.      The osseous structures and surrounding soft tissues demonstrate no acute  abnormality.       Impression:       1. Bilateral interstitial and air space filling infiltrates. Most likely  this is pulmonary edema..        This report was finalized on 06/04/2018 06:44 by Dr. Zach Pham MD.        I have personally reviewed and interpreted the radiology studies and ECG obtained at time of admission.     Assessment / Plan     Assessment:   Hospital Problem List     Oxygen dependent        Impression:  Acute on chronic respiratory failure  Bilateral interstitial and air space filling infiltrates. Most likely this is pulmonary edema.  Elevated BNP  COPD  Leukocytosis  HX DVT  HX Bipolar disorder with PTSD    Plan:   Supportive pulmonary measures  Pulmonology consult  Diuresis with close BP management  Antibiotics  Continue home  medications  Check INR and follow while on antibiotics  Anxiety control  Continuous pulse ox, patient is on A LOT of sedating medications       Code Status: Full     I discussed the patients findings and my recommendations with the patient    Estimated length of stay 3-4 days    Alix Kingsley DO   06/04/18   9:39 AM

## 2018-06-05 ENCOUNTER — APPOINTMENT (OUTPATIENT)
Dept: GENERAL RADIOLOGY | Facility: HOSPITAL | Age: 43
End: 2018-06-05

## 2018-06-05 LAB
ANION GAP SERPL CALCULATED.3IONS-SCNC: 8 MMOL/L (ref 4–13)
ARTERIAL PATENCY WRIST A: POSITIVE
ATMOSPHERIC PRESS: 747 MMHG
BACTERIA UR QL AUTO: ABNORMAL /HPF
BASE EXCESS BLDA CALC-SCNC: 6.1 MMOL/L (ref 0–2)
BDY SITE: ABNORMAL
BILIRUB UR QL STRIP: NEGATIVE
BODY TEMPERATURE: 37 C
BUN BLD-MCNC: 10 MG/DL (ref 5–21)
BUN/CREAT SERPL: 16.7 (ref 7–25)
CALCIUM SPEC-SCNC: 9.7 MG/DL (ref 8.4–10.4)
CENTROMERE B AB SER-ACNC: <0.2 AI (ref 0–0.9)
CHLORIDE SERPL-SCNC: 102 MMOL/L (ref 98–110)
CHROMATIN AB SERPL-ACNC: <0.2 AI (ref 0–0.9)
CLARITY UR: CLEAR
CO2 SERPL-SCNC: 31 MMOL/L (ref 24–31)
COLOR UR: ABNORMAL
CREAT BLD-MCNC: 0.6 MG/DL (ref 0.5–1.4)
DEPRECATED RDW RBC AUTO: 47.2 FL (ref 40–54)
DSDNA AB SER-ACNC: <1 IU/ML (ref 0–9)
ENA JO1 AB SER-ACNC: <0.2 AI (ref 0–0.9)
ENA RNP AB SER-ACNC: <0.2 AI (ref 0–0.9)
ENA SCL70 AB SER-ACNC: <0.2 AI (ref 0–0.9)
ENA SM AB SER-ACNC: <0.2 AI (ref 0–0.9)
ENA SS-A AB SER-ACNC: <0.2 AI (ref 0–0.9)
ENA SS-B AB SER-ACNC: <0.2 AI (ref 0–0.9)
ERYTHROCYTE [DISTWIDTH] IN BLOOD BY AUTOMATED COUNT: 14.5 % (ref 12–15)
GAS FLOW AIRWAY: 5 LPM
GFR SERPL CREATININE-BSD FRML MDRD: 109 ML/MIN/1.73
GIANT PLATELETS: ABNORMAL
GLUCOSE BLD-MCNC: 139 MG/DL (ref 70–100)
GLUCOSE UR STRIP-MCNC: NEGATIVE MG/DL
HCO3 BLDA-SCNC: 31 MMOL/L (ref 20–26)
HCT VFR BLD AUTO: 35.8 % (ref 37–47)
HGB BLD-MCNC: 11.3 G/DL (ref 12–16)
HGB UR QL STRIP.AUTO: ABNORMAL
HYALINE CASTS UR QL AUTO: ABNORMAL /LPF
INR PPP: 1.12 (ref 0.91–1.09)
KETONES UR QL STRIP: NEGATIVE
LEUKOCYTE ESTERASE UR QL STRIP.AUTO: NEGATIVE
LYMPHOCYTES # BLD MANUAL: 0.86 10*3/MM3 (ref 0.72–4.86)
LYMPHOCYTES NFR BLD MANUAL: 5.1 % (ref 15–45)
LYMPHOCYTES NFR BLD MANUAL: 7.1 % (ref 4–12)
Lab: ABNORMAL
Lab: ABNORMAL
Lab: NORMAL
MCH RBC QN AUTO: 28.4 PG (ref 28–32)
MCHC RBC AUTO-ENTMCNC: 31.6 G/DL (ref 33–36)
MCV RBC AUTO: 89.9 FL (ref 82–98)
MODALITY: ABNORMAL
MONOCYTES # BLD AUTO: 1.19 10*3/MM3 (ref 0.19–1.3)
NEUTROPHILS # BLD AUTO: 14.79 10*3/MM3 (ref 1.87–8.4)
NEUTROPHILS NFR BLD MANUAL: 87.9 % (ref 39–78)
NITRITE UR QL STRIP: NEGATIVE
NOTIFIED BY: ABNORMAL
NOTIFIED WHO: ABNORMAL
PCO2 BLDA: 45.3 MM HG (ref 35–45)
PH BLDA: 7.44 PH UNITS (ref 7.35–7.45)
PH UR STRIP.AUTO: 7 [PH] (ref 5–8)
PLATELET # BLD AUTO: 289 10*3/MM3 (ref 130–400)
PMV BLD AUTO: 9.5 FL (ref 6–12)
PO2 BLDA: 44.7 MM HG (ref 83–108)
POTASSIUM BLD-SCNC: 4 MMOL/L (ref 3.5–5.3)
PROT UR QL STRIP: NEGATIVE
PROTHROMBIN TIME: 14.8 SECONDS (ref 11.9–14.6)
RBC # BLD AUTO: 3.98 10*6/MM3 (ref 4.2–5.4)
RBC # UR: ABNORMAL /HPF
RBC MORPH BLD: NORMAL
REF LAB TEST METHOD: ABNORMAL
SAO2 % BLDCOA: 82.6 % (ref 94–99)
SODIUM BLD-SCNC: 141 MMOL/L (ref 135–145)
SP GR UR STRIP: 1.01 (ref 1–1.03)
SQUAMOUS #/AREA URNS HPF: ABNORMAL /HPF
UROBILINOGEN UR QL STRIP: ABNORMAL
VENTILATOR MODE: ABNORMAL
WBC MORPH BLD: NORMAL
WBC NRBC COR # BLD: 16.83 10*3/MM3 (ref 4.8–10.8)
WBC UR QL AUTO: ABNORMAL /HPF

## 2018-06-05 PROCEDURE — 25010000002 METHYLPREDNISOLONE PER 40 MG: Performed by: INTERNAL MEDICINE

## 2018-06-05 PROCEDURE — 94799 UNLISTED PULMONARY SVC/PX: CPT

## 2018-06-05 PROCEDURE — 36600 WITHDRAWAL OF ARTERIAL BLOOD: CPT

## 2018-06-05 PROCEDURE — 85007 BL SMEAR W/DIFF WBC COUNT: CPT | Performed by: INTERNAL MEDICINE

## 2018-06-05 PROCEDURE — 71045 X-RAY EXAM CHEST 1 VIEW: CPT

## 2018-06-05 PROCEDURE — 80048 BASIC METABOLIC PNL TOTAL CA: CPT | Performed by: INTERNAL MEDICINE

## 2018-06-05 PROCEDURE — 85610 PROTHROMBIN TIME: CPT | Performed by: INTERNAL MEDICINE

## 2018-06-05 PROCEDURE — 82803 BLOOD GASES ANY COMBINATION: CPT

## 2018-06-05 PROCEDURE — 25010000002 FUROSEMIDE PER 20 MG: Performed by: INTERNAL MEDICINE

## 2018-06-05 PROCEDURE — 81001 URINALYSIS AUTO W/SCOPE: CPT | Performed by: FAMILY MEDICINE

## 2018-06-05 PROCEDURE — 25010000002 LEVOFLOXACIN PER 250 MG: Performed by: INTERNAL MEDICINE

## 2018-06-05 PROCEDURE — 94760 N-INVAS EAR/PLS OXIMETRY 1: CPT

## 2018-06-05 PROCEDURE — 85025 COMPLETE CBC W/AUTO DIFF WBC: CPT | Performed by: INTERNAL MEDICINE

## 2018-06-05 RX ORDER — WARFARIN SODIUM 5 MG/1
5 TABLET ORAL
Status: DISCONTINUED | OUTPATIENT
Start: 2018-06-05 | End: 2018-06-09

## 2018-06-05 RX ADMIN — METHYLPREDNISOLONE SODIUM SUCCINATE 40 MG: 40 INJECTION, POWDER, FOR SOLUTION INTRAMUSCULAR; INTRAVENOUS at 15:18

## 2018-06-05 RX ADMIN — DIAZEPAM 5 MG: 5 TABLET ORAL at 15:18

## 2018-06-05 RX ADMIN — LITHIUM CARBONATE 300 MG: 300 CAPSULE, GELATIN COATED ORAL at 21:00

## 2018-06-05 RX ADMIN — PANTOPRAZOLE SODIUM 40 MG: 40 TABLET, DELAYED RELEASE ORAL at 09:28

## 2018-06-05 RX ADMIN — NICOTINE 1 PATCH: 21 PATCH, EXTENDED RELEASE TRANSDERMAL at 09:35

## 2018-06-05 RX ADMIN — WARFARIN SODIUM 5 MG: 5 TABLET ORAL at 17:34

## 2018-06-05 RX ADMIN — FLUOXETINE HYDROCHLORIDE 60 MG: 20 CAPSULE ORAL at 09:28

## 2018-06-05 RX ADMIN — IPRATROPIUM BROMIDE AND ALBUTEROL SULFATE 3 ML: 2.5; .5 SOLUTION RESPIRATORY (INHALATION) at 16:08

## 2018-06-05 RX ADMIN — GABAPENTIN 300 MG: 300 CAPSULE ORAL at 09:27

## 2018-06-05 RX ADMIN — IPRATROPIUM BROMIDE AND ALBUTEROL SULFATE 3 ML: 2.5; .5 SOLUTION RESPIRATORY (INHALATION) at 20:06

## 2018-06-05 RX ADMIN — OXYCODONE HYDROCHLORIDE AND ACETAMINOPHEN 1 TABLET: 10; 325 TABLET ORAL at 15:22

## 2018-06-05 RX ADMIN — IPRATROPIUM BROMIDE AND ALBUTEROL SULFATE 3 ML: 2.5; .5 SOLUTION RESPIRATORY (INHALATION) at 07:59

## 2018-06-05 RX ADMIN — GUAIFENESIN 1200 MG: 600 TABLET, EXTENDED RELEASE ORAL at 21:00

## 2018-06-05 RX ADMIN — OXYCODONE HYDROCHLORIDE AND ACETAMINOPHEN 1 TABLET: 10; 325 TABLET ORAL at 09:27

## 2018-06-05 RX ADMIN — GABAPENTIN 600 MG: 300 CAPSULE ORAL at 21:00

## 2018-06-05 RX ADMIN — LEVOFLOXACIN 500 MG: 5 INJECTION, SOLUTION INTRAVENOUS at 09:36

## 2018-06-05 RX ADMIN — FUROSEMIDE 20 MG: 10 INJECTION, SOLUTION INTRAMUSCULAR; INTRAVENOUS at 09:27

## 2018-06-05 RX ADMIN — DIAZEPAM 5 MG: 5 TABLET ORAL at 09:27

## 2018-06-05 RX ADMIN — METHYLPREDNISOLONE SODIUM SUCCINATE 40 MG: 40 INJECTION, POWDER, FOR SOLUTION INTRAMUSCULAR; INTRAVENOUS at 04:35

## 2018-06-05 RX ADMIN — GUAIFENESIN 1200 MG: 600 TABLET, EXTENDED RELEASE ORAL at 09:28

## 2018-06-05 RX ADMIN — IPRATROPIUM BROMIDE AND ALBUTEROL SULFATE 3 ML: 2.5; .5 SOLUTION RESPIRATORY (INHALATION) at 12:14

## 2018-06-05 RX ADMIN — DIAZEPAM 5 MG: 5 TABLET ORAL at 21:00

## 2018-06-05 RX ADMIN — GABAPENTIN 300 MG: 300 CAPSULE ORAL at 15:18

## 2018-06-05 RX ADMIN — LEVOTHYROXINE SODIUM 112 MCG: 112 TABLET ORAL at 09:28

## 2018-06-05 RX ADMIN — OXYCODONE HYDROCHLORIDE AND ACETAMINOPHEN 1 TABLET: 10; 325 TABLET ORAL at 01:34

## 2018-06-05 NOTE — PROGRESS NOTES
Discharge Planning Assessment  [facility]     Patient Name: Vianca Spencer  MRN: [id number]  Today's Date: 6/5/2018    Admit Date: 6/4/2018          Discharge Needs Assessment     Row Name 06/05/18 0958       Living Environment    Lives With child(laura), dependent   [facility]    Current Living Arrangements home/apartment/condo    Primary Care Provided by self    Provides Primary Care For child(laura)   [facility]    Family Caregiver if Needed none    Able to Return to Prior Arrangements yes       Resource/Environmental Concerns    Resource/Environmental Concerns none    Transportation Concerns car, none       Transition Planning    Patient/Family Anticipates Transition to home    Patient/Family Anticipated Services at Transition home health care   [facility]       Discharge Needs Assessment    Readmission Within the Last 30 Days previous discharge plan unsuccessful    Concerns to be Addressed discharge planning    Equipment Currently Used at Home oxygen;wheelchair;walker, rolling;other (see comments)   OXYGEN--[facility]; INR MACHINE FROM [facility]    Discharge Coordination/Progress LIVES WITH 18 YO SON; HAS OXYGEN AND INR MACHINE WITH [facility]; HAS [facility] WILL FOLLOW FOR ANY NEEDS            Discharge Plan    No documentation.       Destination     No service coordination in this encounter.      Durable Medical Equipment     No service coordination in this encounter.      Dialysis/Infusion     No service coordination in this encounter.      Home Medical Care     No service coordination in this encounter.      Social Care     No service coordination in this encounter.                Demographic Summary    No documentation.           Functional Status    No documentation.           Psychosocial    No documentation.           Abuse/Neglect    No documentation.           Legal    No documentation.           Substance Abuse    No documentation.           Patient Forms    No documentation.         Jesenia Carty,  RN

## 2018-06-05 NOTE — PLAN OF CARE
Problem: Patient Care Overview  Goal: Plan of Care Review  Outcome: Ongoing (interventions implemented as appropriate)   06/05/18 1802   Coping/Psychosocial   Plan of Care Reviewed With patient   Plan of Care Review   Progress declining   OTHER   Outcome Summary Pt had abg's with PO2 of 45 and Sat 83 placed on hi-denae at 13L       Problem: Chronic Obstructive Pulmonary Disease (Adult)  Goal: Signs and Symptoms of Listed Potential Problems Will be Absent, Minimized or Managed (Chronic Obstructive Pulmonary Disease)  Outcome: Ongoing (interventions implemented as appropriate)

## 2018-06-05 NOTE — PLAN OF CARE
Problem: Patient Care Overview  Goal: Plan of Care Review  Outcome: Ongoing (interventions implemented as appropriate)   06/05/18 1727   Coping/Psychosocial   Plan of Care Reviewed With patient   Plan of Care Review   Progress no change   OTHER   Outcome Summary Initial RDN eval. Pt reports fair appetite, states it's improved today. She is on a regular diet; PO intake documented 25%/1meal. She does like to drink regular coke. Encouraged intake, advised of alt selections (Pt does have menu at bedside), and will continue to follow up.       Problem: Chronic Obstructive Pulmonary Disease (Adult)  Goal: Signs and Symptoms of Listed Potential Problems Will be Absent, Minimized or Managed (Chronic Obstructive Pulmonary Disease)  Outcome: Ongoing (interventions implemented as appropriate)   06/05/18 1727   Goal/Outcome Evaluation   Problems Assessed (Chronic Obstructive Pulmonary Disease (COPD)) undernutrition   Problems Present (COPD, Bronch/Emphy) none

## 2018-06-05 NOTE — PROGRESS NOTES
PULMONARY AND CRITICAL CARE PROGRESS NOTE - [facility]    Patient: Vianca Spencer    1975    MR# [id number]    Acct# [id number]  06/05/18   10:13 AM  Referring Provider: Alix Kingsley DO    Chief Complaint: Dyspnea    Interval history: Patient is alert and oriented this morning. She is on a NC and appears to be breathing comfortably with a sat of 94% on 5L. She does not have much recollection of her arrival here nor her care up until early morning. She reports that she is feeling better but still gets SOA with movement. She is not using the bipap and does not prefer to use it. There is no family present. She has no major complaints this morning. She does report that she quit smoking and apologized for missing her f/u appt in the office. No other aggravating, alleviating factors or associated symptoms.    Meds:    diazePAM 5 mg Oral TID   FLUoxetine 60 mg Oral Daily   furosemide 20 mg Intravenous Daily   gabapentin 300 mg Oral BID   gabapentin 600 mg Oral Nightly   guaiFENesin 1,200 mg Oral Q12H   ipratropium-albuterol 3 mL Nebulization 4x Daily - RT   levoFLOXacin 500 mg Intravenous Q24H   levothyroxine 112 mcg Oral QAM AC   lithium carbonate 300 mg Oral Nightly   methylPREDNISolone sodium succinate 40 mg Intravenous Q12H   nicotine 1 patch Transdermal Q24H   pantoprazole 40 mg Oral QAM   [START ON 6/6/2018] warfarin 5 mg Oral Once per day on Mon Wed Fri        Review of Systems: positive for SOA and cough, negative for weakness or hemoptysis, positive for hematuria, positive for insomnia    Physical Exam:  Temp:  [97 °F (36.1 °C)-97.8 °F (36.6 °C)] 97.4 °F (36.3 °C)  Heart Rate:  [] 89  Resp:  [16-20] 16  BP: ()/(60-77) 105/75  Intake/Output Summary (Last 24 hours) at 06/05/18 1013  Last data filed at 06/04/18 2030   Gross per 24 hour   Intake              400 ml   Output              650 ml   Net             -250 ml     SpO2 Percentage    06/05/18 0759 06/05/18 0800  06/05/18 0811   SpO2: 94% 90% 91%      Physical Exam:    Constitutional: She appears well-developed and well-nourished. No distress. NC in place   HENT:   Head: Normocephalic and atraumatic.   Right Ear: External ear normal.   Left Ear: External ear normal.   Eyes: Conjunctivae and EOM are normal. Pupils are equal, round, and reactive to light. Right eye exhibits no discharge. Left eye exhibits no discharge.   Neck: Normal range of motion. Neck supple. No JVD present.   Cardiovascular: Normal rate and regular rhythm.    No murmur heard.  Pulmonary/Chest: She has decreased breath sounds. She has rales.   Abdominal: Soft. Bowel sounds are normal. She exhibits no distension.   Musculoskeletal: Normal range of motion. She exhibits no edema or deformity.   Skin: Skin is warm and dry. She is not diaphoretic. No erythema. No pallor.   Psychiatric: She is calm and pleasant today   Nursing note and vitals reviewed.      Results from last 7 days  Lab Units 06/05/18  0517 06/04/18  0415   WBC 10*3/mm3 16.83* 15.00*   HEMOGLOBIN g/dL 11.3* 11.5*   PLATELETS 10*3/mm3 289 268       Results from last 7 days  Lab Units 06/05/18  0517 06/04/18  0415   SODIUM mmol/L 141 138   POTASSIUM mmol/L 4.0 4.4   BUN mg/dL 10 14   CREATININE mg/dL 0.60 0.92       Results from last 7 days  Lab Units 06/04/18  0857 06/04/18  0417   PH, ARTERIAL pH units 7.342* 7.351   PCO2, ARTERIAL mm Hg 46.8* 45.2*   PO2 ART mm Hg 172.0* 109.0*   FIO2 % 65  --      Blood Culture   Date Value Ref Range Status   06/04/2018 No growth at 24 hours  Preliminary   06/04/2018 No growth at 24 hours  Preliminary     Recent films:  Imaging Results (last 24 hours)     Procedure Component Value Units Date/Time    XR Chest 1 View [[id number]] Collected:  06/05/18 0718     Updated:  06/05/18 0721    Narrative:       XR CHEST 1 VW- 6/5/2018 4:20 AM CDT     HISTORY: PNA vs overload; Z99.81-Dependence on supplemental oxygen;  I50.21-Acute systolic (congestive) heart failure;  R09.02-Hypoxemia;  R59.0-Localized enlarged lymph nodes     COMPARISON: 6/4/2018      FINDINGS:   Previously seen bilateral airspace opacities have improved since the  previous study, and no new opacities are noted. The cardiomediastinal  silhouette and pulmonary vascularity are unchanged.       No acute osseous or soft tissue abnormality is noted.        Impression:       1. Mild interval improvement since previous exam.        This report was finalized on 06/05/2018 07:18 by Dr. Regis Beck MD.        Films reviewed personally by me.  My interpretation: some mild improvement in bilateral interstitial infiltrates    Pulmonary Assessment:    1. Acute/Chronic hypoxemic respiratory failure  2. Bipolar disorder  3. Positive RA factor, sed rate negative, may be false positive, awaiting other results  4. Personal history of nicotine dependency  5. Bilateral infiltrates  6. Recent wedge resection, positive for acute organizing pneumonia versus immunologic capillaritis versus vasculitis  7. Hematuria  8. Hx of DVT, on coumadin  9. Leukocytosis, on steroids  10. Elevated BNP, mild    Recommend:     · Continue supplemental oxygen for sat above 90%  · May be well enough to not require bipap at this point. Will leave PRN for tonight  · Continue bronchodilators  · Continue abx coverage per attending  · Diuresis per attending  · Send out labs still pending  · Discussed urine collection with RN this morning  · Pt requests referral to outpatient ILD clinic to be made to [place] since she has family there  · Appears to be doing much better this morning    Electronically signed by PARIS Del Rio on 6/5/2018 at 10:13 AM    She still complains primarily of sleeplessness.  That problem is deferred to the primary service.  She remains on oxygen and she has frequent alarming of the pulse oximeter.  She discusses how she goes home and she is doing so well and then she comes back and needs all his oxygen and we discussed that  something that is stabilized in the hospital is becoming the same stabilized while she is at home leading to all of these exacerbations.  She is still breathing oxygen nasal cannula.  Exam shows her to have some crackles and anxiety.  Plan to continue oxygen, humidified.  Awaiting remainder of connective tissue studies    I have seen and examined patient personally, performing a face-to-face diagnostic evaluation with plan of care reviewed and developed with APRN and nursing staff. I have addended and/or modified the above history of present illness, physical examination, and assessment and plan to reflect my findings and impressions. Essential elements of the care plan were discussed with APRN above.  Agree with findings and assessment/plan as documented above.    Electronically signed by Earnest Ba MD, on 6/5/2018, 11:44 AM    EMR Dragon/Transcription disclaimer: Much of this encounter note is an electronic transcription/translation of spoken language to printed text. The electronic translation of spoken language may permit erroneous, or at times, nonsensical words or phrases to be inadvertently transcribed; although I have reviewed the note for such errors, some may still exist.

## 2018-06-05 NOTE — PLAN OF CARE
Problem: Patient Care Overview  Goal: Plan of Care Review  Outcome: Ongoing (interventions implemented as appropriate)   06/05/18 0414   Coping/Psychosocial   Plan of Care Reviewed With patient   Plan of Care Review   Progress improving   OTHER   Outcome Summary No c/o of SOA voiced. C/o of pain x1, PRN given with relief. Pt would like her Ambien (home med) restarted. VSS.     Goal: Individualization and Mutuality  Outcome: Ongoing (interventions implemented as appropriate)    Goal: Discharge Needs Assessment  Outcome: Ongoing (interventions implemented as appropriate)      Problem: Chronic Obstructive Pulmonary Disease (Adult)  Goal: Signs and Symptoms of Listed Potential Problems Will be Absent, Minimized or Managed (Chronic Obstructive Pulmonary Disease)  Outcome: Ongoing (interventions implemented as appropriate)

## 2018-06-05 NOTE — PAYOR COMM NOTE
"ADMIT INPT 6-4-18  MEDICARE PRIMARY  UR PHONE    [phone]    Samantha Solomon (42 y.o. Female)     Date of Birth Social Security Number Address Home Phone MRN    1975  [address]  [facility] [address] [phone] [id number]    Christian Marital Status          None        Admission Date Admission Type Admitting Provider Attending Provider Department, Room/Bed    6/4/18 Emergency Alix Kingsley DO [place], Ali Janell, DO [address], 480/1    Discharge Date Discharge Disposition Discharge Destination                       Attending Provider:  Alix Kingsley DO    Allergies:  Morphine, Aspirin, Morphine And Related, Nsaids, Quetiapine, Quetiapine Fumarate, Salicylates, Codeine    Isolation:  None   Infection:  None   Code Status:  FULL    Ht:  148.1 cm (58.3\")   Wt:  86 kg (189 lb 8 oz)    Admission Cmt:  None   Principal Problem:  None                Active Insurance as of 6/4/2018     Primary Coverage     Payor Plan Insurance Group Employer/Plan Group    MEDICARE MEDICARE A & B      Payor Plan Address Payor Plan Phone Number Effective From Effective To    [address] [phone] 4/1/2013     [facility] [address]       Subscriber Name Subscriber Birth Date Member ID       SAMANTHA SOLOMON 1975 [id number]           Secondary Coverage     Payor Plan Insurance Group Employer/Plan Group    [employer] [place] WE[place]ICAID      Payor Plan Address Payor Plan Phone Number Effective From Effective To    [address] [address] [phone] 9/19/2016     [facility] [id number]       Subscriber Name Subscriber Birth Date Member ID       SAMANTHA SOLOMON 1975 [id number]                 Emergency Contacts      (Rel.) Home Phone Work Phone Mobile Phone    Delmy Del Rio (Daughter) [phone] -- --               History & Physical      Alix Kingsley DO at 6/4/2018  9:38 AM              [facility] Medicine " Services  HISTORY AND PHYSICAL    Date of Admission: 6/4/2018  Primary Care Physician: Neel Valencia Jr., MD    Subjective     Chief Complaint: SOA    History of Present Illness  41 YO CF admitted on 6/4/18. Patient states that she started feeling bad yesterday. Cough and fever. Currently wearing BiPAP on exam. Doesn't feel like she has fluid.     Review of Systems   Cough  Fever    Otherwise complete ROS reviewed and negative except as mentioned in the HPI.    Past Medical History:   Past Medical History:   Diagnosis Date   • Acute retention of urine    • Anxiety and depression    • Bipolar 1 disorder    • COPD (chronic obstructive pulmonary disease)    • DVT (deep venous thrombosis)    • GERD (gastroesophageal reflux disease)    • Hypoglycemia    • Insomnia    • PTSD (post-traumatic stress disorder)      Past Surgical History:  Past Surgical History:   Procedure Laterality Date   • ABDOMINOPLASTY     • APPENDECTOMY     • BRONCHOSCOPY N/A 2/2/2018    Procedure: BRONCHOSCOPY AT BEDSIDE;  Surgeon: Earnest Ba MD;  Location: [facility] ENDOSCOPY;  Service:    • CHOLECYSTECTOMY     • ENDOSCOPY N/A 1/29/2018    Procedure: ESOPHAGOGASTRODUODENOSCOPY WITH ANESTHESIA;  Surgeon: Aime Reyna MD;  Location: [facility] ENDOSCOPY;  Service:    • GASTRIC BYPASS     • HYSTERECTOMY     • THORACOSCOPY Left 5/9/2018    Procedure: BRONCHOSCOPY, LEFT VIDEO ASSISTED THORACOSCOPY, LEFT LOWER LOBE LUNG BIOPSY;  Surgeon: Case Moctezuma MD;  Location: [facility] OR;  Service: Cardiothoracic     Social History:  reports that she has been smoking Cigarettes.  She has been smoking about 0.25 packs per day. She has never used smokeless tobacco. She reports that she does not drink alcohol or use drugs.    Family History: family history includes Cancer in her father and mother; HIV in her brother.       Allergies:  Allergies   Allergen Reactions   • Morphine Anaphylaxis   • Aspirin Other (See Comments)     Child allergy, unsure what  reaction  Child allergy, unsure what reaction   • Morphine And Related    • Nsaids    • Quetiapine Other (See Comments)     Muscle spasms  Muscle spasms   • Quetiapine Fumarate    • Salicylates    • Codeine Rash     Medications:  Prior to Admission medications    Medication Sig Start Date End Date Taking? Authorizing Provider   oxyCODONE-acetaminophen (PERCOCET)  MG per tablet Take 1 tablet by mouth Every 6 (Six) Hours As Needed for Moderate Pain .   Yes Historical Provider, MD   diazePAM (VALIUM) 5 MG tablet Take 5 mg by mouth 3 (Three) Times a Day. 2/14/17   Historical Provider, MD   fluconazole (DIFLUCAN) 200 MG tablet Take 1 tablet by mouth Daily. 5/13/18   Alix Kingsley DO   FLUoxetine (PROzac) 20 MG capsule Take 60 mg by mouth Daily.    Historical Provider, MD   gabapentin (NEURONTIN) 300 MG capsule Take 300 mg by mouth 2 (Two) Times a Day.    Historical Provider, MD   gabapentin (NEURONTIN) 300 MG capsule Take 600 mg by mouth Every Night.    Historical Provider, MD   guaiFENesin (MUCINEX) 600 MG 12 hr tablet Take 2 tablets by mouth Every 12 (Twelve) Hours. 5/13/18   Alix Kingsley DO   ipratropium-albuterol (DUO-NEB) 0.5-2.5 mg/mL nebulizer Take 3 mL by nebulization 4 (Four) Times a Day. 2/10/18   Emelina Ogden, DO   levoFLOXacin (LEVAQUIN) 500 MG tablet Take 1 tablet by mouth Daily. 5/13/18   Alix Kingsley DO   levothyroxine (SYNTHROID, LEVOTHROID) 112 MCG tablet Take 112 mcg by mouth Daily. 9/24/16   Historical Provider, MD   lithium carbonate 300 MG capsule Take 300 mg by mouth Every Night.    Historical Provider, MD   nicotine (NICODERM CQ) 21 MG/24HR patch Place 1 patch on the skin Daily. 5/14/18   Alix Kingsley DO   omeprazole (priLOSEC) 20 MG capsule Take 20 mg by mouth Daily.    Historical Provider, MD   predniSONE (DELTASONE) 20 MG tablet Take 1 tablet by mouth Daily With Breakfast. 20mg daily x2 days,10mg Daily 3 days, 5mg daily x 3days 5/14/18   Alix Kingsley DO  "  PROAIR  (90 BASE) MCG/ACT inhaler Inhale 2 puffs Every 4 (Four) Hours As Needed for Wheezing or Shortness of Air. 6/3/17   Historical Provider, MD   promethazine (PHENERGAN) 25 MG tablet Take 25 mg by mouth 2 (Two) Times a Day As Needed for Nausea or Vomiting. 9/2/16   Historical Provider, MD   warfarin (COUMADIN) 5 MG tablet Take 1 tablet by mouth 3 (Three) Times a Week. Daily 5/14/18   Alix Kingsley DO   zolpidem (AMBIEN) 10 MG tablet Take 10 mg by mouth Every Night.    Historical Provider, MD     Objective     Vital Signs: /75 (BP Location: Right arm, Patient Position: Lying)   Pulse 93   Temp 98 °F (36.7 °C) (Temporal Artery )   Resp 18   Ht 149.9 cm (59\")   Wt 85.7 kg (189 lb)   SpO2 100%   BMI 38.17 kg/m²    Physical Exam   HENT:   Head: Normocephalic and atraumatic.   Nose: Nose normal.   Mouth/Throat: Oropharynx is clear and moist.   Eyes: Conjunctivae and EOM are normal.   Neck: Normal range of motion. Neck supple.   Cardiovascular: Normal rate, regular rhythm and normal heart sounds.    Pulmonary/Chest: Effort normal. She has rales.   Abdominal: Soft. Bowel sounds are normal.   Musculoskeletal: She exhibits no edema or tenderness.   Neurological: She is alert. No cranial nerve deficit.   Skin: Skin is warm and dry.   Multiple tattoos   Psychiatric: She has a normal mood and affect.   Vitals reviewed.          Results Reviewed:  Lab Results (last 24 hours)     Procedure Component Value Units Date/Time    Blood Gas, Arterial [[id number]]  (Abnormal) Collected:  06/04/18 0857    Specimen:  Arterial Blood Updated:  06/04/18 0903     Site Right Brachial     Luis's Test N/A     pH, Arterial 7.342 (L) pH units      pCO2, Arterial 46.8 (H) mm Hg      pO2, Arterial 172.0 (H) mm Hg      HCO3, Arterial 25.3 mmol/L      Base Excess, Arterial -0.8 (L) mmol/L      O2 Saturation, Arterial 100.1 (H) %      Temperature 37.0 C      Barometric Pressure for Blood Gas 752 mmHg      Modality BiPap     " FIO2 65 %      Ventilator Mode BiPAP     Set [facility] Resp Rate 12.0     IPAP 14     EPAP 6     Collected by [id number]    Blood Culture - Blood, [[id number]] Collected:  06/04/18 0515    Specimen:  Blood from Arm, Right Updated:  06/04/18 0546    Blood Culture - Blood, [[id number]] Collected:  06/04/18 0500    Specimen:  Blood from Arm, Right Updated:  06/04/18 0546    Lactic Acid, Plasma [[id number]]  (Normal) Collected:  06/04/18 0516    Specimen:  Blood Updated:  06/04/18 0537     Lactate 1.1 mmol/L     Procalcitonin [[id number]]  (Abnormal) Collected:  06/04/18 0415    Specimen:  Blood Updated:  06/04/18 0536     Procalcitonin 0.33 (H) ng/mL     Narrative:       SIRS, sepsis, severe sepsis, and septic shock are categorized according to the criteria of the consensus conference of the American College of Chest Physicians/Society of Critical Care Medicine.    PCT < 0.5 ng/mL     Systemic infection (sepsis) is not likely.    PCT >0.5 and < 2.0 ng/mL Systemic infection (sepsis) is possible, but other conditions are known to elevate PCT as well.    PCT > 2.0 ng/mL     Systemic infection (sepsis) is likely, unless other causes are known.      PCT > 10.0 ng/mL    Important systemic inflammatory response, almost exclusively due to severe bacterial sepsis or septic shock.    PCT values of < 0.5 ng/mL do not exclude an infection, because localized infections (without systemic signs) may be associated with such low concentrations, or a systemic infection in its initial stages (<6 hours).  Increased PCT can occur without infection.  PCT concentrations between 0.5 and 2.0 ng/mL should be interpreted taking into account the patients history.  It is recommended to retest PCT within 6-24 hours if any concentrations < 2.0 ng/mL are obtained.    D-dimer, Quantitative [[id number]]  (Normal) Collected:  06/04/18 0415    Specimen:  Blood Updated:  06/04/18 0531     D-Dimer, Quantitative <0.22 mg/L (FEU)     Narrative:       Reference Range is  0-0.50 mg/L FEU. However, results <0.50 mg/L FEU tends to rule out DVT or PE. Results >0.50 mg/L FEU are not useful in predicting absence or presence of DVT or PE.    [place] Draw [[id number]] Collected:  06/04/18 0415    Specimen:  Blood Updated:  06/04/18 0530    Narrative:       The following orders were created for panel order [place] Draw.  Procedure                               Abnormality         Status                     ---------                               -----------         ------                     Light Blue Top[[id number]]                                   Final result               Green Top (Gel)[[id number]]                                  Final result               Lavender Top[[id number]]                                     Final result               Red Top[[id number]]                                          Final result                 Please view results for these tests on the individual orders.    Light Blue Top [[id number]] Collected:  06/04/18 0415    Specimen:  Blood Updated:  06/04/18 0530     Extra Tube hold for add-on     Comment: Auto resulted       Green Top (Gel) [[id number]] Collected:  06/04/18 0415    Specimen:  Blood Updated:  06/04/18 0530     Extra Tube Hold for add-ons.     Comment: Auto resulted.       Lavender Top [[id number]] Collected:  06/04/18 0415    Specimen:  Blood Updated:  06/04/18 0530     Extra Tube hold for add-on     Comment: Auto resulted       Red Top [[id number]] Collected:  06/04/18 0415    Specimen:  Blood Updated:  06/04/18 0530     Extra Tube Hold for add-ons.     Comment: Auto resulted.       Troponin [[id number]]  (Normal) Collected:  06/04/18 0415    Specimen:  Blood Updated:  06/04/18 0528     Troponin I <0.012 ng/mL     BNP [[id number]]  (Abnormal) Collected:  06/04/18 0415    Specimen:  Blood Updated:  06/04/18 0522     proBNP 1,880.0 (H) pg/mL     CBC & Differential [[id number]] Collected:  06/04/18 0415    Specimen:  Blood Updated:  06/04/18 0503     Narrative:       The following orders were created for panel order CBC & Differential.  Procedure                               Abnormality         Status                     ---------                               -----------         ------                     Manual Differential[[id number]]          Abnormal            Final result               CBC Auto Differential[[id number]]        Abnormal            Final result                 Please view results for these tests on the individual orders.    Manual Differential [[id number]]  (Abnormal) Collected:  06/04/18 0415    Specimen:  Blood Updated:  06/04/18 0502     Neutrophil % 81.6 (H) %      Lymphocyte % 6.4 (L) %      Monocyte % 2.4 (L) %      Bands %  9.6 %      Neutrophils Absolute 13.68 (H) 10*3/mm3      Lymphocytes Absolute 0.96 10*3/mm3      Monocytes Absolute 0.36 10*3/mm3      Anisocytosis Slight/1+     WBC Morphology Normal     Giant Platelets Slight/1+    CBC Auto Differential [[id number]]  (Abnormal) Collected:  06/04/18 0415    Specimen:  Blood Updated:  06/04/18 0502     WBC 15.00 (H) 10*3/mm3      RBC 3.99 (L) 10*6/mm3      Hemoglobin 11.5 (L) g/dL      Hematocrit 35.9 (L) %      MCV 90.0 fL      MCH 28.8 pg      MCHC 32.0 (L) g/dL      RDW 14.3 %      RDW-SD 46.5 fl      MPV 9.8 fL      Platelets 268 10*3/mm3     Narrative:       The previously reported component NRBC is no longer being reported.    Basic Metabolic Panel [[id number]]  (Abnormal) Collected:  06/04/18 0415    Specimen:  Blood Updated:  06/04/18 0459     Glucose 145 (H) mg/dL      BUN 14 mg/dL      Creatinine 0.92 mg/dL      Sodium 138 mmol/L      Potassium 4.4 mmol/L      Chloride 101 mmol/L      CO2 28.0 mmol/L      Calcium 9.1 mg/dL      eGFR Non African Amer 66 mL/min/1.73      BUN/Creatinine Ratio 15.2     Anion Gap 9.0 mmol/L     Narrative:       GFR Normal >60  Chronic Kidney Disease <60  Kidney Failure <15    Blood Gas, Arterial [[id number]]  (Abnormal) Collected:  06/04/18 0417     Specimen:  Arterial Blood Updated:  06/04/18 0426     Site Right Radial     Luis's Test Positive     pH, Arterial 7.351 pH units      pCO2, Arterial 45.2 (H) mm Hg      pO2, Arterial 109.0 (H) mm Hg      HCO3, Arterial 25.0 mmol/L      Base Excess, Arterial -0.8 (L) mmol/L      O2 Saturation, Arterial 99.0 %      Temperature 37.0 C      Barometric Pressure for Blood Gas 751 mmHg      Modality NRB     Flow Rate 15.0 lpm      Ventilator Mode NA     Collected by DR. CALHOUN        Imaging Results (last 24 hours)     Procedure Component Value Units Date/Time    CT Angiogram Chest With Contrast [[id number]] Collected:  06/04/18 0751     Updated:  06/04/18 0802    Narrative:       EXAMINATION: CT ANGIOGRAM CHEST W CONTRAST- 6/4/2018 7:51 AM CDT     HISTORY: Shortness of breath; Z99.81-Dependence on supplemental oxygen     DOSE: 495 mGycm (Automatic exposure control technique was implemented in  an effort to keep the radiation dose as low as possible without  compromising image quality)     REPORT: Spiral CT of the chest was performed after administration of  intravenous contrast using CTA protocol, which includes reconstructed  coronal and sagittal images.     COMPARISON: High-resolution chest CT 5/8/2018, previous CT angiogram of  the chest 1/1/2016.     The contrast bolus is satisfactory. There is mild respiratory motion  artifact. No filling defects are seen in the pulmonary arteries. The  thoracic aorta is normal in caliber and there is no evidence of aortic  dissection. There is mild right hilar lymphadenopathy measuring 1.6 cm  in short axis diameter. Shotty left hilar lymph nodes are present. No  measurable mediastinal lymphadenopathy is seen. Lung windows demonstrate  diffuse coarse airspace infiltrates without consolidation. No pleural  effusion or pneumothorax is identified. There is a collapsed breast  implant on the right. This is stable. In the upper abdomen, no acute  abnormalities identified. Previous  cholecystectomy is noted. There is  also evidence of previous gastric bypass surgery. Review of bone windows  is unremarkable.       Impression:       1. Motion artifact from respiration limits the study, no evidence of  pulmonary embolism is identified.  2. Diffuse coarse groundglass infiltrates throughout both lungs probably  represents pulmonary edema, though nonconsolidating pneumonia is  certainly possible.  3. Mild right hilar lymphadenopathy.  This report was finalized on 06/04/2018 07:59 by Dr. Matt Herndon MD.    XR Chest 1 View [[id number]] Collected:  06/04/18 0642     Updated:  06/04/18 0647    Narrative:       EXAMINATION:   XR CHEST 1 VW-  6/4/2018 6:42 AM CDT     HISTORY: Hypoxia     Frontal upright radiograph of the chest 6/4/2018 4:52 AM CDT     COMPARISON: May 12, 2018.     FINDINGS:   Interstitial air space filling infiltrates present in the right lung.  Interstitial infiltrates present left lung. This may be pulmonary edema.  However pneumonia cannot be excluded. Cardiac silhouettes upper limits  of normal.      The osseous structures and surrounding soft tissues demonstrate no acute  abnormality.       Impression:       1. Bilateral interstitial and air space filling infiltrates. Most likely  this is pulmonary edema..        This report was finalized on 06/04/2018 06:44 by Dr. Zach Pham MD.        I have personally reviewed and interpreted the radiology studies and ECG obtained at time of admission.     Assessment / Plan     Assessment:   Hospital Problem List     Oxygen dependent        Impression:  Acute on chronic respiratory failure  Bilateral interstitial and air space filling infiltrates. Most likely this is pulmonary edema.  Elevated BNP  COPD  Leukocytosis  HX DVT  HX Bipolar disorder with PTSD    Plan:   Supportive pulmonary measures  Pulmonology consult  Diuresis with close BP management  Antibiotics  Continue home medications  Check INR and follow while on antibiotics  Anxiety  control  Continuous pulse ox, patient is on A LOT of sedating medications       Code Status: Full     I discussed the patients findings and my recommendations with the patient    Estimated length of stay 3-4 days    Alix Kingsley DO   06/04/18   9:39 AM              Electronically signed by Alix Kingsley DO at 6/4/2018  9:55 AM          Emergency Department Notes      Lpuis Valencia, RN at 6/4/2018  6:34 AM        PT HAD EPISODE LARGE AMT OF DIARRHEA, PT CLEANED ASSISTED BACK TO BED BIPAP BACK IN PLACE     Lupis Valencia RN  06/04/18 0635       Lupis Valencia RN  06/04/18 0635      Electronically signed by Lupis Valencia RN at 6/4/2018  6:35 AM     Chandu Lopez MD at 6/4/2018  8:26 AM          Subjective   History of Present Illness    Review of Systems    Past Medical History:   Diagnosis Date   • Acute retention of urine    • Anxiety and depression    • Bipolar 1 disorder    • COPD (chronic obstructive pulmonary disease)    • DVT (deep venous thrombosis)    • GERD (gastroesophageal reflux disease)    • Hypoglycemia    • Insomnia    • PTSD (post-traumatic stress disorder)        Allergies   Allergen Reactions   • Morphine Anaphylaxis   • Aspirin Other (See Comments)     Child allergy, unsure what reaction  Child allergy, unsure what reaction   • Morphine And Related    • Nsaids    • Quetiapine Other (See Comments)     Muscle spasms  Muscle spasms   • Quetiapine Fumarate    • Salicylates    • Codeine Rash       Past Surgical History:   Procedure Laterality Date   • ABDOMINOPLASTY     • APPENDECTOMY     • BRONCHOSCOPY N/A 2/2/2018    Procedure: BRONCHOSCOPY AT BEDSIDE;  Surgeon: Earnest Ba MD;  Location: [facility] ENDOSCOPY;  Service:    • CHOLECYSTECTOMY     • ENDOSCOPY N/A 1/29/2018    Procedure: ESOPHAGOGASTRODUODENOSCOPY WITH ANESTHESIA;  Surgeon: Aime Reyna MD;  Location: [facility] ENDOSCOPY;  Service:    • GASTRIC BYPASS     • HYSTERECTOMY     • THORACOSCOPY Left 5/9/2018    Procedure: BRONCHOSCOPY,  LEFT VIDEO ASSISTED THORACOSCOPY, LEFT LOWER LOBE LUNG BIOPSY;  Surgeon: Case Moctezuma MD;  Location: [facility] OR;  Service: Cardiothoracic       Family History   Problem Relation Age of Onset   • Cancer Mother    • Cancer Father    • HIV Brother        Social History     Social History   • Marital status:      Social History Main Topics   • Smoking status: Current Every Day Smoker     Packs/day: 0.25     Types: Cigarettes   • Smokeless tobacco: Never Used   • Alcohol use No   • Drug use: No   • Sexual activity: No     Other Topics Concern   • Not on file           Objective   Physical Exam    Procedures          ED Course  ED Course as of Jun 04 0826   Mon Jun 04, 2018   0808 Motion artifact from respiration limits the study, no evidence of  pulmonary embolism is identified.  2. Diffuse coarse groundglass infiltrates throughout both lungs probably  represents pulmonary edema, though nonconsolidating pneumonia is  certainly possible.  3. Mild right hilar lymphadenopathy.  This report was finalized on 06/04/2018 07:59 by Dr. Matt Herndon MD.  This was discussed with the pt and admitting physician for follow up especially the lymphadenopathy  [TS]   0819 Patient is not given any IV fluid boluses her clinical condition is consistent with CHF without evidence of elevated BNP and pulmonary findings of pulmonary edema on her chest x-ray getting fluid boluses to this patient may result in worsening of her symptoms she will have to get a stat echo and then decide about her treatment process  [TS]      ED Course User Index  [TS] Chandu Lopez MD                  [facility]      Final diagnoses:   Oxygen dependent   Acute systolic congestive heart failure   Hypoxic   Lymphadenopathy, thoracic            Chandu Lopez MD  06/04/18 0826      Electronically signed by Chandu Lopez MD at 6/4/2018  8:26 AM             ICU Vital Signs     Row Name 06/05/18 0300 06/04/18 2049 06/04/18 2038 06/04/18 2000 06/04/18 1945        "Vitals    Temp 97.2 °F (36.2 °C)  --  -- 97 °F (36.1 °C)  --    Temp src Temporal Artery   --  -- Temporal Artery   --    Pulse 84 90 93 92  --    Heart Rate Source Monitor  --  --  --  --    Resp 16  -- 18 16  --    Resp Rate Source Visual  --  --  --  --    /64  --  -- 99/77  --    Noninvasive MAP (mmHg) 72  --  --  --  --    BP Location Right arm  --  --  --  --    BP Method Automatic  --  --  --  --    Patient Position Sitting  --  --  --  --       Oxygen Therapy    SpO2 90 %  -- 92 % 92 %  --    Pulse Oximetry Type Continuous  --  --  --  --    Device (Oxygen Therapy) nasal cannula  -- nasal cannula  -- nasal cannula    Flow (L/min) 5  -- 5  -- 5    Row Name 06/04/18 1658 06/04/18 1632 06/04/18 1626 06/04/18 1204 06/04/18 1155       Vitals    Temp 97.8 °F (36.6 °C) --  --  --  --    Temp src Temporal Artery  --  --  --  --    Pulse 103 90   post tx 92 95   post tx 93    Heart Rate Source Monitor Monitor Monitor Monitor Monitor    Resp 18  -- 20  -- 20    Resp Rate Source Visual  -- Visual  -- Visual    /62  --  --  --  --    BP Location Right arm  --  --  --  --    BP Method Automatic  --  --  --  --    Patient Position Lying  --  --  --  --       Oxygen Therapy    SpO2 92 %  -- 92 %  -- 91 %    Pulse Oximetry Type Continuous  -- Continuous  -- Continuous    Device (Oxygen Therapy) nasal cannula  -- nasal cannula  -- nasal cannula    Flow (L/min) 5  -- 6  -- 6    Row Name 06/04/18 1120 06/04/18 0959 06/04/18 0937 06/04/18 08:49:05 06/04/18 0701       Height and Weight    Height 148.1 cm (58.3\")  --  --  --  --    Weight 86 kg (189 lb 8 oz)  --  --  --  --    Ideal Body Weight (IBW) (kg) 41.97  --  --  --  --    BSA (Calculated - sq m) 1.79 sq meters  --  --  --  --    BMI (Calculated) 39.2  --  --  --  --    Weight in (lb) to have BMI = 25 120.6  --  --  --  --       Vitals    Temp 97.8 °F (36.6 °C)  -- 97.2 °F (36.2 °C) 98 °F (36.7 °C)  --    Temp src Temporal Artery   -- Temporal Artery  Temporal " Artery   --    Pulse 96  -- 105 93 98    Heart Rate Source Monitor  -- Monitor Monitor  --    Resp 20  -- 22 18  --    Resp Rate Source Visual  -- Visual Visual  --    BP 95/60  -- 105/70 106/75 94/64    Noninvasive MAP (mmHg)  --  --  --  -- 70    BP Location Right arm  -- Right arm Right arm  --    BP Method Automatic  --  -- Automatic  --    Patient Position Sitting  -- Sitting Lying  --       Oxygen Therapy    SpO2 93 %  -- 92 % 100 % 94 %    Pulse Oximetry Type Continuous  -- Continuous  --  --    Device (Oxygen Therapy) nasal cannula nasal cannula nasal cannula  --  --    Flow (L/min) 6 5 6  --  --    Row Name 06/04/18 0700 06/04/18 0648 06/04/18 0631 06/04/18 0616 06/04/18 0546       Vitals    Pulse  -- 100 99 108 104    Resp Rate (Observed) Vent 30  --  --  --  --    BP  -- 91/60 91/65 97/49 96/73    Noninvasive MAP (mmHg)  -- 68 71 60 79       Oxygen Therapy    SpO2  -- 95 % 92 % 95 % 93 %    Row Name 06/04/18 0531 06/04/18 05:16:02 06/04/18 0501 06/04/18 0500 06/04/18 0446       Vitals    Pulse 105 106  -- 109  --    Heart Rate Source  -- Monitor  --  --  --    Resp  -- 22  --  --  --    Resp Rate Source  -- Monitor  --  --  --    Resp Rate (Observed) Vent  --  --  -- 23  --    /73 114/70 102/74  -- 105/66    Noninvasive MAP (mmHg) 80 --  --  --  --    BP Method  -- Automatic  --  --  --    Patient Position  -- Lying  --  --  --       Oxygen Therapy    SpO2 93 % 93 %  --  --  --    Pulse Oximetry Type  -- Continuous  --  --  --    Row Name 06/04/18 0445 06/04/18 0444 06/04/18 0440 06/04/18 0431 06/04/18 0430       Vitals    Pulse 112  -- 113  -- 111    Heart Rate Source  --  -- Monitor  --  --    Resp  -- 24 22  --  --    Resp Rate Source  -- Visual Visual  --  --    BP  --  --  -- 108/70  --       Oxygen Therapy    SpO2  --  -- 94 %  --  --    Pulse Oximetry Type  -- Intermittent Intermittent  --  --    Device (Oxygen Therapy)  -- nasal cannula nonrebreather mask  --  --    Flow (L/min)  -- 6 15  --  " --    Row Name 06/04/18 0418 06/04/18 0417 06/04/18 0408             Height and Weight    Height  --  -- 149.9 cm (59\")      Height Method  --  -- Stated      Weight  --  -- 85.7 kg (189 lb)      Weight Method  --  -- Stated      Ideal Body Weight (IBW) (kg)  --  -- 43.57      BSA (Calculated - sq m)  --  -- 1.8 sq meters      BMI (Calculated)  --  -- 38.2      Weight in (lb) to have BMI = 25  --  -- 123.5         Vitals    Temp  --  -- 98.7 °F (37.1 °C)      Temp src  --  -- Temporal Artery       Pulse  -- 113 118      Heart Rate Source  --  -- Monitor      Resp  --  -- 22      Resp Rate Source  --  -- Visual      /86 113/86  --      Noninvasive MAP (mmHg)  -- 93  --      BP Location Right arm  --  --      BP Method Automatic  --  --      Patient Position Lying  --  --         Oxygen Therapy    SpO2 99 % 97 % (!)  78 %      Pulse Oximetry Type Intermittent  -- Intermittent      Device (Oxygen Therapy) nonrebreather mask  -- nasal cannula      Flow (L/min) 10  -- 3.5          Intake & Output (last 3 days)       06/02 0701 - 06/03 0700 06/03 0701 - 06/04 0700 06/04 0701 - 06/05 0700    P.O.   300    IV Piggyback   100    Total Intake(mL/kg)   400 (4.7)    Urine (mL/kg/hr)   650 (0.3)    Total Output     650    Net     -250           Unmeasured Urine Occurrence   4 x        Lines, Drains & Airways    Active LDAs     Name:   Placement date:   Placement time:   Site:   Days:    Peripheral IV 06/04/18 0438 Left Antecubital  06/04/18 0438    Antecubital    1         Inactive LDAs     None                Hospital Medications (all)       Dose Frequency Start End    acetaminophen (TYLENOL) tablet 650 mg 650 mg Every 4 Hours PRN 6/4/2018     Sig - Route: Take 2 tablets by mouth Every 4 (Four) Hours As Needed for Mild Pain  or Fever. - Oral    diazePAM (VALIUM) tablet 5 mg 5 mg 3 Times Daily 6/4/2018     Sig - Route: Take 1 tablet by mouth 3 (Three) Times a Day. - Oral    FLUoxetine (PROzac) capsule 60 mg 60 mg Daily " 6/4/2018     Sig - Route: Take 3 capsules by mouth Daily. - Oral    furosemide (LASIX) injection 20 mg 20 mg Daily 6/4/2018     Sig - Route: Infuse 2 mL into a venous catheter Daily. - Intravenous    gabapentin (NEURONTIN) capsule 300 mg 300 mg 2 Times Daily 6/4/2018     Sig - Route: Take 1 capsule by mouth 2 (Two) Times a Day. - Oral    gabapentin (NEURONTIN) capsule 600 mg 600 mg Nightly 6/4/2018     Sig - Route: Take 2 capsules by mouth Every Night. - Oral    guaiFENesin (MUCINEX) 12 hr tablet 1,200 mg 1,200 mg Every 12 Hours Scheduled 6/4/2018     Sig - Route: Take 2 tablets by mouth Every 12 (Twelve) Hours. - Oral    iopamidol (ISOVUE-370) 76 % injection 150 mL 150 mL Once in Imaging 6/4/2018 6/4/2018    Sig - Route: Infuse 150 mL into a venous catheter Once. - Intravenous    ipratropium-albuterol (DUO-NEB) nebulizer solution 3 mL 3 mL Once 6/4/2018 6/4/2018    Sig - Route: Take 3 mL by nebulization 1 (One) Time. - Nebulization    ipratropium-albuterol (DUO-NEB) nebulizer solution 3 mL 3 mL 4 Times Daily - RT 6/4/2018     Sig - Route: Take 3 mL by nebulization 4 (Four) Times a Day. - Nebulization    levoFLOXacin (LEVAQUIN) 500 mg/100 mL D5W (premix) (LEVAQUIN) 500 mg 500 mg Every 24 Hours 6/4/2018 6/14/2018    Sig - Route: Infuse 100 mL into a venous catheter Daily. - Intravenous    levothyroxine (SYNTHROID, LEVOTHROID) tablet 112 mcg 112 mcg Every Morning Before Breakfast 6/4/2018     Sig - Route: Take 1 tablet by mouth Every Morning Before Breakfast. - Oral    lithium carbonate capsule 300 mg 300 mg Nightly 6/4/2018     Sig - Route: Take 1 capsule by mouth Every Night. - Oral    methylPREDNISolone sodium succinate (SOLU-Medrol) injection 125 mg 125 mg Once 6/4/2018 6/4/2018    Sig - Route: Infuse 2 mL into a venous catheter 1 (One) Time. - Intravenous    methylPREDNISolone sodium succinate (SOLU-Medrol) injection 40 mg 40 mg Every 12 Hours 6/4/2018     Sig - Route: Infuse 1 mL into a venous catheter Every 12  (Twelve) Hours. - Intravenous    nicotine (NICODERM CQ) 21 MG/24HR patch 1 patch 1 patch Every 24 Hours 6/4/2018     Sig - Route: Place 1 patch on the skin Daily. - Transdermal    ondansetron (ZOFRAN) injection 4 mg 4 mg Every 6 Hours PRN 6/4/2018     Sig - Route: Infuse 2 mL into a venous catheter Every 6 (Six) Hours As Needed for Nausea or Vomiting. - Intravenous    oxyCODONE-acetaminophen (PERCOCET)  MG per tablet 1 tablet 1 tablet Every 6 Hours PRN 6/4/2018     Sig - Route: Take 1 tablet by mouth Every 6 (Six) Hours As Needed for Moderate Pain . - Oral    pantoprazole (PROTONIX) EC tablet 40 mg 40 mg Every Morning 6/4/2018     Sig - Route: Take 1 tablet by mouth Every Morning. - Oral    piperacillin-tazobactam (ZOSYN) 4.5 g in iso-osmotic dextrose 100 mL IVPB (premix) 4.5 g Once 6/4/2018 6/4/2018    Sig - Route: Infuse 100 mL into a venous catheter 1 (One) Time. - Intravenous    promethazine (PHENERGAN) tablet 25 mg 25 mg 2 Times Daily PRN 6/4/2018     Sig - Route: Take 1 tablet by mouth 2 (Two) Times a Day As Needed for Nausea or Vomiting. - Oral    sodium chloride 0.9 % flush 1-10 mL 1-10 mL As Needed 6/4/2018     Sig - Route: Infuse 1-10 mL into a venous catheter As Needed for Line Care. - Intravenous    warfarin (COUMADIN) tablet 5 mg 5 mg 3 Times Weekly 6/6/2018     Sig - Route: Take 1 tablet by mouth Once per day on Mon Wed Fri. - Oral    gabapentin (NEURONTIN) capsule 300 mg (Discontinued) 300 mg Every 12 Hours Scheduled 6/4/2018 6/4/2018    Sig - Route: Take 1 capsule by mouth Every 12 (Twelve) Hours. - Oral    warfarin (COUMADIN) tablet 5 mg (Discontinued) 5 mg 3 Times Weekly 6/4/2018 6/4/2018    Sig - Route: Take 1 tablet by mouth Once per day on Mon Wed Fri. - Oral          Lab Results (all)     Procedure Component Value Units Date/Time    Blood Culture - Blood, [[id number]]  (Normal) Collected:  06/04/18 0500    Specimen:  Blood from Arm, Right Updated:  06/05/18 0600     Blood Culture No growth  at 24 hours    Blood Culture - Blood, [[id number]]  (Normal) Collected:  06/04/18 0515    Specimen:  Blood from Arm, Right Updated:  06/05/18 0600     Blood Culture No growth at 24 hours    CBC & Differential [[id number]] Collected:  06/05/18 0517    Specimen:  Blood Updated:  06/05/18 0549    Narrative:       The following orders were created for panel order CBC & Differential.  Procedure                               Abnormality         Status                     ---------                               -----------         ------                     Manual Differential[[id number]]          Abnormal            Final result               CBC Auto Differential[[id number]]        Abnormal            Final result                 Please view results for these tests on the individual orders.    CBC Auto Differential [[id number]]  (Abnormal) Collected:  06/05/18 0517    Specimen:  Blood Updated:  06/05/18 0549     WBC 16.83 (H) 10*3/mm3      RBC 3.98 (L) 10*6/mm3      Hemoglobin 11.3 (L) g/dL      Hematocrit 35.8 (L) %      MCV 89.9 fL      MCH 28.4 pg      MCHC 31.6 (L) g/dL      RDW 14.5 %      RDW-SD 47.2 fl      MPV 9.5 fL      Platelets 289 10*3/mm3     Manual Differential [[id number]]  (Abnormal) Collected:  06/05/18 0517    Specimen:  Blood Updated:  06/05/18 0549     Neutrophil % 87.9 (H) %      Lymphocyte % 5.1 (L) %      Monocyte % 7.1 %      Neutrophils Absolute 14.79 (H) 10*3/mm3      Lymphocytes Absolute 0.86 10*3/mm3      Monocytes Absolute 1.19 10*3/mm3      RBC Morphology Normal     WBC Morphology Normal     Giant Platelets Slight/1+    Basic Metabolic Panel [[id number]]  (Abnormal) Collected:  06/05/18 0517    Specimen:  Blood Updated:  06/05/18 0537     Glucose 139 (H) mg/dL      BUN 10 mg/dL      Creatinine 0.60 mg/dL      Sodium 141 mmol/L      Potassium 4.0 mmol/L      Chloride 102 mmol/L      CO2 31.0 mmol/L      Calcium 9.7 mg/dL      eGFR Non African Amer 109 mL/min/1.73      BUN/Creatinine Ratio 16.7      Anion Gap 8.0 mmol/L     Narrative:       GFR Normal >60  Chronic Kidney Disease <60  Kidney Failure <15    Protime-INR [[id number]]  (Abnormal) Collected:  06/05/18 0517    Specimen:  Blood Updated:  06/05/18 0536     Protime 14.8 (H) Seconds      INR 1.12 (H)    Sedimentation Rate [[id number]]  (Normal) Collected:  06/04/18 1444    Specimen:  Blood Updated:  06/04/18 1542     Sed Rate 19 mm/hr     Rheumatoid Factor [[id number]]  (Abnormal) Collected:  06/04/18 1444    Specimen:  Blood Updated:  06/04/18 1515     Rheumatoid Factor Quantitative 15.1 (H) IU/mL     [facility] Comprehensive Panel [[id number]] Collected:  06/04/18 1444    Specimen:  Blood Updated:  06/04/18 1447    ANCA Panel [[id number]] Collected:  06/04/18 1444    Specimen:  Blood Updated:  06/04/18 1447    Alpha - 1 - Antitrypsin Deficiency [[id number]] Collected:  06/04/18 1444    Specimen:  Blood Updated:  06/04/18 1447    Protime-INR [[id number]]  (Abnormal) Collected:  06/04/18 0415    Specimen:  Blood Updated:  06/04/18 0956     Protime 15.9 (H) Seconds      INR 1.23 (H)    Blood Gas, Arterial [[id number]]  (Abnormal) Collected:  06/04/18 0857    Specimen:  Arterial Blood Updated:  06/04/18 0903     Site Right Brachial     Luis's Test N/A     pH, Arterial 7.342 (L) pH units      pCO2, Arterial 46.8 (H) mm Hg      pO2, Arterial 172.0 (H) mm Hg      HCO3, Arterial 25.3 mmol/L      Base Excess, Arterial -0.8 (L) mmol/L      O2 Saturation, Arterial 100.1 (H) %      Temperature 37.0 C      Barometric Pressure for Blood Gas 752 mmHg      Modality BiPap     FIO2 65 %      Ventilator Mode BiPAP     Set Mech Resp Rate 12.0     IPAP 14     EPAP 6     Collected by [id number]    Lactic Acid, Plasma [[id number]]  (Normal) Collected:  06/04/18 0516    Specimen:  Blood Updated:  06/04/18 0537     Lactate 1.1 mmol/L     Procalcitonin [[id number]]  (Abnormal) Collected:  06/04/18 0415    Specimen:  Blood Updated:  06/04/18 0536     Procalcitonin 0.33 (H) ng/mL     Narrative:        SIRS, sepsis, severe sepsis, and septic shock are categorized according to the criteria of the consensus conference of the American College of Chest Physicians/Society of Critical Care Medicine.    PCT < 0.5 ng/mL     Systemic infection (sepsis) is not likely.    PCT >0.5 and < 2.0 ng/mL Systemic infection (sepsis) is possible, but other conditions are known to elevate PCT as well.    PCT > 2.0 ng/mL     Systemic infection (sepsis) is likely, unless other causes are known.      PCT > 10.0 ng/mL    Important systemic inflammatory response, almost exclusively due to severe bacterial sepsis or septic shock.    PCT values of < 0.5 ng/mL do not exclude an infection, because localized infections (without systemic signs) may be associated with such low concentrations, or a systemic infection in its initial stages (<6 hours).  Increased PCT can occur without infection.  PCT concentrations between 0.5 and 2.0 ng/mL should be interpreted taking into account the patients history.  It is recommended to retest PCT within 6-24 hours if any concentrations < 2.0 ng/mL are obtained.    D-dimer, Quantitative [[id number]]  (Normal) Collected:  06/04/18 0415    Specimen:  Blood Updated:  06/04/18 0531     D-Dimer, Quantitative <0.22 mg/L (FEU)     Narrative:       Reference Range is 0-0.50 mg/L FEU. However, results <0.50 mg/L FEU tends to rule out DVT or PE. Results >0.50 mg/L FEU are not useful in predicting absence or presence of DVT or PE.    [facility] Draw [[id number]] Collected:  06/04/18 0415    Specimen:  Blood Updated:  06/04/18 0530    Narrative:       The following orders were created for panel order [facility] Draw.  Procedure                               Abnormality         Status                     ---------                               -----------         ------                     Light Blue Top[[id number]]                                   Final result               Green Top (Gel)[[id number]]                                   Final result               Lavender Top[[id number]]                                     Final result               Red Top[[id number]]                                          Final result                 Please view results for these tests on the individual orders.    Light Blue Top [[id number]] Collected:  06/04/18 0415    Specimen:  Blood Updated:  06/04/18 0530     Extra Tube hold for add-on     Comment: Auto resulted       Green Top (Gel) [[id number]] Collected:  06/04/18 0415    Specimen:  Blood Updated:  06/04/18 0530     Extra Tube Hold for add-ons.     Comment: Auto resulted.       Lavender Top [[id number]] Collected:  06/04/18 0415    Specimen:  Blood Updated:  06/04/18 0530     Extra Tube hold for add-on     Comment: Auto resulted       Red Top [[id number]] Collected:  06/04/18 0415    Specimen:  Blood Updated:  06/04/18 0530     Extra Tube Hold for add-ons.     Comment: Auto resulted.       Troponin [[id number]]  (Normal) Collected:  06/04/18 0415    Specimen:  Blood Updated:  06/04/18 0528     Troponin I <0.012 ng/mL     BNP [[id number]]  (Abnormal) Collected:  06/04/18 0415    Specimen:  Blood Updated:  06/04/18 0522     proBNP 1,880.0 (H) pg/mL     CBC & Differential [[id number]] Collected:  06/04/18 0415    Specimen:  Blood Updated:  06/04/18 0502    Narrative:       The following orders were created for panel order CBC & Differential.  Procedure                               Abnormality         Status                     ---------                               -----------         ------                     Manual Differential[[id number]]          Abnormal            Final result               CBC Auto Differential[[id number]]        Abnormal            Final result                 Please view results for these tests on the individual orders.    Manual Differential [[id number]]  (Abnormal) Collected:  06/04/18 0415    Specimen:  Blood Updated:  06/04/18 0502     Neutrophil % 81.6 (H) %      Lymphocyte % 6.4 (L)  %      Monocyte % 2.4 (L) %      Bands %  9.6 %      Neutrophils Absolute 13.68 (H) 10*3/mm3      Lymphocytes Absolute 0.96 10*3/mm3      Monocytes Absolute 0.36 10*3/mm3      Anisocytosis Slight/1+     WBC Morphology Normal     Giant Platelets Slight/1+    CBC Auto Differential [[id number]]  (Abnormal) Collected:  06/04/18 0415    Specimen:  Blood Updated:  06/04/18 0502     WBC 15.00 (H) 10*3/mm3      RBC 3.99 (L) 10*6/mm3      Hemoglobin 11.5 (L) g/dL      Hematocrit 35.9 (L) %      MCV 90.0 fL      MCH 28.8 pg      MCHC 32.0 (L) g/dL      RDW 14.3 %      RDW-SD 46.5 fl      MPV 9.8 fL      Platelets 268 10*3/mm3     Narrative:       The previously reported component NRBC is no longer being reported.    Basic Metabolic Panel [[id number]]  (Abnormal) Collected:  06/04/18 0415    Specimen:  Blood Updated:  06/04/18 0459     Glucose 145 (H) mg/dL      BUN 14 mg/dL      Creatinine 0.92 mg/dL      Sodium 138 mmol/L      Potassium 4.4 mmol/L      Chloride 101 mmol/L      CO2 28.0 mmol/L      Calcium 9.1 mg/dL      eGFR Non African Amer 66 mL/min/1.73      BUN/Creatinine Ratio 15.2     Anion Gap 9.0 mmol/L     Narrative:       GFR Normal >60  Chronic Kidney Disease <60  Kidney Failure <15    Blood Gas, Arterial [[id number]]  (Abnormal) Collected:  06/04/18 0417    Specimen:  Arterial Blood Updated:  06/04/18 0426     Site Right Radial     Luis's Test Positive     pH, Arterial 7.351 pH units      pCO2, Arterial 45.2 (H) mm Hg      pO2, Arterial 109.0 (H) mm Hg      HCO3, Arterial 25.0 mmol/L      Base Excess, Arterial -0.8 (L) mmol/L      O2 Saturation, Arterial 99.0 %      Temperature 37.0 C      Barometric Pressure for Blood Gas 751 mmHg      Modality NRB     Flow Rate 15.0 lpm      Ventilator Mode NA     Collected by DR. CALHOUN          Imaging Results (all)     Procedure Component Value Units Date/Time    XR Chest 1 View [[id number]] Updated:  06/05/18 0505    CT Angiogram Chest With Contrast [[id number]] Collected:   06/04/18 0751     Updated:  06/04/18 0802    Narrative:       EXAMINATION: CT ANGIOGRAM CHEST W CONTRAST- 6/4/2018 7:51 AM CDT     HISTORY: Shortness of breath; Z99.81-Dependence on supplemental oxygen     DOSE: 495 mGycm (Automatic exposure control technique was implemented in  an effort to keep the radiation dose as low as possible without  compromising image quality)     REPORT: Spiral CT of the chest was performed after administration of  intravenous contrast using CTA protocol, which includes reconstructed  coronal and sagittal images.     COMPARISON: High-resolution chest CT 5/8/2018, previous CT angiogram of  the chest 1/1/2016.     The contrast bolus is satisfactory. There is mild respiratory motion  artifact. No filling defects are seen in the pulmonary arteries. The  thoracic aorta is normal in caliber and there is no evidence of aortic  dissection. There is mild right hilar lymphadenopathy measuring 1.6 cm  in short axis diameter. Shotty left hilar lymph nodes are present. No  measurable mediastinal lymphadenopathy is seen. Lung windows demonstrate  diffuse coarse airspace infiltrates without consolidation. No pleural  effusion or pneumothorax is identified. There is a collapsed breast  implant on the right. This is stable. In the upper abdomen, no acute  abnormalities identified. Previous cholecystectomy is noted. There is  also evidence of previous gastric bypass surgery. Review of bone windows  is unremarkable.       Impression:       1. Motion artifact from respiration limits the study, no evidence of  pulmonary embolism is identified.  2. Diffuse coarse groundglass infiltrates throughout both lungs probably  represents pulmonary edema, though nonconsolidating pneumonia is  certainly possible.  3. Mild right hilar lymphadenopathy.  This report was finalized on 06/04/2018 07:59 by Dr. Matt Herndon MD.    XR Chest 1 View [[id number]] Collected:  06/04/18 0642     Updated:  06/04/18 0647    Narrative:        EXAMINATION:   XR CHEST 1 VW-  6/4/2018 6:42 AM CDT     HISTORY: Hypoxia     Frontal upright radiograph of the chest 6/4/2018 4:52 AM CDT     COMPARISON: May 12, 2018.     FINDINGS:   Interstitial air space filling infiltrates present in the right lung.  Interstitial infiltrates present left lung. This may be pulmonary edema.  However pneumonia cannot be excluded. Cardiac silhouettes upper limits  of normal.      The osseous structures and surrounding soft tissues demonstrate no acute  abnormality.       Impression:       1. Bilateral interstitial and air space filling infiltrates. Most likely  this is pulmonary edema..        This report was finalized on 06/04/2018 06:44 by Dr. Zach Pham MD.          Orders (all)     Start     Ordered    06/06/18 1800  warfarin (COUMADIN) tablet 5 mg  Once per day on Mon Wed Fri 06/04/18 1017    06/05/18 0600  CBC Auto Differential  PROCEDURE ONCE      06/05/18 0001    06/05/18 0526  Manual Differential  Once      06/05/18 0525    06/05/18 0000  XR Chest 1 View  1 Time Imaging      06/04/18 0955    06/04/18 2100  gabapentin (NEURONTIN) capsule 600 mg  Nightly      06/04/18 0935    06/04/18 2100  lithium carbonate capsule 300 mg  Nightly      06/04/18 0935    06/04/18 1600  methylPREDNISolone sodium succinate (SOLU-Medrol) injection 40 mg  Every 12 Hours      06/04/18 0935    06/04/18 1434  Sedimentation Rate  Once      06/04/18 1433    06/04/18 1433  Alpha - 1 - Antitrypsin Deficiency  Once      06/04/18 1433    06/04/18 1433  [facility] Comprehensive Panel  Once      06/04/18 1433    06/04/18 1433  ANCA Panel  Once      06/04/18 1433    06/04/18 1433  Rheumatoid Factor  Once      06/04/18 1433    06/04/18 1230  ipratropium-albuterol (DUO-NEB) nebulizer solution 3 mL  4 Times Daily - RT      06/04/18 0935    06/04/18 1200  Vital Signs  Every 4 Hours      06/04/18 0935    06/04/18 1015  diazePAM (VALIUM) tablet 5 mg  3 Times Daily      06/04/18 0935    06/04/18 1015   FLUoxetine (PROzac) capsule 60 mg  Daily      06/04/18 0935    06/04/18 1015  gabapentin (NEURONTIN) capsule 300 mg  Every 12 Hours Scheduled,   Status:  Discontinued      06/04/18 0935    06/04/18 1015  nicotine (NICODERM CQ) 21 MG/24HR patch 1 patch  Every 24 Hours      06/04/18 0935    06/04/18 1015  pantoprazole (PROTONIX) EC tablet 40 mg  Every Morning      06/04/18 0935    06/04/18 1015  warfarin (COUMADIN) tablet 5 mg  Once per day on Mon Wed Fri,   Status:  Discontinued      06/04/18 0935    06/04/18 1015  guaiFENesin (MUCINEX) 12 hr tablet 1,200 mg  Every 12 Hours Scheduled      06/04/18 0935    06/04/18 1015  levoFLOXacin (LEVAQUIN) 500 mg/100 mL D5W (premix) (LEVAQUIN) 500 mg  Every 24 Hours      06/04/18 0937    06/04/18 1015  furosemide (LASIX) injection 20 mg  Daily      06/04/18 0937    06/04/18 1015  gabapentin (NEURONTIN) capsule 300 mg  2 Times Daily      06/04/18 0943    06/04/18 1000  Strict Intake and Output  Every Hour      06/04/18 0935    06/04/18 0945  levothyroxine (SYNTHROID, LEVOTHROID) tablet 112 mcg  Every Morning Before Breakfast      06/04/18 0935    06/04/18 0938  Keep MAP greater than 60. Call MD if drops below.  Nursing Communication  Once     Comments:  Keep MAP greater than 60. Call MD if drops below.    06/04/18 0937    06/04/18 0936  Protime-INR  Daily      06/04/18 0935    06/04/18 0936  Basic Metabolic Panel  Daily      06/04/18 0935    06/04/18 0936  CBC & Differential  Daily      06/04/18 0935    06/04/18 0936  CBC Auto Differential  PROCEDURE ONCE,   Status:  Canceled      06/04/18 0935    06/04/18 0932  Inpatient Pulmonology Consult  Once     Specialty:  Pulmonary Disease  Provider:  Earnest Ba MD    06/04/18 0935    06/04/18 0931  ondansetron (ZOFRAN) injection 4 mg  Every 6 Hours PRN      06/04/18 0935    06/04/18 0931  acetaminophen (TYLENOL) tablet 650 mg  Every 4 Hours PRN      06/04/18 0935    06/04/18 0931  Weigh Patient  Once      06/04/18 0935     06/04/18 0931  Oxygen Therapy- Nasal Cannula; Titrate for SPO2: 90%  Continuous      06/04/18 0935    06/04/18 0931  Insert Peripheral IV  Once      06/04/18 0935    06/04/18 0931  Saline Lock & Maintain IV Access  Continuous      06/04/18 0935    06/04/18 0931  Full Code  Continuous      06/04/18 0935    06/04/18 0931  VTE Risk Assessment - Low Risk  Once      06/04/18 0935    06/04/18 0931  Pharmacologic VTE Prophylaxis Not Indicated: Currently Anticoagulated  Once      06/04/18 0935    06/04/18 0931  Mechanical VTE Prophylaxis Not Indicated: Low Risk VTE  Once      06/04/18 0935    06/04/18 0931  Pulse Oximetry, Continuous  Continuous      06/04/18 0935    06/04/18 0931  Diet Regular  Diet Effective Now      06/04/18 0935    06/04/18 0930  sodium chloride 0.9 % flush 1-10 mL  As Needed      06/04/18 0935    06/04/18 0929  promethazine (PHENERGAN) tablet 25 mg  2 Times Daily PRN      06/04/18 0935    06/04/18 0929  oxyCODONE-acetaminophen (PERCOCET)  MG per tablet 1 tablet  Every 6 Hours PRN      06/04/18 0935    06/04/18 0904  Blood Gas, Arterial  Once      06/04/18 0903    06/04/18 0852  Blood Gas, Arterial  STAT      06/04/18 0852    06/04/18 0826  Inpatient Admission  Once      06/04/18 0825    06/04/18 0743  iopamidol (ISOVUE-370) 76 % injection 150 mL  Once in Imaging      06/04/18 0741    06/04/18 0625  CT Angiogram Chest With Contrast  1 Time Imaging      06/04/18 0624    06/04/18 0512  . BIPAP; Titrate for SPO2: 92%  Until Discontinued,   Status:  Canceled      06/04/18 0511    06/04/18 0504  piperacillin-tazobactam (ZOSYN) 4.5 g in iso-osmotic dextrose 100 mL IVPB (premix)  Once      06/04/18 0502    06/04/18 0502  D-dimer, Quantitative  Once      06/04/18 0501    06/04/18 0502  BNP  Once      06/04/18 0501    06/04/18 0502  Troponin  Once      06/04/18 0501    06/04/18 0502  Blood Culture - Blood,  Once      06/04/18 0501    06/04/18 0502  Blood Culture - Blood,  Once      06/04/18 0501     06/04/18 0502  Lactic Acid, Plasma  Once      06/04/18 0501    06/04/18 0502  Procalcitonin  Once      06/04/18 0501    06/04/18 0453  Manual Differential  Once      06/04/18 0452    06/04/18 0452  CBC Auto Differential  Once      06/04/18 0451    06/04/18 0448  CBC & Differential  Once      06/04/18 0447    06/04/18 0448  Basic Metabolic Panel  Once      06/04/18 0447    06/04/18 0448  Urinalysis With / Microscopic If Indicated (No Culture) - Urine, Clean Catch  Once      06/04/18 0447    06/04/18 0448  XR Chest 1 View  1 Time Imaging      06/04/18 0448    06/04/18 0430  ipratropium-albuterol (DUO-NEB) nebulizer solution 3 mL  Once      06/04/18 0428    06/04/18 0430  methylPREDNISolone sodium succinate (SOLU-Medrol) injection 125 mg  Once      06/04/18 0428    06/04/18 0427  Blood Gas, Arterial  Once      06/04/18 0426    06/04/18 0415  Harris Draw  STAT      06/04/18 0414    06/04/18 0415  Light Blue Top  PROCEDURE ONCE      06/04/18 0414    06/04/18 0415  Green Top (Gel)  PROCEDURE ONCE      06/04/18 0414    06/04/18 0415  Lavender Top  PROCEDURE ONCE      06/04/18 0414    06/04/18 0415  Red Top  PROCEDURE ONCE      06/04/18 0414    06/04/18 0413  ECG 12 Lead  STAT      06/04/18 0412    Unscheduled  Up With Assistance  As Needed      06/04/18 0935    --  oxyCODONE-acetaminophen (PERCOCET)  MG per tablet  3 Times Daily PRN      06/04/18 0417    --  SCANNED EKG      06/04/18 0000          Ventilator/Non-Invasive Ventilation Settings     Start     Ordered    06/04/18 0512  . BIPAP; Titrate for SPO2: 92%  Until Discontinued,   Status:  Canceled     Question Answer Comment   . BIPAP    Titrate for SPO2 92%        06/04/18 0511          Physician Progress Notes (all)     No notes of this type exist for this encounter.           Consult Notes (all)      Earnest Ba MD at 6/4/2018  2:34 PM      Consult Orders:    1. Inpatient Pulmonology Consult [[id number]] ordered by Alix Kingsley DO at 06/04/18  0935                      PULMONARY & CRITICAL CARE CONSULT - [facility]    18, 2:34 PM  Patient Care Team:  Neel Valencia Jr., MD as PCP - General (Family Medicine)  Devonte Amaya MD as Consulting Physician (Urology)  Name: Vianca Spencer  : 1975  MRN: [id number]  Contact Serial Number [id number]    Chief complaint: Acute respiratory failure    HPI:  We have been consulted by Alix Kingsley DO to see this 42 y.o. female born on 1975.  Patient is currently wearing a bipap mask and her mentation is alerted to the degree that she is not able to give reliable information. When attempting to assess the her she kept repeating that she wanted to be left alone and wanted something to help her sleep. Information has been obtained from her chart and indicates that she initially came in with complaints of fever, cough and SOA for a couple of days. She was afebrile on arrival with an O2 sat of 78% on RA. Currently she is on bipap 14/6 and 65%. She is known to our practice for two admission in 2018 with similar complaints. The first she was diagnosed with influenza and spend several days on the ventilator. She had bilateral infiltrates at that time that were felt to be from the influenza. She was treated and told not to smoke and a follow-up was made that she did not keep. She returned again at the beginning of last month with similar complaints and imaging was unchanged from imaging when she had influenza. Again at that time she required high flow oxygen to sustain. She reported at that time she was told by her PCP, Dr. Abreu several years ago that she had end stage COPD and fibrosis and she wouldn't live very long. It was decided that she undergo a biopsy of her lung which she did. It was performed by Dr. Moctezuma. She tolerated well and high flow O2 was weaned down. Again she was encouraged not to smoke and was discharged home to follow up for the results which she did not  keep. She was again well enough that she did not qualify for oxygen when she was discharged with ambulation. Our office did contact her to reschedule to discuss results. She was due to see Dr. Ba today. Results indicate and acute organizing pneumonia versus immunologic capillaritis versus vasculitis. She has never had hemoptysis but has had recurrent hematuria reported per multiple urinalysis testing since 2015. There is no family present with her today. No other aggravating, alleviating factors or associated symptoms.    Past Medical History:  Past Medical History:   Diagnosis Date   • Acute retention of urine    • Anxiety and depression    • Bipolar 1 disorder    • COPD (chronic obstructive pulmonary disease)    • DVT (deep venous thrombosis)    • GERD (gastroesophageal reflux disease)    • Hypoglycemia    • Insomnia    • PTSD (post-traumatic stress disorder)      Past Surgical History:   Procedure Laterality Date   • ABDOMINOPLASTY     • APPENDECTOMY     • BRONCHOSCOPY N/A 2/2/2018    Procedure: BRONCHOSCOPY AT BEDSIDE;  Surgeon: Earnest Ba MD;  Location: [facility] ENDOSCOPY;  Service:    • CHOLECYSTECTOMY     • ENDOSCOPY N/A 1/29/2018    Procedure: ESOPHAGOGASTRODUODENOSCOPY WITH ANESTHESIA;  Surgeon: Aime Reyna MD;  Location: [facility] ENDOSCOPY;  Service:    • GASTRIC BYPASS     • HYSTERECTOMY     • THORACOSCOPY Left 5/9/2018    Procedure: BRONCHOSCOPY, LEFT VIDEO ASSISTED THORACOSCOPY, LEFT LOWER LOBE LUNG BIOPSY;  Surgeon: Case Moctezuma MD;  Location: [facility] OR;  Service: Cardiothoracic     Allergies   Allergen Reactions   • Morphine Anaphylaxis   • Aspirin Other (See Comments)     Child allergy, unsure what reaction  Child allergy, unsure what reaction   • Morphine And Related    • Nsaids    • Quetiapine Other (See Comments)     Muscle spasms  Muscle spasms   • Quetiapine Fumarate    • Salicylates    • Codeine Rash     Medications:    diazePAM 5 mg Oral TID   FLUoxetine 60 mg Oral Daily    furosemide 20 mg Intravenous Daily   gabapentin 300 mg Oral BID   gabapentin 600 mg Oral Nightly   guaiFENesin 1,200 mg Oral Q12H   ipratropium-albuterol 3 mL Nebulization 4x Daily - RT   levoFLOXacin 500 mg Intravenous Q24H   levothyroxine 112 mcg Oral QAM AC   lithium carbonate 300 mg Oral Nightly   methylPREDNISolone sodium succinate 40 mg Intravenous Q12H   nicotine 1 patch Transdermal Q24H   pantoprazole 40 mg Oral QAM   [START ON 6/6/2018] warfarin 5 mg Oral Once per day on Mon Wed Fri        Family History:  Family History   Problem Relation Age of Onset   • Cancer Mother    • Cancer Father    • HIV Brother      Social History:   reports that she has been smoking Cigarettes.  She has been smoking about 0.25 packs per day. She has never used smokeless tobacco. She reports that she does not drink alcohol or use drugs.  Review of Systems:  Review of Systems   Unable to perform ROS: Mental status change    Bipap on and pt is agitated and confused    Physical Exam:  Temp:  [97.2 °F (36.2 °C)-98.7 °F (37.1 °C)] 97.8 °F (36.6 °C)  Heart Rate:  [] 95  Resp:  [18-24] 20  BP: ()/(49-86) 95/60  FiO2 (%):  [65 %] 65 %  Intake/Output Summary (Last 24 hours) at 06/04/18 1434  Last data filed at 06/04/18 1120   Gross per 24 hour   Intake              100 ml   Output              300 ml   Net             -200 ml     1    06/04/18  0408 06/04/18  1120   Weight: 85.7 kg (189 lb) 86 kg (189 lb 8 oz)     SpO2 Percentage    06/04/18 0937 06/04/18 1120 06/04/18 1155   SpO2: 92% 93% 91%     Physical Exam   Constitutional: She appears well-developed and well-nourished. No distress. Face mask in place.   HENT:   Head: Normocephalic and atraumatic.   Right Ear: External ear normal.   Left Ear: External ear normal.   Eyes: Conjunctivae and EOM are normal. Pupils are equal, round, and reactive to light. Right eye exhibits no discharge. Left eye exhibits no discharge.   Neck: Normal range of motion. Neck supple. No JVD  present.   Cardiovascular: Normal rate and regular rhythm.    No murmur heard.  Pulmonary/Chest: Tachypnea noted. She has decreased breath sounds. She has rales.   Abdominal: Soft. Bowel sounds are normal. She exhibits no distension.   Musculoskeletal: Normal range of motion. She exhibits no edema or deformity.   Skin: Skin is warm and dry. She is not diaphoretic. No erythema. No pallor.   Psychiatric: She is agitated.   Difficult to assess due to bipap and confusion   Nursing note and vitals reviewed.      Results from last 7 days  Lab Units 06/04/18  0415   WBC 10*3/mm3 15.00*   HEMOGLOBIN g/dL 11.5*   PLATELETS 10*3/mm3 268       Results from last 7 days  Lab Units 06/04/18  0415   SODIUM mmol/L 138   POTASSIUM mmol/L 4.4   CO2 mmol/L 28.0   BUN mg/dL 14   CREATININE mg/dL 0.92   GLUCOSE mg/dL 145*       Results from last 7 days  Lab Units 06/04/18  0857 06/04/18  0417   PH, ARTERIAL pH units 7.342* 7.351   PCO2, ARTERIAL mm Hg 46.8* 45.2*   PO2 ART mm Hg 172.0* 109.0*   FIO2 % 65  --      Lab Results   Component Value Date    PROBNP 1,880.0 (H) 06/04/2018        Recent radiology:   Imaging Results (last 72 hours)     Procedure Component Value Units Date/Time    CT Angiogram Chest With Contrast [[id number]] Collected:  06/04/18 0751     Updated:  06/04/18 0802    Narrative:       EXAMINATION: CT ANGIOGRAM CHEST W CONTRAST- 6/4/2018 7:51 AM CDT     HISTORY: Shortness of breath; Z99.81-Dependence on supplemental oxygen     DOSE: 495 mGycm (Automatic exposure control technique was implemented in  an effort to keep the radiation dose as low as possible without  compromising image quality)     REPORT: Spiral CT of the chest was performed after administration of  intravenous contrast using CTA protocol, which includes reconstructed  coronal and sagittal images.     COMPARISON: High-resolution chest CT 5/8/2018, previous CT angiogram of  the chest 1/1/2016.     The contrast bolus is satisfactory. There is mild  respiratory motion  artifact. No filling defects are seen in the pulmonary arteries. The  thoracic aorta is normal in caliber and there is no evidence of aortic  dissection. There is mild right hilar lymphadenopathy measuring 1.6 cm  in short axis diameter. Shotty left hilar lymph nodes are present. No  measurable mediastinal lymphadenopathy is seen. Lung windows demonstrate  diffuse coarse airspace infiltrates without consolidation. No pleural  effusion or pneumothorax is identified. There is a collapsed breast  implant on the right. This is stable. In the upper abdomen, no acute  abnormalities identified. Previous cholecystectomy is noted. There is  also evidence of previous gastric bypass surgery. Review of bone windows  is unremarkable.       Impression:       1. Motion artifact from respiration limits the study, no evidence of  pulmonary embolism is identified.  2. Diffuse coarse groundglass infiltrates throughout both lungs probably  represents pulmonary edema, though nonconsolidating pneumonia is  certainly possible.  3. Mild right hilar lymphadenopathy.  This report was finalized on 06/04/2018 07:59 by Dr. Matt Herndon MD.    XR Chest 1 View [[id number]] Collected:  06/04/18 0642     Updated:  06/04/18 0647    Narrative:       EXAMINATION:   XR CHEST 1 VW-  6/4/2018 6:42 AM CDT     HISTORY: Hypoxia     Frontal upright radiograph of the chest 6/4/2018 4:52 AM CDT     COMPARISON: May 12, 2018.     FINDINGS:   Interstitial air space filling infiltrates present in the right lung.  Interstitial infiltrates present left lung. This may be pulmonary edema.  However pneumonia cannot be excluded. Cardiac silhouettes upper limits  of normal.      The osseous structures and surrounding soft tissues demonstrate no acute  abnormality.       Impression:       1. Bilateral interstitial and air space filling infiltrates. Most likely  this is pulmonary edema..        This report was finalized on 06/04/2018 06:44 by   Zach Pham MD.        My radiograph interpretation/independent review of imaging: bilateral infiltrates consistent with imagine from recent CT last month    Other test results (not lab or imaging): 5-818    · Left ventricular systolic function is normal. Estimated ejection fraction is 61-65%.  · Left ventricular diastolic function is normal.  · Normal right ventricular cavity size and systolic function noted.  · There is no significant (greater than mild) valvular dysfunction.    Independent review of ekg: ST, rate 113, QTc 477    Patient Active Problem List   Diagnosis   • Fe deficiency anemia   • Anemia   • Anxiety   • Biphasic positive airway pressure dependence   • Chronic pain   • Chronic obstructive pulmonary disease   • Depression   • Gastroesophageal reflux disease   • Bariatric surgery status   • Hypercoagulable state   • Opioid dependence   • Obstructive sleep apnea syndrome   • Seizure disorder   • Coagulation disorder   • BiPAP (biphasic positive airway pressure) dependence   • Bipolar disorder   • COPD (chronic obstructive pulmonary disease)   • Essential tremor   • GERD (gastroesophageal reflux disease)   • History of Stefanie-en-Y gastric bypass   • Hypercoagulopathy   • Incisional hernia   • 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, intract, 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   • Morbid obesity due to excess calories   • Narcotic dependence   • Obstructive sleep apnea   • Warfarin-induced coagulopathy   • Multiple falls   • Upper GI bleed   • Pneumonia of right lung due to infectious organism   • Respiratory distress   • Lung disease, interstitial   • Hypoxia   • Chronic respiratory failure   • Oxygen dependent     Pulmonary Assessment:  New problem (to me), with additional workup planned: Acute/Chronic hypoxemic respiratory failure    New problem (to me), no additional workup planned: Bipolar disorder, defer to attending    Other problems either stable, failing to improve or worsenin. Personal history of  nicotine dependency  2. Bilateral infiltrates  3. Recent wedge resection, positive for acute organizing pneumonia versus immunologic capillaritis versus vasculitis  4. Hematuria  5. Hx of DVT, on coumadin  6. Leukocytosis, on steroids  7. Elevated BNP, mild    Recommend/plan:     · Continue IV steroids at current dose  · Bronchodilators  · Bipap as needed, otherwise supplemental oxygen for sat above 90%. She has required high flow oxygen on previous admissions  · Abx per attending  · Diuresis per attending  · Add JOSE ALFREDO, ANCA, RA, sed rate, Alpha 1  · Make sure staff collects the urine sample that has been ordered to assess for hematuria.   · Will need referral to the interstitial lung disease clinic outpatient once she recovers    Electronically signed by PARIS Del Rio, 06/04/18, 2:34 PM    Physician substantive contribution:  Pertinent symptoms/interval history include: pt with recent dx of nonspecific pulmonary disease on open lung biopsy with suggestion of capillaritis/vasculitis.  Pt complains of insomnia  Respiratory exam shows pertinent findings of:diminished breath sounds, crackles.  Plan includes: continue workup for connective tissue disorder, may need help from ILD clinic.  I have seen and examined patient personally, performing a face-to-face diagnostic evaluation with plan of care reviewed and developed with APRN and nursing staff. I have addended and/or modified the above history of present illness, physical examination, and assessment and plan to reflect my findings and impressions. Essential elements of the care plan were discussed with APRN above.  Agree with findings and assessment/plan as documented above.    Electronically signed by Earnest Ba MD, on 6/4/2018, 6:56 PM           Electronically signed by Earnest Ba MD at 6/4/2018  6:57 PM

## 2018-06-05 NOTE — PROGRESS NOTES
[facility]  INPATIENT PROGRESS NOTE    Length of Stay: 1  Date of Admission: 6/4/2018  Primary Care Physician: Neel Valencia Jr., MD    Subjective   Chief Complaint:   SOA  HPI   43 YO CF admitted on 6/4/18. Patient states that she still is not feeling better. States that her 02 is low, and she keeps turning off the alarm. No family at bedside. Loose cough. Ongoing SOA.         Review of Systems   SOA  Cough  Not feeling well    All pertinent negatives and positives are as above. All other systems have been reviewed and are negative unless otherwise stated.     Objective    Temp:  [96.1 °F (35.6 °C)-97.8 °F (36.6 °C)] 96.1 °F (35.6 °C)  Heart Rate:  [] 89  Resp:  [16-20] 18  BP: ()/(62-78) 117/78  Physical Exam   HENT:   Head: Normocephalic and atraumatic.   Nose: Nose normal.   Mouth/Throat: Oropharynx is clear and moist.   Eyes: Conjunctivae and EOM are normal.   Neck: Normal range of motion. Neck supple.   Cardiovascular: Normal rate, regular rhythm and normal heart sounds.    Pulmonary/Chest: Effort normal. She has rales.   Abdominal: Soft. Bowel sounds are normal.   Musculoskeletal: She exhibits no edema or tenderness.   Neurological: She is alert. No cranial nerve deficit.   Skin: Skin is warm and dry.   Psychiatric: She has a normal mood and affect.   Vitals reviewed.          Results Review:  I have reviewed the labs, radiology results, and diagnostic studies.    Laboratory Data:     Results from last 7 days  Lab Units 06/05/18  0517 06/04/18 0415   WBC 10*3/mm3 16.83* 15.00*   HEMOGLOBIN g/dL 11.3* 11.5*   HEMATOCRIT % 35.8* 35.9*   PLATELETS 10*3/mm3 289 268          Results from last 7 days  Lab Units 06/05/18  0517 06/04/18  0415   SODIUM mmol/L 141 138   POTASSIUM mmol/L 4.0 4.4   CHLORIDE mmol/L 102 101   CO2 mmol/L 31.0 28.0   BUN mg/dL 10 14   CREATININE mg/dL 0.60 0.92   CALCIUM mg/dL 9.7 9.1   GLUCOSE mg/dL 139* 145*        Culture Data:   Blood Culture   Date Value Ref Range Status   06/04/2018 No growth at 24 hours  Preliminary   06/04/2018 No growth at 24 hours  Preliminary       Radiology Data:   Imaging Results (last 24 hours)     Procedure Component Value Units Date/Time    XR Chest 1 View [[id number]] Collected:  06/05/18 0718     Updated:  06/05/18 0721    Narrative:       XR CHEST 1 VW- 6/5/2018 4:20 AM CDT     HISTORY: PNA vs overload; Z99.81-Dependence on supplemental oxygen;  I50.21-Acute systolic (congestive) heart failure; R09.02-Hypoxemia;  R59.0-Localized enlarged lymph nodes     COMPARISON: 6/4/2018      FINDINGS:   Previously seen bilateral airspace opacities have improved since the  previous study, and no new opacities are noted. The cardiomediastinal  silhouette and pulmonary vascularity are unchanged.       No acute osseous or soft tissue abnormality is noted.        Impression:       1. Mild interval improvement since previous exam.        This report was finalized on 06/05/2018 07:18 by Dr. Regis Beck MD.          I have reviewed the patient current medications.     Assessment/Plan     Hospital Problem List     Oxygen dependent          Impression:  Acute on chronic respiratory failure  Bilateral interstitial and air space filling infiltrates. Most likely this is pulmonary edema.  Elevated BNP  COPD  Leukocytosis  HX DVT  HX Bipolar disorder with PTSD     Plan:   Supportive pulmonary measures  Pulmonology consult  Diuresis with close BP management  Antibiotics  Continue home medications  Check INR and follow while on antibiotics  Anxiety control  Continuous pulse ox, patient is on A LOT of sedating medications               Discharge Planning: I expect the patient to be discharged to home in 2-3 days.    Alix Kingsley DO   06/05/18   4:30 PM

## 2018-06-06 LAB
ANION GAP SERPL CALCULATED.3IONS-SCNC: 7 MMOL/L (ref 4–13)
ARTERIAL PATENCY WRIST A: POSITIVE
ATMOSPHERIC PRESS: 747 MMHG
BASE EXCESS BLDA CALC-SCNC: 4.8 MMOL/L (ref 0–2)
BASOPHILS # BLD AUTO: 0.06 10*3/MM3 (ref 0–0.2)
BASOPHILS NFR BLD AUTO: 0.4 % (ref 0–2)
BDY SITE: ABNORMAL
BODY TEMPERATURE: 37 C
BUN BLD-MCNC: 12 MG/DL (ref 5–21)
BUN/CREAT SERPL: 15.6 (ref 7–25)
C-ANCA TITR SER IF: NORMAL TITER
CALCIUM SPEC-SCNC: 9.7 MG/DL (ref 8.4–10.4)
CHLORIDE SERPL-SCNC: 101 MMOL/L (ref 98–110)
CO2 SERPL-SCNC: 35 MMOL/L (ref 24–31)
CREAT BLD-MCNC: 0.77 MG/DL (ref 0.5–1.4)
DEPRECATED RDW RBC AUTO: 48.5 FL (ref 40–54)
EOSINOPHIL # BLD AUTO: 0.03 10*3/MM3 (ref 0–0.7)
EOSINOPHIL NFR BLD AUTO: 0.2 % (ref 0–4)
ERYTHROCYTE [DISTWIDTH] IN BLOOD BY AUTOMATED COUNT: 14.7 % (ref 12–15)
GAS FLOW AIRWAY: 14 LPM
GFR SERPL CREATININE-BSD FRML MDRD: 81 ML/MIN/1.73
GLUCOSE BLD-MCNC: 88 MG/DL (ref 70–100)
HCO3 BLDA-SCNC: 30.3 MMOL/L (ref 20–26)
HCT VFR BLD AUTO: 37.4 % (ref 37–47)
HGB BLD-MCNC: 11.6 G/DL (ref 12–16)
IMM GRANULOCYTES # BLD: 0.39 10*3/MM3 (ref 0–0.03)
IMM GRANULOCYTES NFR BLD: 2.7 % (ref 0–5)
INR PPP: 1.15 (ref 0.91–1.09)
LYMPHOCYTES # BLD AUTO: 1.4 10*3/MM3 (ref 0.72–4.86)
LYMPHOCYTES NFR BLD AUTO: 9.6 % (ref 15–45)
Lab: ABNORMAL
MCH RBC QN AUTO: 28.4 PG (ref 28–32)
MCHC RBC AUTO-ENTMCNC: 31 G/DL (ref 33–36)
MCV RBC AUTO: 91.7 FL (ref 82–98)
MODALITY: ABNORMAL
MONOCYTES # BLD AUTO: 0.7 10*3/MM3 (ref 0.19–1.3)
MONOCYTES NFR BLD AUTO: 4.8 % (ref 4–12)
MYELOPEROXIDASE AB SER-ACNC: <9 U/ML (ref 0–9)
NEUTROPHILS # BLD AUTO: 12.06 10*3/MM3 (ref 1.87–8.4)
NEUTROPHILS NFR BLD AUTO: 82.3 % (ref 39–78)
NRBC BLD MANUAL-RTO: 0.2 /100 WBC (ref 0–0)
P-ANCA ATYPICAL TITR SER IF: NORMAL TITER
P-ANCA TITR SER IF: NORMAL TITER
PCO2 BLDA: 47.5 MM HG (ref 35–45)
PH BLDA: 7.41 PH UNITS (ref 7.35–7.45)
PLATELET # BLD AUTO: 304 10*3/MM3 (ref 130–400)
PMV BLD AUTO: 10 FL (ref 6–12)
PO2 BLDA: 82.5 MM HG (ref 83–108)
POTASSIUM BLD-SCNC: 4 MMOL/L (ref 3.5–5.3)
PROTEINASE3 AB SER IA-ACNC: <3.5 U/ML (ref 0–3.5)
PROTHROMBIN TIME: 15.1 SECONDS (ref 11.9–14.6)
RBC # BLD AUTO: 4.08 10*6/MM3 (ref 4.2–5.4)
SAO2 % BLDCOA: 96.7 % (ref 94–99)
SODIUM BLD-SCNC: 143 MMOL/L (ref 135–145)
VENTILATOR MODE: ABNORMAL
WBC NRBC COR # BLD: 14.64 10*3/MM3 (ref 4.8–10.8)

## 2018-06-06 PROCEDURE — 85025 COMPLETE CBC W/AUTO DIFF WBC: CPT | Performed by: INTERNAL MEDICINE

## 2018-06-06 PROCEDURE — 94799 UNLISTED PULMONARY SVC/PX: CPT

## 2018-06-06 PROCEDURE — 36600 WITHDRAWAL OF ARTERIAL BLOOD: CPT

## 2018-06-06 PROCEDURE — 85610 PROTHROMBIN TIME: CPT | Performed by: INTERNAL MEDICINE

## 2018-06-06 PROCEDURE — 80048 BASIC METABOLIC PNL TOTAL CA: CPT | Performed by: INTERNAL MEDICINE

## 2018-06-06 PROCEDURE — 25010000002 LEVOFLOXACIN PER 250 MG: Performed by: INTERNAL MEDICINE

## 2018-06-06 PROCEDURE — 25010000002 FUROSEMIDE PER 20 MG: Performed by: INTERNAL MEDICINE

## 2018-06-06 PROCEDURE — 82803 BLOOD GASES ANY COMBINATION: CPT

## 2018-06-06 PROCEDURE — 25010000002 METHYLPREDNISOLONE PER 40 MG: Performed by: INTERNAL MEDICINE

## 2018-06-06 PROCEDURE — 94760 N-INVAS EAR/PLS OXIMETRY 1: CPT

## 2018-06-06 RX ADMIN — IPRATROPIUM BROMIDE AND ALBUTEROL SULFATE 3 ML: 2.5; .5 SOLUTION RESPIRATORY (INHALATION) at 08:12

## 2018-06-06 RX ADMIN — GUAIFENESIN 1200 MG: 600 TABLET, EXTENDED RELEASE ORAL at 20:24

## 2018-06-06 RX ADMIN — NICOTINE 1 PATCH: 21 PATCH, EXTENDED RELEASE TRANSDERMAL at 09:07

## 2018-06-06 RX ADMIN — LITHIUM CARBONATE 300 MG: 300 CAPSULE, GELATIN COATED ORAL at 20:24

## 2018-06-06 RX ADMIN — LEVOTHYROXINE SODIUM 112 MCG: 112 TABLET ORAL at 09:06

## 2018-06-06 RX ADMIN — METHYLPREDNISOLONE SODIUM SUCCINATE 40 MG: 40 INJECTION, POWDER, FOR SOLUTION INTRAMUSCULAR; INTRAVENOUS at 04:45

## 2018-06-06 RX ADMIN — GABAPENTIN 300 MG: 300 CAPSULE ORAL at 09:06

## 2018-06-06 RX ADMIN — IPRATROPIUM BROMIDE AND ALBUTEROL SULFATE 3 ML: 2.5; .5 SOLUTION RESPIRATORY (INHALATION) at 19:52

## 2018-06-06 RX ADMIN — DIAZEPAM 5 MG: 5 TABLET ORAL at 09:06

## 2018-06-06 RX ADMIN — METHYLPREDNISOLONE SODIUM SUCCINATE 40 MG: 40 INJECTION, POWDER, FOR SOLUTION INTRAMUSCULAR; INTRAVENOUS at 17:29

## 2018-06-06 RX ADMIN — LEVOFLOXACIN 500 MG: 5 INJECTION, SOLUTION INTRAVENOUS at 10:31

## 2018-06-06 RX ADMIN — GABAPENTIN 600 MG: 300 CAPSULE ORAL at 20:24

## 2018-06-06 RX ADMIN — WARFARIN SODIUM 5 MG: 5 TABLET ORAL at 17:29

## 2018-06-06 RX ADMIN — OXYCODONE HYDROCHLORIDE AND ACETAMINOPHEN 1 TABLET: 10; 325 TABLET ORAL at 20:24

## 2018-06-06 RX ADMIN — FUROSEMIDE 20 MG: 10 INJECTION, SOLUTION INTRAMUSCULAR; INTRAVENOUS at 09:06

## 2018-06-06 RX ADMIN — GUAIFENESIN 1200 MG: 600 TABLET, EXTENDED RELEASE ORAL at 09:06

## 2018-06-06 RX ADMIN — DIAZEPAM 5 MG: 5 TABLET ORAL at 17:29

## 2018-06-06 RX ADMIN — DIAZEPAM 5 MG: 5 TABLET ORAL at 20:24

## 2018-06-06 RX ADMIN — PANTOPRAZOLE SODIUM 40 MG: 40 TABLET, DELAYED RELEASE ORAL at 09:06

## 2018-06-06 RX ADMIN — IPRATROPIUM BROMIDE AND ALBUTEROL SULFATE 3 ML: 2.5; .5 SOLUTION RESPIRATORY (INHALATION) at 12:04

## 2018-06-06 RX ADMIN — GABAPENTIN 300 MG: 300 CAPSULE ORAL at 17:29

## 2018-06-06 RX ADMIN — FLUOXETINE HYDROCHLORIDE 60 MG: 20 CAPSULE ORAL at 09:06

## 2018-06-06 RX ADMIN — OXYCODONE HYDROCHLORIDE AND ACETAMINOPHEN 1 TABLET: 10; 325 TABLET ORAL at 14:31

## 2018-06-06 NOTE — PROGRESS NOTES
PULMONARY AND CRITICAL CARE PROGRESS NOTE - [facility]    Patient: Vianca Spencer    1975    MR# [id number]    Acct# [id number]  06/06/18   9:13 AM  Referring Provider: David Hernandez MD    Chief Complaint: Dyspnea    Interval history: Patient is alert and oriented on 14L NC.  She had episode of worsening hypoxemia yesterday PM and required to hi-flow O2 again.  She states her daughter had came to visit and she got upset.  She states she has a living will, but her daughter will not bring it to the hospital for her.  She states she might want to be a DNR, but then states she wants to be intubated if needed.  She does not want to use BiPAP, but is agreeable to use it for 2 days.  She is still extremely short of breath with any exertion.  No other aggravating, alleviating factors or associated symptoms.    Meds:    diazePAM 5 mg Oral TID   FLUoxetine 60 mg Oral Daily   gabapentin 300 mg Oral BID   gabapentin 600 mg Oral Nightly   guaiFENesin 1,200 mg Oral Q12H   ipratropium-albuterol 3 mL Nebulization 4x Daily - RT   levoFLOXacin 500 mg Intravenous Q24H   levothyroxine 112 mcg Oral QAM AC   lithium carbonate 300 mg Oral Nightly   methylPREDNISolone sodium succinate 40 mg Intravenous Q12H   nicotine 1 patch Transdermal Q24H   pantoprazole 40 mg Oral QAM   warfarin 5 mg Oral Daily        Review of Systems: positive for SOA and cough, negative for weakness or hemoptysis, positive for hematuria, positive for insomnia    Physical Exam:  Temp:  [96.1 °F (35.6 °C)-97.3 °F (36.3 °C)] 97 °F (36.1 °C)  Heart Rate:  [70-97] 72  Resp:  [18-20] 18  BP: (110-122)/(73-81) 110/74    Intake/Output Summary (Last 24 hours) at 06/06/18 0913  Last data filed at 06/06/18 0738   Gross per 24 hour   Intake                0 ml   Output             2100 ml   Net            -2100 ml     SpO2 Percentage    06/06/18 0400 06/06/18 0734 06/06/18 0812   SpO2: 97% 98% 99%      Physical Exam:    Constitutional: She  appears well-developed and well-nourished. No distress. NC in place   HENT:   Head: Normocephalic and atraumatic.   Right Ear: External ear normal.   Left Ear: External ear normal.   Eyes: Conjunctivae and EOM are normal. Pupils are equal, round, and reactive to light. Right eye exhibits no discharge. Left eye exhibits no discharge.   Neck: Normal range of motion. Neck supple. No JVD present.   Cardiovascular: Normal rate and regular rhythm.    No murmur heard.  Pulmonary/Chest: She has decreased breath sounds. She has rales.   Abdominal: Soft. Bowel sounds are normal. She exhibits no distension.   Musculoskeletal: Normal range of motion. She exhibits no edema or deformity.   Skin: Skin is warm and dry. She is not diaphoretic. No erythema. No pallor.   Psychiatric: She is calm and pleasant today   Nursing note and vitals reviewed.      Results from last 7 days  Lab Units 06/06/18  0436 06/05/18  0517 06/04/18  0415   WBC 10*3/mm3 14.64* 16.83* 15.00*   HEMOGLOBIN g/dL 11.6* 11.3* 11.5*   PLATELETS 10*3/mm3 304 289 268       Results from last 7 days  Lab Units 06/06/18  0436 06/05/18  0517 06/04/18  0415   SODIUM mmol/L 143 141 138   POTASSIUM mmol/L 4.0 4.0 4.4   BUN mg/dL 12 10 14   CREATININE mg/dL 0.77 0.60 0.92       Results from last 7 days  Lab Units 06/06/18  0350 06/05/18  1653 06/04/18  0857   PH, ARTERIAL pH units 7.412 7.443 7.342*   PCO2, ARTERIAL mm Hg 47.5* 45.3* 46.8*   PO2 ART mm Hg 82.5* 44.7* 172.0*   FIO2 %  --   --  65     Blood Culture   Date Value Ref Range Status   06/04/2018 No growth at 24 hours  Preliminary   06/04/2018 No growth at 24 hours  Preliminary     Recent films:  Imaging Results (last 24 hours)     ** No results found for the last 24 hours. **        Films reviewed personally by me.  My interpretation: some mild improvement in bilateral interstitial infiltrates    Pulmonary Assessment:    1. Acute/Chronic hypoxemic respiratory failure  2. Bipolar disorder  3. Positive RA factor,  sed rate negative, may be false positive, awaiting other results  4. Personal history of nicotine dependency  5. Bilateral infiltrates  6. Recent wedge resection, positive for acute organizing pneumonia versus immunologic capillaritis versus vasculitis  7. Hematuria  8. Hx of DVT, on coumadin  9. Leukocytosis, on steroids  10. Elevated BNP, mild    Recommend:     · Continue supplemental oxygen for sat above 90%  · BiPAP as needed if she will wear it, can try heated high-flow if needed   · Continue bronchodilators  · Continue abx coverage per attending  · Continue solumedrol 40mg IV BID   · Diuresis per attending  · ANCA WNL, JOSE ALFREDO WNL  · Pt requests referral to outpatient ILD clinic to be made to [place] since she has family there    Electronically signed by PARIS Pulido on 6/6/2018 at 9:13 AM    Physician substantive contribution:  Pertinent symptoms/interval history include: she feels the same, still requiring high flow oxygen  Respiratory exam shows pertinent findings of:crackles, stable.  Plan includes: continue oxygen, steroids.  Current RT.  Recommend continue aggressive care.  Pathology from 5/9 notably indicated diffuse alveolar damage.  Autoimmune panel negative so far, other than RF continue steroids.  She responded to this before and cxr from yesterday shows improvement.  Check aldolase, total ck to evaluate for PM/DM.  I have seen and examined patient personally, performing a face-to-face diagnostic evaluation with plan of care reviewed and developed with APRN and nursing staff. I have addended and/or modified the above history of present illness, physical examination, and assessment and plan to reflect my findings and impressions. Essential elements of the care plan were discussed with APRN above.  Agree with findings and assessment/plan as documented above.    Electronically signed by Earnest Ba MD, on 6/6/2018, 7:02 PM

## 2018-06-06 NOTE — PLAN OF CARE
Problem: Chronic Obstructive Pulmonary Disease (Adult)  Goal: Signs and Symptoms of Listed Potential Problems Will be Absent, Minimized or Managed (Chronic Obstructive Pulmonary Disease)  Outcome: Ongoing (interventions implemented as appropriate)   06/06/18 8715   Goal/Outcome Evaluation   Problems Assessed (Chronic Obstructive Pulmonary Disease (COPD)) all   Problems Present (COPD, Bronch/Emphy) respiratory compromise

## 2018-06-06 NOTE — PROGRESS NOTES
[facility] Medicine Services  INPATIENT PROGRESS NOTE    Length of Stay: 2  Date of Admission: 6/4/2018  Primary Care Physician: Neel Valencia Jr., MD    Subjective   Chief Complaint: Hypoxemia  HPI   Patient reports a dry cough.  She reports no significant cough or congestion.  She denies any significant wheezing.  Cannot tell much benefit after receiving nebulizer treatments.  She has had no recent fevers or chills.  She reports she has not smoked since her previous hospitalization at our facility.  Voices no new complaints this morning.    Review of Systems     All pertinent negatives and positives are as above. All other systems have been reviewed and are negative unless otherwise stated.     Objective    Temp:  [96.1 °F (35.6 °C)-97.3 °F (36.3 °C)] 97 °F (36.1 °C)  Heart Rate:  [70-97] 72  Resp:  [18-20] 18  BP: (110-122)/(73-81) 110/74  Physical Exam   Constitutional: No distress.   HENT:   Head: Normocephalic.   Mouth/Throat: No oropharyngeal exudate.   Eyes: No scleral icterus.   Neck: No tracheal deviation present.   Cardiovascular: Normal rate and regular rhythm.    Pulmonary/Chest: Effort normal. No respiratory distress.   Scattered crackles; on high flow 02; continuous pulsox in place with sats in mid 90s; nonlabored breathing; no accessory muscle use   Abdominal: Soft.   Musculoskeletal: She exhibits no edema.   Appears euvolemic   Neurological: She is alert.   Skin: Skin is warm and dry. She is not diaphoretic.   Psychiatric: She has a normal mood and affect. Her behavior is normal.   Vitals reviewed.    Results Review:  I have reviewed the labs, radiology results, and diagnostic studies.    Laboratory Data:     Results from last 7 days  Lab Units 06/06/18  0436 06/05/18  0517 06/04/18  0415   WBC 10*3/mm3 14.64* 16.83* 15.00*   HEMOGLOBIN g/dL 11.6* 11.3* 11.5*   HEMATOCRIT % 37.4 35.8* 35.9*   PLATELETS 10*3/mm3 304 289 268          Results from last 7  days  Lab Units 06/06/18  0436 06/05/18  0517 06/04/18  0415   SODIUM mmol/L 143 141 138   POTASSIUM mmol/L 4.0 4.0 4.4   CHLORIDE mmol/L 101 102 101   CO2 mmol/L 35.0* 31.0 28.0   BUN mg/dL 12 10 14   CREATININE mg/dL 0.77 0.60 0.92   CALCIUM mg/dL 9.7 9.7 9.1   GLUCOSE mg/dL 88 139* 145*       Culture Data:   Blood Culture   Date Value Ref Range Status   06/04/2018 No growth at 2 days  Preliminary   06/04/2018 No growth at 2 days  Preliminary       Radiology Data:   Imaging Results (last 24 hours)     ** No results found for the last 24 hours. **          I have reviewed the patient current medications.     Assessment/Plan     Hospital Problem List     Oxygen dependent        Assessment:  1.  Acute on chronic hypoxemic respiratory failure  2.  Recent VATS with wedge resection revealing: Lung parenchyma demonstrating Acute and Organizing Diffuse Alveolar Damage with hemosiderin laden macrophages and abundant neutrophils, negative for evidence of malignancy.  3.  Tobacco dependence - she reports she has quit since previous hospital admission (dc'd on 5/13)  4.  Leukocytosis  5.  Bilateral infiltrates on chest imaging  6.  History of DVT on Coumadin (subtherapeutic INR)  7.  Suspect component of COPD  8.  Bipolar disorder  9.  Elevated RF    Plan:  1.  Appreciate Pulmonary input  2.  IV Solumedrol  3.  Scheduled nebs  4.  IV Levaquin for now  5.  On Coumadin; check PT and INR; monitor closely while on Levaquin therapy  6.  Received low dose of IV Lasix this AM; will hold moving forward.  Appears euvolemic  7.  Patient is on scheduled Valium, gabapentin; received Percocet, Phenergan and Ambien PRN  8.  Currently on high flow 02  9.  Continuous pulsox  10.  Connective tissue serologies pending      David Hernandez MD   06/06/18   8:49 AM

## 2018-06-06 NOTE — PLAN OF CARE
Problem: Patient Care Overview  Goal: Plan of Care Review  Outcome: Ongoing (interventions implemented as appropriate)   06/06/18 0520 06/06/18 5474   Coping/Psychosocial   Plan of Care Reviewed With patient --    Plan of Care Review   Progress no change --    OTHER   Outcome Summary --  Pt is resting comfortabyl on 14 L hi denae cannula. Pt has promised to try and wear the BiPAP for tonight, but states that she feels like there is a 'weight sitting on her chest'. C/o necka nd back pain, medicated x 1 with good results. VSS, will continue to monitor.        Problem: Chronic Obstructive Pulmonary Disease (Adult)  Goal: Signs and Symptoms of Listed Potential Problems Will be Absent, Minimized or Managed (Chronic Obstructive Pulmonary Disease)  Outcome: Ongoing (interventions implemented as appropriate)

## 2018-06-06 NOTE — PLAN OF CARE
Problem: Patient Care Overview  Goal: Plan of Care Review  Outcome: Ongoing (interventions implemented as appropriate)   06/06/18 0520   Coping/Psychosocial   Plan of Care Reviewed With patient   Plan of Care Review   Progress no change   OTHER   Outcome Summary No c/o of pain or discomfort voiced. Addressed fears of wearing BIPAP, pt states will attempt tonite. Remains on Hi-[place] @ 14L/.min. VSS.

## 2018-06-07 LAB
ANION GAP SERPL CALCULATED.3IONS-SCNC: 10 MMOL/L (ref 4–13)
BUN BLD-MCNC: 16 MG/DL (ref 5–21)
BUN/CREAT SERPL: 23.2 (ref 7–25)
CALCIUM SPEC-SCNC: 9.7 MG/DL (ref 8.4–10.4)
CHLORIDE SERPL-SCNC: 99 MMOL/L (ref 98–110)
CK SERPL-CCNC: <20 U/L (ref 0–203)
CO2 SERPL-SCNC: 31 MMOL/L (ref 24–31)
CREAT BLD-MCNC: 0.69 MG/DL (ref 0.5–1.4)
CYTO UR: NORMAL
DEPRECATED RDW RBC AUTO: 47.8 FL (ref 40–54)
ERYTHROCYTE [DISTWIDTH] IN BLOOD BY AUTOMATED COUNT: 14.6 % (ref 12–15)
GFR SERPL CREATININE-BSD FRML MDRD: 92 ML/MIN/1.73
GLUCOSE BLD-MCNC: 132 MG/DL (ref 70–100)
HCT VFR BLD AUTO: 36.6 % (ref 37–47)
HGB BLD-MCNC: 11.6 G/DL (ref 12–16)
INR PPP: 1.47 (ref 0.91–1.09)
LAB AP CASE REPORT: NORMAL
LAB AP CLINICAL INFORMATION: NORMAL
LAB AP DIAGNOSIS COMMENT: NORMAL
LYMPHOCYTES # BLD MANUAL: 1.72 10*3/MM3 (ref 0.72–4.86)
LYMPHOCYTES NFR BLD MANUAL: 12.1 % (ref 15–45)
LYMPHOCYTES NFR BLD MANUAL: 2 % (ref 4–12)
Lab: NORMAL
MCH RBC QN AUTO: 28.6 PG (ref 28–32)
MCHC RBC AUTO-ENTMCNC: 31.7 G/DL (ref 33–36)
MCV RBC AUTO: 90.4 FL (ref 82–98)
METAMYELOCYTES NFR BLD MANUAL: 1 % (ref 0–0)
MONOCYTES # BLD AUTO: 0.28 10*3/MM3 (ref 0.19–1.3)
MYELOCYTES NFR BLD MANUAL: 6.1 % (ref 0–0)
NEUTROPHILS # BLD AUTO: 10.78 10*3/MM3 (ref 1.87–8.4)
NEUTROPHILS NFR BLD MANUAL: 73.7 % (ref 39–78)
NEUTS BAND NFR BLD MANUAL: 2 % (ref 0–10)
NRBC SPEC MANUAL: 1 /100 WBC (ref 0–0)
PATH REPORT.FINAL DX SPEC: NORMAL
PATH REPORT.GROSS SPEC: NORMAL
PLAT MORPH BLD: NORMAL
PLATELET # BLD AUTO: 281 10*3/MM3 (ref 130–400)
PMV BLD AUTO: 9.6 FL (ref 6–12)
POLYCHROMASIA BLD QL SMEAR: ABNORMAL
POTASSIUM BLD-SCNC: 3.7 MMOL/L (ref 3.5–5.3)
PROMYELOCYTES NFR BLD MANUAL: 2 % (ref 0–0)
PROTHROMBIN TIME: 18.3 SECONDS (ref 11.9–14.6)
RBC # BLD AUTO: 4.05 10*6/MM3 (ref 4.2–5.4)
SODIUM BLD-SCNC: 140 MMOL/L (ref 135–145)
VARIANT LYMPHS NFR BLD MANUAL: 1 % (ref 0–5)
WBC MORPH BLD: NORMAL
WBC NRBC COR # BLD: 14.23 10*3/MM3 (ref 4.8–10.8)

## 2018-06-07 PROCEDURE — 85007 BL SMEAR W/DIFF WBC COUNT: CPT | Performed by: INTERNAL MEDICINE

## 2018-06-07 PROCEDURE — 85610 PROTHROMBIN TIME: CPT | Performed by: INTERNAL MEDICINE

## 2018-06-07 PROCEDURE — 94799 UNLISTED PULMONARY SVC/PX: CPT

## 2018-06-07 PROCEDURE — 82550 ASSAY OF CK (CPK): CPT | Performed by: INTERNAL MEDICINE

## 2018-06-07 PROCEDURE — 85025 COMPLETE CBC W/AUTO DIFF WBC: CPT | Performed by: INTERNAL MEDICINE

## 2018-06-07 PROCEDURE — 25010000002 LEVOFLOXACIN PER 250 MG: Performed by: INTERNAL MEDICINE

## 2018-06-07 PROCEDURE — 25010000002 METHYLPREDNISOLONE PER 40 MG: Performed by: INTERNAL MEDICINE

## 2018-06-07 PROCEDURE — 82085 ASSAY OF ALDOLASE: CPT | Performed by: INTERNAL MEDICINE

## 2018-06-07 PROCEDURE — 80048 BASIC METABOLIC PNL TOTAL CA: CPT | Performed by: INTERNAL MEDICINE

## 2018-06-07 RX ADMIN — LEVOTHYROXINE SODIUM 112 MCG: 112 TABLET ORAL at 09:27

## 2018-06-07 RX ADMIN — OXYCODONE HYDROCHLORIDE AND ACETAMINOPHEN 1 TABLET: 10; 325 TABLET ORAL at 20:25

## 2018-06-07 RX ADMIN — FLUOXETINE HYDROCHLORIDE 60 MG: 20 CAPSULE ORAL at 09:27

## 2018-06-07 RX ADMIN — IPRATROPIUM BROMIDE AND ALBUTEROL SULFATE 3 ML: 2.5; .5 SOLUTION RESPIRATORY (INHALATION) at 19:33

## 2018-06-07 RX ADMIN — IPRATROPIUM BROMIDE AND ALBUTEROL SULFATE 3 ML: 2.5; .5 SOLUTION RESPIRATORY (INHALATION) at 15:01

## 2018-06-07 RX ADMIN — IPRATROPIUM BROMIDE AND ALBUTEROL SULFATE 3 ML: 2.5; .5 SOLUTION RESPIRATORY (INHALATION) at 11:33

## 2018-06-07 RX ADMIN — OXYCODONE HYDROCHLORIDE AND ACETAMINOPHEN 1 TABLET: 10; 325 TABLET ORAL at 12:49

## 2018-06-07 RX ADMIN — GABAPENTIN 600 MG: 300 CAPSULE ORAL at 20:26

## 2018-06-07 RX ADMIN — DIAZEPAM 5 MG: 5 TABLET ORAL at 17:33

## 2018-06-07 RX ADMIN — PANTOPRAZOLE SODIUM 40 MG: 40 TABLET, DELAYED RELEASE ORAL at 09:28

## 2018-06-07 RX ADMIN — NICOTINE 1 PATCH: 21 PATCH, EXTENDED RELEASE TRANSDERMAL at 12:49

## 2018-06-07 RX ADMIN — DIAZEPAM 5 MG: 5 TABLET ORAL at 20:26

## 2018-06-07 RX ADMIN — GABAPENTIN 300 MG: 300 CAPSULE ORAL at 09:28

## 2018-06-07 RX ADMIN — METHYLPREDNISOLONE SODIUM SUCCINATE 40 MG: 40 INJECTION, POWDER, FOR SOLUTION INTRAMUSCULAR; INTRAVENOUS at 17:33

## 2018-06-07 RX ADMIN — LEVOFLOXACIN 500 MG: 5 INJECTION, SOLUTION INTRAVENOUS at 09:33

## 2018-06-07 RX ADMIN — WARFARIN SODIUM 5 MG: 5 TABLET ORAL at 17:32

## 2018-06-07 RX ADMIN — GUAIFENESIN 1200 MG: 600 TABLET, EXTENDED RELEASE ORAL at 09:28

## 2018-06-07 RX ADMIN — GABAPENTIN 300 MG: 300 CAPSULE ORAL at 17:33

## 2018-06-07 RX ADMIN — IPRATROPIUM BROMIDE AND ALBUTEROL SULFATE 3 ML: 2.5; .5 SOLUTION RESPIRATORY (INHALATION) at 07:30

## 2018-06-07 RX ADMIN — METHYLPREDNISOLONE SODIUM SUCCINATE 40 MG: 40 INJECTION, POWDER, FOR SOLUTION INTRAMUSCULAR; INTRAVENOUS at 04:20

## 2018-06-07 RX ADMIN — DIAZEPAM 5 MG: 5 TABLET ORAL at 09:28

## 2018-06-07 RX ADMIN — GUAIFENESIN 1200 MG: 600 TABLET, EXTENDED RELEASE ORAL at 20:26

## 2018-06-07 RX ADMIN — LITHIUM CARBONATE 300 MG: 300 CAPSULE, GELATIN COATED ORAL at 20:26

## 2018-06-07 NOTE — PLAN OF CARE
Problem: Patient Care Overview  Goal: Plan of Care Review  Outcome: Ongoing (interventions implemented as appropriate)   06/07/18 0407   Coping/Psychosocial   Plan of Care Reviewed With patient;daughter   Plan of Care Review   Progress no change   OTHER   Outcome Summary Pt resting comfortably, BIPAP in place since 2300; tolerating well. O2 sats 99%. C/o of apin x1, PRN given with relief. VSS. Will continue to monitor.      Goal: Individualization and Mutuality  Outcome: Ongoing (interventions implemented as appropriate)    Goal: Discharge Needs Assessment  Outcome: Ongoing (interventions implemented as appropriate)      Problem: Chronic Obstructive Pulmonary Disease (Adult)  Goal: Signs and Symptoms of Listed Potential Problems Will be Absent, Minimized or Managed (Chronic Obstructive Pulmonary Disease)  Outcome: Ongoing (interventions implemented as appropriate)

## 2018-06-07 NOTE — PROGRESS NOTES
[facility] Medicine Services  INPATIENT PROGRESS NOTE    Length of Stay: 3  Date of Admission: 6/4/2018  Primary Care Physician: Neel Valencia Jr., MD    Subjective   Chief Complaint: Hypoxemia  HPI   Patient reports a dry cough.  She thinks her breathing may be a little better. She seems a little sleepy this morning. On 12 L 02 with sats of 99%.      Review of Systems   Respiratory: Positive for shortness of breath.         All pertinent negatives and positives are as above. All other systems have been reviewed and are negative unless otherwise stated.     Objective    Temp:  [96.4 °F (35.8 °C)-98.7 °F (37.1 °C)] 98.7 °F (37.1 °C)  Heart Rate:  [58-92] 58  Resp:  [14-22] 20  BP: ()/(49-88) 98/49  FiO2 (%):  [40 %] 40 %  Physical Exam   Constitutional: No distress.   HENT:   Head: Normocephalic.   Mouth/Throat: No oropharyngeal exudate.   Eyes: No scleral icterus.   Neck: No tracheal deviation present.   Cardiovascular: Normal rate and regular rhythm.    Pulmonary/Chest: Effort normal. No respiratory distress.   Scattered crackles; on high flow 02; continuous pulsox in place with sats 99; nonlabored breathing; no accessory muscle use   Abdominal: Soft.   Musculoskeletal: She exhibits no edema.   Appears euvolemic   Neurological: She is alert.   Skin: Skin is warm and dry. She is not diaphoretic.   Psychiatric: She has a normal mood and affect. Her behavior is normal.   Vitals reviewed.    Results Review:  I have reviewed the labs, radiology results, and diagnostic studies.    Laboratory Data:     Results from last 7 days  Lab Units 06/07/18  0537 06/06/18  0436 06/05/18  0517   WBC 10*3/mm3 14.23* 14.64* 16.83*   HEMOGLOBIN g/dL 11.6* 11.6* 11.3*   HEMATOCRIT % 36.6* 37.4 35.8*   PLATELETS 10*3/mm3 281 304 289          Results from last 7 days  Lab Units 06/07/18  0537 06/06/18  0436 06/05/18  0517   SODIUM mmol/L 140 143 141   POTASSIUM mmol/L 3.7 4.0 4.0   CHLORIDE  mmol/L 99 101 102   CO2 mmol/L 31.0 35.0* 31.0   BUN mg/dL 16 12 10   CREATININE mg/dL 0.69 0.77 0.60   CALCIUM mg/dL 9.7 9.7 9.7   GLUCOSE mg/dL 132* 88 139*       Culture Data:   Blood Culture   Date Value Ref Range Status   06/04/2018 No growth at 2 days  Preliminary   06/04/2018 No growth at 2 days  Preliminary       Radiology Data:   Imaging Results (last 24 hours)     ** No results found for the last 24 hours. **          I have reviewed the patient current medications.     Assessment/Plan     Hospital Problem List     Oxygen dependent        Assessment:  1.  Acute on chronic hypoxemic respiratory failure  2.  Recent VATS with wedge resection revealing: Lung parenchyma demonstrating Acute and Organizing Diffuse Alveolar Damage with hemosiderin laden macrophages and abundant neutrophils, negative for evidence of malignancy.  3.  Tobacco dependence - she reports she has quit since previous hospital admission (dc'd on 5/13)  4.  Leukocytosis  5.  Bilateral infiltrates on chest imaging  6.  History of DVT on Coumadin (subtherapeutic INR)  7.  Suspect component of COPD  8.  Bipolar disorder  9.  Elevated RF  10. 1/4 blood culture positive after 72 hours for gram pos cocci resembling diptheroids, most  Likely a contaminant at this point. Will discuss with pulm    Plan:  1.  Appreciate Pulmonary input  2.  IV Solumedrol  3.  Scheduled nebs  4.  IV Levaquin for now  5.  On Coumadin; check PT and INR; monitor closely while on Levaquin therapy  6.  Received low dose of IV Lasix yesterday; will hold moving forward.  Appears euvolemic  7.  Patient is on scheduled Valium, gabapentin; received Percocet, Phenergan and Ambien PRN  8.  Currently on high flow 02  9.  Continuous pulsox  10.  Connective tissue serologies pending      Paola Valencia MD   06/07/18   8:10 AM

## 2018-06-07 NOTE — PLAN OF CARE
Problem: Patient Care Overview  Goal: Plan of Care Review  Outcome: Ongoing (interventions implemented as appropriate)   06/07/18 2823   Coping/Psychosocial   Plan of Care Reviewed With patient   Plan of Care Review   Progress improving   OTHER   Outcome Summary Pt states that she feels better this AM after having slept with the BiPAP all night and tolerating it well. Pt instructed to get up to the chair for lunch and dinner and BiPap moved to allow patient access to chair. medicated for c/o necka nd back pain x 1 with good results. VSS, will continue to monitor.        Problem: Chronic Obstructive Pulmonary Disease (Adult)  Goal: Signs and Symptoms of Listed Potential Problems Will be Absent, Minimized or Managed (Chronic Obstructive Pulmonary Disease)  Outcome: Ongoing (interventions implemented as appropriate)

## 2018-06-07 NOTE — PROGRESS NOTES
PULMONARY AND CRITICAL CARE PROGRESS NOTE - [facility]    Patient: Vianca Spencer    1975    MR# [id number]    Acct# [id number]  06/07/18   12:04 PM  Referring Provider: Paola Valencia MD    Chief Complaint: Dyspnea    Interval history: Patient is alert and oriented on 14L NC, sat is 100% so O2 was turned down to 12L.  She wore BiPAP last night.  She is less short of breath this morning.  No other aggravating, alleviating factors or associated symptoms.    Meds:    diazePAM 5 mg Oral TID   FLUoxetine 60 mg Oral Daily   gabapentin 300 mg Oral BID   gabapentin 600 mg Oral Nightly   guaiFENesin 1,200 mg Oral Q12H   ipratropium-albuterol 3 mL Nebulization 4x Daily - RT   levoFLOXacin 500 mg Intravenous Q24H   levothyroxine 112 mcg Oral QAM AC   lithium carbonate 300 mg Oral Nightly   methylPREDNISolone sodium succinate 40 mg Intravenous Q12H   nicotine 1 patch Transdermal Q24H   pantoprazole 40 mg Oral QAM   warfarin 5 mg Oral Daily        Review of Systems: positive for SOA and cough, negative for weakness or hemoptysis, positive for hematuria, positive for insomnia    Physical Exam:  Temp:  [96.4 °F (35.8 °C)-98.7 °F (37.1 °C)] 98.3 °F (36.8 °C)  Heart Rate:  [58-92] 74  Resp:  [14-22] 16  BP: ()/(49-88) 96/50  FiO2 (%):  [40 %] 40 %    Intake/Output Summary (Last 24 hours) at 06/07/18 1204  Last data filed at 06/07/18 0400   Gross per 24 hour   Intake              100 ml   Output             2600 ml   Net            -2500 ml     SpO2 Percentage    06/07/18 1133 06/07/18 1139 06/07/18 1154   SpO2: 100% 100%  Comment: post tx 96%      Physical Exam:    Constitutional: She appears well-developed and well-nourished. No distress. High flow O2 in place   HENT:   Head: Normocephalic and atraumatic.   Right Ear: External ear normal.   Left Ear: External ear normal.   Eyes: Conjunctivae and EOM are normal. Pupils are equal, round, and reactive to light. Right eye exhibits no discharge. Left  eye exhibits no discharge.   Neck: Normal range of motion. Neck supple. No JVD present.   Cardiovascular: Normal rate and regular rhythm.    No murmur heard.  Pulmonary/Chest: She has decreased breath sounds. She has rales.   Abdominal: Soft. Bowel sounds are normal. She exhibits no distension.   Musculoskeletal: Normal range of motion. She exhibits no edema or deformity.   Skin: Skin is warm and dry. She is not diaphoretic. No erythema. No pallor.   Psychiatric: She is calm and pleasant today   Nursing note and vitals reviewed.      Results from last 7 days  Lab Units 06/07/18  0537 06/06/18  0436 06/05/18  0517   WBC 10*3/mm3 14.23* 14.64* 16.83*   HEMOGLOBIN g/dL 11.6* 11.6* 11.3*   PLATELETS 10*3/mm3 281 304 289       Results from last 7 days  Lab Units 06/07/18  0537 06/06/18  0436 06/05/18  0517   SODIUM mmol/L 140 143 141   POTASSIUM mmol/L 3.7 4.0 4.0   BUN mg/dL 16 12 10   CREATININE mg/dL 0.69 0.77 0.60       Results from last 7 days  Lab Units 06/06/18  0350 06/05/18  1653 06/04/18  0857   PH, ARTERIAL pH units 7.412 7.443 7.342*   PCO2, ARTERIAL mm Hg 47.5* 45.3* 46.8*   PO2 ART mm Hg 82.5* 44.7* 172.0*   FIO2 %  --   --  65     Blood Culture   Date Value Ref Range Status   06/04/2018 No growth at 24 hours  Preliminary   06/04/2018 No growth at 24 hours  Preliminary     Recent films:  Imaging Results (last 24 hours)     ** No results found for the last 24 hours. **        Films reviewed personally by me.  My interpretation: No new today    Pulmonary Assessment:    1. Acute/Chronic hypoxemic respiratory failure  2. Bipolar disorder  3. Positive RA factor, sed rate negative, may be false positive, awaiting other results  4. Personal history of nicotine dependency  5. Bilateral infiltrates  6. Recent wedge resection, positive for acute organizing pneumonia versus immunologic capillaritis versus vasculitis  7. Hematuria  8. Hx of DVT, on coumadin  9. Leukocytosis, on steroids  10. Elevated BNP,  mild    Recommend:     · CK level normal  · Aldolase pending   · Continue supplemental oxygen for sat above 90%, decreased high-flow to 12L  · BiPAP for sleep  · Up out of bed today   · Continue bronchodilators, mucinex   · Continue abx coverage per attending, blood culture sensitivity pending   · Continue solumedrol 40mg IV BID   · Diuresis per attending  · ANCA and JOSE ALFREDO,  WNL  · Nicotine patch, needs to stop smoking   · Pt requests referral to outpatient ILD clinic to be made to [place] since she has family there    Electronically signed by PARIS Euceda on 6/7/2018 at 12:04 PM    Physician substantive contribution:  Pertinent symptoms/interval history include: pt feels a little bettet  Respiratory exam shows pertinent findings of:diminished breath sounds, crackles, stable.  Ck negative.  Blood cx looks false positive.  Plan includes: pt is improved with current tx.  Awaiting remainder of labs  I have seen and examined patient personally, performing a face-to-face diagnostic evaluation with plan of care reviewed and developed with APRN and nursing staff. I have addended and/or modified the above history of present illness, physical examination, and assessment and plan to reflect my findings and impressions. Essential elements of the care plan were discussed with APRN above.  Agree with findings and assessment/plan as documented above.    Electronically signed by Earnest Ba MD, on 6/7/2018, 8:26 PM

## 2018-06-07 NOTE — NURSING NOTE
"Started pt on BIPAP; pt states \" this mask is different, I kind of like it\". To help alleviate anxiety (per pt request) this nurse sat with pt until asleep. Pt tolerating BIPAP well. O2 sat @ 99%.   "

## 2018-06-08 LAB
ALDOLASE SERPL-CCNC: 7.1 U/L (ref 3.3–10.3)
ANION GAP SERPL CALCULATED.3IONS-SCNC: 8 MMOL/L (ref 4–13)
BASO STIPL COARSE BLD QL SMEAR: ABNORMAL
BUN BLD-MCNC: 17 MG/DL (ref 5–21)
BUN/CREAT SERPL: 23.3 (ref 7–25)
CALCIUM SPEC-SCNC: 9.6 MG/DL (ref 8.4–10.4)
CHLORIDE SERPL-SCNC: 98 MMOL/L (ref 98–110)
CO2 SERPL-SCNC: 33 MMOL/L (ref 24–31)
CREAT BLD-MCNC: 0.73 MG/DL (ref 0.5–1.4)
DACRYOCYTES BLD QL SMEAR: ABNORMAL
DEPRECATED RDW RBC AUTO: 47.9 FL (ref 40–54)
ERYTHROCYTE [DISTWIDTH] IN BLOOD BY AUTOMATED COUNT: 14.6 % (ref 12–15)
GFR SERPL CREATININE-BSD FRML MDRD: 87 ML/MIN/1.73
GLUCOSE BLD-MCNC: 106 MG/DL (ref 70–100)
GLUCOSE BLDC GLUCOMTR-MCNC: 162 MG/DL (ref 70–130)
HCT VFR BLD AUTO: 39.8 % (ref 37–47)
HGB BLD-MCNC: 12.3 G/DL (ref 12–16)
INR PPP: 1.72 (ref 0.91–1.09)
LYMPHOCYTES # BLD MANUAL: 0.87 10*3/MM3 (ref 0.72–4.86)
LYMPHOCYTES NFR BLD MANUAL: 2 % (ref 4–12)
LYMPHOCYTES NFR BLD MANUAL: 6.1 % (ref 15–45)
MCH RBC QN AUTO: 28.2 PG (ref 28–32)
MCHC RBC AUTO-ENTMCNC: 30.9 G/DL (ref 33–36)
MCV RBC AUTO: 91.3 FL (ref 82–98)
METAMYELOCYTES NFR BLD MANUAL: 1 % (ref 0–0)
MONOCYTES # BLD AUTO: 0.29 10*3/MM3 (ref 0.19–1.3)
MYELOCYTES NFR BLD MANUAL: 2 % (ref 0–0)
NEUTROPHILS # BLD AUTO: 12.42 10*3/MM3 (ref 1.87–8.4)
NEUTROPHILS NFR BLD MANUAL: 86.7 % (ref 39–78)
NRBC SPEC MANUAL: 1 /100 WBC (ref 0–0)
PLAT MORPH BLD: NORMAL
PLATELET # BLD AUTO: 301 10*3/MM3 (ref 130–400)
PMV BLD AUTO: 9.5 FL (ref 6–12)
POIKILOCYTOSIS BLD QL SMEAR: ABNORMAL
POLYCHROMASIA BLD QL SMEAR: ABNORMAL
POTASSIUM BLD-SCNC: 4.8 MMOL/L (ref 3.5–5.3)
PROMYELOCYTES NFR BLD MANUAL: 1 % (ref 0–0)
PROTHROMBIN TIME: 20.8 SECONDS (ref 11.9–14.6)
RBC # BLD AUTO: 4.36 10*6/MM3 (ref 4.2–5.4)
SODIUM BLD-SCNC: 139 MMOL/L (ref 135–145)
SPHEROCYTES BLD QL SMEAR: ABNORMAL
VARIANT LYMPHS NFR BLD MANUAL: 1 % (ref 0–5)
WBC MORPH BLD: NORMAL
WBC NRBC COR # BLD: 14.33 10*3/MM3 (ref 4.8–10.8)

## 2018-06-08 PROCEDURE — 94760 N-INVAS EAR/PLS OXIMETRY 1: CPT

## 2018-06-08 PROCEDURE — 94660 CPAP INITIATION&MGMT: CPT

## 2018-06-08 PROCEDURE — 94799 UNLISTED PULMONARY SVC/PX: CPT

## 2018-06-08 PROCEDURE — 25010000002 METHYLPREDNISOLONE PER 40 MG: Performed by: INTERNAL MEDICINE

## 2018-06-08 PROCEDURE — 85610 PROTHROMBIN TIME: CPT | Performed by: INTERNAL MEDICINE

## 2018-06-08 PROCEDURE — 85025 COMPLETE CBC W/AUTO DIFF WBC: CPT | Performed by: INTERNAL MEDICINE

## 2018-06-08 PROCEDURE — 85007 BL SMEAR W/DIFF WBC COUNT: CPT | Performed by: INTERNAL MEDICINE

## 2018-06-08 PROCEDURE — 80048 BASIC METABOLIC PNL TOTAL CA: CPT | Performed by: INTERNAL MEDICINE

## 2018-06-08 PROCEDURE — 82962 GLUCOSE BLOOD TEST: CPT

## 2018-06-08 RX ORDER — LEVOFLOXACIN 500 MG/1
500 TABLET, FILM COATED ORAL EVERY 24 HOURS
Status: COMPLETED | OUTPATIENT
Start: 2018-06-08 | End: 2018-06-10

## 2018-06-08 RX ADMIN — METHYLPREDNISOLONE SODIUM SUCCINATE 40 MG: 40 INJECTION, POWDER, FOR SOLUTION INTRAMUSCULAR; INTRAVENOUS at 15:18

## 2018-06-08 RX ADMIN — DIAZEPAM 5 MG: 5 TABLET ORAL at 09:55

## 2018-06-08 RX ADMIN — OXYCODONE HYDROCHLORIDE AND ACETAMINOPHEN 1 TABLET: 10; 325 TABLET ORAL at 04:12

## 2018-06-08 RX ADMIN — DIAZEPAM 5 MG: 5 TABLET ORAL at 22:42

## 2018-06-08 RX ADMIN — GUAIFENESIN 1200 MG: 600 TABLET, EXTENDED RELEASE ORAL at 09:54

## 2018-06-08 RX ADMIN — DIAZEPAM 5 MG: 5 TABLET ORAL at 15:18

## 2018-06-08 RX ADMIN — GABAPENTIN 600 MG: 300 CAPSULE ORAL at 22:42

## 2018-06-08 RX ADMIN — METHYLPREDNISOLONE SODIUM SUCCINATE 40 MG: 40 INJECTION, POWDER, FOR SOLUTION INTRAMUSCULAR; INTRAVENOUS at 04:06

## 2018-06-08 RX ADMIN — IPRATROPIUM BROMIDE AND ALBUTEROL SULFATE 3 ML: 2.5; .5 SOLUTION RESPIRATORY (INHALATION) at 20:02

## 2018-06-08 RX ADMIN — IPRATROPIUM BROMIDE AND ALBUTEROL SULFATE 3 ML: 2.5; .5 SOLUTION RESPIRATORY (INHALATION) at 15:06

## 2018-06-08 RX ADMIN — LEVOTHYROXINE SODIUM 112 MCG: 112 TABLET ORAL at 06:45

## 2018-06-08 RX ADMIN — PANTOPRAZOLE SODIUM 40 MG: 40 TABLET, DELAYED RELEASE ORAL at 06:45

## 2018-06-08 RX ADMIN — LITHIUM CARBONATE 300 MG: 300 CAPSULE, GELATIN COATED ORAL at 22:42

## 2018-06-08 RX ADMIN — NICOTINE 1 PATCH: 21 PATCH, EXTENDED RELEASE TRANSDERMAL at 09:54

## 2018-06-08 RX ADMIN — GABAPENTIN 300 MG: 300 CAPSULE ORAL at 15:18

## 2018-06-08 RX ADMIN — WARFARIN SODIUM 5 MG: 5 TABLET ORAL at 17:22

## 2018-06-08 RX ADMIN — GABAPENTIN 300 MG: 300 CAPSULE ORAL at 09:55

## 2018-06-08 RX ADMIN — LEVOFLOXACIN 500 MG: 500 TABLET, FILM COATED ORAL at 09:55

## 2018-06-08 RX ADMIN — IPRATROPIUM BROMIDE AND ALBUTEROL SULFATE 3 ML: 2.5; .5 SOLUTION RESPIRATORY (INHALATION) at 07:12

## 2018-06-08 RX ADMIN — IPRATROPIUM BROMIDE AND ALBUTEROL SULFATE 3 ML: 2.5; .5 SOLUTION RESPIRATORY (INHALATION) at 12:26

## 2018-06-08 RX ADMIN — OXYCODONE HYDROCHLORIDE AND ACETAMINOPHEN 1 TABLET: 10; 325 TABLET ORAL at 18:42

## 2018-06-08 RX ADMIN — GUAIFENESIN 1200 MG: 600 TABLET, EXTENDED RELEASE ORAL at 22:43

## 2018-06-08 RX ADMIN — FLUOXETINE HYDROCHLORIDE 60 MG: 20 CAPSULE ORAL at 09:54

## 2018-06-08 NOTE — DISCHARGE PLACEMENT REQUEST
"[facility]  [phone]    Samantha oSlomon (42 y.o. Female)     Date of Birth Social Security Number Address Home Phone MRN    1975  [address]  [facility] [address] [phone] [id number]    [employer] Marital Status          None        Admission Date Admission Type Admitting Provider Attending Provider Department, Room/Bed    6/4/18 Emergency David Hernandez MD Thompson, Benjamin H, MD [address], 480/1    Discharge Date Discharge Disposition Discharge Destination                       Attending Provider:  David Hernandez MD    Allergies:  Morphine, Aspirin, Morphine And Related, Nsaids, Quetiapine, Quetiapine Fumarate, Salicylates, Codeine    Isolation:  None   Infection:  None   Code Status:  FULL    Ht:  148.1 cm (58.3\")   Wt:  84 kg (185 lb 2 oz)    Admission Cmt:  None   Principal Problem:  None                Active Insurance as of 6/4/2018     Primary Coverage     Payor Plan Insurance Group Employer/Plan Group    MEDICARE MEDICARE A & B      Payor Plan Address Payor Plan Phone Number Effective From Effective To    [address] [phone] 4/1/2013     [facility] [id number]       Subscriber Name Subscriber Birth Date Member ID       SAMANTHA SOLOMON 1975 [id number]           Secondary Coverage     Payor Plan Insurance Group Employer/Plan Group    [employer] [place] WELL[place]ICAID      Payor Plan Address Payor Plan Phone Number Effective From Effective To    [address] [address] [phone] 9/19/2016     [facility] [id number]       Subscriber Name Subscriber Birth Date Member ID       SAMANTHA SOLOMON 1975 [id number]                 Emergency Contacts      (Rel.) Home Phone Work Phone Mobile Phone    Delmy Del Rio (Daughter) [phone] -- --               History & Physical      Alix Kingsley DO at 6/4/2018  9:38 AM              [facility]  HISTORY AND PHYSICAL    Date of Admission: " 6/4/2018  Primary Care Physician: Neel Valencia Jr., MD    Subjective     Chief Complaint: SOA    History of Present Illness  41 YO CF admitted on 6/4/18. Patient states that she started feeling bad yesterday. Cough and fever. Currently wearing BiPAP on exam. Doesn't feel like she has fluid.     Review of Systems   Cough  Fever    Otherwise complete ROS reviewed and negative except as mentioned in the HPI.    Past Medical History:   Past Medical History:   Diagnosis Date   • Acute retention of urine    • Anxiety and depression    • Bipolar 1 disorder    • COPD (chronic obstructive pulmonary disease)    • DVT (deep venous thrombosis)    • GERD (gastroesophageal reflux disease)    • Hypoglycemia    • Insomnia    • PTSD (post-traumatic stress disorder)      Past Surgical History:  Past Surgical History:   Procedure Laterality Date   • ABDOMINOPLASTY     • APPENDECTOMY     • BRONCHOSCOPY N/A 2/2/2018    Procedure: BRONCHOSCOPY AT BEDSIDE;  Surgeon: Earnest Ba MD;  Location: [facility] ENDOSCOPY;  Service:    • CHOLECYSTECTOMY     • ENDOSCOPY N/A 1/29/2018    Procedure: ESOPHAGOGASTRODUODENOSCOPY WITH ANESTHESIA;  Surgeon: Aime Reyna MD;  Location: [facility] ENDOSCOPY;  Service:    • GASTRIC BYPASS     • HYSTERECTOMY     • THORACOSCOPY Left 5/9/2018    Procedure: BRONCHOSCOPY, LEFT VIDEO ASSISTED THORACOSCOPY, LEFT LOWER LOBE LUNG BIOPSY;  Surgeon: Case Moctezuma MD;  Location: [facility] OR;  Service: Cardiothoracic     Social History:  reports that she has been smoking Cigarettes.  She has been smoking about 0.25 packs per day. She has never used smokeless tobacco. She reports that she does not drink alcohol or use drugs.    Family History: family history includes Cancer in her father and mother; HIV in her brother.       Allergies:  Allergies   Allergen Reactions   • Morphine Anaphylaxis   • Aspirin Other (See Comments)     Child allergy, unsure what reaction  Child allergy, unsure what reaction   • Morphine  And Related    • Nsaids    • Quetiapine Other (See Comments)     Muscle spasms  Muscle spasms   • Quetiapine Fumarate    • Salicylates    • Codeine Rash     Medications:  Prior to Admission medications    Medication Sig Start Date End Date Taking? Authorizing Provider   oxyCODONE-acetaminophen (PERCOCET)  MG per tablet Take 1 tablet by mouth Every 6 (Six) Hours As Needed for Moderate Pain .   Yes Historical Provider, MD   diazePAM (VALIUM) 5 MG tablet Take 5 mg by mouth 3 (Three) Times a Day. 2/14/17   Historical Provider, MD   fluconazole (DIFLUCAN) 200 MG tablet Take 1 tablet by mouth Daily. 5/13/18   Alix Kingsley, DO   FLUoxetine (PROzac) 20 MG capsule Take 60 mg by mouth Daily.    Historical Provider, MD   gabapentin (NEURONTIN) 300 MG capsule Take 300 mg by mouth 2 (Two) Times a Day.    Historical Provider, MD   gabapentin (NEURONTIN) 300 MG capsule Take 600 mg by mouth Every Night.    Historical Provider, MD   guaiFENesin (MUCINEX) 600 MG 12 hr tablet Take 2 tablets by mouth Every 12 (Twelve) Hours. 5/13/18   Alix Kingsley, DO   ipratropium-albuterol (DUO-NEB) 0.5-2.5 mg/mL nebulizer Take 3 mL by nebulization 4 (Four) Times a Day. 2/10/18   Emelina Ogden, DO   levoFLOXacin (LEVAQUIN) 500 MG tablet Take 1 tablet by mouth Daily. 5/13/18   Alix Kingsley, DO   levothyroxine (SYNTHROID, LEVOTHROID) 112 MCG tablet Take 112 mcg by mouth Daily. 9/24/16   Historical Provider, MD   lithium carbonate 300 MG capsule Take 300 mg by mouth Every Night.    Historical Provider, MD   nicotine (NICODERM CQ) 21 MG/24HR patch Place 1 patch on the skin Daily. 5/14/18   Alix Kingsley DO   omeprazole (priLOSEC) 20 MG capsule Take 20 mg by mouth Daily.    Historical Provider, MD   predniSONE (DELTASONE) 20 MG tablet Take 1 tablet by mouth Daily With Breakfast. 20mg daily x2 days,10mg Daily 3 days, 5mg daily x 3days 5/14/18   Alix Kingsley DO   PROAIR  (90 BASE) MCG/ACT inhaler Inhale 2 puffs  "Every 4 (Four) Hours As Needed for Wheezing or Shortness of Air. 6/3/17   Historical Provider, MD   promethazine (PHENERGAN) 25 MG tablet Take 25 mg by mouth 2 (Two) Times a Day As Needed for Nausea or Vomiting. 9/2/16   Historical Provider, MD   warfarin (COUMADIN) 5 MG tablet Take 1 tablet by mouth 3 (Three) Times a Week. Daily 5/14/18   Alix Kingsley DO   zolpidem (AMBIEN) 10 MG tablet Take 10 mg by mouth Every Night.    Historical Provider, MD     Objective     Vital Signs: /75 (BP Location: Right arm, Patient Position: Lying)   Pulse 93   Temp 98 °F (36.7 °C) (Temporal Artery )   Resp 18   Ht 149.9 cm (59\")   Wt 85.7 kg (189 lb)   SpO2 100%   BMI 38.17 kg/m²    Physical Exam   HENT:   Head: Normocephalic and atraumatic.   Nose: Nose normal.   Mouth/Throat: Oropharynx is clear and moist.   Eyes: Conjunctivae and EOM are normal.   Neck: Normal range of motion. Neck supple.   Cardiovascular: Normal rate, regular rhythm and normal heart sounds.    Pulmonary/Chest: Effort normal. She has rales.   Abdominal: Soft. Bowel sounds are normal.   Musculoskeletal: She exhibits no edema or tenderness.   Neurological: She is alert. No cranial nerve deficit.   Skin: Skin is warm and dry.   Multiple tattoos   Psychiatric: She has a normal mood and affect.   Vitals reviewed.          Results Reviewed:  Lab Results (last 24 hours)     Procedure Component Value Units Date/Time    Blood Gas, Arterial [[id number]]  (Abnormal) Collected:  06/04/18 0857    Specimen:  Arterial Blood Updated:  06/04/18 0903     Site Right Brachial     Luis's Test N/A     pH, Arterial 7.342 (L) pH units      pCO2, Arterial 46.8 (H) mm Hg      pO2, Arterial 172.0 (H) mm Hg      HCO3, Arterial 25.3 mmol/L      Base Excess, Arterial -0.8 (L) mmol/L      O2 Saturation, Arterial 100.1 (H) %      Temperature 37.0 C      Barometric Pressure for Blood Gas 752 mmHg      Modality BiPap     FIO2 65 %      Ventilator Mode BiPAP     Set [facility] Resp " Rate 12.0     IPAP 14     EPAP 6     Collected by [id number]    Blood Culture - Blood, [[id number]] Collected:  06/04/18 0515    Specimen:  Blood from Arm, Right Updated:  06/04/18 0546    Blood Culture - Blood, [[id number]] Collected:  06/04/18 0500    Specimen:  Blood from Arm, Right Updated:  06/04/18 0546    Lactic Acid, Plasma [[id number]]  (Normal) Collected:  06/04/18 0516    Specimen:  Blood Updated:  06/04/18 0537     Lactate 1.1 mmol/L     Procalcitonin [[id number]]  (Abnormal) Collected:  06/04/18 0415    Specimen:  Blood Updated:  06/04/18 0536     Procalcitonin 0.33 (H) ng/mL     Narrative:       SIRS, sepsis, severe sepsis, and septic shock are categorized according to the criteria of the consensus conference of the American College of Chest Physicians/Society of Critical Care Medicine.    PCT < 0.5 ng/mL     Systemic infection (sepsis) is not likely.    PCT >0.5 and < 2.0 ng/mL Systemic infection (sepsis) is possible, but other conditions are known to elevate PCT as well.    PCT > 2.0 ng/mL     Systemic infection (sepsis) is likely, unless other causes are known.      PCT > 10.0 ng/mL    Important systemic inflammatory response, almost exclusively due to severe bacterial sepsis or septic shock.    PCT values of < 0.5 ng/mL do not exclude an infection, because localized infections (without systemic signs) may be associated with such low concentrations, or a systemic infection in its initial stages (<6 hours).  Increased PCT can occur without infection.  PCT concentrations between 0.5 and 2.0 ng/mL should be interpreted taking into account the patients history.  It is recommended to retest PCT within 6-24 hours if any concentrations < 2.0 ng/mL are obtained.    D-dimer, Quantitative [[id number]]  (Normal) Collected:  06/04/18 0415    Specimen:  Blood Updated:  06/04/18 0531     D-Dimer, Quantitative <0.22 mg/L (FEU)     Narrative:       Reference Range is 0-0.50 mg/L FEU. However, results <0.50 mg/L FEU tends to  rule out DVT or PE. Results >0.50 mg/L FEU are not useful in predicting absence or presence of DVT or PE.    [facility] Draw [[id number]] Collected:  06/04/18 0415    Specimen:  Blood Updated:  06/04/18 0530    Narrative:       The following orders were created for panel order [facility] Draw.  Procedure                               Abnormality         Status                     ---------                               -----------         ------                     Light Blue Top[[id number]]                                   Final result               Green Top (Gel)[[id number]]                                  Final result               Lavender Top[[id number]]                                     Final result               Red Top[[id number]]                                          Final result                 Please view results for these tests on the individual orders.    Light Blue Top [[id number]] Collected:  06/04/18 0415    Specimen:  Blood Updated:  06/04/18 0530     Extra Tube hold for add-on     Comment: Auto resulted       Green Top (Gel) [[id number]] Collected:  06/04/18 0415    Specimen:  Blood Updated:  06/04/18 0530     Extra Tube Hold for add-ons.     Comment: Auto resulted.       Lavender Top [[id number]] Collected:  06/04/18 0415    Specimen:  Blood Updated:  06/04/18 0530     Extra Tube hold for add-on     Comment: Auto resulted       Red Top [[id number]] Collected:  06/04/18 0415    Specimen:  Blood Updated:  06/04/18 0530     Extra Tube Hold for add-ons.     Comment: Auto resulted.       Troponin [[id number]]  (Normal) Collected:  06/04/18 0415    Specimen:  Blood Updated:  06/04/18 0528     Troponin I <0.012 ng/mL     BNP [[id number]]  (Abnormal) Collected:  06/04/18 0415    Specimen:  Blood Updated:  06/04/18 0522     proBNP 1,880.0 (H) pg/mL     CBC & Differential [[id number]] Collected:  06/04/18 0415    Specimen:  Blood Updated:  06/04/18 0502    Narrative:       The following orders were created for panel  order CBC & Differential.  Procedure                               Abnormality         Status                     ---------                               -----------         ------                     Manual Differential[[id number]]          Abnormal            Final result               CBC Auto Differential[[id number]]        Abnormal            Final result                 Please view results for these tests on the individual orders.    Manual Differential [[id number]]  (Abnormal) Collected:  06/04/18 0415    Specimen:  Blood Updated:  06/04/18 0502     Neutrophil % 81.6 (H) %      Lymphocyte % 6.4 (L) %      Monocyte % 2.4 (L) %      Bands %  9.6 %      Neutrophils Absolute 13.68 (H) 10*3/mm3      Lymphocytes Absolute 0.96 10*3/mm3      Monocytes Absolute 0.36 10*3/mm3      Anisocytosis Slight/1+     WBC Morphology Normal     Giant Platelets Slight/1+    CBC Auto Differential [[id number]]  (Abnormal) Collected:  06/04/18 0415    Specimen:  Blood Updated:  06/04/18 0502     WBC 15.00 (H) 10*3/mm3      RBC 3.99 (L) 10*6/mm3      Hemoglobin 11.5 (L) g/dL      Hematocrit 35.9 (L) %      MCV 90.0 fL      MCH 28.8 pg      MCHC 32.0 (L) g/dL      RDW 14.3 %      RDW-SD 46.5 fl      MPV 9.8 fL      Platelets 268 10*3/mm3     Narrative:       The previously reported component NRBC is no longer being reported.    Basic Metabolic Panel [[id number]]  (Abnormal) Collected:  06/04/18 0415    Specimen:  Blood Updated:  06/04/18 0459     Glucose 145 (H) mg/dL      BUN 14 mg/dL      Creatinine 0.92 mg/dL      Sodium 138 mmol/L      Potassium 4.4 mmol/L      Chloride 101 mmol/L      CO2 28.0 mmol/L      Calcium 9.1 mg/dL      eGFR Non African Amer 66 mL/min/1.73      BUN/Creatinine Ratio 15.2     Anion Gap 9.0 mmol/L     Narrative:       GFR Normal >60  Chronic Kidney Disease <60  Kidney Failure <15    Blood Gas, Arterial [[id number]]  (Abnormal) Collected:  06/04/18 0417    Specimen:  Arterial Blood Updated:  06/04/18 0426     Site  Right Radial     Luis's Test Positive     pH, Arterial 7.351 pH units      pCO2, Arterial 45.2 (H) mm Hg      pO2, Arterial 109.0 (H) mm Hg      HCO3, Arterial 25.0 mmol/L      Base Excess, Arterial -0.8 (L) mmol/L      O2 Saturation, Arterial 99.0 %      Temperature 37.0 C      Barometric Pressure for Blood Gas 751 mmHg      Modality NRB     Flow Rate 15.0 lpm      Ventilator Mode NA     Collected by DR. CALHOUN        Imaging Results (last 24 hours)     Procedure Component Value Units Date/Time    CT Angiogram Chest With Contrast [[id number]] Collected:  06/04/18 0751     Updated:  06/04/18 0802    Narrative:       EXAMINATION: CT ANGIOGRAM CHEST W CONTRAST- 6/4/2018 7:51 AM CDT     HISTORY: Shortness of breath; Z99.81-Dependence on supplemental oxygen     DOSE: 495 mGycm (Automatic exposure control technique was implemented in  an effort to keep the radiation dose as low as possible without  compromising image quality)     REPORT: Spiral CT of the chest was performed after administration of  intravenous contrast using CTA protocol, which includes reconstructed  coronal and sagittal images.     COMPARISON: High-resolution chest CT 5/8/2018, previous CT angiogram of  the chest 1/1/2016.     The contrast bolus is satisfactory. There is mild respiratory motion  artifact. No filling defects are seen in the pulmonary arteries. The  thoracic aorta is normal in caliber and there is no evidence of aortic  dissection. There is mild right hilar lymphadenopathy measuring 1.6 cm  in short axis diameter. Shotty left hilar lymph nodes are present. No  measurable mediastinal lymphadenopathy is seen. Lung windows demonstrate  diffuse coarse airspace infiltrates without consolidation. No pleural  effusion or pneumothorax is identified. There is a collapsed breast  implant on the right. This is stable. In the upper abdomen, no acute  abnormalities identified. Previous cholecystectomy is noted. There is  also evidence of previous  gastric bypass surgery. Review of bone windows  is unremarkable.       Impression:       1. Motion artifact from respiration limits the study, no evidence of  pulmonary embolism is identified.  2. Diffuse coarse groundglass infiltrates throughout both lungs probably  represents pulmonary edema, though nonconsolidating pneumonia is  certainly possible.  3. Mild right hilar lymphadenopathy.  This report was finalized on 06/04/2018 07:59 by Dr. Matt Herndon MD.    XR Chest 1 View [[id number]] Collected:  06/04/18 0642     Updated:  06/04/18 0647    Narrative:       EXAMINATION:   XR CHEST 1 VW-  6/4/2018 6:42 AM CDT     HISTORY: Hypoxia     Frontal upright radiograph of the chest 6/4/2018 4:52 AM CDT     COMPARISON: May 12, 2018.     FINDINGS:   Interstitial air space filling infiltrates present in the right lung.  Interstitial infiltrates present left lung. This may be pulmonary edema.  However pneumonia cannot be excluded. Cardiac silhouettes upper limits  of normal.      The osseous structures and surrounding soft tissues demonstrate no acute  abnormality.       Impression:       1. Bilateral interstitial and air space filling infiltrates. Most likely  this is pulmonary edema..        This report was finalized on 06/04/2018 06:44 by Dr. Zach Pham MD.        I have personally reviewed and interpreted the radiology studies and ECG obtained at time of admission.     Assessment / Plan     Assessment:   Hospital Problem List     Oxygen dependent        Impression:  Acute on chronic respiratory failure  Bilateral interstitial and air space filling infiltrates. Most likely this is pulmonary edema.  Elevated BNP  COPD  Leukocytosis  HX DVT  HX Bipolar disorder with PTSD    Plan:   Supportive pulmonary measures  Pulmonology consult  Diuresis with close BP management  Antibiotics  Continue home medications  Check INR and follow while on antibiotics  Anxiety control  Continuous pulse ox, patient is on A LOT of sedating  medications       Code Status: Full     I discussed the patients findings and my recommendations with the patient    Estimated length of stay 3-4 days    Alix Kingsley DO   06/04/18   9:39 AM              Electronically signed by Alix Kingsley DO at 6/4/2018  9:55 AM       Hospital Medications (active)       Dose Frequency Start End    acetaminophen (TYLENOL) tablet 650 mg 650 mg Every 4 Hours PRN 6/4/2018     Sig - Route: Take 2 tablets by mouth Every 4 (Four) Hours As Needed for Mild Pain  or Fever. - Oral    diazePAM (VALIUM) tablet 5 mg 5 mg 3 Times Daily 6/4/2018     Sig - Route: Take 1 tablet by mouth 3 (Three) Times a Day. - Oral    FLUoxetine (PROzac) capsule 60 mg 60 mg Daily 6/4/2018     Sig - Route: Take 3 capsules by mouth Daily. - Oral    gabapentin (NEURONTIN) capsule 300 mg 300 mg 2 Times Daily 6/4/2018     Sig - Route: Take 1 capsule by mouth 2 (Two) Times a Day. - Oral    gabapentin (NEURONTIN) capsule 600 mg 600 mg Nightly 6/4/2018     Sig - Route: Take 2 capsules by mouth Every Night. - Oral    guaiFENesin (MUCINEX) 12 hr tablet 1,200 mg 1,200 mg Every 12 Hours Scheduled 6/4/2018     Sig - Route: Take 2 tablets by mouth Every 12 (Twelve) Hours. - Oral    ipratropium-albuterol (DUO-NEB) nebulizer solution 3 mL 3 mL 4 Times Daily - RT 6/4/2018     Sig - Route: Take 3 mL by nebulization 4 (Four) Times a Day. - Nebulization    levoFLOXacin (LEVAQUIN) tablet 500 mg 500 mg Every 24 Hours 6/8/2018 6/11/2018    Sig - Route: Take 1 tablet by mouth Daily. - Oral    levothyroxine (SYNTHROID, LEVOTHROID) tablet 112 mcg 112 mcg Every Morning Before Breakfast 6/4/2018     Sig - Route: Take 1 tablet by mouth Every Morning Before Breakfast. - Oral    lithium carbonate capsule 300 mg 300 mg Nightly 6/4/2018     Sig - Route: Take 1 capsule by mouth Every Night. - Oral    methylPREDNISolone sodium succinate (SOLU-Medrol) injection 40 mg 40 mg Every 12 Hours 6/4/2018     Sig - Route: Infuse 1 mL into a  venous catheter Every 12 (Twelve) Hours. - Intravenous    nicotine (NICODERM CQ) 21 MG/24HR patch 1 patch 1 patch Every 24 Hours 6/4/2018     Sig - Route: Place 1 patch on the skin Daily. - Transdermal    ondansetron (ZOFRAN) injection 4 mg 4 mg Every 6 Hours PRN 6/4/2018     Sig - Route: Infuse 2 mL into a venous catheter Every 6 (Six) Hours As Needed for Nausea or Vomiting. - Intravenous    oxyCODONE-acetaminophen (PERCOCET)  MG per tablet 1 tablet 1 tablet Every 6 Hours PRN 6/4/2018     Sig - Route: Take 1 tablet by mouth Every 6 (Six) Hours As Needed for Moderate Pain . - Oral    pantoprazole (PROTONIX) EC tablet 40 mg 40 mg Every Morning 6/4/2018     Sig - Route: Take 1 tablet by mouth Every Morning. - Oral    promethazine (PHENERGAN) tablet 25 mg 25 mg 2 Times Daily PRN 6/4/2018     Sig - Route: Take 1 tablet by mouth 2 (Two) Times a Day As Needed for Nausea or Vomiting. - Oral    sodium chloride 0.9 % flush 1-10 mL 1-10 mL As Needed 6/4/2018     Sig - Route: Infuse 1-10 mL into a venous catheter As Needed for Line Care. - Intravenous    warfarin (COUMADIN) tablet 5 mg 5 mg Daily Warfarin 6/5/2018     Sig - Route: Take 1 tablet by mouth Daily. - Oral    levoFLOXacin (LEVAQUIN) 500 mg/100 mL D5W (premix) (LEVAQUIN) 500 mg (Discontinued) 500 mg Every 24 Hours 6/4/2018 6/8/2018    Sig - Route: Infuse 100 mL into a venous catheter Daily. - Intravenous             Physician Progress Notes (last 24 hours) (Notes from 6/7/2018  3:50 PM through 6/8/2018  3:50 PM)      Jesenia Foreman APRN at 6/8/2018  8:15 AM              PULMONARY AND CRITICAL CARE PROGRESS NOTE - [facility]    Patient: Vianca Spencer    1975    MR# [id number]    Acct# [id number]  06/08/18   2:42 PM  Referring Provider: David Hernandez MD    Chief Complaint: Dyspnea    Interval history: Patient is alert and oriented on 8L NC with O2 sat 99%.  She is eating breakfast.  She is wearing BiPAP at night.  She is still  short of breath with any exertion.  She would like her referral to be sent to [facility].  If not [facility], then [place].  No other aggravating, alleviating factors or associated symptoms.    Meds:    diazePAM 5 mg Oral TID   FLUoxetine 60 mg Oral Daily   gabapentin 300 mg Oral BID   gabapentin 600 mg Oral Nightly   guaiFENesin 1,200 mg Oral Q12H   ipratropium-albuterol 3 mL Nebulization 4x Daily - RT   levoFLOXacin 500 mg Oral Q24H   levothyroxine 112 mcg Oral QAM AC   lithium carbonate 300 mg Oral Nightly   methylPREDNISolone sodium succinate 40 mg Intravenous Q12H   nicotine 1 patch Transdermal Q24H   pantoprazole 40 mg Oral QAM   warfarin 5 mg Oral Daily        Review of Systems: positive for SOA and cough, negative for weakness or hemoptysis, positive for hematuria, positive for insomnia    Physical Exam:  Temp:  [97.4 °F (36.3 °C)-97.8 °F (36.6 °C)] 97.6 °F (36.4 °C)  Heart Rate:  [57-80] 78  Resp:  [16-20] 18  BP: (102-123)/(55-90) 118/84  FiO2 (%):  [40 %] 40 %    Intake/Output Summary (Last 24 hours) at 06/08/18 1442  Last data filed at 06/08/18 0100   Gross per 24 hour   Intake              750 ml   Output             2000 ml   Net            -1250 ml     SpO2 Percentage    06/08/18 1226 06/08/18 1231 06/08/18 1241   SpO2: 99% 100% 100%      Physical Exam:    Constitutional: She appears well-developed and well-nourished. No distress. NC in place   HENT:   Head: Normocephalic and atraumatic.   Right Ear: External ear normal.   Left Ear: External ear normal.   Eyes: Conjunctivae and EOM are normal. Pupils are equal, round, and reactive to light. Right eye exhibits no discharge. Left eye exhibits no discharge.   Neck: Normal range of motion. Neck supple. No JVD present.   Cardiovascular: Normal rate and regular rhythm.    No murmur heard.  Pulmonary/Chest: She has decreased breath sounds. She has rales.   Abdominal: Soft. Bowel sounds are normal. She exhibits no distension.   Musculoskeletal:  Normal range of motion. She exhibits no edema or deformity.   Skin: Skin is warm and dry. She is not diaphoretic. No erythema. No pallor.   Psychiatric: She is calm and pleasant today   Nursing note and vitals reviewed.      Results from last 7 days  Lab Units 06/08/18  0616 06/07/18  0537 06/06/18  0436   WBC 10*3/mm3 14.33* 14.23* 14.64*   HEMOGLOBIN g/dL 12.3 11.6* 11.6*   PLATELETS 10*3/mm3 301 281 304       Results from last 7 days  Lab Units 06/08/18  0616 06/07/18  0537 06/06/18  0436   SODIUM mmol/L 139 140 143   POTASSIUM mmol/L 4.8 3.7 4.0   BUN mg/dL 17 16 12   CREATININE mg/dL 0.73 0.69 0.77       Results from last 7 days  Lab Units 06/06/18  0350 06/05/18  1653 06/04/18  0857   PH, ARTERIAL pH units 7.412 7.443 7.342*   PCO2, ARTERIAL mm Hg 47.5* 45.3* 46.8*   PO2 ART mm Hg 82.5* 44.7* 172.0*   FIO2 %  --   --  65     Blood Culture   Date Value Ref Range Status   06/04/2018 No growth at 24 hours  Preliminary   06/04/2018 No growth at 24 hours  Preliminary     Recent films:  Imaging Results (last 24 hours)     ** No results found for the last 24 hours. **        Films reviewed personally by me.  My interpretation: some mild improvement in bilateral interstitial infiltrates    Pulmonary Assessment:    1. Acute/Chronic hypoxemic respiratory failure  2. Bipolar disorder  3. Positive RA factor, sed rate negative, may be false positive, awaiting other results  4. Personal history of nicotine dependency  5. Bilateral infiltrates  6. Recent wedge resection, positive for acute organizing pneumonia versus immunologic capillaritis versus vasculitis  7. Hematuria  8. Hx of DVT, on coumadin  9. Leukocytosis, on steroids  10. Elevated BNP, mild    Recommend:     · Continue supplemental oxygen for sat above 90%  · BiPAP as needed and at HS  · Continue bronchodilators  · Continue abx coverage per attending  · Continue solumedrol 40mg IV BID   · Diuresis per attending  · ANCA WNL, JOSE ALFREDO WNL, aldolase pending, RF elevated    · Pt requests referral to outpatient ILD clinic to [facility]    Electronically signed by PARIS Pulido on 6/8/2018 at 2:42 PM                  Electronically signed by PARIS Pulido at 6/8/2018  2:48 PM     David Hernandez MD at 6/8/2018  7:24 AM              [facility] Medicine Services  INPATIENT PROGRESS NOTE    Length of Stay: 4  Date of Admission: 6/4/2018  Primary Care Physician: Neel Valencia Jr., MD    Subjective   Chief Complaint: Hypoxemia  HPI   Patient reports that she is breathing a little bit better.  Continues to have dyspnea with exertion.  Reports that she got up this morning and tried to make her bed, and this resulted in some shortness of breath.  Continues to have a dry cough.  Does not report much wheezing.  Reports no new pain.  Review of Systems   Respiratory: Positive for shortness of breath.         All pertinent negatives and positives are as above. All other systems have been reviewed and are negative unless otherwise stated.     Objective    Temp:  [97.4 °F (36.3 °C)-98.7 °F (37.1 °C)] 97.6 °F (36.4 °C)  Heart Rate:  [57-77] 63  Resp:  [16-20] 16  BP: ()/(49-78) 118/75  FiO2 (%):  [40 %] 40 %  Physical Exam   Constitutional: No distress.   HENT:   Head: Normocephalic.   Mouth/Throat: No oropharyngeal exudate.   Eyes: No scleral icterus.   Neck: No tracheal deviation present.   Cardiovascular: Normal rate and regular rhythm.    Pulmonary/Chest: Effort normal. No respiratory distress.   Scattered crackles; on high flow 02 at 10 L; continuous pulsox in place with sats upper 90s; nonlabored breathing; no accessory muscle use   Abdominal: Soft.   Musculoskeletal: She exhibits no edema.   Appears euvolemic   Neurological: She is alert.   Skin: Skin is warm and dry. She is not diaphoretic.   Psychiatric: She has a normal mood and affect. Her behavior is normal.   Vitals  reviewed.    Results Review:  I have reviewed the labs, radiology results, and diagnostic studies.    Laboratory Data:     Results from last 7 days  Lab Units 06/08/18  0616 06/07/18  0537 06/06/18  0436   WBC 10*3/mm3 14.33* 14.23* 14.64*   HEMOGLOBIN g/dL 12.3 11.6* 11.6*   HEMATOCRIT % 39.8 36.6* 37.4   PLATELETS 10*3/mm3 301 281 304          Results from last 7 days  Lab Units 06/07/18  0537 06/06/18  0436 06/05/18  0517   SODIUM mmol/L 140 143 141   POTASSIUM mmol/L 3.7 4.0 4.0   CHLORIDE mmol/L 99 101 102   CO2 mmol/L 31.0 35.0* 31.0   BUN mg/dL 16 12 10   CREATININE mg/dL 0.69 0.77 0.60   CALCIUM mg/dL 9.7 9.7 9.7   GLUCOSE mg/dL 132* 88 139*       Culture Data:   Blood Culture   Date Value Ref Range Status   06/04/2018 No growth at 2 days  Preliminary   06/04/2018 No growth at 2 days  Preliminary       Radiology Data:   Imaging Results (last 24 hours)     ** No results found for the last 24 hours. **          I have reviewed the patient current medications.     Assessment/Plan     Hospital Problem List     Oxygen dependent        Assessment:  1.  Acute on chronic hypoxemic respiratory failure  2.  Recent VATS with wedge resection revealing: Lung parenchyma demonstrating Acute and Organizing Diffuse Alveolar Damage with hemosiderin laden macrophages and abundant neutrophils, negative for evidence of malignancy.  3.  Tobacco dependence - she reports she has quit since previous hospital admission (dc'd on 5/13)  4.  Leukocytosis  5.  Bilateral infiltrates on chest imaging  6.  History of DVT on Coumadin (subtherapeutic INR)  7.  Suspect component of COPD  8.  Bipolar disorder  9.  Elevated RF    Plan:  1.  Appreciate Pulmonary following  2.  IV Solumedrol currently at 40mg IV q12hrs  3.  Scheduled nebs  4.  IV to PO Levaquin - day 5 of Abx therapy  5.  Wean 02 as tolerated - currently at 10L high flow  6.  BiPAP PRN  7.  Increase activity level; 02 walks - will monitor pulsoximetry while ambulating  8.  On  Coumadin; follow PT and INR; monitor closely while on Levaquin therapy  9.  Continuous pulsox  10.  Connective tissue serologies pending  11.  Outpatient follow-up in ILD clinic in [place] or [place] (patient reports this morning that she would rather go to [place] given closer proximity to home).      David Hernandez MD   18   7:24 AM    Electronically signed by David Hernandez MD at 2018  7:39 AM          Consult Notes (last 7 days) (Notes from 18 through 18)      Earnest Ba MD at 2018  2:34 PM      Consult Orders:    1. Inpatient Pulmonology Consult [[id number]] ordered by Alix Kingsley DO at 18 0935                      PULMONARY & CRITICAL CARE CONSULT - [facility]    18, 2:34 PM  Patient Care Team:  Neel Valencia Jr., MD as PCP - General (Family Medicine)  Devonte Amaya MD as Consulting Physician (Urology)  Name: Vianca Spencer  : 1975  MRN: [id number]  Contact Serial Number [id number]    Chief complaint: Acute respiratory failure    HPI:  We have been consulted by Alix Kingsley DO to see this 42 y.o. female born on 1975.  Patient is currently wearing a bipap mask and her mentation is alerted to the degree that she is not able to give reliable information. When attempting to assess the her she kept repeating that she wanted to be left alone and wanted something to help her sleep. Information has been obtained from her chart and indicates that she initially came in with complaints of fever, cough and SOA for a couple of days. She was afebrile on arrival with an O2 sat of 78% on RA. Currently she is on bipap 14/6 and 65%. She is known to our practice for two admission in 2018 with similar complaints. The first she was diagnosed with influenza and spend several days on the ventilator. She had bilateral infiltrates at that time that were felt to be from the influenza. She was treated and told not to  smoke and a follow-up was made that she did not keep. She returned again at the beginning of last month with similar complaints and imaging was unchanged from imaging when she had influenza. Again at that time she required high flow oxygen to sustain. She reported at that time she was told by her PCP, Dr. Abreu several years ago that she had end stage COPD and fibrosis and she wouldn't live very long. It was decided that she undergo a biopsy of her lung which she did. It was performed by Dr. Moctezuma. She tolerated well and high flow O2 was weaned down. Again she was encouraged not to smoke and was discharged home to follow up for the results which she did not keep. She was again well enough that she did not qualify for oxygen when she was discharged with ambulation. Our office did contact her to reschedule to discuss results. She was due to see Dr. Ba today. Results indicate and acute organizing pneumonia versus immunologic capillaritis versus vasculitis. She has never had hemoptysis but has had recurrent hematuria reported per multiple urinalysis testing since 2015. There is no family present with her today. No other aggravating, alleviating factors or associated symptoms.    Past Medical History:  Past Medical History:   Diagnosis Date   • Acute retention of urine    • Anxiety and depression    • Bipolar 1 disorder    • COPD (chronic obstructive pulmonary disease)    • DVT (deep venous thrombosis)    • GERD (gastroesophageal reflux disease)    • Hypoglycemia    • Insomnia    • PTSD (post-traumatic stress disorder)      Past Surgical History:   Procedure Laterality Date   • ABDOMINOPLASTY     • APPENDECTOMY     • BRONCHOSCOPY N/A 2/2/2018    Procedure: BRONCHOSCOPY AT BEDSIDE;  Surgeon: Earnest Ba MD;  Location: [facility] ENDOSCOPY;  Service:    • CHOLECYSTECTOMY     • ENDOSCOPY N/A 1/29/2018    Procedure: ESOPHAGOGASTRODUODENOSCOPY WITH ANESTHESIA;  Surgeon: Aime Reyna MD;  Location: [facility]  ENDOSCOPY;  Service:    • GASTRIC BYPASS     • HYSTERECTOMY     • THORACOSCOPY Left 5/9/2018    Procedure: BRONCHOSCOPY, LEFT VIDEO ASSISTED THORACOSCOPY, LEFT LOWER LOBE LUNG BIOPSY;  Surgeon: Case Moctezuma MD;  Location: [facility] OR;  Service: Cardiothoracic     Allergies   Allergen Reactions   • Morphine Anaphylaxis   • Aspirin Other (See Comments)     Child allergy, unsure what reaction  Child allergy, unsure what reaction   • Morphine And Related    • Nsaids    • Quetiapine Other (See Comments)     Muscle spasms  Muscle spasms   • Quetiapine Fumarate    • Salicylates    • Codeine Rash     Medications:    diazePAM 5 mg Oral TID   FLUoxetine 60 mg Oral Daily   furosemide 20 mg Intravenous Daily   gabapentin 300 mg Oral BID   gabapentin 600 mg Oral Nightly   guaiFENesin 1,200 mg Oral Q12H   ipratropium-albuterol 3 mL Nebulization 4x Daily - RT   levoFLOXacin 500 mg Intravenous Q24H   levothyroxine 112 mcg Oral QAM AC   lithium carbonate 300 mg Oral Nightly   methylPREDNISolone sodium succinate 40 mg Intravenous Q12H   nicotine 1 patch Transdermal Q24H   pantoprazole 40 mg Oral QAM   [START ON 6/6/2018] warfarin 5 mg Oral Once per day on Mon Wed Fri        Family History:  Family History   Problem Relation Age of Onset   • Cancer Mother    • Cancer Father    • HIV Brother      Social History:   reports that she has been smoking Cigarettes.  She has been smoking about 0.25 packs per day. She has never used smokeless tobacco. She reports that she does not drink alcohol or use drugs.  Review of Systems:  Review of Systems   Unable to perform ROS: Mental status change    Bipap on and pt is agitated and confused    Physical Exam:  Temp:  [97.2 °F (36.2 °C)-98.7 °F (37.1 °C)] 97.8 °F (36.6 °C)  Heart Rate:  [] 95  Resp:  [18-24] 20  BP: ()/(49-86) 95/60  FiO2 (%):  [65 %] 65 %  Intake/Output Summary (Last 24 hours) at 06/04/18 1434  Last data filed at 06/04/18 1120   Gross per 24 hour   Intake               100 ml   Output              300 ml   Net             -200 ml     1    06/04/18  0408 06/04/18  1120   Weight: 85.7 kg (189 lb) 86 kg (189 lb 8 oz)     SpO2 Percentage    06/04/18 0937 06/04/18 1120 06/04/18 1155   SpO2: 92% 93% 91%     Physical Exam   Constitutional: She appears well-developed and well-nourished. No distress. Face mask in place.   HENT:   Head: Normocephalic and atraumatic.   Right Ear: External ear normal.   Left Ear: External ear normal.   Eyes: Conjunctivae and EOM are normal. Pupils are equal, round, and reactive to light. Right eye exhibits no discharge. Left eye exhibits no discharge.   Neck: Normal range of motion. Neck supple. No JVD present.   Cardiovascular: Normal rate and regular rhythm.    No murmur heard.  Pulmonary/Chest: Tachypnea noted. She has decreased breath sounds. She has rales.   Abdominal: Soft. Bowel sounds are normal. She exhibits no distension.   Musculoskeletal: Normal range of motion. She exhibits no edema or deformity.   Skin: Skin is warm and dry. She is not diaphoretic. No erythema. No pallor.   Psychiatric: She is agitated.   Difficult to assess due to bipap and confusion   Nursing note and vitals reviewed.      Results from last 7 days  Lab Units 06/04/18  0415   WBC 10*3/mm3 15.00*   HEMOGLOBIN g/dL 11.5*   PLATELETS 10*3/mm3 268       Results from last 7 days  Lab Units 06/04/18  0415   SODIUM mmol/L 138   POTASSIUM mmol/L 4.4   CO2 mmol/L 28.0   BUN mg/dL 14   CREATININE mg/dL 0.92   GLUCOSE mg/dL 145*       Results from last 7 days  Lab Units 06/04/18  0857 06/04/18  0417   PH, ARTERIAL pH units 7.342* 7.351   PCO2, ARTERIAL mm Hg 46.8* 45.2*   PO2 ART mm Hg 172.0* 109.0*   FIO2 % 65  --      Lab Results   Component Value Date    PROBNP 1,880.0 (H) 06/04/2018        Recent radiology:   Imaging Results (last 72 hours)     Procedure Component Value Units Date/Time    CT Angiogram Chest With Contrast [[id number]] Collected:  06/04/18 0751     Updated:  06/04/18  0802    Narrative:       EXAMINATION: CT ANGIOGRAM CHEST W CONTRAST- 6/4/2018 7:51 AM CDT     HISTORY: Shortness of breath; Z99.81-Dependence on supplemental oxygen     DOSE: 495 mGycm (Automatic exposure control technique was implemented in  an effort to keep the radiation dose as low as possible without  compromising image quality)     REPORT: Spiral CT of the chest was performed after administration of  intravenous contrast using CTA protocol, which includes reconstructed  coronal and sagittal images.     COMPARISON: High-resolution chest CT 5/8/2018, previous CT angiogram of  the chest 1/1/2016.     The contrast bolus is satisfactory. There is mild respiratory motion  artifact. No filling defects are seen in the pulmonary arteries. The  thoracic aorta is normal in caliber and there is no evidence of aortic  dissection. There is mild right hilar lymphadenopathy measuring 1.6 cm  in short axis diameter. Shotty left hilar lymph nodes are present. No  measurable mediastinal lymphadenopathy is seen. Lung windows demonstrate  diffuse coarse airspace infiltrates without consolidation. No pleural  effusion or pneumothorax is identified. There is a collapsed breast  implant on the right. This is stable. In the upper abdomen, no acute  abnormalities identified. Previous cholecystectomy is noted. There is  also evidence of previous gastric bypass surgery. Review of bone windows  is unremarkable.       Impression:       1. Motion artifact from respiration limits the study, no evidence of  pulmonary embolism is identified.  2. Diffuse coarse groundglass infiltrates throughout both lungs probably  represents pulmonary edema, though nonconsolidating pneumonia is  certainly possible.  3. Mild right hilar lymphadenopathy.  This report was finalized on 06/04/2018 07:59 by Dr. Matt Herndon MD.    XR Chest 1 View [[id number]] Collected:  06/04/18 0642     Updated:  06/04/18 0647    Narrative:       EXAMINATION:   XR CHEST 1 VW-   6/4/2018 6:42 AM CDT     HISTORY: Hypoxia     Frontal upright radiograph of the chest 6/4/2018 4:52 AM CDT     COMPARISON: May 12, 2018.     FINDINGS:   Interstitial air space filling infiltrates present in the right lung.  Interstitial infiltrates present left lung. This may be pulmonary edema.  However pneumonia cannot be excluded. Cardiac silhouettes upper limits  of normal.      The osseous structures and surrounding soft tissues demonstrate no acute  abnormality.       Impression:       1. Bilateral interstitial and air space filling infiltrates. Most likely  this is pulmonary edema..        This report was finalized on 06/04/2018 06:44 by Dr. Zach Pham MD.        My radiograph interpretation/independent review of imaging: bilateral infiltrates consistent with imagine from recent CT last month    Other test results (not lab or imaging): 5-8-18    · Left ventricular systolic function is normal. Estimated ejection fraction is 61-65%.  · Left ventricular diastolic function is normal.  · Normal right ventricular cavity size and systolic function noted.  · There is no significant (greater than mild) valvular dysfunction.    Independent review of ekg: ST, rate 113, QTc 477    Patient Active Problem List   Diagnosis   • Fe deficiency anemia   • Anemia   • Anxiety   • Biphasic positive airway pressure dependence   • Chronic pain   • Chronic obstructive pulmonary disease   • Depression   • Gastroesophageal reflux disease   • Bariatric surgery status   • Hypercoagulable state   • Opioid dependence   • Obstructive sleep apnea syndrome   • Seizure disorder   • Coagulation disorder   • BiPAP (biphasic positive airway pressure) dependence   • Bipolar disorder   • COPD (chronic obstructive pulmonary disease)   • Essential tremor   • GERD (gastroesophageal reflux disease)   • History of Stefanie-en-Y gastric bypass   • Hypercoagulopathy   • Incisional hernia   • 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, intract, 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   • Morbid obesity due to excess calories   • Narcotic dependence   • Obstructive sleep apnea   • Warfarin-induced coagulopathy   • Multiple falls   • Upper GI bleed   • Pneumonia of right lung due to infectious organism   • Respiratory distress   • Lung disease, interstitial   • Hypoxia   • Chronic respiratory failure   • Oxygen dependent     Pulmonary Assessment:  New problem (to me), with additional workup planned: Acute/Chronic hypoxemic respiratory failure    New problem (to me), no additional workup planned: Bipolar disorder, defer to attending    Other problems either stable, failing to improve or worsenin. Personal history of nicotine dependency  2. Bilateral infiltrates  3. Recent wedge resection, positive for acute organizing pneumonia versus immunologic capillaritis versus vasculitis  4. Hematuria  5. Hx of DVT, on coumadin  6. Leukocytosis, on steroids  7. Elevated BNP, mild    Recommend/plan:     · Continue IV steroids at current dose  · Bronchodilators  · Bipap as needed, otherwise supplemental oxygen for sat above 90%. She has required high flow oxygen on previous admissions  · Abx per attending  · Diuresis per attending  · Add JOSE ALFREDO, ANCA, RA, sed rate, Alpha 1  · Make sure staff collects the urine sample that has been ordered to assess for hematuria.   · Will need referral to the interstitial lung disease clinic outpatient once she recovers    Electronically signed by PARIS Del Rio, 18, 2:34 PM    Physician substantive contribution:  Pertinent symptoms/interval history include: pt with recent dx of nonspecific pulmonary disease on open lung biopsy with suggestion of capillaritis/vasculitis.  Pt complains of insomnia  Respiratory exam shows pertinent findings of:diminished breath sounds, crackles.  Plan includes: continue workup for connective tissue disorder, may need help from ILD clinic.  I have seen and examined patient personally, performing a face-to-face diagnostic  evaluation with plan of care reviewed and developed with APRN and nursing staff. I have addended and/or modified the above history of present illness, physical examination, and assessment and plan to reflect my findings and impressions. Essential elements of the care plan were discussed with APRN above.  Agree with findings and assessment/plan as documented above.    Electronically signed by Earnest Ba MD, on 6/4/2018, 6:56 PM           Electronically signed by Earnest Ba MD at 6/4/2018  6:57 PM

## 2018-06-08 NOTE — NURSING NOTE
"Pt ambulated down hallway and back twice. O2 sat remained above 92% on 10L with hiflo tubing. Pt reported SOB while walking and being \"tired\" when getting back to bed.    "

## 2018-06-08 NOTE — PLAN OF CARE
Problem: Patient Care Overview  Goal: Plan of Care Review  Outcome: Ongoing (interventions implemented as appropriate)   06/08/18 9205   Coping/Psychosocial   Plan of Care Reviewed With patient   Plan of Care Review   Progress improving   OTHER   Outcome Summary Patient c/o pain in neck and back. PRN medication given with relief noted. Patient tolerating BiPap. Patient is resting comfortably. VSS. Will continue to monitor.        Problem: Chronic Obstructive Pulmonary Disease (Adult)  Goal: Signs and Symptoms of Listed Potential Problems Will be Absent, Minimized or Managed (Chronic Obstructive Pulmonary Disease)  Outcome: Ongoing (interventions implemented as appropriate)

## 2018-06-08 NOTE — PLAN OF CARE
Problem: Patient Care Overview  Goal: Plan of Care Review  Outcome: Ongoing (interventions implemented as appropriate)   06/08/18 0876   Coping/Psychosocial   Plan of Care Reviewed With patient   Plan of Care Review   Progress improving   OTHER   Outcome Summary Pt weaned to 5L hi denae.Would like to try her on a regular cannula if tolerates 5L hiflo. vss. will continue to monitor       Problem: Chronic Obstructive Pulmonary Disease (Adult)  Goal: Signs and Symptoms of Listed Potential Problems Will be Absent, Minimized or Managed (Chronic Obstructive Pulmonary Disease)  Outcome: Ongoing (interventions implemented as appropriate)      Problem: Fall Risk (Adult)  Goal: Identify Related Risk Factors and Signs and Symptoms  Outcome: Ongoing (interventions implemented as appropriate)

## 2018-06-08 NOTE — PLAN OF CARE
Problem: Patient Care Overview  Goal: Plan of Care Review  Outcome: Ongoing (interventions implemented as appropriate)   06/08/18 1722   Coping/Psychosocial   Plan of Care Reviewed With patient   Plan of Care Review   Progress no change   OTHER   Outcome Summary Pt continues on regular diet; tells me her appetite is good, she is just a picky person and doesn't like all the food here. Did provide her with a menu and encouraged intake. Will continue to follow.       Problem: Chronic Obstructive Pulmonary Disease (Adult)  Goal: Signs and Symptoms of Listed Potential Problems Will be Absent, Minimized or Managed (Chronic Obstructive Pulmonary Disease)  Outcome: Ongoing (interventions implemented as appropriate)   06/08/18 1722   Goal/Outcome Evaluation   Problems Assessed (Chronic Obstructive Pulmonary Disease (COPD)) undernutrition   Problems Present (COPD, Bronch/Emphy) none

## 2018-06-08 NOTE — PROGRESS NOTES
[facility] Medicine Services  INPATIENT PROGRESS NOTE    Length of Stay: 4  Date of Admission: 6/4/2018  Primary Care Physician: Neel Valencia Jr., MD    Subjective   Chief Complaint: Hypoxemia  HPI   Patient reports that she is breathing a little bit better.  Continues to have dyspnea with exertion.  Reports that she got up this morning and tried to make her bed, and this resulted in some shortness of breath.  Continues to have a dry cough.  Does not report much wheezing.  Reports no new pain.  Review of Systems   Respiratory: Positive for shortness of breath.         All pertinent negatives and positives are as above. All other systems have been reviewed and are negative unless otherwise stated.     Objective    Temp:  [97.4 °F (36.3 °C)-98.7 °F (37.1 °C)] 97.6 °F (36.4 °C)  Heart Rate:  [57-77] 63  Resp:  [16-20] 16  BP: ()/(49-78) 118/75  FiO2 (%):  [40 %] 40 %  Physical Exam   Constitutional: No distress.   HENT:   Head: Normocephalic.   Mouth/Throat: No oropharyngeal exudate.   Eyes: No scleral icterus.   Neck: No tracheal deviation present.   Cardiovascular: Normal rate and regular rhythm.    Pulmonary/Chest: Effort normal. No respiratory distress.   Scattered crackles; on high flow 02 at 10 L; continuous pulsox in place with sats upper 90s; nonlabored breathing; no accessory muscle use   Abdominal: Soft.   Musculoskeletal: She exhibits no edema.   Appears euvolemic   Neurological: She is alert.   Skin: Skin is warm and dry. She is not diaphoretic.   Psychiatric: She has a normal mood and affect. Her behavior is normal.   Vitals reviewed.    Results Review:  I have reviewed the labs, radiology results, and diagnostic studies.    Laboratory Data:     Results from last 7 days  Lab Units 06/08/18  0616 06/07/18  0537 06/06/18  0436   WBC 10*3/mm3 14.33* 14.23* 14.64*   HEMOGLOBIN g/dL 12.3 11.6* 11.6*   HEMATOCRIT % 39.8 36.6* 37.4   PLATELETS 10*3/mm3 301 281 304           Results from last 7 days  Lab Units 06/07/18  0537 06/06/18  0436 06/05/18  0517   SODIUM mmol/L 140 143 141   POTASSIUM mmol/L 3.7 4.0 4.0   CHLORIDE mmol/L 99 101 102   CO2 mmol/L 31.0 35.0* 31.0   BUN mg/dL 16 12 10   CREATININE mg/dL 0.69 0.77 0.60   CALCIUM mg/dL 9.7 9.7 9.7   GLUCOSE mg/dL 132* 88 139*       Culture Data:   Blood Culture   Date Value Ref Range Status   06/04/2018 No growth at 2 days  Preliminary   06/04/2018 No growth at 2 days  Preliminary       Radiology Data:   Imaging Results (last 24 hours)     ** No results found for the last 24 hours. **          I have reviewed the patient current medications.     Assessment/Plan     Hospital Problem List     Oxygen dependent        Assessment:  1.  Acute on chronic hypoxemic respiratory failure  2.  Recent VATS with wedge resection revealing: Lung parenchyma demonstrating Acute and Organizing Diffuse Alveolar Damage with hemosiderin laden macrophages and abundant neutrophils, negative for evidence of malignancy.  3.  Tobacco dependence - she reports she has quit since previous hospital admission (dc'd on 5/13)  4.  Leukocytosis  5.  Bilateral infiltrates on chest imaging  6.  History of DVT on Coumadin (subtherapeutic INR)  7.  Suspect component of COPD  8.  Bipolar disorder  9.  Elevated RF    Plan:  1.  Appreciate Pulmonary following  2.  IV Solumedrol currently at 40mg IV q12hrs  3.  Scheduled nebs  4.  IV to PO Levaquin - day 5 of Abx therapy  5.  Wean 02 as tolerated - currently at 10L high flow  6.  BiPAP PRN  7.  Increase activity level; 02 walks - will monitor pulsoximetry while ambulating  8.  On Coumadin; follow PT and INR; monitor closely while on Levaquin therapy  9.  Continuous pulsox  10.  Connective tissue serologies pending  11.  Outpatient follow-up in ILD clinic in [place] or [place] (patient reports this morning that she would rather go to [place] given closer proximity to home).      David Hernandez MD    06/08/18   7:24 AM

## 2018-06-08 NOTE — PROGRESS NOTES
PULMONARY AND CRITICAL CARE PROGRESS NOTE - [facility]    Patient: Vianca Spencer    1975    MR# [id number]    Acct# [id number]  06/08/18   2:42 PM  Referring Provider: David Hernandez MD    Chief Complaint: Dyspnea    Interval history: Patient is alert and oriented on 8L NC with O2 sat 99%.  She is eating breakfast.  She is wearing BiPAP at night.  She is still short of breath with any exertion.  She would like her referral to be sent to [place] ILD clinic.  If not [facility], then [place].  No other aggravating, alleviating factors or associated symptoms.    Meds:    diazePAM 5 mg Oral TID   FLUoxetine 60 mg Oral Daily   gabapentin 300 mg Oral BID   gabapentin 600 mg Oral Nightly   guaiFENesin 1,200 mg Oral Q12H   ipratropium-albuterol 3 mL Nebulization 4x Daily - RT   levoFLOXacin 500 mg Oral Q24H   levothyroxine 112 mcg Oral QAM AC   lithium carbonate 300 mg Oral Nightly   methylPREDNISolone sodium succinate 40 mg Intravenous Q12H   nicotine 1 patch Transdermal Q24H   pantoprazole 40 mg Oral QAM   warfarin 5 mg Oral Daily        Review of Systems: positive for SOA and cough, negative for weakness or hemoptysis, positive for hematuria, positive for insomnia    Physical Exam:  Temp:  [97.4 °F (36.3 °C)-97.8 °F (36.6 °C)] 97.6 °F (36.4 °C)  Heart Rate:  [57-80] 78  Resp:  [16-20] 18  BP: (102-123)/(55-90) 118/84  FiO2 (%):  [40 %] 40 %    Intake/Output Summary (Last 24 hours) at 06/08/18 1442  Last data filed at 06/08/18 0100   Gross per 24 hour   Intake              750 ml   Output             2000 ml   Net            -1250 ml     SpO2 Percentage    06/08/18 1226 06/08/18 1231 06/08/18 1241   SpO2: 99% 100% 100%      Physical Exam:    Constitutional: She appears well-developed and well-nourished. No distress. NC in place   HENT:   Head: Normocephalic and atraumatic.   Right Ear: External ear normal.   Left Ear: External ear normal.   Eyes: Conjunctivae and EOM are normal. Pupils are  equal, round, and reactive to light. Right eye exhibits no discharge. Left eye exhibits no discharge.   Neck: Normal range of motion. Neck supple. No JVD present.   Cardiovascular: Normal rate and regular rhythm.    No murmur heard.  Pulmonary/Chest: She has decreased breath sounds. She has rales.   Abdominal: Soft. Bowel sounds are normal. She exhibits no distension.   Musculoskeletal: Normal range of motion. She exhibits no edema or deformity.   Skin: Skin is warm and dry. She is not diaphoretic. No erythema. No pallor.   Psychiatric: She is calm and pleasant today   Nursing note and vitals reviewed.      Results from last 7 days  Lab Units 06/08/18  0616 06/07/18  0537 06/06/18  0436   WBC 10*3/mm3 14.33* 14.23* 14.64*   HEMOGLOBIN g/dL 12.3 11.6* 11.6*   PLATELETS 10*3/mm3 301 281 304       Results from last 7 days  Lab Units 06/08/18  0616 06/07/18  0537 06/06/18  0436   SODIUM mmol/L 139 140 143   POTASSIUM mmol/L 4.8 3.7 4.0   BUN mg/dL 17 16 12   CREATININE mg/dL 0.73 0.69 0.77       Results from last 7 days  Lab Units 06/06/18  0350 06/05/18  1653 06/04/18  0857   PH, ARTERIAL pH units 7.412 7.443 7.342*   PCO2, ARTERIAL mm Hg 47.5* 45.3* 46.8*   PO2 ART mm Hg 82.5* 44.7* 172.0*   FIO2 %  --   --  65     Blood Culture   Date Value Ref Range Status   06/04/2018 No growth at 24 hours  Preliminary   06/04/2018 No growth at 24 hours  Preliminary     Recent films:  Imaging Results (last 24 hours)     ** No results found for the last 24 hours. **        Films reviewed personally by me.  My interpretation: some mild improvement in bilateral interstitial infiltrates    Pulmonary Assessment:    1. Acute/Chronic hypoxemic respiratory failure  2. Bipolar disorder  3. Positive RA factor, sed rate negative, may be false positive, awaiting other results  4. Personal history of nicotine dependency  5. Bilateral infiltrates  6. Recent wedge resection, positive for acute organizing pneumonia versus immunologic capillaritis  versus vasculitis  7. Hematuria  8. Hx of DVT, on coumadin  9. Leukocytosis, on steroids  10. Elevated BNP, mild    Recommend:     · Continue supplemental oxygen for sat above 90%  · BiPAP as needed and at HS  · Continue bronchodilators  · Continue abx coverage per attending  · Continue solumedrol 40mg IV BID   · Diuresis per attending  · ANCA WNL, JOSE ALFREDO WNL, aldolase pending, RF elevated   · Pt requests referral to outpatient ILD clinic to be made to [facility]    Electronically signed by PARIS Pulido on 6/8/2018 at 2:42 PM    Physician substantive contribution:  Pertinent symptoms/interval history include: feels mildly better, still requiring large amounts of supplemental oxygen  Respiratory exam shows pertinent findings of:diminished breath sounds, crackles, stable.  Plan includes: no changes in any of the above meds,  Maybe taper steroids over weekend if oxygen requirement improves.  I have seen and examined patient personally, performing a face-to-face diagnostic evaluation with plan of care reviewed and developed with APRN and nursing staff. I have addended and/or modified the above history of present illness, physical examination, and assessment and plan to reflect my findings and impressions. Essential elements of the care plan were discussed with APRN above.  Agree with findings and assessment/plan as documented above.    Electronically signed by Earnest Ba MD, on 6/8/2018, 5:13 PM

## 2018-06-08 NOTE — PROGRESS NOTES
Continued Stay Note  [facility]     Patient Name: Vianca Spencer  MRN: [id number]  Today's Date: 6/8/2018    Admit Date: 6/4/2018          Discharge Plan     Row Name 06/08/18 1542       Plan    Plan Home with [facility]    Patient/Family in Agreement with Plan yes    Plan Comments Spoke with Lora at the [facility] [place] [phone] and arranged an appointment with Dr. Ok Strauss. He is a thoracic surgeon but she says their thoracic surgeons are the ones that deal with the interstitial lung disease. Faxing information to [phone]. Appointment was made for Friday, Dianne 15 at 1000. Informing pt.               Discharge Codes    No documentation.           LAQUITA Hopper

## 2018-06-08 NOTE — PROGRESS NOTES
Continued Stay Note  [facility]     Patient Name: Vianca Spencer  MRN: [id number]  Today's Date: 6/8/2018    Admit Date: 6/4/2018          Discharge Plan     Row Name 06/08/18 0916       Plan    Plan Home with [facility]    Patient/Family in Agreement with Plan yes    Plan Comments Met with pt to update d/c plan. She plans to return home at d/c with [facility]. Asked her if she would be interested in the [employer] [facility] through the [facility] but she has declined. She is currently on high liter O2. Checked with Jessica at [facility] [phone] and pt does have home oxygen with a rx of 2 liters. If she will require higher liter flow, she will need to be tested.                Discharge Codes    No documentation.           LAQUITA Hopper

## 2018-06-09 LAB
BACTERIA SPEC AEROBE CULT: ABNORMAL
BACTERIA SPEC AEROBE CULT: NORMAL
GRAM STN SPEC: ABNORMAL
INR PPP: 2.04 (ref 0.91–1.09)
ISOLATED FROM: ABNORMAL
PROTHROMBIN TIME: 23.8 SECONDS (ref 11.9–14.6)

## 2018-06-09 PROCEDURE — 94799 UNLISTED PULMONARY SVC/PX: CPT

## 2018-06-09 PROCEDURE — 94660 CPAP INITIATION&MGMT: CPT

## 2018-06-09 PROCEDURE — 85610 PROTHROMBIN TIME: CPT | Performed by: INTERNAL MEDICINE

## 2018-06-09 PROCEDURE — 63710000001 PREDNISONE PER 5 MG: Performed by: INTERNAL MEDICINE

## 2018-06-09 PROCEDURE — 63710000001 PREDNISONE PER 1 MG: Performed by: INTERNAL MEDICINE

## 2018-06-09 PROCEDURE — 25010000002 METHYLPREDNISOLONE PER 40 MG: Performed by: INTERNAL MEDICINE

## 2018-06-09 RX ORDER — WARFARIN SODIUM 1 MG/1
1 TABLET ORAL
Status: DISCONTINUED | OUTPATIENT
Start: 2018-06-09 | End: 2018-06-10

## 2018-06-09 RX ADMIN — METHYLPREDNISOLONE SODIUM SUCCINATE 40 MG: 40 INJECTION, POWDER, FOR SOLUTION INTRAMUSCULAR; INTRAVENOUS at 04:03

## 2018-06-09 RX ADMIN — LEVOTHYROXINE SODIUM 112 MCG: 112 TABLET ORAL at 08:55

## 2018-06-09 RX ADMIN — GUAIFENESIN 1200 MG: 600 TABLET, EXTENDED RELEASE ORAL at 08:55

## 2018-06-09 RX ADMIN — GUAIFENESIN 1200 MG: 600 TABLET, EXTENDED RELEASE ORAL at 20:45

## 2018-06-09 RX ADMIN — IPRATROPIUM BROMIDE AND ALBUTEROL SULFATE 3 ML: 2.5; .5 SOLUTION RESPIRATORY (INHALATION) at 19:51

## 2018-06-09 RX ADMIN — LEVOFLOXACIN 500 MG: 500 TABLET, FILM COATED ORAL at 08:55

## 2018-06-09 RX ADMIN — OXYCODONE HYDROCHLORIDE AND ACETAMINOPHEN 1 TABLET: 10; 325 TABLET ORAL at 12:17

## 2018-06-09 RX ADMIN — FLUOXETINE HYDROCHLORIDE 60 MG: 20 CAPSULE ORAL at 08:55

## 2018-06-09 RX ADMIN — PREDNISONE 30 MG: 10 TABLET ORAL at 17:05

## 2018-06-09 RX ADMIN — GABAPENTIN 300 MG: 300 CAPSULE ORAL at 15:10

## 2018-06-09 RX ADMIN — WARFARIN SODIUM 1 MG: 1 TABLET ORAL at 17:05

## 2018-06-09 RX ADMIN — NICOTINE 1 PATCH: 21 PATCH, EXTENDED RELEASE TRANSDERMAL at 08:57

## 2018-06-09 RX ADMIN — OXYCODONE HYDROCHLORIDE AND ACETAMINOPHEN 1 TABLET: 10; 325 TABLET ORAL at 18:53

## 2018-06-09 RX ADMIN — DIAZEPAM 5 MG: 5 TABLET ORAL at 20:46

## 2018-06-09 RX ADMIN — LITHIUM CARBONATE 300 MG: 300 CAPSULE, GELATIN COATED ORAL at 20:45

## 2018-06-09 RX ADMIN — IPRATROPIUM BROMIDE AND ALBUTEROL SULFATE 3 ML: 2.5; .5 SOLUTION RESPIRATORY (INHALATION) at 10:13

## 2018-06-09 RX ADMIN — PANTOPRAZOLE SODIUM 40 MG: 40 TABLET, DELAYED RELEASE ORAL at 06:01

## 2018-06-09 RX ADMIN — GABAPENTIN 600 MG: 300 CAPSULE ORAL at 20:45

## 2018-06-09 RX ADMIN — OXYCODONE HYDROCHLORIDE AND ACETAMINOPHEN 1 TABLET: 10; 325 TABLET ORAL at 01:02

## 2018-06-09 RX ADMIN — IPRATROPIUM BROMIDE AND ALBUTEROL SULFATE 3 ML: 2.5; .5 SOLUTION RESPIRATORY (INHALATION) at 06:19

## 2018-06-09 RX ADMIN — IPRATROPIUM BROMIDE AND ALBUTEROL SULFATE 3 ML: 2.5; .5 SOLUTION RESPIRATORY (INHALATION) at 14:57

## 2018-06-09 RX ADMIN — DIAZEPAM 5 MG: 5 TABLET ORAL at 15:10

## 2018-06-09 RX ADMIN — DIAZEPAM 5 MG: 5 TABLET ORAL at 08:55

## 2018-06-09 RX ADMIN — GABAPENTIN 300 MG: 300 CAPSULE ORAL at 08:55

## 2018-06-09 NOTE — PROGRESS NOTES
PULMONARY AND CRITICAL CARE PROGRESS NOTE - [facility]    Patient: Vianca Spencer    1975    MR# [id number]    Acct# [id number]  06/09/18   11:15 AM  Referring Provider: David Hernandez MD    Chief Complaint: Dyspnea    Interval history: Patient is alert and oriented on 3.5L NC with O2 sat 98%.  She is eating breakfast.  She is wearing BiPAP at night.  She is still short of breath with any exertion.  Referral has been made to [facility].  No other aggravating, alleviating factors or associated symptoms.    Meds:    diazePAM 5 mg Oral TID   FLUoxetine 60 mg Oral Daily   gabapentin 300 mg Oral BID   gabapentin 600 mg Oral Nightly   guaiFENesin 1,200 mg Oral Q12H   ipratropium-albuterol 3 mL Nebulization 4x Daily - RT   levoFLOXacin 500 mg Oral Q24H   levothyroxine 112 mcg Oral QAM AC   lithium carbonate 300 mg Oral Nightly   nicotine 1 patch Transdermal Q24H   pantoprazole 40 mg Oral QAM   predniSONE 30 mg Oral BID With Meals   warfarin 1 mg Oral Daily        Review of Systems: positive for SOA and cough, negative for weakness or hemoptysis, positive for hematuria, positive for insomnia    Physical Exam:  Temp:  [97 °F (36.1 °C)-98.6 °F (37 °C)] 97.6 °F (36.4 °C)  Heart Rate:  [65-94] 70  Resp:  [16-20] 18  BP: (104-130)/(82-91) 122/86  FiO2 (%):  [40 %] 40 %    Intake/Output Summary (Last 24 hours) at 06/09/18 1115  Last data filed at 06/09/18 0900   Gross per 24 hour   Intake              240 ml   Output             1600 ml   Net            -1360 ml     SpO2 Percentage    06/09/18 0841 06/09/18 1013 06/09/18 1020   SpO2: 98% 99% 98%      Physical Exam:    Constitutional: She appears well-developed and well-nourished. No distress. NC in place   HENT:   Head: Normocephalic and atraumatic.   Right Ear: External ear normal.   Left Ear: External ear normal.   Eyes: Conjunctivae and EOM are normal. Pupils are equal, round, and reactive to light. Right eye exhibits no discharge. Left eye  exhibits no discharge.   Neck: Normal range of motion. Neck supple. No JVD present.   Cardiovascular: Normal rate and regular rhythm.    No murmur heard.  Pulmonary/Chest: She has decreased breath sounds.   Abdominal: Soft. Bowel sounds are normal. She exhibits no distension.   Musculoskeletal: Normal range of motion. She exhibits no edema or deformity.   Skin: Skin is warm and dry. She is not diaphoretic. No erythema. No pallor.   Psychiatric: She is calm and pleasant today   Nursing note and vitals reviewed.      Results from last 7 days  Lab Units 06/08/18  0616 06/07/18  0537 06/06/18  0436   WBC 10*3/mm3 14.33* 14.23* 14.64*   HEMOGLOBIN g/dL 12.3 11.6* 11.6*   PLATELETS 10*3/mm3 301 281 304       Results from last 7 days  Lab Units 06/08/18  0616 06/07/18  0537 06/06/18  0436   SODIUM mmol/L 139 140 143   POTASSIUM mmol/L 4.8 3.7 4.0   BUN mg/dL 17 16 12   CREATININE mg/dL 0.73 0.69 0.77       Results from last 7 days  Lab Units 06/06/18  0350 06/05/18  1653 06/04/18  0857   PH, ARTERIAL pH units 7.412 7.443 7.342*   PCO2, ARTERIAL mm Hg 47.5* 45.3* 46.8*   PO2 ART mm Hg 82.5* 44.7* 172.0*   FIO2 %  --   --  65     Blood Culture   Date Value Ref Range Status   06/04/2018 No growth at 24 hours  Preliminary   06/04/2018 No growth at 24 hours  Preliminary     Recent films:  Imaging Results (last 24 hours)     ** No results found for the last 24 hours. **        Films reviewed personally by me.  My interpretation:     Pulmonary Assessment:    1. Acute/Chronic hypoxemic respiratory failure  2. Bipolar disorder  3. Positive RA factor, sed rate negative, may be false positive, awaiting other results  4. Personal history of nicotine dependency  5. Bilateral infiltrates  6. Recent wedge resection, positive for acute organizing pneumonia versus immunologic capillaritis versus vasculitis  7. Hematuria  8. Hx of DVT, on coumadin  9. Leukocytosis, on steroids  10. Elevated BNP, mild    Recommend:     · Continue  supplemental oxygen for sat above 90%  · BiPAP as needed and at HS  · Continue bronchodilators, mucolytics  · Continue abx coverage per attending  · Transitioning from IV to oral steroid today per attending   · Diuresis per attending  · ANCA WNL, JOSE ALFREDO WNL, aldolase pending, RF elevated   · Outpatient follow-up in ILD clinic at [facility] with Dr. Ok Strauss  Electronically signed by PARIS Pulido on 6/9/2018 at 11:15 AM    She feels better.  She remains on IV steroids and remains on oxygen but has less dyspnea and her chest continuously aggravated by exertion and alleviated by oxygen with no other associated symptoms.  She thinks she is going to be seeing Dr. Strauss at [place].  Exam shows no acute distress.  Lungs have some crackles.  Recommend for her interstitial disease to continue current therapies continue IV steroids and transverse to orals over the course of today.  Some laboratory data is still pending.  I recommended she be seen in interstitial lung disease clinic rather than in Dr. Strauss's nodule clinic.  She has artery had a lung biopsy; I do not think she needs any more surgical management    I have seen and examined patient personally, performing a face-to-face diagnostic evaluation with plan of care reviewed and developed with APRN and nursing staff. I have addended and/or modified the above history of present illness, physical examination, and assessment and plan to reflect my findings and impressions. Essential elements of the care plan were discussed with APRN above.  Agree with findings and assessment/plan as documented above.    Electronically signed by Earnest Ba MD, on 6/9/2018, 1:03 PM    EMR Dragon/Transcription disclaimer: Much of this encounter note is an electronic transcription/translation of spoken language to printed text. The electronic translation of spoken language may permit erroneous, or at times, nonsensical words or phrases to be inadvertently  transcribed; although I have reviewed the note for such errors, some may still exist.

## 2018-06-09 NOTE — PROGRESS NOTES
[facility] Medicine Services  INPATIENT PROGRESS NOTE    Length of Stay: 5  Date of Admission: 6/4/2018  Primary Care Physician: Neel Valencia Jr., MD    Subjective   Chief Complaint: Hypoxemia  Shortness of Breath     Patient reports that she is starting to feel better from a breathing perspective.  Still has dyspnea on exertion.  She is down to 3-1/2 L by nasal cannula which is a significant improvement over the course of the past couple of days.  Dry cough.  Nonproductive.  No new pain.  Still with shortness of breath especially with activity.   Review of Systems   Respiratory: Positive for shortness of breath.         All pertinent negatives and positives are as above. All other systems have been reviewed and are negative unless otherwise stated.     Objective    Temp:  [97 °F (36.1 °C)-98.6 °F (37 °C)] 97 °F (36.1 °C)  Heart Rate:  [65-94] 78  Resp:  [16-20] 18  BP: (104-130)/(82-91) 111/82  FiO2 (%):  [40 %] 40 %  Physical Exam   Constitutional: No distress.   HENT:   Head: Normocephalic.   Mouth/Throat: No oropharyngeal exudate.   Eyes: No scleral icterus.   Neck: No tracheal deviation present.   Cardiovascular: Normal rate and regular rhythm.    Pulmonary/Chest: Effort normal. No respiratory distress.   Scattered crackles noted; on 3.5LNC   Abdominal: Soft.   Musculoskeletal: She exhibits no edema.   Appears euvolemic   Neurological: She is alert.   Skin: Skin is warm and dry. She is not diaphoretic.   Psychiatric: She has a normal mood and affect. Her behavior is normal.   Vitals reviewed.    Results Review:  I have reviewed the labs, radiology results, and diagnostic studies.    Laboratory Data:     Results from last 7 days  Lab Units 06/08/18  0616 06/07/18  0537 06/06/18  0436   WBC 10*3/mm3 14.33* 14.23* 14.64*   HEMOGLOBIN g/dL 12.3 11.6* 11.6*   HEMATOCRIT % 39.8 36.6* 37.4   PLATELETS 10*3/mm3 301 281 304          Results from last 7 days  Lab Units  06/08/18  0616 06/07/18  0537 06/06/18  0436   SODIUM mmol/L 139 140 143   POTASSIUM mmol/L 4.8 3.7 4.0   CHLORIDE mmol/L 98 99 101   CO2 mmol/L 33.0* 31.0 35.0*   BUN mg/dL 17 16 12   CREATININE mg/dL 0.73 0.69 0.77   CALCIUM mg/dL 9.6 9.7 9.7   GLUCOSE mg/dL 106* 132* 88       Culture Data:   Blood Culture   Date Value Ref Range Status   06/04/2018 No growth at 2 days  Preliminary   06/04/2018 No growth at 2 days  Preliminary       Radiology Data:   Imaging Results (last 24 hours)     ** No results found for the last 24 hours. **          I have reviewed the patient current medications.     Assessment/Plan     Hospital Problem List     Oxygen dependent        Assessment:  1.  Acute on chronic hypoxemic respiratory failure  2.  Recent VATS with wedge resection revealing: Lung parenchyma demonstrating Acute and Organizing Diffuse Alveolar Damage with hemosiderin laden macrophages and abundant neutrophils, negative for evidence of malignancy.  3.  Tobacco dependence - she reports she has quit since previous hospital admission (dc'd on 5/13)  4.  Leukocytosis  5.  Bilateral infiltrates on chest imaging  6.  History of DVT on Coumadin (subtherapeutic INR)  7.  Suspect component of COPD  8.  Bipolar disorder  9.  Elevated RF    Plan:  1.  Appreciate Pulmonary input  2.  Transition from IV Solumedrol to PO Prednisone today and assess response  3.  Scheduled nebs  4.  IV to PO Levaquin - day 6 of Abx therapy  5.  Wean 02 as tolerated - now down to 3.5 L NC  6.  BiPAP PRN  7.  Increase activity level; 02 walks - will monitor pulsoximetry while ambulating  8.  On Coumadin; follow PT and INR; monitor closely while on Levaquin therapy; reduce dose of Coumadin  9.  Continuous pulsox  10.  Connective tissue serologies pending  11.  Outpatient follow-up in ILD clinic at [facility] with Dr. Ok Hernandez MD   06/09/18   8:09 AM

## 2018-06-09 NOTE — PLAN OF CARE
Problem: Patient Care Overview  Goal: Plan of Care Review   06/09/18 1526   Coping/Psychosocial   Plan of Care Reviewed With patient   Plan of Care Review   Progress improving   OTHER   Outcome Summary Pt on 3.5L NC and tolerating well. O2 sat in upper 90s. walking halls without O2 desaturation. some anxiety about going back home voiced.        Problem: Chronic Obstructive Pulmonary Disease (Adult)  Goal: Signs and Symptoms of Listed Potential Problems Will be Absent, Minimized or Managed (Chronic Obstructive Pulmonary Disease)  Outcome: Ongoing (interventions implemented as appropriate)      Problem: Fall Risk (Adult)  Goal: Identify Related Risk Factors and Signs and Symptoms  Outcome: Ongoing (interventions implemented as appropriate)    Goal: Absence of Fall  Outcome: Ongoing (interventions implemented as appropriate)

## 2018-06-09 NOTE — PLAN OF CARE
Problem: Patient Care Overview  Goal: Plan of Care Review  Outcome: Ongoing (interventions implemented as appropriate)   06/09/18 0414   Coping/Psychosocial   Plan of Care Reviewed With patient   Plan of Care Review   Progress no change   OTHER   Outcome Summary Pt tolerating BiPAP with sleep, 3.5L while awake. VSS. PRN pain med given with relief.       Problem: Fall Risk (Adult)  Goal: Identify Related Risk Factors and Signs and Symptoms  Outcome: Ongoing (interventions implemented as appropriate)   06/09/18 0414   Fall Risk (Adult)   Related Risk Factors (Fall Risk) fatigue/slow reaction;gait/mobility problems   Signs and Symptoms (Fall Risk) presence of risk factors     Goal: Absence of Fall  Outcome: Ongoing (interventions implemented as appropriate)   06/09/18 0414   Fall Risk (Adult)   Absence of Fall achieves outcome

## 2018-06-10 LAB
GLUCOSE BLDC GLUCOMTR-MCNC: 132 MG/DL (ref 70–130)
INR PPP: 2.11 (ref 0.91–1.09)
PROTHROMBIN TIME: 24.4 SECONDS (ref 11.9–14.6)

## 2018-06-10 PROCEDURE — 63710000001 PREDNISONE PER 5 MG: Performed by: INTERNAL MEDICINE

## 2018-06-10 PROCEDURE — 94660 CPAP INITIATION&MGMT: CPT

## 2018-06-10 PROCEDURE — 94799 UNLISTED PULMONARY SVC/PX: CPT

## 2018-06-10 PROCEDURE — 85610 PROTHROMBIN TIME: CPT | Performed by: INTERNAL MEDICINE

## 2018-06-10 PROCEDURE — 94760 N-INVAS EAR/PLS OXIMETRY 1: CPT

## 2018-06-10 PROCEDURE — 94762 N-INVAS EAR/PLS OXIMTRY CONT: CPT

## 2018-06-10 PROCEDURE — 63710000001 PREDNISONE PER 1 MG: Performed by: INTERNAL MEDICINE

## 2018-06-10 PROCEDURE — 82962 GLUCOSE BLOOD TEST: CPT

## 2018-06-10 RX ORDER — WARFARIN SODIUM 2 MG/1
2 TABLET ORAL
Status: DISCONTINUED | OUTPATIENT
Start: 2018-06-10 | End: 2018-06-11 | Stop reason: HOSPADM

## 2018-06-10 RX ADMIN — IPRATROPIUM BROMIDE AND ALBUTEROL SULFATE 3 ML: 2.5; .5 SOLUTION RESPIRATORY (INHALATION) at 10:35

## 2018-06-10 RX ADMIN — IPRATROPIUM BROMIDE AND ALBUTEROL SULFATE 3 ML: 2.5; .5 SOLUTION RESPIRATORY (INHALATION) at 20:09

## 2018-06-10 RX ADMIN — IPRATROPIUM BROMIDE AND ALBUTEROL SULFATE 3 ML: 2.5; .5 SOLUTION RESPIRATORY (INHALATION) at 06:59

## 2018-06-10 RX ADMIN — LITHIUM CARBONATE 300 MG: 300 CAPSULE, GELATIN COATED ORAL at 21:07

## 2018-06-10 RX ADMIN — DIAZEPAM 5 MG: 5 TABLET ORAL at 08:06

## 2018-06-10 RX ADMIN — LEVOFLOXACIN 500 MG: 500 TABLET, FILM COATED ORAL at 08:07

## 2018-06-10 RX ADMIN — OXYCODONE HYDROCHLORIDE AND ACETAMINOPHEN 1 TABLET: 10; 325 TABLET ORAL at 00:56

## 2018-06-10 RX ADMIN — FLUOXETINE HYDROCHLORIDE 60 MG: 20 CAPSULE ORAL at 08:06

## 2018-06-10 RX ADMIN — PREDNISONE 30 MG: 10 TABLET ORAL at 16:56

## 2018-06-10 RX ADMIN — GABAPENTIN 600 MG: 300 CAPSULE ORAL at 21:07

## 2018-06-10 RX ADMIN — GABAPENTIN 300 MG: 300 CAPSULE ORAL at 16:57

## 2018-06-10 RX ADMIN — GUAIFENESIN 1200 MG: 600 TABLET, EXTENDED RELEASE ORAL at 21:07

## 2018-06-10 RX ADMIN — IPRATROPIUM BROMIDE AND ALBUTEROL SULFATE 3 ML: 2.5; .5 SOLUTION RESPIRATORY (INHALATION) at 14:47

## 2018-06-10 RX ADMIN — GUAIFENESIN 1200 MG: 600 TABLET, EXTENDED RELEASE ORAL at 08:07

## 2018-06-10 RX ADMIN — NICOTINE 1 PATCH: 21 PATCH, EXTENDED RELEASE TRANSDERMAL at 08:05

## 2018-06-10 RX ADMIN — PREDNISONE 30 MG: 10 TABLET ORAL at 08:07

## 2018-06-10 RX ADMIN — OXYCODONE HYDROCHLORIDE AND ACETAMINOPHEN 1 TABLET: 10; 325 TABLET ORAL at 08:07

## 2018-06-10 RX ADMIN — DIAZEPAM 5 MG: 5 TABLET ORAL at 21:07

## 2018-06-10 RX ADMIN — GABAPENTIN 300 MG: 300 CAPSULE ORAL at 08:06

## 2018-06-10 RX ADMIN — LEVOTHYROXINE SODIUM 112 MCG: 112 TABLET ORAL at 08:06

## 2018-06-10 RX ADMIN — PANTOPRAZOLE SODIUM 40 MG: 40 TABLET, DELAYED RELEASE ORAL at 06:17

## 2018-06-10 RX ADMIN — OXYCODONE HYDROCHLORIDE AND ACETAMINOPHEN 1 TABLET: 10; 325 TABLET ORAL at 14:15

## 2018-06-10 RX ADMIN — DIAZEPAM 5 MG: 5 TABLET ORAL at 16:57

## 2018-06-10 RX ADMIN — OXYCODONE HYDROCHLORIDE AND ACETAMINOPHEN 1 TABLET: 10; 325 TABLET ORAL at 21:07

## 2018-06-10 RX ADMIN — WARFARIN SODIUM 2 MG: 2 TABLET ORAL at 16:57

## 2018-06-10 RX ADMIN — LITHIUM CARBONATE 300 MG: 300 CAPSULE, GELATIN COATED ORAL at 08:07

## 2018-06-10 NOTE — PROGRESS NOTES
[facility] Medicine Services  INPATIENT PROGRESS NOTE    Length of Stay: 6  Date of Admission: 6/4/2018  Primary Care Physician: Neel Valencia Jr., MD    Subjective   Chief Complaint: Hypoxemia  Shortness of Breath     Review of Systems   Respiratory: Positive for shortness of breath.    Patient reports that she is doing pretty well this morning.  She states that her breathing continues to improve.  She did take a walk yesterday in the hallway without her supplemental oxygen, and reports her O2 sats did down into the upper 80s.  I have changed her from 3-1/2 L by nasal cannula to 2-1/2 L by nasal cannula this morning.  She remains on continuous pulse oximetry.  She voices no new complaints.     All pertinent negatives and positives are as above. All other systems have been reviewed and are negative unless otherwise stated.     Objective    Temp:  [97.2 °F (36.2 °C)-98 °F (36.7 °C)] 97.5 °F (36.4 °C)  Heart Rate:  [61-90] 64  Resp:  [16-20] 20  BP: (114-132)/(59-87) 132/59  FiO2 (%):  [40 %] 40 %  Physical Exam   Constitutional: No distress.   HENT:   Head: Normocephalic.   Mouth/Throat: No oropharyngeal exudate.   Eyes: No scleral icterus.   Neck: No tracheal deviation present.   Cardiovascular: Normal rate and regular rhythm.    Pulmonary/Chest: Effort normal. No respiratory distress.   Scattered crackles noted; on 2.5LNC   Abdominal: Soft.   Musculoskeletal: She exhibits no edema.   Appears euvolemic   Neurological: She is alert.   Skin: Skin is warm and dry. She is not diaphoretic.   Psychiatric: She has a normal mood and affect. Her behavior is normal.   Vitals reviewed.    Results Review:  I have reviewed the labs, radiology results, and diagnostic studies.    Laboratory Data:     Results from last 7 days  Lab Units 06/08/18  0616 06/07/18  0537 06/06/18  0436   WBC 10*3/mm3 14.33* 14.23* 14.64*   HEMOGLOBIN g/dL 12.3 11.6* 11.6*   HEMATOCRIT % 39.8 36.6* 37.4    PLATELETS 10*3/mm3 301 281 304          Results from last 7 days  Lab Units 06/08/18  0616 06/07/18  0537 06/06/18  0436   SODIUM mmol/L 139 140 143   POTASSIUM mmol/L 4.8 3.7 4.0   CHLORIDE mmol/L 98 99 101   CO2 mmol/L 33.0* 31.0 35.0*   BUN mg/dL 17 16 12   CREATININE mg/dL 0.73 0.69 0.77   CALCIUM mg/dL 9.6 9.7 9.7   GLUCOSE mg/dL 106* 132* 88       Culture Data:   Blood Culture   Date Value Ref Range Status   06/04/2018 No growth at 2 days  Preliminary   06/04/2018 No growth at 2 days  Preliminary       Radiology Data:   Imaging Results (last 24 hours)     ** No results found for the last 24 hours. **          I have reviewed the patient current medications.     Assessment/Plan     Hospital Problem List     Oxygen dependent        Assessment:  1.  Acute on chronic hypoxemic respiratory failure  2.  Recent VATS with wedge resection revealing: Lung parenchyma demonstrating Acute and Organizing Diffuse Alveolar Damage with hemosiderin laden macrophages and abundant neutrophils, negative for evidence of malignancy.  3.  Tobacco dependence - she reports she has quit since previous hospital admission (dc'd on 5/13)  4.  Leukocytosis  5.  Bilateral infiltrates on chest imaging  6.  History of DVT on Coumadin (subtherapeutic INR on arrival)  7.  Suspect component of COPD  8.  Bipolar disorder  9.  Elevated RF    Plan:  1.  Will transition from 3.5L to 2.5L by nasal cannula and monitor  2.  Now on PO Prednisone with taper  3.  Scheduled nebs  4.  Day 7 of Abx therapy today - will monitor off of Abxs  5.  BiPAP PRN  6.  Increase activity level; 02 walks - will monitor pulsoximetry while ambulating  7.  On Coumadin; follow PT and INR; monitor closely while on Levaquin therapy; I have reduced dose of Coumadin and INR currently stable at 2.11  8.  Outpatient follow-up in ILD clinic at [facility]   9.  Dispo: possible d/c tomorrow?  Appreciate Pulmonary input    David Hernandez MD   06/10/18   8:17 AM

## 2018-06-10 NOTE — PLAN OF CARE
Problem: Patient Care Overview  Goal: Plan of Care Review  Outcome: Ongoing (interventions implemented as appropriate)   06/10/18 0749   Coping/Psychosocial   Plan of Care Reviewed With patient   Plan of Care Review   Progress no change   OTHER   Outcome Summary remains with c/o pain in back and abd, medicated per order with some relief.o2 on, pulse ox greater than 90%. continous pulse ox for sleep study ordered for tonite, Bopap ordered for night use.

## 2018-06-10 NOTE — PLAN OF CARE
Problem: Patient Care Overview  Goal: Plan of Care Review  Outcome: Ongoing (interventions implemented as appropriate)   06/10/18 0441   Coping/Psychosocial   Plan of Care Reviewed With patient   Plan of Care Review   Progress no change   OTHER   Outcome Summary Patient only wore bipap part of the night this shift. Pt. encouraged to wear the bipap and educated on the importance of wearing it..     Goal: Individualization and Mutuality  Outcome: Ongoing (interventions implemented as appropriate)   06/10/18 0441   Individualization   Patient Specific Goals (Include Timeframe) Pt. goal to wear bipap at night.   Patient Specific Interventions Pt. encouraged to wear the bipap and educated on reasons it needs to be worn.     Goal: Interprofessional Rounds/Family Conf  Outcome: Ongoing (interventions implemented as appropriate)   06/10/18 0441   Interdisciplinary Rounds/Family Conf   Participants nursing;patient       Problem: Chronic Obstructive Pulmonary Disease (Adult)  Goal: Signs and Symptoms of Listed Potential Problems Will be Absent, Minimized or Managed (Chronic Obstructive Pulmonary Disease)  Outcome: Ongoing (interventions implemented as appropriate)   06/10/18 0441   Goal/Outcome Evaluation   Problems Assessed (Chronic Obstructive Pulmonary Disease (COPD)) all   Problems Present (COPD, Bronch/Emphy) respiratory compromise       Problem: Fall Risk (Adult)  Goal: Identify Related Risk Factors and Signs and Symptoms  Outcome: Ongoing (interventions implemented as appropriate)   06/10/18 0441   Fall Risk (Adult)   Related Risk Factors (Fall Risk) fatigue/slow reaction;gait/mobility problems   Signs and Symptoms (Fall Risk) presence of risk factors     Goal: Absence of Fall  Outcome: Ongoing (interventions implemented as appropriate)   06/10/18 0441   Fall Risk (Adult)   Absence of Fall making progress toward outcome

## 2018-06-11 VITALS
RESPIRATION RATE: 20 BRPM | HEIGHT: 58 IN | BODY MASS INDEX: 41.6 KG/M2 | SYSTOLIC BLOOD PRESSURE: 115 MMHG | DIASTOLIC BLOOD PRESSURE: 76 MMHG | WEIGHT: 198.2 LBS | TEMPERATURE: 97.4 F | OXYGEN SATURATION: 96 % | HEART RATE: 79 BPM

## 2018-06-11 LAB
INR PPP: 1.65 (ref 0.91–1.09)
PROTHROMBIN TIME: 20.1 SECONDS (ref 11.9–14.6)

## 2018-06-11 PROCEDURE — 85610 PROTHROMBIN TIME: CPT | Performed by: INTERNAL MEDICINE

## 2018-06-11 PROCEDURE — 94799 UNLISTED PULMONARY SVC/PX: CPT

## 2018-06-11 PROCEDURE — 94760 N-INVAS EAR/PLS OXIMETRY 1: CPT

## 2018-06-11 PROCEDURE — 63710000001 PREDNISONE PER 5 MG: Performed by: INTERNAL MEDICINE

## 2018-06-11 PROCEDURE — 63710000001 PREDNISONE PER 1 MG: Performed by: INTERNAL MEDICINE

## 2018-06-11 PROCEDURE — 63710000001 PROMETHAZINE PER 25 MG: Performed by: INTERNAL MEDICINE

## 2018-06-11 RX ORDER — WARFARIN SODIUM 5 MG/1
5 TABLET ORAL DAILY
Qty: 10 TABLET | Refills: 0 | Status: SHIPPED | OUTPATIENT
Start: 2018-06-11

## 2018-06-11 RX ORDER — PREDNISONE 10 MG/1
30 TABLET ORAL 2 TIMES DAILY WITH MEALS
Qty: 30 TABLET | Refills: 0 | Status: SHIPPED | OUTPATIENT
Start: 2018-06-11

## 2018-06-11 RX ADMIN — OXYCODONE HYDROCHLORIDE AND ACETAMINOPHEN 1 TABLET: 10; 325 TABLET ORAL at 09:10

## 2018-06-11 RX ADMIN — LEVOTHYROXINE SODIUM 112 MCG: 112 TABLET ORAL at 09:07

## 2018-06-11 RX ADMIN — IPRATROPIUM BROMIDE AND ALBUTEROL SULFATE 3 ML: 2.5; .5 SOLUTION RESPIRATORY (INHALATION) at 06:32

## 2018-06-11 RX ADMIN — PANTOPRAZOLE SODIUM 40 MG: 40 TABLET, DELAYED RELEASE ORAL at 06:02

## 2018-06-11 RX ADMIN — NICOTINE 1 PATCH: 21 PATCH, EXTENDED RELEASE TRANSDERMAL at 09:56

## 2018-06-11 RX ADMIN — GUAIFENESIN 1200 MG: 600 TABLET, EXTENDED RELEASE ORAL at 09:07

## 2018-06-11 RX ADMIN — DIAZEPAM 5 MG: 5 TABLET ORAL at 09:07

## 2018-06-11 RX ADMIN — FLUOXETINE HYDROCHLORIDE 60 MG: 20 CAPSULE ORAL at 09:06

## 2018-06-11 RX ADMIN — IPRATROPIUM BROMIDE AND ALBUTEROL SULFATE 3 ML: 2.5; .5 SOLUTION RESPIRATORY (INHALATION) at 14:49

## 2018-06-11 RX ADMIN — GABAPENTIN 300 MG: 300 CAPSULE ORAL at 09:07

## 2018-06-11 RX ADMIN — IPRATROPIUM BROMIDE AND ALBUTEROL SULFATE 3 ML: 2.5; .5 SOLUTION RESPIRATORY (INHALATION) at 11:05

## 2018-06-11 RX ADMIN — PREDNISONE 30 MG: 10 TABLET ORAL at 09:06

## 2018-06-11 RX ADMIN — PROMETHAZINE HYDROCHLORIDE 25 MG: 25 TABLET ORAL at 12:34

## 2018-06-11 NOTE — PROGRESS NOTES
PULMONARY AND CRITICAL CARE PROGRESS NOTE - [facility]    Patient: Vianca Spencer    1975    MR# [id number]    Acct# [id number]  06/11/18   7:56 AM  Referring Provider: Alix Kingsley DO    Chief Complaint: Dyspnea    Interval history: Patient is resting in bed on 2L NC.  She slept off of the bipap and overnight reflected 2L sufficient for home use. No new overnight issues. She has an appt with Dr. Strauss on Friday. No other aggravating, alleviating factors or associated symptoms.    Meds:    diazePAM 5 mg Oral TID   FLUoxetine 60 mg Oral Daily   gabapentin 300 mg Oral BID   gabapentin 600 mg Oral Nightly   guaiFENesin 1,200 mg Oral Q12H   ipratropium-albuterol 3 mL Nebulization 4x Daily - RT   levothyroxine 112 mcg Oral QAM AC   lithium carbonate 300 mg Oral Nightly   nicotine 1 patch Transdermal Q24H   pantoprazole 40 mg Oral QAM   predniSONE 30 mg Oral BID With Meals   warfarin 2 mg Oral Daily        Review of Systems: positive for SOA and cough, negative for weakness or hemoptysis, positive for hematuria, positive for insomnia    Physical Exam:  Temp:  [96 °F (35.6 °C)-97.9 °F (36.6 °C)] 97.4 °F (36.3 °C)  Heart Rate:  [63-90] 80  Resp:  [16-20] 20  BP: (113-142)/(73-85) 142/84  No intake or output data in the 24 hours ending 06/11/18 0756  SpO2 Percentage    06/11/18 0411 06/11/18 0632 06/11/18 0638   SpO2: 97% 98% 98%      Physical Exam:    Constitutional: She appears well-developed and well-nourished. No distress. NC in place   HENT:   Head: Normocephalic and atraumatic.   Right Ear: External ear normal.   Left Ear: External ear normal.   Eyes: Conjunctivae and EOM are normal. Pupils are equal, round, and reactive to light. Right eye exhibits no discharge. Left eye exhibits no discharge.   Neck: Normal range of motion. Neck supple. No JVD present.   Cardiovascular: Normal rate and regular rhythm.    No murmur heard.  Pulmonary/Chest: She has decreased breath sounds.   Abdominal:  Soft. Bowel sounds are normal. She exhibits no distension.   Musculoskeletal: Normal range of motion. She exhibits no edema or deformity.   Skin: Skin is warm and dry. She is not diaphoretic. No erythema. No pallor.   Psychiatric: She is calm and pleasant today   Nursing note and vitals reviewed.      Results from last 7 days  Lab Units 06/08/18  0616 06/07/18  0537 06/06/18  0436   WBC 10*3/mm3 14.33* 14.23* 14.64*   HEMOGLOBIN g/dL 12.3 11.6* 11.6*   PLATELETS 10*3/mm3 301 281 304       Results from last 7 days  Lab Units 06/08/18  0616 06/07/18  0537 06/06/18  0436   SODIUM mmol/L 139 140 143   POTASSIUM mmol/L 4.8 3.7 4.0   BUN mg/dL 17 16 12   CREATININE mg/dL 0.73 0.69 0.77       Results from last 7 days  Lab Units 06/06/18  0350 06/05/18  1653 06/04/18  0857   PH, ARTERIAL pH units 7.412 7.443 7.342*   PCO2, ARTERIAL mm Hg 47.5* 45.3* 46.8*   PO2 ART mm Hg 82.5* 44.7* 172.0*   FIO2 %  --   --  65     Blood Culture   Date Value Ref Range Status   06/04/2018 No growth at 24 hours  Preliminary   06/04/2018 No growth at 24 hours  Preliminary     Recent films:  Imaging Results (last 24 hours)     ** No results found for the last 24 hours. **        Films reviewed personally by me.  My interpretation:     Pulmonary Assessment:    1. Acute/Chronic hypoxemic respiratory failure  2. Bipolar disorder  3. Positive RA factor, sed rate negative, may be false positive, awaiting other results  4. Personal history of nicotine dependency  5. Bilateral infiltrates  6. Recent wedge resection, positive for acute organizing pneumonia versus immunologic capillaritis versus vasculitis  7. Hematuria  8. Hx of DVT, on coumadin  9. Leukocytosis, on steroids  10. Elevated BNP, mild    Recommend:     · Continue supplemental oxygen for sat above 90%. Will need to continue 2L NC at home during the daytime and with sleep.    · Continue bronchodilators, mucolytics, IS/flutter   · Abx coverage per attending, has now been transitioned to oral    · Continue prednisone dosing until f/u with [place]. Has an appt with Dr. Strauss on Friday.   · F/U in our office in 4 weeks.     Electronically signed by PARIS Del Rio on 6/11/2018 at 7:56 AM    Physician substantive contribution:  Pertinent symptoms/interval history include: she feels better, o2 sat ok on 2 lpm  Respiratory exam shows pertinent findings of:diminished breath sounds, crackles, improved.  Plan includes: ok to home.  [facility] evaluation. Prednisone at home.  I have seen and examined patient personally, performing a face-to-face diagnostic evaluation with plan of care reviewed and developed with APRN and nursing staff. I have addended and/or modified the above history of present illness, physical examination, and assessment and plan to reflect my findings and impressions. Essential elements of the care plan were discussed with APRN above.  Agree with findings and assessment/plan as documented above.    Electronically signed by Earnest Ba MD, on 6/11/2018, 8:08 AM

## 2018-06-11 NOTE — PLAN OF CARE
Problem: Patient Care Overview  Goal: Plan of Care Review  Outcome: Ongoing (interventions implemented as appropriate)   06/11/18 0409   Coping/Psychosocial   Plan of Care Reviewed With patient   Plan of Care Review   Progress no change   OTHER   Outcome Summary Overnight pulse ox study completed this shift per RT. Pt. awake off and on for most of this shift. Pt. noted to be turning off the continuous pulse ox after being educated on the importance of leaving it alone. Pt. requesting caffeinated beverages all night. Pt. discouraged from having these and the need to sleep for her overnight study. Pt. states she has had this done before and failed. Pt. also states she is not in a big hurry to go home as she has family matters at this time. Continue to monitor.     Goal: Individualization and Mutuality  Outcome: Ongoing (interventions implemented as appropriate)   06/11/18 0409   Individualization   Patient Specific Interventions Overnight pulse ox study completed this shift.     Goal: Interprofessional Rounds/Family Conf  Outcome: Ongoing (interventions implemented as appropriate)   06/11/18 0409   Interdisciplinary Rounds/Family Conf   Participants nursing;patient       Problem: Chronic Obstructive Pulmonary Disease (Adult)  Goal: Signs and Symptoms of Listed Potential Problems Will be Absent, Minimized or Managed (Chronic Obstructive Pulmonary Disease)  Outcome: Ongoing (interventions implemented as appropriate)   06/11/18 0409   Goal/Outcome Evaluation   Problems Assessed (Chronic Obstructive Pulmonary Disease (COPD)) all   Problems Present (COPD, Bronch/Emphy) respiratory compromise       Problem: Fall Risk (Adult)  Goal: Identify Related Risk Factors and Signs and Symptoms  Outcome: Ongoing (interventions implemented as appropriate)   06/11/18 0409   Fall Risk (Adult)   Related Risk Factors (Fall Risk) fatigue/slow reaction;gait/mobility problems;sleep pattern alteration   Signs and Symptoms (Fall Risk) presence  of risk factors     Goal: Absence of Fall  Outcome: Ongoing (interventions implemented as appropriate)   06/11/18 2437   Fall Risk (Adult)   Absence of Fall making progress toward outcome

## 2018-06-11 NOTE — PROGRESS NOTES
Continued Stay Note   [facility]     Patient Name: Vianca Spencer  MRN: [id number]  Today's Date: 6/11/2018    Admit Date: 6/4/2018          Discharge Plan     Row Name 06/11/18 0914       Plan    Plan Home with [facility]     Patient/Family in Agreement with Plan yes    Plan Comments Pt will return home upon d/c from hospital.  Pt is followed by [facility]  so they will need to be informed upon d/c status.  Pt has appt. with interstital lung disease clinic on Friday in [place]. Will follow.               Discharge Codes    No documentation.           LAQUITA Araiza

## 2018-06-11 NOTE — PROGRESS NOTES
Continued Stay Note   [place]     Patient Name: Vianca Spencer  MRN: [id number]  Today's Date: 6/11/2018    Admit Date: 6/4/2018          Discharge Plan     Row Name 06/11/18 1348       Plan    Plan [facility]    Patient/Family in Agreement with Plan yes    Final Discharge Disposition Code 06 - home with home health care    Final Note Pt is being discharged home today. Sarah from [facility] [id number] aware of d/c status and met with pt in room.               Discharge Codes    No documentation.       Expected Discharge Date and Time     Expected Discharge Date Expected Discharge Time    Jun 11, 2018             LAQUITA Araiza

## 2018-06-11 NOTE — DISCHARGE SUMMARY
"    [facility] Medicine Services  DISCHARGE SUMMARY       Date of Admission: 6/4/2018  Date of Discharge:  6/11/2018  Primary Care Physician: Neel Valencia Jr., MD    Presenting Problem/History of Present Illness:  Oxygen dependent [Z99.81]     Final Discharge Diagnoses:  Hospital Problem List     Oxygen dependent          Consults: Pulmonology    Procedures Performed:   CTA:  IMPRESSION:  1. Motion artifact from respiration limits the study, no evidence of  pulmonary embolism is identified.  2. Diffuse coarse groundglass infiltrates throughout both lungs probably  represents pulmonary edema, though nonconsolidating pneumonia is  certainly possible.  3. Mild right hilar lymphadenopathy.    Pertinent Test Results:   Lab Results (last 48 hours)     Procedure Component Value Units Date/Time    Protime-INR [[id number]]  (Abnormal) Collected:  06/11/18 0715    Specimen:  Blood Updated:  06/11/18 0807     Protime 20.1 (H) Seconds      INR 1.65 (H)    POC Glucose Once [[id number]]  (Abnormal) Collected:  06/10/18 1654    Specimen:  Blood Updated:  06/10/18 1726     Glucose 132 (H) mg/dL      Comment: : [id number] Erik Kilpatrick ID: [id number]       Protime-INR [[id number]]  (Abnormal) Collected:  06/10/18 0452    Specimen:  Blood Updated:  06/10/18 0537     Protime 24.4 (H) Seconds      INR 2.11 (H)            Chief Complaint on Day of Discharge: \"I don't want to go home because I don't want to put up with the bullshit.\"    History of Present Illness on Day of Discharge: Stable    Hospital Course:  The patient is a 42 y.o. female who presented to [facility] with Respiratory distress. Patient was started on IV antibiotics and steroids. BiPAP was attempted but patient refused at times because she states that she has PTSD. Patient improved. Antibiotics were changed to oral and steroids were tapered. She was given mild diuresis. She has OP appointments for her lung disease " "at the end of the week in [place].   Concern for frequent readmissions due to social situation at home.      Condition on Discharge:  Stable    Physical Exam on Discharge:  /76 (BP Location: Left arm, Patient Position: Sitting)   Pulse 94   Temp 97.4 °F (36.3 °C)   Resp 20   Ht 148.1 cm (58.3\")   Wt 89.9 kg (198 lb 3.2 oz)   SpO2 97%   BMI 41.00 kg/m²   Physical Exam   HENT:   Head: Normocephalic and atraumatic.   Nose: Nose normal.   Mouth/Throat: Oropharynx is clear and moist.   Eyes: Conjunctivae and EOM are normal.   Neck: Normal range of motion. Neck supple.   Cardiovascular: Normal rate, regular rhythm and normal heart sounds.    Pulmonary/Chest: Effort normal and breath sounds normal.   Abdominal: Soft. Bowel sounds are normal.   Central obesity   Musculoskeletal: She exhibits no edema or tenderness.   Neurological: She is alert. No cranial nerve deficit.   Skin: Skin is warm and dry.   Psychiatric: She has a normal mood and affect.   Vitals reviewed.      Discharge Disposition:  Home or Self Care    Discharge Medications:   Vianca Spencer   Home Medication Instructions KIERRA:[id number]    Printed on:06/11/18 131   Medication Information                      diazePAM (VALIUM) 5 MG tablet  Take 5 mg by mouth 3 (Three) Times a Day.             FLUoxetine (PROzac) 20 MG capsule  Take 60 mg by mouth Daily.             gabapentin (NEURONTIN) 300 MG capsule  Take 300 mg by mouth 2 (Two) Times a Day.             gabapentin (NEURONTIN) 300 MG capsule  Take 600 mg by mouth Every Night.             guaiFENesin (MUCINEX) 600 MG 12 hr tablet  Take 2 tablets by mouth Every 12 (Twelve) Hours.             ipratropium-albuterol (DUO-NEB) 0.5-2.5 mg/mL nebulizer  Take 3 mL by nebulization 4 (Four) Times a Day.             levothyroxine (SYNTHROID, LEVOTHROID) 112 MCG tablet  Take 112 mcg by mouth Daily.             lithium carbonate 300 MG capsule  Take 300 mg by mouth Every Night.             nicotine " (NICODERM CQ) 21 MG/24HR patch  Place 1 patch on the skin Daily.             omeprazole (priLOSEC) 20 MG capsule  Take 20 mg by mouth Daily.             oxyCODONE-acetaminophen (PERCOCET)  MG per tablet  Take 1 tablet by mouth 3 (Three) Times a Day As Needed for Moderate Pain .             predniSONE (DELTASONE) 10 MG tablet  Take 3 tablets by mouth 2 (Two) Times a Day With Meals.             PROAIR  (90 BASE) MCG/ACT inhaler  Inhale 2 puffs Every 4 (Four) Hours As Needed for Wheezing or Shortness of Air.             promethazine (PHENERGAN) 25 MG tablet  Take 25 mg by mouth 2 (Two) Times a Day As Needed for Nausea or Vomiting.             warfarin (COUMADIN) 5 MG tablet  Take 1 tablet by mouth 3 (Three) Times a Week. Daily             zolpidem (AMBIEN) 10 MG tablet  Take 10 mg by mouth Every Night.                 Discharge Diet:   Diet Instructions     Advance Diet As Tolerated             Activity at Discharge:   Activity Instructions     Activity as Tolerated             Discharge Care Plan/Instructions:  FU in [place] as directed.  Return if worsens.  Continue home 02 as directed.     Follow-up Appointments:   No future appointments.  PCP and pulmonology as directed  FU with [facility] therapist.    Test Results Pending at Discharge: None    Alix Kingsley DO  06/11/18  1:18 PM    Time: 28

## 2018-06-12 LAB
PATHOLOGIST NAME: NORMAL
SERPINA1 GENE MUT ANL BLD/T: NORMAL
SERPINA1 GENE MUT TESTED BLD/T: NORMAL

## 2018-06-12 NOTE — PAYOR COMM NOTE
"Samantha Solomon (42 y.o. Female)     Date of Birth Social Security Number Address Home Phone MRN    1975  [address]  [facility] [address] [phone] [id number]    [place] Marital Status          None        Admission Date Admission Type Admitting Provider Attending Provider Department, Room/Bed    6/4/18 Emergency Alix Kingsley DO  [facility] 4C, 480/1    Discharge Date Discharge Disposition Discharge Destination        6/11/2018 Home or Self Care              Attending Provider:  (none)   Allergies:  Morphine, Aspirin, Morphine And Related, Nsaids, Quetiapine, Quetiapine Fumarate, Salicylates, Codeine    Isolation:  None   Infection:  None   Code Status:  Prior    Ht:  148.1 cm (58.3\")   Wt:  89.9 kg (198 lb 3.2 oz)    Admission Cmt:  None   Principal Problem:  None                Active Insurance as of 6/4/2018     Primary Coverage     Payor Plan Insurance Group Employer/Plan Group    MEDICARE MEDICARE A & B      Payor Plan Address Payor Plan Phone Number Effective From Effective To    [address] [phone] 4/1/2013     [facility] [id number]       Subscriber Name Subscriber Birth Date Member ID       SAMANTHA SOLOMON 1975 [id number]           Secondary Coverage     Payor Plan Insurance Group Employer/Plan Group    [employer] [place]ICAID      Payor Plan Address Payor Plan Phone Number Effective From Effective To    [address] [address] [phone] 9/19/2016     [facility] [id number]       Subscriber Name Subscriber Birth Date Member ID       SAMANTHA SOLOMON 1975 [id number]                 Emergency Contacts      (Rel.) Home Phone Work Phone Mobile Phone    Delmy Del Rio (Daughter) [phone] -- --        Discharge Summaries  Date of Service: 6/11/2018 1:18 PM  Alix Kingsley DO   Medicine      []Manual[]Template  []Copied       [facility] Medicine Services  DISCHARGE SUMMARY         Date of Admission: " "6/4/2018  Date of Discharge:  6/11/2018  Primary Care Physician: Neel Valencia Jr., MD     Presenting Problem/History of Present Illness:  Oxygen dependent [Z99.81]        Final Discharge Diagnoses:      Hospital Problem List      Oxygen dependent             Consults: Pulmonology     Procedures Performed:   CTA:  IMPRESSION:  1. Motion artifact from respiration limits the study, no evidence of  pulmonary embolism is identified.  2. Diffuse coarse groundglass infiltrates throughout both lungs probably  represents pulmonary edema, though nonconsolidating pneumonia is  certainly possible.  3. Mild right hilar lymphadenopathy.     Pertinent Test Results:           Lab Results (last 48 hours)      Procedure Component Value Units Date/Time     Protime-INR [[id number]]  (Abnormal) Collected:  06/11/18 0715     Specimen:  Blood Updated:  06/11/18 0807       Protime 20.1 (H) Seconds         INR 1.65 (H)     POC Glucose Once [[id number]]  (Abnormal) Collected:  06/10/18 1654     Specimen:  Blood Updated:  06/10/18 1726       Glucose 132 (H) mg/dL         Comment: : [id number] Erik Kilpatrick ID: [id number]        Protime-INR [[id number]]  (Abnormal) Collected:  06/10/18 0452     Specimen:  Blood Updated:  06/10/18 0537       Protime 24.4 (H) Seconds         INR 2.11 (H)                Chief Complaint on Day of Discharge: \"I don't want to go home because I don't want to put up with the bullshit.\"     History of Present Illness on Day of Discharge: Stable     Hospital Course:  The patient is a 42 y.o. female who presented to [facility] with Respiratory distress. Patient was started on IV antibiotics and steroids. BiPAP was attempted but patient refused at times because she states that she has PTSD. Patient improved. Antibiotics were changed to oral and steroids were tapered. She was given mild diuresis. She has OP appointments for her lung disease at the end of the week in [place].   Concern for frequent " "readmissions due to social situation at home.       Condition on Discharge:  Stable     Physical Exam on Discharge:  /76 (BP Location: Left arm, Patient Position: Sitting)   Pulse 94   Temp 97.4 °F (36.3 °C)   Resp 20   Ht 148.1 cm (58.3\")   Wt 89.9 kg (198 lb 3.2 oz)   SpO2 97%   BMI 41.00 kg/m²   Physical Exam   HENT:   Head: Normocephalic and atraumatic.   Nose: Nose normal.   Mouth/Throat: Oropharynx is clear and moist.   Eyes: Conjunctivae and EOM are normal.   Neck: Normal range of motion. Neck supple.   Cardiovascular: Normal rate, regular rhythm and normal heart sounds.    Pulmonary/Chest: Effort normal and breath sounds normal.   Abdominal: Soft. Bowel sounds are normal.   Central obesity   Musculoskeletal: She exhibits no edema or tenderness.   Neurological: She is alert. No cranial nerve deficit.   Skin: Skin is warm and dry.   Psychiatric: She has a normal mood and affect.   Vitals reviewed.        Discharge Disposition:  Home or Self Care     Discharge Medications:                Vianca Spencer   Home Medication Instructions KIERRA:[id number]     Printed on:06/11/18 9797   Medication Information                                       diazePAM (VALIUM) 5 MG tablet  Take 5 mg by mouth 3 (Three) Times a Day.                      FLUoxetine (PROzac) 20 MG capsule  Take 60 mg by mouth Daily.                      gabapentin (NEURONTIN) 300 MG capsule  Take 300 mg by mouth 2 (Two) Times a Day.                      gabapentin (NEURONTIN) 300 MG capsule  Take 600 mg by mouth Every Night.                      guaiFENesin (MUCINEX) 600 MG 12 hr tablet  Take 2 tablets by mouth Every 12 (Twelve) Hours.                      ipratropium-albuterol (DUO-NEB) 0.5-2.5 mg/mL nebulizer  Take 3 mL by nebulization 4 (Four) Times a Day.                      levothyroxine (SYNTHROID, LEVOTHROID) 112 MCG tablet  Take 112 mcg by mouth Daily.                      lithium carbonate 300 MG capsule  Take 300 mg by mouth " Every Night.                      nicotine (NICODERM CQ) 21 MG/24HR patch  Place 1 patch on the skin Daily.                      omeprazole (priLOSEC) 20 MG capsule  Take 20 mg by mouth Daily.                      oxyCODONE-acetaminophen (PERCOCET)  MG per tablet  Take 1 tablet by mouth 3 (Three) Times a Day As Needed for Moderate Pain .                      predniSONE (DELTASONE) 10 MG tablet  Take 3 tablets by mouth 2 (Two) Times a Day With Meals.                      PROAIR  (90 BASE) MCG/ACT inhaler  Inhale 2 puffs Every 4 (Four) Hours As Needed for Wheezing or Shortness of Air.                      promethazine (PHENERGAN) 25 MG tablet  Take 25 mg by mouth 2 (Two) Times a Day As Needed for Nausea or Vomiting.                      warfarin (COUMADIN) 5 MG tablet  Take 1 tablet by mouth 3 (Three) Times a Week. Daily                      zolpidem (AMBIEN) 10 MG tablet  Take 10 mg by mouth Every Night.                            Discharge Diet:       Diet Instructions      Advance Diet As Tolerated                Activity at Discharge:       Activity Instructions      Activity as Tolerated                Discharge Care Plan/Instructions:  FU in [place] as directed.  Return if worsens.  Continue home 02 as directed.      Follow-up Appointments:   No future appointments.  PCP and pulmonology as directed  FU with [facility] therapist.     Test Results Pending at Discharge: None     Alix Kingsley DO  06/11/18  1:18 PM     Time: 28                         Note shared with patient       Discharge Order     Start     Ordered    06/11/18 1317  Discharge patient  Once     Expected Discharge Date:  06/11/18    Discharge Disposition:  Home or Self Care    Physician of Record:  ALIX KINGSLEY [[id number]]    Physician of Record selection review needed?:  No       Question Answer Comment   Physician of Record ALIX KINGSLEY    Physician of Record selection review needed? No        06/11/18 2067

## 2018-06-20 LAB
FUNGUS WND CULT: NORMAL
MYCOBACTERIUM SPEC CULT: NORMAL
NIGHT BLUE STAIN TISS: NORMAL
NIGHT BLUE STAIN TISS: NORMAL

## 2019-10-25 NOTE — CONSULTS
PULMONARY & CRITICAL CARE CONSULT - [facility]    18, 2:34 PM  Patient Care Team:  Neel Valencia Jr., MD as PCP - General (Family Medicine)  Devonte Amaya MD as Consulting Physician (Urology)  Name: Vianca Spencer  : 1975  MRN: [id number]  Contact Serial Number [id number]    Chief complaint: Acute respiratory failure    HPI:  We have been consulted by Alix Kingsley DO to see this 42 y.o. female born on 1975.  Patient is currently wearing a bipap mask and her mentation is alerted to the degree that she is not able to give reliable information. When attempting to assess the her she kept repeating that she wanted to be left alone and wanted something to help her sleep. Information has been obtained from her chart and indicates that she initially came in with complaints of fever, cough and SOA for a couple of days. She was afebrile on arrival with an O2 sat of 78% on RA. Currently she is on bipap 14/6 and 65%. She is known to our practice for two admission in 2018 with similar complaints. The first she was diagnosed with influenza and spend several days on the ventilator. She had bilateral infiltrates at that time that were felt to be from the influenza. She was treated and told not to smoke and a follow-up was made that she did not keep. She returned again at the beginning of last month with similar complaints and imaging was unchanged from imaging when she had influenza. Again at that time she required high flow oxygen to sustain. She reported at that time she was told by her PCP, Dr. Abreu several years ago that she had end stage COPD and fibrosis and she wouldn't live very long. It was decided that she undergo a biopsy of her lung which she did. It was performed by Dr. Moctezuma. She tolerated well and high flow O2 was weaned down. Again she was encouraged not to smoke and was discharged home to follow up for the results which she did not keep. She was again  well enough that she did not qualify for oxygen when she was discharged with ambulation. Our office did contact her to reschedule to discuss results. She was due to see Dr. Ba today. Results indicate and acute organizing pneumonia versus immunologic capillaritis versus vasculitis. She has never had hemoptysis but has had recurrent hematuria reported per multiple urinalysis testing since 2015. There is no family present with her today. No other aggravating, alleviating factors or associated symptoms.    Past Medical History:  Past Medical History:   Diagnosis Date   • Acute retention of urine    • Anxiety and depression    • Bipolar 1 disorder    • COPD (chronic obstructive pulmonary disease)    • DVT (deep venous thrombosis)    • GERD (gastroesophageal reflux disease)    • Hypoglycemia    • Insomnia    • PTSD (post-traumatic stress disorder)      Past Surgical History:   Procedure Laterality Date   • ABDOMINOPLASTY     • APPENDECTOMY     • BRONCHOSCOPY N/A 2/2/2018    Procedure: BRONCHOSCOPY AT BEDSIDE;  Surgeon: Earnest Ba MD;  Location: [facility] ENDOSCOPY;  Service:    • CHOLECYSTECTOMY     • ENDOSCOPY N/A 1/29/2018    Procedure: ESOPHAGOGASTRODUODENOSCOPY WITH ANESTHESIA;  Surgeon: Aime Reyna MD;  Location: [facility] ENDOSCOPY;  Service:    • GASTRIC BYPASS     • HYSTERECTOMY     • THORACOSCOPY Left 5/9/2018    Procedure: BRONCHOSCOPY, LEFT VIDEO ASSISTED THORACOSCOPY, LEFT LOWER LOBE LUNG BIOPSY;  Surgeon: Case Moctezuma MD;  Location: [facility] OR;  Service: Cardiothoracic     Allergies   Allergen Reactions   • Morphine Anaphylaxis   • Aspirin Other (See Comments)     Child allergy, unsure what reaction  Child allergy, unsure what reaction   • Morphine And Related    • Nsaids    • Quetiapine Other (See Comments)     Muscle spasms  Muscle spasms   • Quetiapine Fumarate    • Salicylates    • Codeine Rash     Medications:    diazePAM 5 mg Oral TID   FLUoxetine 60 mg Oral Daily   furosemide 20 mg  Intravenous Daily   gabapentin 300 mg Oral BID   gabapentin 600 mg Oral Nightly   guaiFENesin 1,200 mg Oral Q12H   ipratropium-albuterol 3 mL Nebulization 4x Daily - RT   levoFLOXacin 500 mg Intravenous Q24H   levothyroxine 112 mcg Oral QAM AC   lithium carbonate 300 mg Oral Nightly   methylPREDNISolone sodium succinate 40 mg Intravenous Q12H   nicotine 1 patch Transdermal Q24H   pantoprazole 40 mg Oral QAM   [START ON 6/6/2018] warfarin 5 mg Oral Once per day on Mon Wed Fri        Family History:  Family History   Problem Relation Age of Onset   • Cancer Mother    • Cancer Father    • HIV Brother      Social History:   reports that she has been smoking Cigarettes.  She has been smoking about 0.25 packs per day. She has never used smokeless tobacco. She reports that she does not drink alcohol or use drugs.  Review of Systems:  Review of Systems   Unable to perform ROS: Mental status change    Bipap on and pt is agitated and confused    Physical Exam:  Temp:  [97.2 °F (36.2 °C)-98.7 °F (37.1 °C)] 97.8 °F (36.6 °C)  Heart Rate:  [] 95  Resp:  [18-24] 20  BP: ()/(49-86) 95/60  FiO2 (%):  [65 %] 65 %  Intake/Output Summary (Last 24 hours) at 06/04/18 1434  Last data filed at 06/04/18 1120   Gross per 24 hour   Intake              100 ml   Output              300 ml   Net             -200 ml     1    06/04/18  0408 06/04/18  1120   Weight: 85.7 kg (189 lb) 86 kg (189 lb 8 oz)     SpO2 Percentage    06/04/18 0937 06/04/18 1120 06/04/18 1155   SpO2: 92% 93% 91%     Physical Exam   Constitutional: She appears well-developed and well-nourished. No distress. Face mask in place.   HENT:   Head: Normocephalic and atraumatic.   Right Ear: External ear normal.   Left Ear: External ear normal.   Eyes: Conjunctivae and EOM are normal. Pupils are equal, round, and reactive to light. Right eye exhibits no discharge. Left eye exhibits no discharge.   Neck: Normal range of motion. Neck supple. No JVD present.    Cardiovascular: Normal rate and regular rhythm.    No murmur heard.  Pulmonary/Chest: Tachypnea noted. She has decreased breath sounds. She has rales.   Abdominal: Soft. Bowel sounds are normal. She exhibits no distension.   Musculoskeletal: Normal range of motion. She exhibits no edema or deformity.   Skin: Skin is warm and dry. She is not diaphoretic. No erythema. No pallor.   Psychiatric: She is agitated.   Difficult to assess due to bipap and confusion   Nursing note and vitals reviewed.      Results from last 7 days  Lab Units 06/04/18  0415   WBC 10*3/mm3 15.00*   HEMOGLOBIN g/dL 11.5*   PLATELETS 10*3/mm3 268       Results from last 7 days  Lab Units 06/04/18  0415   SODIUM mmol/L 138   POTASSIUM mmol/L 4.4   CO2 mmol/L 28.0   BUN mg/dL 14   CREATININE mg/dL 0.92   GLUCOSE mg/dL 145*       Results from last 7 days  Lab Units 06/04/18  0857 06/04/18  0417   PH, ARTERIAL pH units 7.342* 7.351   PCO2, ARTERIAL mm Hg 46.8* 45.2*   PO2 ART mm Hg 172.0* 109.0*   FIO2 % 65  --      Lab Results   Component Value Date    PROBNP 1,880.0 (H) 06/04/2018        Recent radiology:   Imaging Results (last 72 hours)     Procedure Component Value Units Date/Time    CT Angiogram Chest With Contrast [[id number]] Collected:  06/04/18 0751     Updated:  06/04/18 0802    Narrative:       EXAMINATION: CT ANGIOGRAM CHEST W CONTRAST- 6/4/2018 7:51 AM CDT     HISTORY: Shortness of breath; Z99.81-Dependence on supplemental oxygen     DOSE: 495 mGycm (Automatic exposure control technique was implemented in  an effort to keep the radiation dose as low as possible without  compromising image quality)     REPORT: Spiral CT of the chest was performed after administration of  intravenous contrast using CTA protocol, which includes reconstructed  coronal and sagittal images.     COMPARISON: High-resolution chest CT 5/8/2018, previous CT angiogram of  the chest 1/1/2016.     The contrast bolus is satisfactory. There is mild respiratory  motion  artifact. No filling defects are seen in the pulmonary arteries. The  thoracic aorta is normal in caliber and there is no evidence of aortic  dissection. There is mild right hilar lymphadenopathy measuring 1.6 cm  in short axis diameter. Shotty left hilar lymph nodes are present. No  measurable mediastinal lymphadenopathy is seen. Lung windows demonstrate  diffuse coarse airspace infiltrates without consolidation. No pleural  effusion or pneumothorax is identified. There is a collapsed breast  implant on the right. This is stable. In the upper abdomen, no acute  abnormalities identified. Previous cholecystectomy is noted. There is  also evidence of previous gastric bypass surgery. Review of bone windows  is unremarkable.       Impression:       1. Motion artifact from respiration limits the study, no evidence of  pulmonary embolism is identified.  2. Diffuse coarse groundglass infiltrates throughout both lungs probably  represents pulmonary edema, though nonconsolidating pneumonia is  certainly possible.  3. Mild right hilar lymphadenopathy.  This report was finalized on 06/04/2018 07:59 by Dr. Matt Herndon MD.    XR Chest 1 View [[id number]] Collected:  06/04/18 0642     Updated:  06/04/18 0647    Narrative:       EXAMINATION:   XR CHEST 1 VW-  6/4/2018 6:42 AM CDT     HISTORY: Hypoxia     Frontal upright radiograph of the chest 6/4/2018 4:52 AM CDT     COMPARISON: May 12, 2018.     FINDINGS:   Interstitial air space filling infiltrates present in the right lung.  Interstitial infiltrates present left lung. This may be pulmonary edema.  However pneumonia cannot be excluded. Cardiac silhouettes upper limits  of normal.      The osseous structures and surrounding soft tissues demonstrate no acute  abnormality.       Impression:       1. Bilateral interstitial and air space filling infiltrates. Most likely  this is pulmonary edema..        This report was finalized on 06/04/2018 06:44 by Dr. Zach Pham  MD.        My radiograph interpretation/independent review of imaging: bilateral infiltrates consistent with imagine from recent CT last month    Other test results (not lab or imaging): 518    · Left ventricular systolic function is normal. Estimated ejection fraction is 61-65%.  · Left ventricular diastolic function is normal.  · Normal right ventricular cavity size and systolic function noted.  · There is no significant (greater than mild) valvular dysfunction.    Independent review of ekg: ST, rate 113, QTc 477    Patient Active Problem List   Diagnosis   • Fe deficiency anemia   • Anemia   • Anxiety   • Biphasic positive airway pressure dependence   • Chronic pain   • Chronic obstructive pulmonary disease   • Depression   • Gastroesophageal reflux disease   • Bariatric surgery status   • Hypercoagulable state   • Opioid dependence   • Obstructive sleep apnea syndrome   • Seizure disorder   • Coagulation disorder   • BiPAP (biphasic positive airway pressure) dependence   • Bipolar disorder   • COPD (chronic obstructive pulmonary disease)   • Essential tremor   • GERD (gastroesophageal reflux disease)   • History of Stefanie-en-Y gastric bypass   • Hypercoagulopathy   • Incisional hernia   • 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, intract, 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   • Morbid obesity due to excess calories   • Narcotic dependence   • Obstructive sleep apnea   • Warfarin-induced coagulopathy   • Multiple falls   • Upper GI bleed   • Pneumonia of right lung due to infectious organism   • Respiratory distress   • Lung disease, interstitial   • Hypoxia   • Chronic respiratory failure   • Oxygen dependent     Pulmonary Assessment:  New problem (to me), with additional workup planned: Acute/Chronic hypoxemic respiratory failure    New problem (to me), no additional workup planned: Bipolar disorder, defer to attending    Other problems either stable, failing to improve or worsenin. Personal history of nicotine  dependency  2. Bilateral infiltrates  3. Recent wedge resection, positive for acute organizing pneumonia versus immunologic capillaritis versus vasculitis  4. Hematuria  5. Hx of DVT, on coumadin  6. Leukocytosis, on steroids  7. Elevated BNP, mild    Recommend/plan:     · Continue IV steroids at current dose  · Bronchodilators  · Bipap as needed, otherwise supplemental oxygen for sat above 90%. She has required high flow oxygen on previous admissions  · Abx per attending  · Diuresis per attending  · Add JOSE ALFREDO, ANCA, RA, sed rate, Alpha 1  · Make sure staff collects the urine sample that has been ordered to assess for hematuria.   · Will need referral to the interstitial lung disease clinic outpatient once she recovers    Electronically signed by PARIS Del Rio, 06/04/18, 2:34 PM    Physician substantive contribution:  Pertinent symptoms/interval history include: pt with recent dx of nonspecific pulmonary disease on open lung biopsy with suggestion of capillaritis/vasculitis.  Pt complains of insomnia  Respiratory exam shows pertinent findings of:diminished breath sounds, crackles.  Plan includes: continue workup for connective tissue disorder, may need help from ILD clinic.  I have seen and examined patient personally, performing a face-to-face diagnostic evaluation with plan of care reviewed and developed with APRN and nursing staff. I have addended and/or modified the above history of present illness, physical examination, and assessment and plan to reflect my findings and impressions. Essential elements of the care plan were discussed with APRN above.  Agree with findings and assessment/plan as documented above.    Electronically signed by Earnest Ba MD, on 6/4/2018, 6:56 PM          intact

## 2023-06-13 NOTE — TELEPHONE ENCOUNTER
Spoke with pt, informed of results. Pt voiced understanding.   Will contact when rx is sent in.    Adbry Pregnancy And Lactation Text: It is unknown if this medication will adversely affect pregnancy or breast feeding.

## (undated) DEVICE — SPONGE,LAP,12"X12",XR,ST,5/PK,40PK/CS: Brand: MEDLINE

## (undated) DEVICE — ENDOGATOR AUXILIARY WATER JET CONNECTOR: Brand: ENDOGATOR

## (undated) DEVICE — CUFF,BP,DISP,1 TUBE,ADULT,HP: Brand: MEDLINE

## (undated) DEVICE — SENSR O2 OXIMAX FNGR A/ 18IN NONSTR

## (undated) DEVICE — TRAP,MUCUS SPECIMEN, 80CC: Brand: MEDLINE

## (undated) DEVICE — THE ECHELON FLEX POWERED PLUS ARTICULATING ENDOSCOPIC LINEAR CUTTERS ARE STERILE, SINGLE PATIENT USE INSTRUMENTS THAT SIMULTANEOUSLYCUT AND STAPLE TISSUE. THERE ARE SIX STAGGERED ROWS OF STAPLES, THREE ON EITHER SIDE OF THE CUT LINE. THE ECHELON FLEX 45 POWERED PLUSINSTRUMENTS HAVE A STAPLE LINE THAT IS APPROXIMATELY 45 MM LONG AND A CUT LINE THAT IS APPROXIMATELY 42 MM LONG. THE SHAFT CAN ROTATE FREELYIN BOTH DIRECTIONS AND AN ARTICULATION MECHANISM ENABLES THE DISTAL PORTION OF THE SHAFT TO PIVOT TO FACILITATE LATERAL ACCESS TO THE OPERATIVESITE.THE INSTRUMENTS ARE PACKAGED WITH A PRIMARY LITHIUM BATTERY PACK THAT MUST BE INSTALLED PRIOR TO USE. THERE ARE SPECIFIC REQUIREMENTS FORDISPOSING OF THE BATTERY PACK. REFER TO THE BATTERY PACK DISPOSAL SECTION.THE INSTRUMENTS ARE PACKAGED WITHOUT A RELOAD AND MUST BE LOADED PRIOR TO USE. A STAPLE RETAINING CAP ON THE RELOAD PROTECTS THE STAPLE LEGPOINTS DURING SHIPPING AND TRANSPORTATION. THE INSTRUMENTS’ LOCK-OUT FEATURE IS DESIGNED TO PREVENT A USED OR IMPROPERLY INSTALLED RELOADFROM BEING REFIRED OR AN INSTRUMENT FROM BEING FIRED WITHOUT A RELOAD.: Brand: ECHELON FLEX

## (undated) DEVICE — SINGLE USE SUCTION VALVE MAJ-209: Brand: SINGLE USE SUCTION VALVE (STERILE)

## (undated) DEVICE — SKIN AFFIX SURG ADHESIVE 72/CS 0.55ML: Brand: MEDLINE

## (undated) DEVICE — SUT SILK 2/0 FS BLK 18IN 685G

## (undated) DEVICE — SUT SILK 2/0 SUTUPAK TIES 24IN SA75H

## (undated) DEVICE — TBG SMPL FLTR LINE NASL 02/C02 A/ BX/100

## (undated) DEVICE — SUT PROLN 4/0 RB1 D/A 36IN 8557H

## (undated) DEVICE — ANTIBACTERIAL UNDYED BRAIDED (POLYGLACTIN 910), SYNTHETIC ABSORBABLE SUTURE: Brand: COATED VICRYL

## (undated) DEVICE — GLV SURG BIOGEL LTX PF 6 1/2

## (undated) DEVICE — SPNG GZ WOVN 4X4IN 12PLY 10/BX STRL

## (undated) DEVICE — SINGLE USE BIOPSY VALVE MAJ-210: Brand: SINGLE USE BIOPSY VALVE (STERILE)

## (undated) DEVICE — UTILITY MARKER W/MED LABELS: Brand: MEDLINE

## (undated) DEVICE — PAD MAJOR: Brand: MEDLINE INDUSTRIES, INC.

## (undated) DEVICE — DEFOGGER!" ANTI FOG KIT: Brand: DEROYAL

## (undated) DEVICE — THE CHANNEL CLEANING BRUSH IS A NYLON FLEXI BRUSH ATTACHED TO A FLEXIBLE PLASTIC SHEATH DESIGNED TO SAFELY REMOVE DEBRIS FROM FLEXIBLE ENDOSCOPES.

## (undated) DEVICE — CVR HNDL LIGHT RIGID

## (undated) DEVICE — CLTH CLENS READYCLEANSE PERI CARE PK/5

## (undated) DEVICE — GLV SURG TRIUMPH MICRO PF LTX 7.5 STRL

## (undated) DEVICE — APPL CHLORAPREP W/TINT 26ML ORNG

## (undated) DEVICE — CONTAINER,SPECIMEN,OR STERILE,4OZ: Brand: MEDLINE

## (undated) DEVICE — 3M™ IOBAN™ 2 ANTIMICROBIAL INCISE DRAPE 6651EZ: Brand: IOBAN™ 2

## (undated) DEVICE — DISSCT ENDO

## (undated) DEVICE — ELECTRODE,ECG,STRESS,FOAM,50PK: Brand: MEDLINE

## (undated) DEVICE — SUT VIC 2/0 UR6 27IN VCP602H

## (undated) DEVICE — CONMED SCOPE SAVER BITE BLOCK, 20X27 MM: Brand: SCOPE SAVER

## (undated) DEVICE — 28 FR STRAIGHT – SOFT PVC CATHETER: Brand: PVC THORACIC CATHETERS

## (undated) DEVICE — PK TURNOVER RM ADV

## (undated) DEVICE — TOWEL,OR,DSP,ST,BLUE,DLX,10/PK,8PK/CS: Brand: MEDLINE

## (undated) DEVICE — DRAPE,UTILITY,TAPE,15X26,STERILE: Brand: MEDLINE

## (undated) DEVICE — ENDOPATH XCEL BLUNT TIP TROCARS WITH SMOOTH SLEEVES: Brand: ENDOPATH XCEL

## (undated) DEVICE — SUT SILK 3/0 SUTUPAK TIES 24IN SA74H

## (undated) DEVICE — DRSNG TELFA PAD NONADH STR 1S 3X8IN

## (undated) DEVICE — COVER,MAYO STAND,STERILE: Brand: MEDLINE

## (undated) DEVICE — Device: Brand: DEFENDO AIR/WATER/SUCTION AND BIOPSY VALVE